# Patient Record
Sex: FEMALE | Race: BLACK OR AFRICAN AMERICAN | NOT HISPANIC OR LATINO | Employment: FULL TIME | URBAN - METROPOLITAN AREA
[De-identification: names, ages, dates, MRNs, and addresses within clinical notes are randomized per-mention and may not be internally consistent; named-entity substitution may affect disease eponyms.]

---

## 2017-03-02 ENCOUNTER — TELEPHONE (OUTPATIENT)
Dept: FAMILY MEDICINE | Facility: CLINIC | Age: 37
End: 2017-03-02

## 2017-03-02 DIAGNOSIS — E78.5 DYSLIPIDEMIA (HIGH LDL; LOW HDL): ICD-10-CM

## 2017-03-02 DIAGNOSIS — E66.01 MORBID OBESITY, UNSPECIFIED OBESITY TYPE: Primary | ICD-10-CM

## 2017-03-02 DIAGNOSIS — E88.810 METABOLIC SYNDROME: ICD-10-CM

## 2017-03-02 NOTE — TELEPHONE ENCOUNTER
----- Message from Nae Magana sent at 3/2/2017 10:07 AM CST -----  Pt called and stated she would like labs before her 03/10/17 appt.

## 2017-03-03 ENCOUNTER — LAB VISIT (OUTPATIENT)
Dept: LAB | Facility: HOSPITAL | Age: 37
End: 2017-03-03
Attending: FAMILY MEDICINE
Payer: COMMERCIAL

## 2017-03-03 DIAGNOSIS — E88.810 METABOLIC SYNDROME: ICD-10-CM

## 2017-03-03 DIAGNOSIS — E78.5 DYSLIPIDEMIA (HIGH LDL; LOW HDL): ICD-10-CM

## 2017-03-03 DIAGNOSIS — E66.01 MORBID OBESITY, UNSPECIFIED OBESITY TYPE: ICD-10-CM

## 2017-03-03 LAB
ALBUMIN SERPL BCP-MCNC: 3.5 G/DL
ALP SERPL-CCNC: 57 U/L
ALT SERPL W/O P-5'-P-CCNC: 12 U/L
ANION GAP SERPL CALC-SCNC: 6 MMOL/L
AST SERPL-CCNC: 17 U/L
BASOPHILS # BLD AUTO: 0.04 K/UL
BASOPHILS NFR BLD: 0.8 %
BILIRUB SERPL-MCNC: 0.5 MG/DL
BUN SERPL-MCNC: 14 MG/DL
CALCIUM SERPL-MCNC: 8.7 MG/DL
CHLORIDE SERPL-SCNC: 105 MMOL/L
CO2 SERPL-SCNC: 27 MMOL/L
CREAT SERPL-MCNC: 0.8 MG/DL
DIFFERENTIAL METHOD: ABNORMAL
EOSINOPHIL # BLD AUTO: 0.2 K/UL
EOSINOPHIL NFR BLD: 3.2 %
ERYTHROCYTE [DISTWIDTH] IN BLOOD BY AUTOMATED COUNT: 13.1 %
EST. GFR  (AFRICAN AMERICAN): >60 ML/MIN/1.73 M^2
EST. GFR  (NON AFRICAN AMERICAN): >60 ML/MIN/1.73 M^2
GLUCOSE SERPL-MCNC: 91 MG/DL
HCT VFR BLD AUTO: 35 %
HGB BLD-MCNC: 11.9 G/DL
LYMPHOCYTES # BLD AUTO: 1.4 K/UL
LYMPHOCYTES NFR BLD: 28.6 %
MCH RBC QN AUTO: 29.5 PG
MCHC RBC AUTO-ENTMCNC: 34 %
MCV RBC AUTO: 87 FL
MONOCYTES # BLD AUTO: 0.5 K/UL
MONOCYTES NFR BLD: 10.2 %
NEUTROPHILS # BLD AUTO: 2.7 K/UL
NEUTROPHILS NFR BLD: 57 %
PLATELET # BLD AUTO: 267 K/UL
PMV BLD AUTO: 10.3 FL
POTASSIUM SERPL-SCNC: 3.9 MMOL/L
PROT SERPL-MCNC: 7.1 G/DL
RBC # BLD AUTO: 4.03 M/UL
SODIUM SERPL-SCNC: 138 MMOL/L
T4 FREE SERPL-MCNC: 1.07 NG/DL
TSH SERPL DL<=0.005 MIU/L-ACNC: 2.13 UIU/ML
WBC # BLD AUTO: 4.72 K/UL

## 2017-03-03 PROCEDURE — 80053 COMPREHEN METABOLIC PANEL: CPT

## 2017-03-03 PROCEDURE — 85025 COMPLETE CBC W/AUTO DIFF WBC: CPT

## 2017-03-03 PROCEDURE — 84443 ASSAY THYROID STIM HORMONE: CPT

## 2017-03-03 PROCEDURE — 84439 ASSAY OF FREE THYROXINE: CPT

## 2017-03-03 PROCEDURE — 80061 LIPID PANEL: CPT

## 2017-03-03 PROCEDURE — 36415 COLL VENOUS BLD VENIPUNCTURE: CPT | Mod: PO

## 2017-03-03 PROCEDURE — 83036 HEMOGLOBIN GLYCOSYLATED A1C: CPT

## 2017-03-03 RX ORDER — FLUTICASONE PROPIONATE 50 UG/1
SPRAY, METERED NASAL
Qty: 16 G | Refills: 5 | Status: SHIPPED | OUTPATIENT
Start: 2017-03-03

## 2017-03-04 LAB
ESTIMATED AVG GLUCOSE: 108 MG/DL
HBA1C MFR BLD HPLC: 5.4 %

## 2017-03-08 LAB
CHOLEST SERPL-MCNC: 189 MG/DL (ref 100–199)
HDL SERPL QN: 9.2 NM
HDL SERPL QN: <25 NM
HDL SERPL-SCNC: 20.9 UMOL/L
HDLC SERPL-MCNC: 39 MG/DL
HLD.LARGE SERPL-SCNC: 4.2 UMOL/L
LDL MED SERPL QN: 21.7 NM
LDL SERPL QN: 21.7 NM
LDL SERPL-SCNC: 1601 NMOL/L
LDL SMALL SERPL-SCNC: 427 NMOL/L
LDL SMALL SERPL-SCNC: 427 NMOL/L
LDLC SERPL CALC-MCNC: 140 MG/DL (ref 0–99)
TRIGL SERPL-MCNC: 49 MG/DL (ref 0–149)
VLDL LARGE SERPL-SCNC: <0.8 NMOL/L
VLDL SERPL QN: ABNORMAL NM

## 2017-03-10 ENCOUNTER — OFFICE VISIT (OUTPATIENT)
Dept: FAMILY MEDICINE | Facility: CLINIC | Age: 37
End: 2017-03-10
Attending: FAMILY MEDICINE
Payer: COMMERCIAL

## 2017-03-10 VITALS
BODY MASS INDEX: 38.84 KG/M2 | DIASTOLIC BLOOD PRESSURE: 74 MMHG | WEIGHT: 233.13 LBS | HEART RATE: 66 BPM | SYSTOLIC BLOOD PRESSURE: 118 MMHG | OXYGEN SATURATION: 99 % | HEIGHT: 65 IN

## 2017-03-10 DIAGNOSIS — Z91.89 FRAMINGHAM CARDIAC RISK <10% IN NEXT 10 YEARS: ICD-10-CM

## 2017-03-10 DIAGNOSIS — E78.5 DYSLIPIDEMIA (HIGH LDL; LOW HDL): ICD-10-CM

## 2017-03-10 DIAGNOSIS — E88.810 METABOLIC SYNDROME: ICD-10-CM

## 2017-03-10 DIAGNOSIS — L21.9 SEBORRHEIC ECZEMA OF SCALP: ICD-10-CM

## 2017-03-10 DIAGNOSIS — E66.9 NON MORBID OBESITY, UNSPECIFIED OBESITY TYPE: ICD-10-CM

## 2017-03-10 DIAGNOSIS — Z00.00 ROUTINE GENERAL MEDICAL EXAMINATION AT A HEALTH CARE FACILITY: Primary | ICD-10-CM

## 2017-03-10 DIAGNOSIS — Z97.5 IUD (INTRAUTERINE DEVICE) IN PLACE: ICD-10-CM

## 2017-03-10 PROCEDURE — 99395 PREV VISIT EST AGE 18-39: CPT | Mod: S$GLB,,, | Performed by: FAMILY MEDICINE

## 2017-03-10 PROCEDURE — 99999 PR PBB SHADOW E&M-EST. PATIENT-LVL III: CPT | Mod: PBBFAC,,, | Performed by: FAMILY MEDICINE

## 2017-03-10 PROCEDURE — 3078F DIAST BP <80 MM HG: CPT | Mod: S$GLB,,, | Performed by: FAMILY MEDICINE

## 2017-03-10 PROCEDURE — 3074F SYST BP LT 130 MM HG: CPT | Mod: S$GLB,,, | Performed by: FAMILY MEDICINE

## 2017-03-10 NOTE — MR AVS SNAPSHOT
[facility]  [address] [place] [address]  [address]  [facility] [address]  Phone: [phone]                  Kristofer Gentile   3/10/2017 8:20 AM   Office Visit    Description:  Female : 1980   Provider:  Albino Billings Jr., MD   Department:  [facility]           Reason for Visit     Annual Exam           Diagnoses this Visit        Comments    Routine general medical examination at a health care facility    -  Primary     Metabolic syndrome         Dyslipidemia (high LDL; low HDL)         IUD (intrauterine device) in place         Non morbid obesity, unspecified obesity type         BMI 38.0-38.9,adult         Kendall cardiac risk <10% in next 10 years         Seborrheic eczema of scalp                To Do List           Goals (5 Years of Data)     None      [facility] On [facility]     [facility] On Call Nurse Care Line -  Assistance  Registered nurses in the [facility] On Call Center provide clinical advisement, health education, appointment booking, and other advisory services.  Call for this free service at [phone].             Medications           Message regarding Medications     Verify the changes and/or additions to your medication regime listed below are the same as discussed with your clinician today.  If any of these changes or additions are incorrect, please notify your healthcare provider.        STOP taking these medications     fexofenadine (ALLEGRA) 180 MG tablet Take 1 tablet (180 mg total) by mouth once daily.    levocetirizine (XYZAL) 5 MG tablet Take 1 tablet (5 mg total) by mouth every evening.           Verify that the below list of medications is an accurate representation of the medications you are currently taking.  If none reported, the list may be blank. If incorrect, please contact your healthcare provider. Carry this list with you in case of emergency.           Current Medications     FLONASE ALLERGY RELIEF 50 mcg/actuation nasal spray  "SPRAY TWICE IN EACH NOSTRIL ONCE DAILY    levonorgestrel (MIRENA) 20 mcg/24 hour (5 years) IUD 1 each by Intrauterine route once.           Clinical Reference Information           Your Vitals Were     BP Pulse Height Weight SpO2 BMI    118/74 (BP Location: Left arm, Patient Position: Sitting, BP Method: Manual) 66 5' 5" (1.651 m) 105.7 kg (233 lb 1.6 oz) 99% 38.79 kg/m2      Blood Pressure          Most Recent Value    BP  118/74      Allergies as of 3/10/2017     Bactrim [Sulfamethoxazole-trimethoprim]    Sulfa (Sulfonamide Antibiotics)      Immunizations Administered on Date of Encounter - 3/10/2017     None      Orders Placed During Today's Visit      Normal Orders This Visit    Ambulatory referral to Dermatology       Language Assistance Services     ATTENTION: Language assistance services are available, free of charge. Please call [phone].      ATENCIÓN: Si habla karen, tiene a guzman disposición servicios gratuitos de asistencia lingüística. Llame al [phone].     SHEA Ý: N?u b?n nói Ti?ng Vi?t, có các d?ch v? h? tr? ngôn ng? mi?n phí dành cho b?n. G?i s? [phone].         [facility] complies with applicable Federal civil rights laws and does not discriminate on the basis of race, color, national origin, age, disability, or sex.        "

## 2017-03-10 NOTE — PROGRESS NOTES
Subjective:       Patient ID: Kristofer Gentile is a 36 y.o. female.    Chief Complaint: Annual Exam    HPI     The patient presents to the office today requesting a routine periodic health examination.    Patient Active Problem List   Diagnosis    Dyslipidemia (high LDL; low HDL)    Family history of stroke    Herpes simplex labialis    Obesity    BMI 38.0-38.9,adult    Metabolic syndrome    IUD (intrauterine device) in place       Past Surgical History:   Procedure Laterality Date    INTRAUTERINE DEVICE INSERTION  2009    again 2014         Current Outpatient Prescriptions:     FLONASE ALLERGY RELIEF 50 mcg/actuation nasal spray, SPRAY TWICE IN EACH NOSTRIL ONCE DAILY, Disp: 16 g, Rfl: 5    levonorgestrel (MIRENA) 20 mcg/24 hour (5 years) IUD, 1 each by Intrauterine route once., Disp: , Rfl:     Review of patient's allergies indicates:   Allergen Reactions    Bactrim [sulfamethoxazole-trimethoprim] Other (See Comments)     Stiffness to joints    Sulfa (sulfonamide antibiotics) Hives       Family History   Problem Relation Age of Onset    Diabetes Mother     Hypertension Mother     Rheum arthritis Mother     Coronary artery disease Mother     Stroke Mother      x2    Liver disease Mother      NAFLD    Sarcoidosis Mother     Hypertension Father        Social History     Social History    Marital status:      Spouse name: N/A    Number of children: 2    Years of education: N/A     Occupational History     [employer]     Social History Main Topics    Smoking status: Never Smoker    Smokeless tobacco: Never Used    Alcohol use 0.5 oz/week     1 drink(s) per week    Drug use: No    Sexual activity: Yes     Partners: Male     Birth control/ protection: IUD     Other Topics Concern    Not on file     Social History Narrative    The patient does exercise regularly (@ gym 2-4 days/week).    Rates diet as fair.    She is not satisfied with weight.    She does not drink at least  "1/2 gallon water daily.    She drinks 1 coffee/tea/caffeine-containing soft drinks daily.    Total sleep time at night is 4-6 hours.    She works 40 hours per week.    She does wear seat belts.    Hobbies include none.                               OB History      Para Term  AB TAB SAB Ectopic Multiple Living    2 2 2               Obstetric Comments    Menarche age 14.  No menses on Mirena.  No history of abnormal PAP smear.          Patient Care Team:  Albino Billings Jr., MD as PCP - General (Family Medicine)  Anastsaiya Ellis MD as Obstetrician (Obstetrics)      Review of Systems   Constitutional: Negative for fatigue and unexpected weight change.   HENT: Negative for ear discharge, ear pain, hearing loss, tinnitus and voice change.    Respiratory: Negative for cough and shortness of breath.    Cardiovascular: Negative for chest pain, palpitations and leg swelling.   Gastrointestinal: Positive for constipation. Negative for abdominal pain, blood in stool, diarrhea, nausea and vomiting.   Genitourinary: Negative for difficulty urinating, dyspareunia, dysuria, frequency and hematuria.   Musculoskeletal: Negative for arthralgias, back pain and myalgias.   Skin: Negative for rash.   Neurological: Positive for headaches (mild). Negative for dizziness, weakness and light-headedness.   Hematological: Does not bruise/bleed easily.   Psychiatric/Behavioral: Positive for sleep disturbance (early awakenings; 2/t "stress"?). Negative for dysphoric mood. The patient is not nervous/anxious.          Lab Visit on 2017   Component Date Value Ref Range Status    LDL Particle Number by NMR 2017 1601* <1000 nmol/L Final    Lipids, LDL Cholesterol 2017 140* 0 - 99 mg/dL Final    Lipids, HDL Cholesterol 2017 39* >39 mg/dL Final    Lipids, Triglycerides 2017 49  0 - 149 mg/dL Final    Lipids, Total Cholesterol 2017 189  100 - 199 mg/dL Final    HDL Particle Number 2017 20.9* " >=30.5 umol/L Final    Small LDL Particle 03/03/2017 427  <=527 nmol/L Final    LDL Particle Size 03/03/2017 21.7  >20.5 nm Final    Large VLDL Particle Number 03/03/2017 <0.8  <=2.7 nmol/L Final    Small LDL Particle Number 03/03/2017 427  <=527 nmol/L Final    Large HDL Particle Number 03/03/2017 4.2* >=4.8 umol/L Final    VLDL Size 03/03/2017 Not Applicable  nm Final    NMR LDL Size 03/03/2017 21.7  >=20.8 nm Final    HDL Size 03/03/2017 9.2  >=9.2 nm Final    LP Insulin Resistance Score 03/03/2017 <25  <=45 Final    WBC 03/03/2017 4.72  3.90 - 12.70 K/uL Final    RBC 03/03/2017 4.03  4.00 - 5.40 M/uL Final    Hemoglobin 03/03/2017 11.9* 12.0 - 16.0 g/dL Final    Hematocrit 03/03/2017 35.0* 37.0 - 48.5 % Final    MCV 03/03/2017 87  82 - 98 fL Final    MCH 03/03/2017 29.5  27.0 - 31.0 pg Final    MCHC 03/03/2017 34.0  32.0 - 36.0 % Final    RDW 03/03/2017 13.1  11.5 - 14.5 % Final    Platelets 03/03/2017 267  150 - 350 K/uL Final    MPV 03/03/2017 10.3  9.2 - 12.9 fL Final    Gran # 03/03/2017 2.7  1.8 - 7.7 K/uL Final    Lymph # 03/03/2017 1.4  1.0 - 4.8 K/uL Final    Mono # 03/03/2017 0.5  0.3 - 1.0 K/uL Final    Eos # 03/03/2017 0.2  0.0 - 0.5 K/uL Final    Baso # 03/03/2017 0.04  0.00 - 0.20 K/uL Final    Gran% 03/03/2017 57.0  38.0 - 73.0 % Final    Lymph% 03/03/2017 28.6  18.0 - 48.0 % Final    Mono% 03/03/2017 10.2  4.0 - 15.0 % Final    Eosinophil% 03/03/2017 3.2  0.0 - 8.0 % Final    Basophil% 03/03/2017 0.8  0.0 - 1.9 % Final    Differential Method 03/03/2017 Automated   Final    Sodium 03/03/2017 138  136 - 145 mmol/L Final    Potassium 03/03/2017 3.9  3.5 - 5.1 mmol/L Final    Chloride 03/03/2017 105  95 - 110 mmol/L Final    CO2 03/03/2017 27  23 - 29 mmol/L Final    Glucose 03/03/2017 91  70 - 110 mg/dL Final    BUN, Bld 03/03/2017 14  6 - 20 mg/dL Final    Creatinine 03/03/2017 0.8  0.5 - 1.4 mg/dL Final    Calcium 03/03/2017 8.7  8.7 - 10.5 mg/dL Final     Total Protein 03/03/2017 7.1  6.0 - 8.4 g/dL Final    Albumin 03/03/2017 3.5  3.5 - 5.2 g/dL Final    Total Bilirubin 03/03/2017 0.5  0.1 - 1.0 mg/dL Final    Alkaline Phosphatase 03/03/2017 57  55 - 135 U/L Final    AST 03/03/2017 17  10 - 40 U/L Final    ALT 03/03/2017 12  10 - 44 U/L Final    Anion Gap 03/03/2017 6* 8 - 16 mmol/L Final    eGFR if African American 03/03/2017 >60.0  >60 mL/min/1.73 m^2 Final    eGFR if non African American 03/03/2017 >60.0  >60 mL/min/1.73 m^2 Final    TSH 03/03/2017 2.135  0.400 - 4.000 uIU/mL Final    Free T4 03/03/2017 1.07  0.71 - 1.51 ng/dL Final    Hemoglobin A1C 03/03/2017 5.4  4.5 - 6.2 % Final    Estimated Avg Glucose 03/03/2017 108  68 - 131 mg/dL Final         Objective:      Physical Exam   Constitutional: She is oriented to person, place, and time. She appears well-developed and well-nourished. She is cooperative.   HENT:   Head: Normocephalic and atraumatic.   Nose: Nose normal.   Mouth/Throat: Oropharynx is clear and moist and mucous membranes are normal.   Eyes: Conjunctivae are normal. No scleral icterus.   Neck: Neck supple. No JVD present. Carotid bruit is not present. No thyromegaly present.   Cardiovascular: Normal rate, regular rhythm, normal heart sounds and normal pulses.  Exam reveals no gallop and no friction rub.    No murmur heard.  Pulmonary/Chest: Effort normal and breath sounds normal. She has no wheezes. She has no rhonchi. She has no rales.   Abdominal: Soft. Bowel sounds are normal. She exhibits no distension and no mass. There is no splenomegaly or hepatomegaly. There is no tenderness.   Musculoskeletal: Normal range of motion. She exhibits no edema or tenderness.   Lymphadenopathy:     She has no cervical adenopathy.     She has no axillary adenopathy.   Neurological: She is alert and oriented to person, place, and time. She has normal strength and normal reflexes. No cranial nerve deficit or sensory deficit.   Skin: Skin is warm and  "dry.   Psychiatric: She has a normal mood and affect. Her speech is normal.   Vitals reviewed.        Assessment:       1. Routine general medical examination at a health care facility    2. Metabolic syndrome    3. Dyslipidemia (high LDL; low HDL)    4. IUD (intrauterine device) in place    5. Non morbid obesity, unspecified obesity type    6. BMI 38.0-38.9,adult        Plan:       Congratulated patient on progress so far: loss of ~ 20-25 lbs.  Discussed with patient the importance of lifestyle modifications, including well-balanced diet and moderate exercise regimen, in reducing risk for cardiovascular/cerebrovascular disease and diabetes.  Discussed routine examinations and screenings.   Reminded patient to get PAP smear in July.  Flonase for allergic rhinitis.  RTC 1 year.         "This note will not be shared with the patient."  "

## 2017-04-27 ENCOUNTER — LAB VISIT (OUTPATIENT)
Dept: LAB | Facility: HOSPITAL | Age: 37
End: 2017-04-27
Payer: COMMERCIAL

## 2017-04-27 DIAGNOSIS — L65.8 OTHER ALOPECIA: Primary | ICD-10-CM

## 2017-04-27 PROCEDURE — 86038 ANTINUCLEAR ANTIBODIES: CPT

## 2017-04-27 PROCEDURE — 36415 COLL VENOUS BLD VENIPUNCTURE: CPT

## 2017-04-28 LAB — ANA SER QL IF: NORMAL

## 2017-07-17 ENCOUNTER — PATIENT MESSAGE (OUTPATIENT)
Dept: OBSTETRICS AND GYNECOLOGY | Facility: CLINIC | Age: 37
End: 2017-07-17

## 2017-07-24 ENCOUNTER — OFFICE VISIT (OUTPATIENT)
Dept: OBSTETRICS AND GYNECOLOGY | Facility: CLINIC | Age: 37
End: 2017-07-24
Attending: OBSTETRICS & GYNECOLOGY
Payer: COMMERCIAL

## 2017-07-24 VITALS
SYSTOLIC BLOOD PRESSURE: 108 MMHG | HEIGHT: 65 IN | BODY MASS INDEX: 39.3 KG/M2 | WEIGHT: 235.88 LBS | DIASTOLIC BLOOD PRESSURE: 80 MMHG

## 2017-07-24 DIAGNOSIS — Z01.419 WELL WOMAN EXAM WITH ROUTINE GYNECOLOGICAL EXAM: Primary | ICD-10-CM

## 2017-07-24 PROCEDURE — 88175 CYTOPATH C/V AUTO FLUID REDO: CPT

## 2017-07-24 PROCEDURE — 99385 PREV VISIT NEW AGE 18-39: CPT | Mod: S$GLB,,, | Performed by: OBSTETRICS & GYNECOLOGY

## 2017-07-24 PROCEDURE — 99999 PR PBB SHADOW E&M-EST. PATIENT-LVL II: CPT | Mod: PBBFAC,,, | Performed by: OBSTETRICS & GYNECOLOGY

## 2017-07-24 PROCEDURE — 87624 HPV HI-RISK TYP POOLED RSLT: CPT

## 2017-07-24 NOTE — PROGRESS NOTES
SUBJECTIVE:   37 y.o. female   for annual routine Pap and checkup. Patient's last menstrual period was 2017 (approximate)..  She has no unusual complaints.        Past Medical History:   Diagnosis Date    Allergy     Eczema 3/2012    Fever blister     IUD (intrauterine device) in place      Past Surgical History:   Procedure Laterality Date    INTRAUTERINE DEVICE INSERTION      again      Social History     Social History    Marital status:      Spouse name: N/A    Number of children: 2    Years of education: N/A     Occupational History     [employer]     Social History Main Topics    Smoking status: Never Smoker    Smokeless tobacco: Never Used    Alcohol use 0.5 oz/week     1 Standard drinks or equivalent per week    Drug use: No    Sexual activity: Yes     Partners: Male     Birth control/ protection: IUD     Other Topics Concern    Not on file     Social History Narrative    The patient does exercise regularly (@ gym 2-4 days/week).    Rates diet as fair.    She is not satisfied with weight.    She does not drink at least 1/2 gallon water daily.    She drinks 1 coffee/tea/caffeine-containing soft drinks daily.    Total sleep time at night is 4-6 hours.    She works 40 hours per week.    She does wear seat belts.    Hobbies include none.                             Family History   Problem Relation Age of Onset    Diabetes Mother     Hypertension Mother     Rheum arthritis Mother     Coronary artery disease Mother     Stroke Mother      x2    Liver disease Mother      NAFLD    Sarcoidosis Mother     Hypertension Father      OB History    Para Term  AB Living   2 2 2         SAB TAB Ectopic Multiple Live Births                  # Outcome Date GA Lbr [place]/2nd Weight Sex Delivery Anes PTL Lv   2 Term      Vag-Spont      1 Term      Vag-Spont         Obstetric Comments   Menarche age 14.   No menses on Mirena.   No history of abnormal PAP  "smear.         Current Outpatient Prescriptions   Medication Sig Dispense Refill    FLONASE ALLERGY RELIEF 50 mcg/actuation nasal spray SPRAY TWICE IN EACH NOSTRIL ONCE DAILY 16 g 5    levonorgestrel (MIRENA) 20 mcg/24 hour (5 years) IUD 1 each by Intrauterine route once.       No current facility-administered medications for this visit.      Allergies: Bactrim [sulfamethoxazole-trimethoprim] and Sulfa (sulfonamide antibiotics)     ROS:  Constitutional: no weight loss, weight gain, fever, fatigue  Eyes:  No vision changes, glasses/contacts  ENT/Mouth: No ulcers, sinus problems, ears ringing, headache  Cardiovascular: No inability to lie flat, chest pain, exercise intolerance, swelling, heart palpitations  Respiratory: No wheezing, coughing blood, shortness of breath, or cough  Gastrointestinal: No diarrhea, bloody stool, nausea/vomiting, constipation, gas, hemorrhoids  Genitourinary: No blood in urine, painful urination, urgency of urination, frequency of urination, incomplete emptying, incontinence, abnormal bleeding, painful periods, heavy periods, vaginal discharge, vaginal odor, painful intercourse, sexual problems, bleeding after intercourse.  Musculoskeletal: No muscle weakness  Skin/Breast: No painful breasts, nipple discharge, masses, rash, ulcers  Neurological: No passing out, seizures, numbness, headache  Endocrine: No diabetes, hypothyroid, hyperthyroid, hot flashes, hair loss, abnormal hair growth, ance  Psychiatric: No depression, crying  Hematologic: No bruises, bleeding, swollen lymph nodes, anemia.      OBJECTIVE:   The patient appears well, alert, oriented x 3, in no distress.  /80   Ht 5' 5" (1.651 m)   Wt 107 kg (235 lb 14.3 oz)   LMP 07/03/2017 (Approximate)   BMI 39.25 kg/m²   NECK: no thyromegaly, trachea midline  SKIN: no acne, striae, hirsutism  BREAST EXAM: breasts appear normal, no suspicious masses, no skin or nipple changes or axillary nodes  ABDOMEN: no hernias, masses, or " hepatosplenomegaly  GENITALIA: normal external genitalia, no erythema, no discharge  URETHRA: normal urethra, normal urethral meatus  VAGINA: Normal  CERVIX: no lesions or cervical motion tenderness and IUD string present  UTERUS: normal size, contour, position, consistency, mobility, non-tender  ADNEXA: no mass, fullness, tenderness    \  ASSESSMENT:   .Kristofer was seen today for well woman and annual exam.    Diagnoses and all orders for this visit:    Well woman exam with routine gynecological exam  -     Liquid-based pap smear, screening  -     HPV High Risk Genotypes, PCR

## 2017-07-28 LAB
HPV HR 12 DNA CVX QL NAA+PROBE: NEGATIVE
HPV16 DNA SPEC QL NAA+PROBE: NEGATIVE
HPV18 DNA SPEC QL NAA+PROBE: NEGATIVE

## 2017-10-23 ENCOUNTER — OFFICE VISIT (OUTPATIENT)
Dept: OBSTETRICS AND GYNECOLOGY | Facility: CLINIC | Age: 37
End: 2017-10-23
Payer: COMMERCIAL

## 2017-10-23 ENCOUNTER — TELEPHONE (OUTPATIENT)
Dept: OBSTETRICS AND GYNECOLOGY | Facility: CLINIC | Age: 37
End: 2017-10-23

## 2017-10-23 VITALS
WEIGHT: 237.44 LBS | DIASTOLIC BLOOD PRESSURE: 72 MMHG | BODY MASS INDEX: 39.56 KG/M2 | SYSTOLIC BLOOD PRESSURE: 120 MMHG | HEIGHT: 65 IN

## 2017-10-23 DIAGNOSIS — N76.0 ACUTE VAGINITIS: Primary | ICD-10-CM

## 2017-10-23 LAB
CANDIDA RRNA VAG QL PROBE: POSITIVE
G VAGINALIS RRNA GENITAL QL PROBE: NEGATIVE
T VAGINALIS RRNA GENITAL QL PROBE: NEGATIVE

## 2017-10-23 PROCEDURE — 87480 CANDIDA DNA DIR PROBE: CPT

## 2017-10-23 PROCEDURE — 99999 PR PBB SHADOW E&M-EST. PATIENT-LVL II: CPT | Mod: PBBFAC,,,

## 2017-10-23 PROCEDURE — 87660 TRICHOMONAS VAGIN DIR PROBE: CPT

## 2017-10-23 PROCEDURE — 99213 OFFICE O/P EST LOW 20 MIN: CPT | Mod: S$GLB,,, | Performed by: NURSE PRACTITIONER

## 2017-10-23 RX ORDER — FLUCONAZOLE 150 MG/1
150 TABLET ORAL ONCE
Qty: 1 TABLET | Refills: 1 | Status: SHIPPED | OUTPATIENT
Start: 2017-10-23 | End: 2017-10-23

## 2017-10-23 NOTE — PROGRESS NOTES
CC: Vaginal Discharge    Kristofer Gentile is a 37 y.o. female  presents with complaint of vaginal discharge for 1 week.  She reports itching.  denies odor.  She states the discharge is white.  Alleviating factors: Monistat 1- with mild relief of symptoms. No new sexual partners.    Reports she took Amoxicillin for 2 days prior to onset of symptoms.     ROS:  GENERAL: No fever, chills, fatigability or weight loss.  VULVAR: No pain, no lesions and no itching.  VAGINAL: No relaxation, no itching, no discharge, no abnormal bleeding and no lesions.  ABDOMEN: No abdominal pain. Denies nausea. Denies vomiting. No diarrhea. No constipation  BREAST: Denies pain. No lumps. No discharge.  URINARY: No incontinence, no nocturia, no frequency and no dysuria.  CARDIOVASCULAR: No chest pain. No shortness of breath. No leg cramps.  NEUROLOGICAL: No headaches. No vision changes.    PHYSICAL EXAM:  VULVA: normal appearing vulva with no masses, tenderness or lesions   VAGINA: normal appearing vagina with normal color and + thick discharge, no lesions   CERVIX: normal appearing cervix without discharge or lesions + IUD strings visualized- 2 cm out  UTERUS: uterus is normal size, shape, consistency and nontender   ADNEXA: normal adnexa in size, nontender and no masses    ASSESSMENT and PLAN:    ICD-10-CM ICD-9-CM    1. Acute vaginitis N76.0 616.10 Vaginosis Screen by DNA Probe      fluconazole (DIFLUCAN) 150 MG Tab     Affirm  Diflucan    Patient was counseled today on vaginitis prevention including :  a. avoiding feminine products such as deoderant soaps, body wash, bubble bath, douches, scented toilet paper, deoderant tampons or pads, feminine wipes, chronic pad use, etc.  b. avoiding other vulvovaginal irritants such as long hot baths, humidity, tight, synthetic clothing, chlorine and sitting around in wet bathing suits  c. wearing cotton underwear, avoiding thong underwear and no underwear to bed  d. taking showers instead of  baths and use a hair dryer on cool setting afterwards to dry  e. wearing cotton to exercise and shower immediately after exercise and change clothes  f. using polyurethane condoms without spermicide if sexually active and symptoms are triggered by intercourse    FOLLOW UP: PRN lack of improvement.    Lulu Sullivan, FNP-C

## 2017-10-23 NOTE — TELEPHONE ENCOUNTER
Called to inform patient of abnormal culture results, no answer, left message for patient to call office at [phone].        Please notify pt her vaginosis culture was positive for yeast- continue Diflucan as we discussed

## 2018-04-03 ENCOUNTER — PATIENT MESSAGE (OUTPATIENT)
Dept: FAMILY MEDICINE | Facility: CLINIC | Age: 38
End: 2018-04-03

## 2018-04-04 ENCOUNTER — OFFICE VISIT (OUTPATIENT)
Dept: INTERNAL MEDICINE | Facility: CLINIC | Age: 38
End: 2018-04-04
Payer: COMMERCIAL

## 2018-04-04 ENCOUNTER — TELEPHONE (OUTPATIENT)
Dept: INTERNAL MEDICINE | Facility: CLINIC | Age: 38
End: 2018-04-04

## 2018-04-04 VITALS
BODY MASS INDEX: 40.84 KG/M2 | SYSTOLIC BLOOD PRESSURE: 126 MMHG | HEIGHT: 65 IN | DIASTOLIC BLOOD PRESSURE: 82 MMHG | HEART RATE: 73 BPM | WEIGHT: 245.13 LBS | OXYGEN SATURATION: 99 %

## 2018-04-04 DIAGNOSIS — G47.00 INSOMNIA, UNSPECIFIED TYPE: Primary | ICD-10-CM

## 2018-04-04 DIAGNOSIS — F43.21 GRIEF: ICD-10-CM

## 2018-04-04 PROCEDURE — 99999 PR PBB SHADOW E&M-EST. PATIENT-LVL III: CPT | Mod: PBBFAC,,, | Performed by: INTERNAL MEDICINE

## 2018-04-04 PROCEDURE — 99212 OFFICE O/P EST SF 10 MIN: CPT | Mod: S$GLB,,, | Performed by: INTERNAL MEDICINE

## 2018-04-04 PROCEDURE — 3079F DIAST BP 80-89 MM HG: CPT | Mod: CPTII,S$GLB,, | Performed by: INTERNAL MEDICINE

## 2018-04-04 PROCEDURE — 3074F SYST BP LT 130 MM HG: CPT | Mod: CPTII,S$GLB,, | Performed by: INTERNAL MEDICINE

## 2018-04-04 RX ORDER — TEMAZEPAM 7.5 MG/1
15 CAPSULE ORAL NIGHTLY PRN
Qty: 15 CAPSULE | Refills: 0 | Status: SHIPPED | OUTPATIENT
Start: 2018-04-04 | End: 2018-05-04

## 2018-04-04 NOTE — TELEPHONE ENCOUNTER
----- Message from Maddy Parmar sent at 4/4/2018 12:34 PM CDT -----  Contact: self/[phone]      Patient is returning a phone call.  Who left a message for the patient: Cecille  Does patient know what this is regarding:  Returning call  Comments: please call and advise.

## 2018-04-05 NOTE — PROGRESS NOTES
Subjective:       Patient ID: Kristofer Gentile is a 37 y.o. female.    Chief Complaint: Stress    Patient is very stressed and distressed over the impending death of her mother.  Wants [place] papers completed.  Looks like from review of form that mom's doc need to complete forms.  Patient not sleeping      Review of Systems   Constitutional: Negative for activity change, chills, fatigue and fever.   HENT: Negative for congestion, ear pain, nosebleeds, postnasal drip, sinus pressure and sore throat.    Eyes: Negative.  Negative for visual disturbance.   Respiratory: Negative for cough, chest tightness, shortness of breath and wheezing.    Cardiovascular: Negative for chest pain.   Gastrointestinal: Negative for abdominal pain, diarrhea, nausea and vomiting.   Genitourinary: Negative for difficulty urinating, dysuria, frequency and urgency.   Musculoskeletal: Negative for arthralgias and neck stiffness.   Skin: Negative for rash.   Neurological: Negative for dizziness, weakness and headaches.   Psychiatric/Behavioral: Negative for sleep disturbance. The patient is not nervous/anxious.        Objective:      Physical Exam   Constitutional: She appears well-developed and well-nourished.       Assessment:       1. Insomnia, unspecified type    2. Grief        Plan:   Kristofer was seen today for stress.    Diagnoses and all orders for this visit:    Insomnia, unspecified type    Grief    Other orders  -     temazepam (RESTORIL) 7.5 MG Cap; Take 2 capsules (15 mg total) by mouth nightly as needed.

## 2018-10-25 ENCOUNTER — TELEPHONE (OUTPATIENT)
Dept: INTERNAL MEDICINE | Facility: CLINIC | Age: 38
End: 2018-10-25

## 2018-10-25 NOTE — TELEPHONE ENCOUNTER
Appointment For: Kristofer Gentile ([id number])   Visit Type: [facility]CIE NEW PATIENT VISIT/PHYS ([id number])      10/31/2018   3:20 PM  40 mins.  Melonie Villanueva MD     [facility]      Patient Comments:   I need a check up and I would also like to talk about some changes in my left breast.  I am looking for a new primary care physician

## 2018-10-31 ENCOUNTER — OFFICE VISIT (OUTPATIENT)
Dept: INTERNAL MEDICINE | Facility: CLINIC | Age: 38
End: 2018-10-31
Payer: COMMERCIAL

## 2018-10-31 VITALS
SYSTOLIC BLOOD PRESSURE: 130 MMHG | HEIGHT: 65 IN | HEART RATE: 80 BPM | TEMPERATURE: 98 F | BODY MASS INDEX: 41.36 KG/M2 | DIASTOLIC BLOOD PRESSURE: 80 MMHG | WEIGHT: 248.25 LBS

## 2018-10-31 DIAGNOSIS — E04.9 PALPABLE THYROID: ICD-10-CM

## 2018-10-31 DIAGNOSIS — N64.52 BREAST DISCHARGE: ICD-10-CM

## 2018-10-31 DIAGNOSIS — Z00.00 ANNUAL PHYSICAL EXAM: Primary | ICD-10-CM

## 2018-10-31 DIAGNOSIS — Z86.2 HISTORY OF ANEMIA: ICD-10-CM

## 2018-10-31 PROCEDURE — 90714 TD VACC NO PRESV 7 YRS+ IM: CPT | Mod: S$GLB,,, | Performed by: INTERNAL MEDICINE

## 2018-10-31 PROCEDURE — 90471 IMMUNIZATION ADMIN: CPT | Mod: S$GLB,,, | Performed by: INTERNAL MEDICINE

## 2018-10-31 PROCEDURE — 90472 IMMUNIZATION ADMIN EACH ADD: CPT | Mod: S$GLB,,, | Performed by: INTERNAL MEDICINE

## 2018-10-31 PROCEDURE — 3079F DIAST BP 80-89 MM HG: CPT | Mod: CPTII,S$GLB,, | Performed by: INTERNAL MEDICINE

## 2018-10-31 PROCEDURE — 99395 PREV VISIT EST AGE 18-39: CPT | Mod: 25,S$GLB,, | Performed by: INTERNAL MEDICINE

## 2018-10-31 PROCEDURE — 99999 PR PBB SHADOW E&M-EST. PATIENT-LVL III: CPT | Mod: PBBFAC,,, | Performed by: INTERNAL MEDICINE

## 2018-10-31 PROCEDURE — 3075F SYST BP GE 130 - 139MM HG: CPT | Mod: CPTII,S$GLB,, | Performed by: INTERNAL MEDICINE

## 2018-10-31 PROCEDURE — 90686 IIV4 VACC NO PRSV 0.5 ML IM: CPT | Mod: S$GLB,,, | Performed by: INTERNAL MEDICINE

## 2018-10-31 NOTE — PROGRESS NOTES
Subjective:      Kristofer Gentile is a 38 y.o. female who presents for annual exam.      Family History:  family history includes Coronary artery disease in her mother; Dementia in her father; Diabetes in her mother; Hyperlipidemia in her father; Hypertension in her father and mother; Leukemia in her maternal uncle; Liver disease in her mother; Rheum arthritis in her maternal grandmother, mother, and paternal grandmother; Sarcoidosis in her maternal uncle and mother; Stroke in her mother; Thyroid disease in her mother.    Health Maintenance:  Health Maintenance       Date Due Completion Date    TETANUS VACCINE 07/01/2018 7/1/2008 (Done)    Override on 7/1/2008: Done    Influenza Vaccine 08/01/2018 3/10/2017 (ClinicallyNA)    Override on 3/10/2017: Not Clinically Appropriate    Pap Smear with HPV Cotest 07/24/2020 7/24/2017    Override on 5/1/2010: Done (date approximate)        Eye exam: not regularly  Dental Exam: not regularly  Last Pap: 7/2017    Influenza: due  Tetanus: due    Body mass index is 41.31 kg/m².    Meds:   Current Outpatient Medications:     FLONASE ALLERGY RELIEF 50 mcg/actuation nasal spray, SPRAY TWICE IN EACH NOSTRIL ONCE DAILY, Disp: 16 g, Rfl: 5    levonorgestrel (MIRENA) 20 mcg/24 hour (5 years) IUD, 1 each by Intrauterine route once., Disp: , Rfl:     PMHx:   Past Medical History:   Diagnosis Date    Allergy     Carpal tunnel syndrome     Fever blister     IUD (intrauterine device) in place 2009    Thyroid disease     treated with synthroid during pregnancy       PSHx:     Past Surgical History:   Procedure Laterality Date    INTRAUTERINE DEVICE INSERTION  2009    again 2014       SocHx:   Social History     Socioeconomic History    Marital status:      Spouse name: None    Number of children: 2    Years of education: None    Highest education level: None   Social Needs    Financial resource strain: None    Food insecurity - worry: None    Food insecurity - inability:  None    Transportation needs - medical: None    Transportation needs - non-medical: None   Occupational History     Employer: [employer]   Tobacco Use    Smoking status: Never Smoker    Smokeless tobacco: Never Used   Substance and Sexual Activity    Alcohol use: Yes     Alcohol/week: 0.5 oz     Types: 1 Standard drinks or equivalent per week     Comment: Rarely    Drug use: No    Sexual activity: Yes     Partners: Male     Birth control/protection: IUD   Other Topics Concern    Are you pregnant or think you may be? Not Asked    Breast-feeding Not Asked   Social History Narrative    None       Review of Systems   Constitutional: Negative for appetite change, chills, fatigue, fever and unexpected weight change.        With stress    HENT: Negative for congestion, dental problem, ear discharge, ear pain, postnasal drip, rhinorrhea and sinus pressure.    Eyes: Negative for redness and visual disturbance.   Respiratory: Negative for cough, chest tightness and shortness of breath.    Cardiovascular: Positive for leg swelling. Negative for chest pain and palpitations.   Gastrointestinal: Negative for abdominal pain, constipation (infrequent BM's, sometimes every 2 weeks), diarrhea, nausea and vomiting.   Endocrine: Positive for cold intolerance. Negative for heat intolerance.   Genitourinary: Negative for dysuria, frequency, hematuria and urgency.        Reports clear left breast discharge x1   Musculoskeletal: Positive for arthralgias (knee pains). Negative for myalgias.        Right shoulder blade pain   Skin: Negative for rash.        Hair loss   Neurological: Negative for dizziness, weakness, numbness and headaches.   Hematological: Negative for adenopathy.   Psychiatric/Behavioral: Positive for sleep disturbance. Negative for dysphoric mood. The patient is nervous/anxious (related to her stressful job ).        Objective:      Physical Exam   Constitutional: She is oriented to person, place, and  time. Vital signs are normal. She appears well-developed and well-nourished. No distress.   HENT:   Head: Normocephalic and atraumatic.   Right Ear: Hearing, tympanic membrane, external ear and ear canal normal. Tympanic membrane is not erythematous and not bulging.   Left Ear: Hearing, tympanic membrane, external ear and ear canal normal. Tympanic membrane is not erythematous and not bulging.   Nose: Nose normal.   Mouth/Throat: Uvula is midline, oropharynx is clear and moist and mucous membranes are normal. No oropharyngeal exudate or posterior oropharyngeal erythema.   Eyes: EOM and lids are normal. Pupils are equal, round, and reactive to light. No scleral icterus.   + conjunctival pallor   Neck: Normal range of motion. Neck supple. No thyroid mass present. Thyromegaly: palpable.   Cardiovascular: Normal rate, regular rhythm, normal heart sounds and intact distal pulses.   No murmur heard.  Pulmonary/Chest: Effort normal and breath sounds normal. She has no wheezes.   Abdominal: Soft. Bowel sounds are normal. She exhibits no distension. There is no hepatosplenomegaly. There is no tenderness. There is no rigidity, no rebound and no guarding.   Musculoskeletal: Normal range of motion. She exhibits no edema.   Lymphadenopathy:     She has no cervical adenopathy.        Right: No supraclavicular adenopathy present.        Left: No supraclavicular adenopathy present.   Neurological: She is alert and oriented to person, place, and time. She has normal reflexes. She displays normal reflexes. Coordination and gait normal.   Skin: Skin is warm, dry and intact. No rash noted. She is not diaphoretic.   Psychiatric: She has a normal mood and affect.   Vitals reviewed.      Assessment:       1. Annual physical exam    2. Breast discharge    3. Palpable thyroid    4. History of anemia        Plan:         1. Annual physical exam  - CBC auto differential; Future  - Comprehensive metabolic panel; Future  - Hemoglobin A1c;  Future  - Lipid panel; Future  - T3, free; Future  - T4, free; Future  - Urinalysis; Future  - Vitamin D; Future  - TAY; Future  - Sedimentation rate; Future  - C-reactive protein; Future  - Td Vaccine (Adult) - Preservative Free  - Influenza - Quadrivalent (3 years & older) (PF)    2. Breast discharge, left  - Prolactin; Future  - will arrange MMG and US    3. Palpable thyroid  - Anti-thyroglobulin antibody; Future  - Thyroid peroxidase antibody; Future  - Thyroid stimulating immunoglobulin; Future  - TSH; Future  - US Soft Tissue Head Neck Thyroid; Future    4. History of anemia  - Ferritin; Future  - Iron and TIBC; Future  - Reticulocytes; Future      RTC in 1 year or sooner if needed      Melonie Villanueva MD

## 2018-11-02 ENCOUNTER — LAB VISIT (OUTPATIENT)
Dept: LAB | Facility: HOSPITAL | Age: 38
End: 2018-11-02
Attending: INTERNAL MEDICINE
Payer: COMMERCIAL

## 2018-11-02 DIAGNOSIS — Z86.2 HISTORY OF ANEMIA: ICD-10-CM

## 2018-11-02 DIAGNOSIS — Z00.00 ANNUAL PHYSICAL EXAM: ICD-10-CM

## 2018-11-02 DIAGNOSIS — E04.9 PALPABLE THYROID: ICD-10-CM

## 2018-11-02 DIAGNOSIS — N64.52 BREAST DISCHARGE: ICD-10-CM

## 2018-11-02 LAB
25(OH)D3+25(OH)D2 SERPL-MCNC: 6 NG/ML
ALBUMIN SERPL BCP-MCNC: 3.5 G/DL
ALP SERPL-CCNC: 67 U/L
ALT SERPL W/O P-5'-P-CCNC: 14 U/L
ANION GAP SERPL CALC-SCNC: 5 MMOL/L
AST SERPL-CCNC: 18 U/L
BASOPHILS # BLD AUTO: 0.04 K/UL
BASOPHILS NFR BLD: 1.2 %
BILIRUB SERPL-MCNC: 0.4 MG/DL
BUN SERPL-MCNC: 10 MG/DL
CALCIUM SERPL-MCNC: 9 MG/DL
CHLORIDE SERPL-SCNC: 106 MMOL/L
CHOLEST SERPL-MCNC: 233 MG/DL
CHOLEST/HDLC SERPL: 5.7 {RATIO}
CO2 SERPL-SCNC: 27 MMOL/L
CREAT SERPL-MCNC: 0.8 MG/DL
CRP SERPL-MCNC: 4.1 MG/L
DIFFERENTIAL METHOD: ABNORMAL
EOSINOPHIL # BLD AUTO: 0.1 K/UL
EOSINOPHIL NFR BLD: 3.7 %
ERYTHROCYTE [DISTWIDTH] IN BLOOD BY AUTOMATED COUNT: 12.8 %
ERYTHROCYTE [SEDIMENTATION RATE] IN BLOOD BY WESTERGREN METHOD: 26 MM/HR
EST. GFR  (AFRICAN AMERICAN): >60 ML/MIN/1.73 M^2
EST. GFR  (NON AFRICAN AMERICAN): >60 ML/MIN/1.73 M^2
ESTIMATED AVG GLUCOSE: 103 MG/DL
FERRITIN SERPL-MCNC: 172 NG/ML
GLUCOSE SERPL-MCNC: 89 MG/DL
HBA1C MFR BLD HPLC: 5.2 %
HCT VFR BLD AUTO: 36.5 %
HDLC SERPL-MCNC: 41 MG/DL
HDLC SERPL: 17.6 %
HGB BLD-MCNC: 11.8 G/DL
IMM GRANULOCYTES # BLD AUTO: 0.01 K/UL
IMM GRANULOCYTES NFR BLD AUTO: 0.3 %
IRON SERPL-MCNC: 47 UG/DL
LDLC SERPL CALC-MCNC: 179.8 MG/DL
LYMPHOCYTES # BLD AUTO: 1.2 K/UL
LYMPHOCYTES NFR BLD: 33.7 %
MCH RBC QN AUTO: 28.8 PG
MCHC RBC AUTO-ENTMCNC: 32.3 G/DL
MCV RBC AUTO: 89 FL
MONOCYTES # BLD AUTO: 0.4 K/UL
MONOCYTES NFR BLD: 11 %
NEUTROPHILS # BLD AUTO: 1.7 K/UL
NEUTROPHILS NFR BLD: 50.1 %
NONHDLC SERPL-MCNC: 192 MG/DL
NRBC BLD-RTO: 0 /100 WBC
PLATELET # BLD AUTO: 304 K/UL
PMV BLD AUTO: 10.7 FL
POTASSIUM SERPL-SCNC: 3.8 MMOL/L
PROLACTIN SERPL IA-MCNC: 7 NG/ML
PROT SERPL-MCNC: 7.5 G/DL
RBC # BLD AUTO: 4.1 M/UL
RETICS/RBC NFR AUTO: 1.2 %
SATURATED IRON: 15 %
SODIUM SERPL-SCNC: 138 MMOL/L
T3FREE SERPL-MCNC: 2.5 PG/ML
T4 FREE SERPL-MCNC: 1.24 NG/DL
THYROGLOB AB SERPL IA-ACNC: 42.1 IU/ML
THYROPEROXIDASE IGG SERPL-ACNC: 197.2 IU/ML
TOTAL IRON BINDING CAPACITY: 312 UG/DL
TRANSFERRIN SERPL-MCNC: 211 MG/DL
TRIGL SERPL-MCNC: 61 MG/DL
TSH SERPL DL<=0.005 MIU/L-ACNC: 1.85 UIU/ML
WBC # BLD AUTO: 3.47 K/UL

## 2018-11-02 PROCEDURE — 84445 ASSAY OF TSI GLOBULIN: CPT

## 2018-11-02 PROCEDURE — 84443 ASSAY THYROID STIM HORMONE: CPT

## 2018-11-02 PROCEDURE — 85045 AUTOMATED RETICULOCYTE COUNT: CPT

## 2018-11-02 PROCEDURE — 84439 ASSAY OF FREE THYROXINE: CPT

## 2018-11-02 PROCEDURE — 85652 RBC SED RATE AUTOMATED: CPT

## 2018-11-02 PROCEDURE — 85025 COMPLETE CBC W/AUTO DIFF WBC: CPT

## 2018-11-02 PROCEDURE — 36415 COLL VENOUS BLD VENIPUNCTURE: CPT | Mod: PO

## 2018-11-02 PROCEDURE — 86376 MICROSOMAL ANTIBODY EACH: CPT

## 2018-11-02 PROCEDURE — 83036 HEMOGLOBIN GLYCOSYLATED A1C: CPT

## 2018-11-02 PROCEDURE — 86038 ANTINUCLEAR ANTIBODIES: CPT

## 2018-11-02 PROCEDURE — 80061 LIPID PANEL: CPT

## 2018-11-02 PROCEDURE — 86140 C-REACTIVE PROTEIN: CPT

## 2018-11-02 PROCEDURE — 83540 ASSAY OF IRON: CPT

## 2018-11-02 PROCEDURE — 80053 COMPREHEN METABOLIC PANEL: CPT

## 2018-11-02 PROCEDURE — 82728 ASSAY OF FERRITIN: CPT

## 2018-11-02 PROCEDURE — 86800 THYROGLOBULIN ANTIBODY: CPT

## 2018-11-02 PROCEDURE — 84481 FREE ASSAY (FT-3): CPT

## 2018-11-02 PROCEDURE — 82306 VITAMIN D 25 HYDROXY: CPT

## 2018-11-02 PROCEDURE — 84146 ASSAY OF PROLACTIN: CPT

## 2018-11-05 ENCOUNTER — TELEPHONE (OUTPATIENT)
Dept: INTERNAL MEDICINE | Facility: CLINIC | Age: 38
End: 2018-11-05

## 2018-11-05 ENCOUNTER — HOSPITAL ENCOUNTER (OUTPATIENT)
Dept: RADIOLOGY | Facility: OTHER | Age: 38
Discharge: HOME | End: 2018-11-05
Attending: INTERNAL MEDICINE
Payer: COMMERCIAL

## 2018-11-05 DIAGNOSIS — E04.9 PALPABLE THYROID: ICD-10-CM

## 2018-11-05 PROBLEM — E55.9 VITAMIN D DEFICIENCY: Status: ACTIVE | Noted: 2018-11-05

## 2018-11-05 LAB
ANA SER QL IF: NORMAL
TSI SER-ACNC: <0.1 IU/L

## 2018-11-05 PROCEDURE — 76536 US EXAM OF HEAD AND NECK: CPT | Mod: 26,,, | Performed by: RADIOLOGY

## 2018-11-05 PROCEDURE — 76536 US EXAM OF HEAD AND NECK: CPT | Mod: TC

## 2018-11-05 RX ORDER — ERGOCALCIFEROL 1.25 MG/1
50000 CAPSULE ORAL
Qty: 4 CAPSULE | Refills: 2 | Status: SHIPPED | OUTPATIENT
Start: 2018-11-05 | End: 2019-01-30 | Stop reason: SDUPTHER

## 2018-11-05 NOTE — TELEPHONE ENCOUNTER
----- Message from Melonie Villanueva MD sent at 11/5/2018 12:19 AM CST -----  Message sent via patient portal.    - new rx, also see if she will make f/u appt in 1-2 weeks to discuss results and plans

## 2018-11-05 NOTE — TELEPHONE ENCOUNTER
Called pt; no answer; left voicemail stating new RX was called in, and appt has been made to follow up for lab result discussion. Pt has been notified via portal and letter sent home.

## 2018-11-08 PROBLEM — R76.8 ANTI-TPO ANTIBODIES PRESENT: Status: ACTIVE | Noted: 2018-11-08

## 2018-11-21 ENCOUNTER — OFFICE VISIT (OUTPATIENT)
Dept: INTERNAL MEDICINE | Facility: CLINIC | Age: 38
End: 2018-11-21
Payer: COMMERCIAL

## 2018-11-21 VITALS
RESPIRATION RATE: 16 BRPM | HEIGHT: 65 IN | BODY MASS INDEX: 41.94 KG/M2 | SYSTOLIC BLOOD PRESSURE: 121 MMHG | DIASTOLIC BLOOD PRESSURE: 82 MMHG | TEMPERATURE: 99 F | HEART RATE: 69 BPM | WEIGHT: 251.75 LBS

## 2018-11-21 DIAGNOSIS — N64.52 BREAST DISCHARGE: Primary | ICD-10-CM

## 2018-11-21 DIAGNOSIS — R76.8 ANTI-TPO ANTIBODIES PRESENT: ICD-10-CM

## 2018-11-21 DIAGNOSIS — E06.5 THYROIDITIS, CHRONIC: ICD-10-CM

## 2018-11-21 DIAGNOSIS — N63.20 LEFT BREAST LUMP: ICD-10-CM

## 2018-11-21 PROCEDURE — 3079F DIAST BP 80-89 MM HG: CPT | Mod: CPTII,S$GLB,, | Performed by: INTERNAL MEDICINE

## 2018-11-21 PROCEDURE — 99999 PR PBB SHADOW E&M-EST. PATIENT-LVL III: CPT | Mod: PBBFAC,,, | Performed by: INTERNAL MEDICINE

## 2018-11-21 PROCEDURE — 3008F BODY MASS INDEX DOCD: CPT | Mod: CPTII,S$GLB,, | Performed by: INTERNAL MEDICINE

## 2018-11-21 PROCEDURE — 99213 OFFICE O/P EST LOW 20 MIN: CPT | Mod: S$GLB,,, | Performed by: INTERNAL MEDICINE

## 2018-11-21 PROCEDURE — 3074F SYST BP LT 130 MM HG: CPT | Mod: CPTII,S$GLB,, | Performed by: INTERNAL MEDICINE

## 2018-11-21 NOTE — PROGRESS NOTES
Subjective:       Patient ID: Kristofer Gentile is a 38 y.o. female who presents for Follow-up; Breast Problem; and Thyroid Problem      Patient described discharge from the left breast which has continued on a daily basis. Labs were unremarkable. She denies skin changes and reports that she checks breasts for lumps intermittently. She does notice some areas of tenderness. Denies family history of breast cancer.    Thyroid Problem   Presents for follow-up visit. Symptoms include constipation, nail problem and weight gain. Patient reports no anxiety, cold intolerance, diaphoresis, diarrhea, fatigue, heat intolerance, leg swelling, palpitations, tremors or weight loss. (Reports hair loss and brittle nails) The symptoms have been stable.        Review of Systems   Constitutional: Positive for weight gain. Negative for chills, diaphoresis, fatigue, fever and weight loss.   HENT: Negative for congestion, rhinorrhea and sinus pressure.    Eyes: Negative for redness and visual disturbance.   Respiratory: Negative for cough and shortness of breath.    Cardiovascular: Negative for chest pain, palpitations and leg swelling.   Gastrointestinal: Positive for constipation. Negative for abdominal pain, diarrhea, nausea and vomiting.   Endocrine: Negative for cold intolerance and heat intolerance.   Genitourinary: Negative for frequency and hematuria.        + breast discharge   Musculoskeletal: Negative for arthralgias and myalgias.   Skin: Negative for rash.   Neurological: Negative for dizziness, tremors, weakness, numbness and headaches.   Psychiatric/Behavioral: The patient is not nervous/anxious.        Objective:      Physical Exam   Constitutional: She is oriented to person, place, and time. Vital signs are normal. She appears well-developed and well-nourished. No distress.   HENT:   Head: Normocephalic and atraumatic.   Right Ear: Hearing and external ear normal.   Left Ear: Hearing and external ear normal.   Nose: Nose  normal.   Mouth/Throat: Uvula is midline.   Eyes: Lids are normal.   Cardiovascular: Normal rate, regular rhythm, normal heart sounds and intact distal pulses.   No murmur heard.  Pulmonary/Chest: Effort normal and breath sounds normal. She has no wheezes. She exhibits no mass, no tenderness and no swelling. Right breast exhibits no inverted nipple, no mass, no nipple discharge and no skin change. Left breast exhibits mass (tender nodular area inferior to nipple) and nipple discharge (serous discharge). Left breast exhibits no inverted nipple and no skin change.   Abdominal: Soft. Bowel sounds are normal. She exhibits no distension. There is no tenderness.   Musculoskeletal: Normal range of motion. She exhibits no edema.   Lymphadenopathy:     She has no axillary adenopathy.        Right: No supraclavicular adenopathy present.        Left: No supraclavicular adenopathy present.   Neurological: She is alert and oriented to person, place, and time.   Skin: Skin is warm, dry and intact. No rash noted. She is not diaphoretic.   Psychiatric: She has a normal mood and affect.   Vitals reviewed.      Assessment/Plan:       1. Breast discharge  - diagnostic mammogram and US    2. Left breast lump  - MMG and US    3. Thyroiditis, chronic  - TSH; Future    4. Anti-TPO antibodies present  - currently asymptomatic, will monitor periodically    Melonie Villanueva MD

## 2018-11-23 ENCOUNTER — HOSPITAL ENCOUNTER (OUTPATIENT)
Dept: RADIOLOGY | Facility: HOSPITAL | Age: 38
Discharge: HOME | End: 2018-11-23
Attending: INTERNAL MEDICINE
Payer: COMMERCIAL

## 2018-11-23 DIAGNOSIS — N64.52 BREAST DISCHARGE: ICD-10-CM

## 2018-11-23 DIAGNOSIS — N63.20 LEFT BREAST LUMP: ICD-10-CM

## 2018-11-23 PROCEDURE — 77066 DX MAMMO INCL CAD BI: CPT | Mod: TC,PO

## 2018-11-23 PROCEDURE — 77062 BREAST TOMOSYNTHESIS BI: CPT | Mod: 26,,, | Performed by: RADIOLOGY

## 2018-11-23 PROCEDURE — 77066 DX MAMMO INCL CAD BI: CPT | Mod: 26,,, | Performed by: RADIOLOGY

## 2018-11-23 PROCEDURE — 76642 ULTRASOUND BREAST LIMITED: CPT | Mod: TC,PO,LT

## 2018-11-23 PROCEDURE — 76642 ULTRASOUND BREAST LIMITED: CPT | Mod: 26,LT,, | Performed by: RADIOLOGY

## 2018-12-05 ENCOUNTER — TELEPHONE (OUTPATIENT)
Dept: RADIOLOGY | Facility: HOSPITAL | Age: 38
End: 2018-12-05

## 2018-12-14 ENCOUNTER — HOSPITAL ENCOUNTER (OUTPATIENT)
Dept: RADIOLOGY | Facility: HOSPITAL | Age: 38
Discharge: HOME | End: 2018-12-14
Attending: INTERNAL MEDICINE
Payer: COMMERCIAL

## 2018-12-14 DIAGNOSIS — R92.8 ABNORMAL MAMMOGRAM: ICD-10-CM

## 2018-12-14 PROCEDURE — 88305 TISSUE SPECIMEN TO PATHOLOGY, RADIOLOGY: ICD-10-PCS | Mod: 26,,, | Performed by: PATHOLOGY

## 2018-12-14 PROCEDURE — 77065 MAMMO DIGITAL DIAGNOSTIC LEFT WITH CAD: ICD-10-PCS | Mod: 26,LT,, | Performed by: RADIOLOGY

## 2018-12-14 PROCEDURE — 19083 BX BREAST 1ST LESION US IMAG: CPT | Mod: LT,,, | Performed by: RADIOLOGY

## 2018-12-14 PROCEDURE — 88305 TISSUE EXAM BY PATHOLOGIST: CPT | Performed by: PATHOLOGY

## 2018-12-14 PROCEDURE — 27201068 US BREAST BIOPSY WITH IMAGING 1ST SITE LEFT: Mod: PO

## 2018-12-14 PROCEDURE — 77065 DX MAMMO INCL CAD UNI: CPT | Mod: TC,PO,LT

## 2018-12-14 PROCEDURE — 77065 DX MAMMO INCL CAD UNI: CPT | Mod: 26,LT,, | Performed by: RADIOLOGY

## 2018-12-14 PROCEDURE — 19083 US BREAST BIOPSY WITH IMAGING 1ST SITE LEFT: ICD-10-PCS | Mod: LT,,, | Performed by: RADIOLOGY

## 2018-12-14 PROCEDURE — 88305 TISSUE EXAM BY PATHOLOGIST: CPT | Mod: 26,,, | Performed by: PATHOLOGY

## 2018-12-18 ENCOUNTER — TELEPHONE (OUTPATIENT)
Dept: SURGERY | Facility: CLINIC | Age: 38
End: 2018-12-18

## 2018-12-18 NOTE — TELEPHONE ENCOUNTER
Spoke with patient regarding results of left breast biopsy showing papilloma, surgical consult recommended, appt scheduled and confirmed, all questions answered at this time

## 2018-12-20 ENCOUNTER — TELEPHONE (OUTPATIENT)
Dept: INTERNAL MEDICINE | Facility: CLINIC | Age: 38
End: 2018-12-20

## 2018-12-20 NOTE — TELEPHONE ENCOUNTER
----- Message from Carol Polo RN sent at 12/19/2018 11:49 AM CST -----  Regarding: breast core needle biopsy results   Good morning,  Spoke with patient regarding results of left breast biopsy showing papilloma, surgical consult recommended, appt scheduled and confirmed, all questions answered at this time.  Consult scheduled for 1/8/19 with Dr Wynn.  Thank you.    Carol Polo RN

## 2019-01-21 ENCOUNTER — OFFICE VISIT (OUTPATIENT)
Dept: INTERNAL MEDICINE | Facility: CLINIC | Age: 39
End: 2019-01-21
Payer: COMMERCIAL

## 2019-01-21 VITALS
TEMPERATURE: 99 F | BODY MASS INDEX: 41.58 KG/M2 | RESPIRATION RATE: 12 BRPM | DIASTOLIC BLOOD PRESSURE: 89 MMHG | HEIGHT: 65 IN | HEART RATE: 76 BPM | SYSTOLIC BLOOD PRESSURE: 127 MMHG | WEIGHT: 249.56 LBS

## 2019-01-21 DIAGNOSIS — B36.9 FUNGAL SKIN INFECTION: Primary | ICD-10-CM

## 2019-01-21 DIAGNOSIS — R21 RASH: ICD-10-CM

## 2019-01-21 PROCEDURE — 3079F DIAST BP 80-89 MM HG: CPT | Mod: CPTII,S$GLB,, | Performed by: INTERNAL MEDICINE

## 2019-01-21 PROCEDURE — 3074F PR MOST RECENT SYSTOLIC BLOOD PRESSURE < 130 MM HG: ICD-10-PCS | Mod: CPTII,S$GLB,, | Performed by: INTERNAL MEDICINE

## 2019-01-21 PROCEDURE — 3079F PR MOST RECENT DIASTOLIC BLOOD PRESSURE 80-89 MM HG: ICD-10-PCS | Mod: CPTII,S$GLB,, | Performed by: INTERNAL MEDICINE

## 2019-01-21 PROCEDURE — 99213 PR OFFICE/OUTPT VISIT, EST, LEVL III, 20-29 MIN: ICD-10-PCS | Mod: S$GLB,,, | Performed by: INTERNAL MEDICINE

## 2019-01-21 PROCEDURE — 3008F BODY MASS INDEX DOCD: CPT | Mod: CPTII,S$GLB,, | Performed by: INTERNAL MEDICINE

## 2019-01-21 PROCEDURE — 99999 PR PBB SHADOW E&M-EST. PATIENT-LVL III: ICD-10-PCS | Mod: PBBFAC,,, | Performed by: INTERNAL MEDICINE

## 2019-01-21 PROCEDURE — 99999 PR PBB SHADOW E&M-EST. PATIENT-LVL III: CPT | Mod: PBBFAC,,, | Performed by: INTERNAL MEDICINE

## 2019-01-21 PROCEDURE — 99213 OFFICE O/P EST LOW 20 MIN: CPT | Mod: S$GLB,,, | Performed by: INTERNAL MEDICINE

## 2019-01-21 PROCEDURE — 3074F SYST BP LT 130 MM HG: CPT | Mod: CPTII,S$GLB,, | Performed by: INTERNAL MEDICINE

## 2019-01-21 PROCEDURE — 3008F PR BODY MASS INDEX (BMI) DOCUMENTED: ICD-10-PCS | Mod: CPTII,S$GLB,, | Performed by: INTERNAL MEDICINE

## 2019-01-21 RX ORDER — CLOTRIMAZOLE AND BETAMETHASONE DIPROPIONATE 10; .5 MG/ML; MG/ML
LOTION TOPICAL 2 TIMES DAILY
Qty: 30 ML | Refills: 0 | Status: SHIPPED | OUTPATIENT
Start: 2019-01-21 | End: 2019-02-06 | Stop reason: SDUPTHER

## 2019-01-21 RX ORDER — METHYLPREDNISOLONE 4 MG/1
TABLET ORAL
Qty: 1 PACKAGE | Refills: 0 | Status: SHIPPED | OUTPATIENT
Start: 2019-01-21 | End: 2019-02-01

## 2019-01-21 NOTE — PROGRESS NOTES
"Subjective:       Patient ID: Kristofer Gentile is a 38 y.o. female.    Chief Complaint: Rash (underarm since Nov. under breast area, neck)    HPI    Rash in axilla started in November. She thought it had been irritated shaving so she applied neosporin, but it only worsened. Last week the rash has spread to under breasts. It is pruritic. She has avoid deoderant for the past few weeks. No drainage from the rash.     Review of Systems   Constitutional: Negative for chills and fever.   Respiratory: Negative for shortness of breath.    Cardiovascular: Negative for chest pain.   Musculoskeletal: Negative for neck pain.   Skin: Positive for rash.       Objective:        Vitals:    01/21/19 1151   BP: 127/89   BP Location: Left arm   Patient Position: Sitting   BP Method: Medium (Automatic)   Pulse: 76   Resp: 12   Temp: 98.6 °F (37 °C)   TempSrc: Oral   Weight: 113.2 kg (249 lb 9 oz)   Height: 5' 5" (1.651 m)       Body mass index is 41.53 kg/m².    Physical Exam   Constitutional: She is oriented to person, place, and time. She appears well-developed and well-nourished. No distress.   HENT:   Head: Normocephalic and atraumatic.   Right Ear: External ear normal.   Left Ear: External ear normal.   Eyes: Conjunctivae and EOM are normal.   Neck: Normal range of motion.   Cardiovascular: Normal rate and intact distal pulses.   Pulmonary/Chest: Effort normal.   Musculoskeletal: Normal range of motion.   Neurological: She is alert and oriented to person, place, and time.   Skin: Skin is warm and dry. Rash noted. Rash is maculopapular (bilateral axilla, under breasts, and along base of neck).   Psychiatric: She has a normal mood and affect. Her behavior is normal.       Assessment:     1. Fungal skin infection    2. Rash           Plan:         1. Fungal skin infection  - clotrimazole-betamethasone (LOTRISONE) lotion; Apply topically 2 (two) times daily. for 10 days  Dispense: 30 mL; Refill: 0    2. Rash  - methylPREDNISolone " (MEDROL DOSEPACK) 4 mg tablet; use as directed  Dispense: 1 Package; Refill: 0           Patient note was created using MModal Dictation.  Any errors in syntax or even information may not have been identified and edited on initial review prior to signing this note.

## 2019-01-28 ENCOUNTER — TELEPHONE (OUTPATIENT)
Dept: SURGERY | Facility: CLINIC | Age: 39
End: 2019-01-28

## 2019-01-28 NOTE — TELEPHONE ENCOUNTER
Returned call to  regarding a sooner apt. New Patient with a Left Intraductal Mass. Booked apt with  2/1/19 @ 11am I informed patient of date & time of apt. Mailed apt slip out.

## 2019-01-30 ENCOUNTER — TELEPHONE (OUTPATIENT)
Dept: INTERNAL MEDICINE | Facility: CLINIC | Age: 39
End: 2019-01-30

## 2019-01-30 DIAGNOSIS — E55.9 VITAMIN D DEFICIENCY: Primary | ICD-10-CM

## 2019-01-30 RX ORDER — ERGOCALCIFEROL 1.25 MG/1
CAPSULE ORAL
Qty: 4 CAPSULE | Refills: 0 | Status: SHIPPED | OUTPATIENT
Start: 2019-01-30 | End: 2019-03-18 | Stop reason: SDUPTHER

## 2019-01-30 NOTE — TELEPHONE ENCOUNTER
----- Message from Melonie Villanueva MD sent at 1/30/2019  3:30 PM CST -----  Please enter 1 month recall

## 2019-02-01 ENCOUNTER — OFFICE VISIT (OUTPATIENT)
Dept: SURGERY | Facility: CLINIC | Age: 39
End: 2019-02-01
Payer: COMMERCIAL

## 2019-02-01 VITALS
HEIGHT: 65 IN | SYSTOLIC BLOOD PRESSURE: 119 MMHG | HEART RATE: 70 BPM | WEIGHT: 251.44 LBS | DIASTOLIC BLOOD PRESSURE: 69 MMHG | TEMPERATURE: 98 F | BODY MASS INDEX: 41.89 KG/M2

## 2019-02-01 DIAGNOSIS — D24.2 INTRADUCTAL PAPILLOMA OF LEFT BREAST: Primary | ICD-10-CM

## 2019-02-01 PROCEDURE — 99999 PR PBB SHADOW E&M-EST. PATIENT-LVL III: ICD-10-PCS | Mod: PBBFAC,,, | Performed by: SURGERY

## 2019-02-01 PROCEDURE — 3078F DIAST BP <80 MM HG: CPT | Mod: CPTII,S$GLB,, | Performed by: SURGERY

## 2019-02-01 PROCEDURE — 3074F SYST BP LT 130 MM HG: CPT | Mod: CPTII,S$GLB,, | Performed by: SURGERY

## 2019-02-01 PROCEDURE — 3078F PR MOST RECENT DIASTOLIC BLOOD PRESSURE < 80 MM HG: ICD-10-PCS | Mod: CPTII,S$GLB,, | Performed by: SURGERY

## 2019-02-01 PROCEDURE — 99204 OFFICE O/P NEW MOD 45 MIN: CPT | Mod: S$GLB,,, | Performed by: SURGERY

## 2019-02-01 PROCEDURE — 99204 PR OFFICE/OUTPT VISIT, NEW, LEVL IV, 45-59 MIN: ICD-10-PCS | Mod: S$GLB,,, | Performed by: SURGERY

## 2019-02-01 PROCEDURE — 3008F BODY MASS INDEX DOCD: CPT | Mod: CPTII,S$GLB,, | Performed by: SURGERY

## 2019-02-01 PROCEDURE — 3074F PR MOST RECENT SYSTOLIC BLOOD PRESSURE < 130 MM HG: ICD-10-PCS | Mod: CPTII,S$GLB,, | Performed by: SURGERY

## 2019-02-01 PROCEDURE — 3008F PR BODY MASS INDEX (BMI) DOCUMENTED: ICD-10-PCS | Mod: CPTII,S$GLB,, | Performed by: SURGERY

## 2019-02-01 PROCEDURE — 99999 PR PBB SHADOW E&M-EST. PATIENT-LVL III: CPT | Mod: PBBFAC,,, | Performed by: SURGERY

## 2019-02-01 NOTE — PROGRESS NOTES
CHIEF COMPLAINT: left-sided  Intraductal papilloma of the breast with associated discharge     Subjective:       Kristofer Gentile is a 38 y.o. pre menopausal female referred for evaluation of results following a core needle biopsy of the left breast which took place on 2018  Pathology showed:               Left breast mass, biopsy:              - Intraductal papilloma.              - Negative for atypia or malignancy.      Patient noted a change on breast exam.  Patient admits to nipple discharge.Patient denies a personal history of breast cancer.        GYN History:  Age of menarche was 12. Patient admits to hormonal therapy. Patient is . Age of first live birth was 22 Patient did breast feed.     FAMILY History:  There is no family history of breast cancer. No family history of ovarian cancers.           Past Medical History:   Diagnosis Date    Allergy      Carpal tunnel syndrome      Fever blister      IUD (intrauterine device) in place     Thyroid disease       treated with synthroid during pregnancy            Past Surgical History:   Procedure Laterality Date    INTRAUTERINE DEVICE INSERTION        again              Current Outpatient Medications on File Prior to Visit   Medication Sig Dispense Refill    ergocalciferol (ERGOCALCIFEROL) 50,000 unit Cap TAKE 1 CAPSULE BY MOUTH EVERY 7 DAYS 4 capsule 0    FLONASE ALLERGY RELIEF 50 mcg/actuation nasal spray SPRAY TWICE IN EACH NOSTRIL ONCE DAILY 16 g 5    levonorgestrel (MIRENA) 20 mcg/24 hour (5 years) IUD 1 each by Intrauterine route once.        [] clotrimazole-betamethasone (LOTRISONE) lotion Apply topically 2 (two) times daily. for 10 days 30 mL 0    [DISCONTINUED] methylPREDNISolone (MEDROL DOSEPACK) 4 mg tablet use as directed 1 Package 0      No current facility-administered medications on file prior to visit.       Social History               Socioeconomic History    Marital status:        Spouse name:  Not on file    Number of children: 2    Years of education: Not on file    Highest education level: Not on file   Social Needs    Financial resource strain: Not on file    Food insecurity - worry: Not on file    Food insecurity - inability: Not on file    Transportation needs - medical: Not on file    Transportation needs - non-medical: Not on file   Occupational History       Employer: [employer]   Tobacco Use    Smoking status: Never Smoker    Smokeless tobacco: Never Used   Substance and Sexual Activity    Alcohol use: Yes       Alcohol/week: 0.5 oz       Types: 1 Standard drinks or equivalent per week       Comment: Rarely    Drug use: No    Sexual activity: Yes       Partners: Male       Birth control/protection: IUD   Other Topics Concern    Are you pregnant or think you may be? Not Asked    Breast-feeding Not Asked   Social History Narrative    Not on file               Family History   Problem Relation Age of Onset    Diabetes Mother      Hypertension Mother      Rheum arthritis Mother      Coronary artery disease Mother      Stroke Mother           x2    Liver disease Mother           NAFLD    Sarcoidosis Mother      Thyroid disease Mother      Hypertension Father      Dementia Father      Hyperlipidemia Father      Rheum arthritis Maternal Grandmother           wheelchair bound    Rheum arthritis Paternal Grandmother      Sarcoidosis Maternal Uncle      Leukemia Maternal Uncle      Breast cancer Neg Hx      Colon cancer Neg Hx      Ovarian cancer Neg Hx           Review of Systems  Review of Systems   Constitutional: Negative for appetite change, fever and unexpected weight change.   Respiratory: Negative for chest tightness and shortness of breath.    Cardiovascular: Negative for chest pain.   Gastrointestinal: Positive for constipation.   Neurological: Negative for dizziness and headaches.            Objective:   PHYSICAL EXAM:  /69 (BP Location: Left arm,  "Patient Position: Sitting, BP Method: Large (Automatic))   Pulse 70   Temp 98.2 °F (36.8 °C) (Oral)   Ht 5' 5" (1.651 m)   Wt 114 kg (251 lb 7 oz)   BMI 41.84 kg/m²      Physical Exam   Pulmonary/Chest: Left breast exhibits nipple discharge.             On examination, this patient has evident keloid scars on the abdomen and on the right anterior chest. There is a small area of density beneath the areola of the left breast, at the 6 o'clock position. When this area is palpated, there is serous discharge from the nipple. There are no palpable masses bilaterally in the breasts.No axillary adenopathy bilaterally. There is no nipple discharge on the right.         Assessment:       Kristofer Gentile is a 38 y.o. pre-menopausal female with a left sided breast papilloma and associated discharge. She is amenable to excisional biopsy and will proceed with surgical planning.   Plan:   The patient's discharge is bothersome. Ms Gentile would like to proceed with removing the papilloma in her left breast. She has been scheduled for an excisional biopsy for March 1st. Because this patient forms keloid scars, it was discussed that kenalog injections will be used during her surgical excision to mitigate possible keloid formation.         "

## 2019-02-01 NOTE — MEDICAL/APP STUDENT
New Breast Cancer  History and Physical  [facility]  Department of Surgery    REFERRING PROVIDER: [place] [address]    CHIEF COMPLAINT: left-sided  Intraductal papilloma of the breast with associated discharge    Subjective:      Kristofer Gentile is a 38 y.o. pre menopausal female referred for evaluation of results following a core needle biopsy of the left breast which took place on 2018  Pathology showed:    Left breast mass, biopsy:   - Intraductal papilloma.   - Negative for atypia or malignancy.     Patient noted a change on breast exam.  Patient admits to nipple discharge.Patient denies a personal history of breast cancer.      GYN History:  Age of menarche was 12. Patient admits to hormonal therapy. Patient is . Age of first live birth was 22 Patient did breast feed.    FAMILY History:  There is no family history of breast cancer. No family history of ovarian cancers.     Past Medical History:   Diagnosis Date    Allergy     Carpal tunnel syndrome     Fever blister     IUD (intrauterine device) in place     Thyroid disease     treated with synthroid during pregnancy     Past Surgical History:   Procedure Laterality Date    INTRAUTERINE DEVICE INSERTION  2009    again 2014     Current Outpatient Medications on File Prior to Visit   Medication Sig Dispense Refill    ergocalciferol (ERGOCALCIFEROL) 50,000 unit Cap TAKE 1 CAPSULE BY MOUTH EVERY 7 DAYS 4 capsule 0    FLONASE ALLERGY RELIEF 50 mcg/actuation nasal spray SPRAY TWICE IN EACH NOSTRIL ONCE DAILY 16 g 5    levonorgestrel (MIRENA) 20 mcg/24 hour (5 years) IUD 1 each by Intrauterine route once.      [] clotrimazole-betamethasone (LOTRISONE) lotion Apply topically 2 (two) times daily. for 10 days 30 mL 0    [DISCONTINUED] methylPREDNISolone (MEDROL DOSEPACK) 4 mg tablet use as directed 1 Package 0     No current facility-administered medications on file prior to visit.       Social History     Socioeconomic History    Marital status:      Spouse name: Not on file    Number of children: 2    Years of education: Not on file    Highest education level: Not on file   Social Needs    Financial resource strain: Not on file    Food insecurity - worry: Not on file    Food insecurity - inability: Not on file    Transportation needs - medical: Not on file    Transportation needs - non-medical: Not on file   Occupational History     Employer: [employer]   Tobacco Use    Smoking status: Never Smoker    Smokeless tobacco: Never Used   Substance and Sexual Activity    Alcohol use: Yes     Alcohol/week: 0.5 oz     Types: 1 Standard drinks or equivalent per week     Comment: Rarely    Drug use: No    Sexual activity: Yes     Partners: Male     Birth control/protection: IUD   Other Topics Concern    Are you pregnant or think you may be? Not Asked    Breast-feeding Not Asked   Social History Narrative    Not on file     Family History   Problem Relation Age of Onset    Diabetes Mother     Hypertension Mother     Rheum arthritis Mother     Coronary artery disease Mother     Stroke Mother         x2    Liver disease Mother         NAFLD    Sarcoidosis Mother     Thyroid disease Mother     Hypertension Father     Dementia Father     Hyperlipidemia Father     Rheum arthritis Maternal Grandmother         wheelchair bound    Rheum arthritis Paternal Grandmother     Sarcoidosis Maternal Uncle     Leukemia Maternal Uncle     Breast cancer Neg Hx     Colon cancer Neg Hx     Ovarian cancer Neg Hx         Review of Systems  Review of Systems   Constitutional: Negative for appetite change, fever and unexpected weight change.   Respiratory: Negative for chest tightness and shortness of breath.    Cardiovascular: Negative for chest pain.   Gastrointestinal: Positive for constipation.   Neurological: Negative for dizziness and headaches.           Objective:  "  PHYSICAL EXAM:  /69 (BP Location: Left arm, Patient Position: Sitting, BP Method: Large (Automatic))   Pulse 70   Temp 98.2 °F (36.8 °C) (Oral)   Ht 5' 5" (1.651 m)   Wt 114 kg (251 lb 7 oz)   BMI 41.84 kg/m²     Physical Exam   Pulmonary/Chest: Left breast exhibits nipple discharge.           On examination, this patient has evident keloid scars on the abdomen and on the right anterior chest. There is a small area of density beneath the areola of the left breast, at the 6 o'clock position. When this area is palpated, there is serous discharge from the nipple. There are no palpable masses bilaterally in the breasts.No axillary adenopathy bilaterally. There is no nipple discharge on the right.       Assessment:      Kristofer Gentile is a 38 y.o. pre-menopausal female with a left sided breast papilloma and associated discharge. She is amenable to excisional biopsy and will proceed with surgical planning.   Plan:    It was discussed that due to her young age, and the associated nipple discharge that is bothering her, Ms Gentile would like to proceed with removing the papilloma in her left breast. She has been scheduled for an excisional biopsy for March 1st. Because this patient forms keloid scars, it was discussed that kenalog injections will be used during her surgical excision to mitigate possible keloid formation.   "

## 2019-02-01 NOTE — H&P (VIEW-ONLY)
CHIEF COMPLAINT: left-sided  Intraductal papilloma of the breast with associated discharge     Subjective:       Kristofer Gentile is a 38 y.o. pre menopausal female referred for evaluation of results following a core needle biopsy of the left breast which took place on 2018  Pathology showed:               Left breast mass, biopsy:              - Intraductal papilloma.              - Negative for atypia or malignancy.      Patient noted a change on breast exam.  Patient admits to nipple discharge.Patient denies a personal history of breast cancer.        GYN History:  Age of menarche was 12. Patient admits to hormonal therapy. Patient is . Age of first live birth was 22 Patient did breast feed.     FAMILY History:  There is no family history of breast cancer. No family history of ovarian cancers.           Past Medical History:   Diagnosis Date    Allergy      Carpal tunnel syndrome      Fever blister      IUD (intrauterine device) in place     Thyroid disease       treated with synthroid during pregnancy            Past Surgical History:   Procedure Laterality Date    INTRAUTERINE DEVICE INSERTION        again              Current Outpatient Medications on File Prior to Visit   Medication Sig Dispense Refill    ergocalciferol (ERGOCALCIFEROL) 50,000 unit Cap TAKE 1 CAPSULE BY MOUTH EVERY 7 DAYS 4 capsule 0    FLONASE ALLERGY RELIEF 50 mcg/actuation nasal spray SPRAY TWICE IN EACH NOSTRIL ONCE DAILY 16 g 5    levonorgestrel (MIRENA) 20 mcg/24 hour (5 years) IUD 1 each by Intrauterine route once.        [] clotrimazole-betamethasone (LOTRISONE) lotion Apply topically 2 (two) times daily. for 10 days 30 mL 0    [DISCONTINUED] methylPREDNISolone (MEDROL DOSEPACK) 4 mg tablet use as directed 1 Package 0      No current facility-administered medications on file prior to visit.       Social History               Socioeconomic History    Marital status:        Spouse name:  Not on file    Number of children: 2    Years of education: Not on file    Highest education level: Not on file   Social Needs    Financial resource strain: Not on file    Food insecurity - worry: Not on file    Food insecurity - inability: Not on file    Transportation needs - medical: Not on file    Transportation needs - non-medical: Not on file   Occupational History       Employer: [employer]   Tobacco Use    Smoking status: Never Smoker    Smokeless tobacco: Never Used   Substance and Sexual Activity    Alcohol use: Yes       Alcohol/week: 0.5 oz       Types: 1 Standard drinks or equivalent per week       Comment: Rarely    Drug use: No    Sexual activity: Yes       Partners: Male       Birth control/protection: IUD   Other Topics Concern    Are you pregnant or think you may be? Not Asked    Breast-feeding Not Asked   Social History Narrative    Not on file               Family History   Problem Relation Age of Onset    Diabetes Mother      Hypertension Mother      Rheum arthritis Mother      Coronary artery disease Mother      Stroke Mother           x2    Liver disease Mother           NAFLD    Sarcoidosis Mother      Thyroid disease Mother      Hypertension Father      Dementia Father      Hyperlipidemia Father      Rheum arthritis Maternal Grandmother           wheelchair bound    Rheum arthritis Paternal Grandmother      Sarcoidosis Maternal Uncle      Leukemia Maternal Uncle      Breast cancer Neg Hx      Colon cancer Neg Hx      Ovarian cancer Neg Hx           Review of Systems  Review of Systems   Constitutional: Negative for appetite change, fever and unexpected weight change.   Respiratory: Negative for chest tightness and shortness of breath.    Cardiovascular: Negative for chest pain.   Gastrointestinal: Positive for constipation.   Neurological: Negative for dizziness and headaches.            Objective:   PHYSICAL EXAM:  /69 (BP Location: Left arm,  "Patient Position: Sitting, BP Method: Large (Automatic))   Pulse 70   Temp 98.2 °F (36.8 °C) (Oral)   Ht 5' 5" (1.651 m)   Wt 114 kg (251 lb 7 oz)   BMI 41.84 kg/m²      Physical Exam   Pulmonary/Chest: Left breast exhibits nipple discharge.             On examination, this patient has evident keloid scars on the abdomen and on the right anterior chest. There is a small area of density beneath the areola of the left breast, at the 6 o'clock position. When this area is palpated, there is serous discharge from the nipple. There are no palpable masses bilaterally in the breasts.No axillary adenopathy bilaterally. There is no nipple discharge on the right.         Assessment:       Kristoefr Gentile is a 38 y.o. pre-menopausal female with a left sided breast papilloma and associated discharge. She is amenable to excisional biopsy and will proceed with surgical planning.   Plan:   The patient's discharge is bothersome. Ms Gentile would like to proceed with removing the papilloma in her left breast. She has been scheduled for an excisional biopsy for March 1st. Because this patient forms keloid scars, it was discussed that kenalog injections will be used during her surgical excision to mitigate possible keloid formation.         "

## 2019-02-01 NOTE — LETTER
February 1, 2019      Melonie Villanueva MD  [address] [place] [address] [place] [address]           [facility]  [address]  [facility] [address]  Phone: [phone]  Fax: [phone]          Patient: Kristofer Gentile   MR Number: [id number]   YOB: 1980   Date of Visit: 2/1/2019       Dear Dr. Melonie Villanueva:    Thank you for referring Kristofer Gentile to me for evaluation. Attached you will find relevant portions of my assessment and plan of care.    If you have questions, please do not hesitate to call me. I look forward to following Kristofer Gentile along with you.    Sincerely,    Kelby Mcmahan MD    Enclosure  CC:  No Recipients    If you would like to receive this communication electronically, please contact [email or web address] or [phone] to request more information on [employer] Link access.    For providers and/or their staff who would like to refer a patient to [facility], please contact us through our one-stop-shop provider referral line, [facility], at [phone].    If you feel you have received this communication in error or would no longer like to receive these types of communications, please e-mail [email or web address]

## 2019-02-05 DIAGNOSIS — D24.2 INTRADUCTAL PAPILLOMA OF BREAST, LEFT: Primary | ICD-10-CM

## 2019-02-06 ENCOUNTER — PATIENT MESSAGE (OUTPATIENT)
Dept: INTERNAL MEDICINE | Facility: CLINIC | Age: 39
End: 2019-02-06

## 2019-02-06 DIAGNOSIS — B36.9 FUNGAL SKIN INFECTION: ICD-10-CM

## 2019-02-06 RX ORDER — CLOTRIMAZOLE AND BETAMETHASONE DIPROPIONATE 10; .5 MG/ML; MG/ML
LOTION TOPICAL 2 TIMES DAILY
Qty: 30 ML | Refills: 0 | Status: SHIPPED | OUTPATIENT
Start: 2019-02-06 | End: 2019-05-05 | Stop reason: SDUPTHER

## 2019-02-11 ENCOUNTER — LAB VISIT (OUTPATIENT)
Dept: LAB | Facility: HOSPITAL | Age: 39
End: 2019-02-11
Attending: INTERNAL MEDICINE
Payer: COMMERCIAL

## 2019-02-11 DIAGNOSIS — E55.9 VITAMIN D DEFICIENCY: ICD-10-CM

## 2019-02-11 DIAGNOSIS — E06.5 THYROIDITIS, CHRONIC: ICD-10-CM

## 2019-02-11 LAB
25(OH)D3+25(OH)D2 SERPL-MCNC: 22 NG/ML
TSH SERPL DL<=0.005 MIU/L-ACNC: 2.1 UIU/ML

## 2019-02-11 PROCEDURE — 82306 VITAMIN D 25 HYDROXY: CPT

## 2019-02-11 PROCEDURE — 84443 ASSAY THYROID STIM HORMONE: CPT

## 2019-02-11 PROCEDURE — 36415 COLL VENOUS BLD VENIPUNCTURE: CPT | Mod: PO

## 2019-02-18 ENCOUNTER — TELEPHONE (OUTPATIENT)
Dept: SURGERY | Facility: CLINIC | Age: 39
End: 2019-02-18

## 2019-02-18 NOTE — TELEPHONE ENCOUNTER
Left message on voice mail informing pt that her post op appt has been changed from 3/15, to 3/18/19. Instructed pt to call if the new date and time does not work for her schedule.

## 2019-02-27 ENCOUNTER — TELEPHONE (OUTPATIENT)
Dept: SURGERY | Facility: CLINIC | Age: 39
End: 2019-02-27

## 2019-02-27 NOTE — TELEPHONE ENCOUNTER
Return call to pt. Informed that per the surgery instruction booklet, polish, tips and wraps are to be removed prior to surgery. Pt states she is not sure if she will be able to get this done prior to surgery 3/1. Again, informed pt that this was the recommendation.

## 2019-02-27 NOTE — TELEPHONE ENCOUNTER
----- Message from Angela Suarez RN sent at 2/27/2019  4:44 PM CST -----  Contact: [phone]  Breast Patient.  ----- Message -----  From: Sharmin Vides  Sent: 2/27/2019   4:22 PM  To: Marj JOHNSON Staff    Needs Advice    Reason for call:Pt states that has dip nails and she wants to know if she needs to remove them for surgery         Communication Preference:call    Additional Information:

## 2019-02-28 ENCOUNTER — ANESTHESIA EVENT (OUTPATIENT)
Dept: SURGERY | Facility: HOSPITAL | Age: 39
End: 2019-02-28
Payer: COMMERCIAL

## 2019-02-28 ENCOUNTER — TELEPHONE (OUTPATIENT)
Dept: SURGERY | Facility: CLINIC | Age: 39
End: 2019-02-28

## 2019-02-28 DIAGNOSIS — D24.2 INTRADUCTAL PAPILLOMA OF LEFT BREAST: ICD-10-CM

## 2019-02-28 RX ORDER — SODIUM CHLORIDE 9 MG/ML
INJECTION, SOLUTION INTRAVENOUS CONTINUOUS
Status: CANCELLED | OUTPATIENT
Start: 2019-02-28

## 2019-02-28 RX ORDER — ACETAMINOPHEN 500 MG
1 TABLET ORAL EVERY MORNING
COMMUNITY
End: 2020-04-17

## 2019-02-28 NOTE — ANESTHESIA PREPROCEDURE EVALUATION
02/28/2019    Pre-operative evaluation for EXCISIONAL BIOPSY- w/major duct excision KENALOG INJECTION AT TIME OF SURGERY (Left)    Kristofer Gentile is a 38 y.o. female with no significant past medical history who was recently evaluated for left bneast papilloma with associated nipple discharge. She is now presenting for procedure noted above.     Past Surgical History:   Procedure Laterality Date    INTRAUTERINE DEVICE INSERTION  2009    again 2014         Vital Signs Range (Last 24H):         CBC:   No results for input(s): WBC, RBC, HGB, HCT, PLT, MCV, MCH, MCHC in the last 720 hours.    CMP: No results for input(s): NA, K, CL, CO2, BUN, CREATININE, GLU, MG, PHOS, CALCIUM, ALBUMIN, PROT, ALKPHOS, ALT, AST, BILITOT in the last 720 hours.    INR:  No results for input(s): PT, INR, PROTIME, APTT in the last 720 hours.      Anesthesia Evaluation    I have reviewed the Patient Summary Reports.     I have reviewed the Medications.     Review of Systems  Anesthesia Hx:  No previous Anesthesia Denies Hx of Anesthetic complications  Neg history of prior surgery. Denies Family Hx of Anesthesia complications.   Denies Personal Hx of Anesthesia complications.   Cardiovascular:   Denies MI.  Denies CAD.     Denies Angina.        Pulmonary:   Denies Pneumonia Denies COPD.  Denies Asthma.  Denies Shortness of breath.    Renal/:   Denies Chronic Renal Disease.     Hepatic/GI:   Denies GERD.    Neurological:   Denies TIA. Denies CVA.    Endocrine:   Denies Diabetes.           Anesthesia Plan  Type of Anesthesia, risks & benefits discussed:  Anesthesia Type:  general  Patient's Preference:   Intra-op Monitoring Plan:   Intra-op Monitoring Plan Comments:   Post Op Pain Control Plan: multimodal analgesia  Post Op Pain Control Plan Comments:   Induction:   IV  Beta Blocker:  Patient is not currently on a Beta-Blocker (No  further documentation required).       Informed Consent: Patient understands risks and agrees with Anesthesia plan.  Questions answered. Anesthesia consent signed with patient.  ASA Score: 2     Day of Surgery Review of History & Physical:            Ready For Surgery From Anesthesia Perspective.

## 2019-02-28 NOTE — TELEPHONE ENCOUNTER
Pre op call attempted and LVM with arrival time of 0515, NPO after [place] and have someone to drive her jennie when she is discharged from the hospital.  Also stated to call back to confirm receipt of this message.

## 2019-03-01 ENCOUNTER — ANESTHESIA (OUTPATIENT)
Dept: SURGERY | Facility: HOSPITAL | Age: 39
End: 2019-03-01
Payer: COMMERCIAL

## 2019-03-01 ENCOUNTER — HOSPITAL ENCOUNTER (OUTPATIENT)
Facility: HOSPITAL | Age: 39
Discharge: HOME | End: 2019-03-01
Attending: SURGERY | Admitting: SURGERY
Payer: COMMERCIAL

## 2019-03-01 VITALS
HEIGHT: 65 IN | TEMPERATURE: 99 F | WEIGHT: 251.31 LBS | BODY MASS INDEX: 41.87 KG/M2 | SYSTOLIC BLOOD PRESSURE: 146 MMHG | RESPIRATION RATE: 18 BRPM | DIASTOLIC BLOOD PRESSURE: 92 MMHG | HEART RATE: 79 BPM | OXYGEN SATURATION: 100 %

## 2019-03-01 DIAGNOSIS — D24.2 INTRADUCTAL PAPILLOMA OF LEFT BREAST: Primary | ICD-10-CM

## 2019-03-01 LAB
B-HCG UR QL: NEGATIVE
CTP QC/QA: YES

## 2019-03-01 PROCEDURE — 63600175 PHARM REV CODE 636 W HCPCS: Performed by: STUDENT IN AN ORGANIZED HEALTH CARE EDUCATION/TRAINING PROGRAM

## 2019-03-01 PROCEDURE — 71000033 HC RECOVERY, INTIAL HOUR: Performed by: SURGERY

## 2019-03-01 PROCEDURE — 88307 TISSUE EXAM BY PATHOLOGIST: CPT | Performed by: PATHOLOGY

## 2019-03-01 PROCEDURE — 88307 TISSUE EXAM BY PATHOLOGIST: CPT | Mod: 26,,, | Performed by: PATHOLOGY

## 2019-03-01 PROCEDURE — 81025 URINE PREGNANCY TEST: CPT | Performed by: SURGERY

## 2019-03-01 PROCEDURE — 63600175 PHARM REV CODE 636 W HCPCS: Performed by: PHYSICIAN ASSISTANT

## 2019-03-01 PROCEDURE — D9220A PRA ANESTHESIA: ICD-10-PCS | Mod: ,,, | Performed by: ANESTHESIOLOGY

## 2019-03-01 PROCEDURE — 88307 TISSUE SPECIMEN TO PATHOLOGY - SURGERY: ICD-10-PCS | Mod: 26,,, | Performed by: PATHOLOGY

## 2019-03-01 PROCEDURE — D9220A PRA ANESTHESIA: Mod: ,,, | Performed by: ANESTHESIOLOGY

## 2019-03-01 PROCEDURE — 37000008 HC ANESTHESIA 1ST 15 MINUTES: Performed by: SURGERY

## 2019-03-01 PROCEDURE — 36000706: Performed by: SURGERY

## 2019-03-01 PROCEDURE — 37000009 HC ANESTHESIA EA ADD 15 MINS: Performed by: SURGERY

## 2019-03-01 PROCEDURE — 19120 PR EXCISE BREAST CYST: ICD-10-PCS | Mod: LT,,, | Performed by: SURGERY

## 2019-03-01 PROCEDURE — 25000003 PHARM REV CODE 250: Performed by: SURGERY

## 2019-03-01 PROCEDURE — 19120 REMOVAL OF BREAST LESION: CPT | Mod: LT,,, | Performed by: SURGERY

## 2019-03-01 PROCEDURE — 25000003 PHARM REV CODE 250: Performed by: STUDENT IN AN ORGANIZED HEALTH CARE EDUCATION/TRAINING PROGRAM

## 2019-03-01 PROCEDURE — 63600175 PHARM REV CODE 636 W HCPCS: Performed by: SURGERY

## 2019-03-01 PROCEDURE — 36000707: Performed by: SURGERY

## 2019-03-01 PROCEDURE — 25000003 PHARM REV CODE 250: Performed by: PHYSICIAN ASSISTANT

## 2019-03-01 PROCEDURE — 71000015 HC POSTOP RECOV 1ST HR: Performed by: SURGERY

## 2019-03-01 PROCEDURE — 25000003 PHARM REV CODE 250

## 2019-03-01 RX ORDER — LIDOCAINE HYDROCHLORIDE 10 MG/ML
1 INJECTION, SOLUTION EPIDURAL; INFILTRATION; INTRACAUDAL; PERINEURAL ONCE
Status: COMPLETED | OUTPATIENT
Start: 2019-03-01 | End: 2019-03-01

## 2019-03-01 RX ORDER — SODIUM CHLORIDE 0.9 % (FLUSH) 0.9 %
3 SYRINGE (ML) INJECTION
Status: DISCONTINUED | OUTPATIENT
Start: 2019-03-01 | End: 2019-03-01 | Stop reason: HOSPADM

## 2019-03-01 RX ORDER — LIDOCAINE HCL/PF 100 MG/5ML
SYRINGE (ML) INTRAVENOUS
Status: DISCONTINUED | OUTPATIENT
Start: 2019-03-01 | End: 2019-03-01

## 2019-03-01 RX ORDER — SUCCINYLCHOLINE CHLORIDE 20 MG/ML
INJECTION INTRAMUSCULAR; INTRAVENOUS
Status: DISCONTINUED | OUTPATIENT
Start: 2019-03-01 | End: 2019-03-01

## 2019-03-01 RX ORDER — ONDANSETRON 2 MG/ML
INJECTION INTRAMUSCULAR; INTRAVENOUS
Status: DISCONTINUED | OUTPATIENT
Start: 2019-03-01 | End: 2019-03-01

## 2019-03-01 RX ORDER — PROPOFOL 10 MG/ML
INJECTION, EMULSION INTRAVENOUS
Status: DISCONTINUED | OUTPATIENT
Start: 2019-03-01 | End: 2019-03-01

## 2019-03-01 RX ORDER — FENTANYL CITRATE 50 UG/ML
INJECTION, SOLUTION INTRAMUSCULAR; INTRAVENOUS
Status: DISCONTINUED | OUTPATIENT
Start: 2019-03-01 | End: 2019-03-01

## 2019-03-01 RX ORDER — SODIUM CHLORIDE 9 MG/ML
INJECTION, SOLUTION INTRAVENOUS CONTINUOUS
Status: DISCONTINUED | OUTPATIENT
Start: 2019-03-01 | End: 2019-03-01 | Stop reason: HOSPADM

## 2019-03-01 RX ORDER — TRIAMCINOLONE ACETONIDE 40 MG/ML
INJECTION, SUSPENSION INTRA-ARTICULAR; INTRAMUSCULAR
Status: DISCONTINUED | OUTPATIENT
Start: 2019-03-01 | End: 2019-03-01 | Stop reason: HOSPADM

## 2019-03-01 RX ORDER — HYDROMORPHONE HYDROCHLORIDE 1 MG/ML
0.2 INJECTION, SOLUTION INTRAMUSCULAR; INTRAVENOUS; SUBCUTANEOUS EVERY 5 MIN PRN
Status: DISCONTINUED | OUTPATIENT
Start: 2019-03-01 | End: 2019-03-01 | Stop reason: HOSPADM

## 2019-03-01 RX ORDER — ONDANSETRON 2 MG/ML
4 INJECTION INTRAMUSCULAR; INTRAVENOUS DAILY PRN
Status: DISCONTINUED | OUTPATIENT
Start: 2019-03-01 | End: 2019-03-01 | Stop reason: HOSPADM

## 2019-03-01 RX ORDER — OXYCODONE HYDROCHLORIDE 5 MG/1
5 TABLET ORAL EVERY 6 HOURS PRN
Qty: 16 TABLET | Refills: 0 | Status: SHIPPED | OUTPATIENT
Start: 2019-03-01 | End: 2019-03-18

## 2019-03-01 RX ORDER — CEFAZOLIN SODIUM 1 G/3ML
2 INJECTION, POWDER, FOR SOLUTION INTRAMUSCULAR; INTRAVENOUS
Status: COMPLETED | OUTPATIENT
Start: 2019-03-01 | End: 2019-03-01

## 2019-03-01 RX ORDER — OXYCODONE HYDROCHLORIDE 5 MG/1
5 TABLET ORAL ONCE
Status: COMPLETED | OUTPATIENT
Start: 2019-03-01 | End: 2019-03-01

## 2019-03-01 RX ORDER — MIDAZOLAM HYDROCHLORIDE 1 MG/ML
INJECTION INTRAMUSCULAR; INTRAVENOUS
Status: DISCONTINUED | OUTPATIENT
Start: 2019-03-01 | End: 2019-03-01

## 2019-03-01 RX ORDER — OXYCODONE HYDROCHLORIDE 5 MG/1
TABLET ORAL
Status: COMPLETED
Start: 2019-03-01 | End: 2019-03-01

## 2019-03-01 RX ADMIN — PROPOFOL 200 MG: 10 INJECTION, EMULSION INTRAVENOUS at 07:03

## 2019-03-01 RX ADMIN — SUCCINYLCHOLINE CHLORIDE 160 MG: 20 INJECTION, SOLUTION INTRAMUSCULAR; INTRAVENOUS at 07:03

## 2019-03-01 RX ADMIN — MIDAZOLAM HYDROCHLORIDE 2 MG: 1 INJECTION, SOLUTION INTRAMUSCULAR; INTRAVENOUS at 06:03

## 2019-03-01 RX ADMIN — LIDOCAINE HYDROCHLORIDE 0.1 MG: 10 INJECTION, SOLUTION EPIDURAL; INFILTRATION; INTRACAUDAL; PERINEURAL at 06:03

## 2019-03-01 RX ADMIN — CEFAZOLIN 3 G: 330 INJECTION, POWDER, FOR SOLUTION INTRAMUSCULAR; INTRAVENOUS at 07:03

## 2019-03-01 RX ADMIN — LIDOCAINE HYDROCHLORIDE 100 MG: 20 INJECTION, SOLUTION INTRAVENOUS at 07:03

## 2019-03-01 RX ADMIN — SODIUM CHLORIDE, SODIUM GLUCONATE, SODIUM ACETATE, POTASSIUM CHLORIDE, MAGNESIUM CHLORIDE, SODIUM PHOSPHATE, DIBASIC, AND POTASSIUM PHOSPHATE: .53; .5; .37; .037; .03; .012; .00082 INJECTION, SOLUTION INTRAVENOUS at 07:03

## 2019-03-01 RX ADMIN — ONDANSETRON 4 MG: 2 INJECTION INTRAMUSCULAR; INTRAVENOUS at 07:03

## 2019-03-01 RX ADMIN — SODIUM CHLORIDE: 0.9 INJECTION, SOLUTION INTRAVENOUS at 06:03

## 2019-03-01 RX ADMIN — FENTANYL CITRATE 50 MCG: 50 INJECTION, SOLUTION INTRAMUSCULAR; INTRAVENOUS at 07:03

## 2019-03-01 RX ADMIN — OXYCODONE HYDROCHLORIDE 5 MG: 5 TABLET ORAL at 08:03

## 2019-03-01 NOTE — OP NOTE
DATE OF PROCEDURE:  03/01/2019.    POSTOPERATIVE DIAGNOSES:  Left nipple discharge and papilloma.    POSTOPERATIVE DIAGNOSES:  Left nipple discharge and papilloma.    PROCEDURES:  Major duct excision and injection of Kenalog.    SURGEON:  Kelby Mcmahan M.D.    ASSISTANT:  Dr. Mendoza.    ANESTHESIA:  General.    BLOOD LOSS:  Minimal.    COMPLICATIONS:  None.    INDICATIONS:  This is a 38-year-old keloid-former, presented with a copious volume of   bloody left nipple discharge.  Core biopsy demonstrated a papilloma.  Major duct   excision is indicated as a therapeutic maneuver to stop the discharge.    OPERATIVE REPORT IN DETAIL:  The patient was brought to the operating room and   placed in the supine position, prepped and draped in sterile fashion.  Once   satisfactory general anesthesia was induced, a Kenalog injection was made along   the lateral edge of the areola and then infiltrating the soft tissues in the   retroareolar space.  Skin and subcutaneous tissues were divided and then flaps   were raised in a plane below the dermis of the areola.  This was raised beyond   the level of the nipple.  Several large blood filled ducts were encountered   during this procedure.  These ducts were excised as a single specimen and   labeled as major ducts.  No orientation was provided.  At the completion of this   excision, there was no further nipple discharge nor was there any drainage from   deep within the breast tissue.  Hemostasis was achieved and considered   excellent, and the skin edges were then reapproximated with a running   subcuticular 4-0 Monocryl stitch.  Again, it should be emphasized that Kenalog   injection was made prior to the incision of the entire operative field.  Needle,   sponge and instrument counts were correct.  The patient remained stable   throughout.      /eddie  [id number]  blank(s)        GF/HN  dd: 03/01/2019 07:33:22 (CST)  td: 03/01/2019 08:16:25 (CST)  Doc ID   #[id number]  Job ID  #[id number]    CC:

## 2019-03-01 NOTE — ANESTHESIA RELEASE NOTE
"Anesthesia Release from PACU Note    Patient: Kristofer Gentile    Procedure(s) Performed: Procedure(s) (LRB):  EXCISIONAL BIOPSY- w/major duct excision KENALOG INJECTION AT TIME OF SURGERY (Left)    Anesthesia type: general    Post pain: Adequate analgesia    Post assessment: no apparent anesthetic complications, tolerated procedure well and no evidence of recall    Last Vitals:   Visit Vitals  BP (!) 146/92   Pulse 79   Temp 37 °C (98.6 °F) (Temporal)   Resp 18   Ht 5' 5" (1.651 m)   Wt 114 kg (251 lb 5.2 oz)   LMP 01/01/2019 (Approximate)   SpO2 100%   Breastfeeding? No   BMI 41.82 kg/m²       Post vital signs: stable    Level of consciousness: awake, alert  and oriented    Nausea/Vomiting: no nausea/no vomiting    Complications: none    Airway Patency: patent    Respiratory: unassisted, spontaneous ventilation, room air    Cardiovascular: stable and blood pressure at baseline    Hydration: euvolemic  "

## 2019-03-01 NOTE — PROGRESS NOTES
On admit, patient unable to void for pg test. Started IV and gave fluids. Will try again. anes aware.

## 2019-03-01 NOTE — ANESTHESIA POSTPROCEDURE EVALUATION
"Anesthesia Post Evaluation    Patient: Kristofer Gentile    Procedure(s) Performed: Procedure(s) (LRB):  EXCISIONAL BIOPSY- w/major duct excision KENALOG INJECTION AT TIME OF SURGERY (Left)    Final Anesthesia Type: general  Patient location during evaluation: PACU  Patient participation: Yes- Able to Participate  Level of consciousness: awake and alert and oriented  Post-procedure vital signs: reviewed and stable  Pain management: adequate  Airway patency: patent  PONV status at discharge: No PONV  Anesthetic complications: no      Cardiovascular status: blood pressure returned to baseline, stable and hemodynamically stable  Respiratory status: unassisted, spontaneous ventilation and room air  Hydration status: euvolemic  Follow-up not needed.        Visit Vitals  BP (!) 146/92   Pulse 79   Temp 37 °C (98.6 °F) (Temporal)   Resp 18   Ht 5' 5" (1.651 m)   Wt 114 kg (251 lb 5.2 oz)   LMP 01/01/2019 (Approximate)   SpO2 100%   Breastfeeding? No   BMI 41.82 kg/m²       Pain/Matthew Score: Pain Rating Prior to Med Admin: 3 (3/1/2019  8:11 AM)  Matthew Score: 9 (3/1/2019  9:00 AM)        "

## 2019-03-01 NOTE — INTERVAL H&P NOTE
The patient has been examined and the H&P has been reviewed:    I concur with the findings and no changes have occurred since H&P was written.    Anesthesia/Surgery risks, benefits and alternative options discussed and understood by patient/family.          Active Hospital Problems    Diagnosis  POA    Intraductal papilloma of left breast [D24.2]  Yes      Resolved Hospital Problems   No resolved problems to display.

## 2019-03-01 NOTE — DISCHARGE INSTRUCTIONS
PATIENT INSTRUCTIONS  POST-ANESTHESIA    IMMEDIATELY FOLLOWING SURGERY:  Do not drive or operate machinery for the first twenty four hours after surgery.  Do not make any important decisions for twenty four hours after surgery or while taking narcotic pain medications or sedatives.  If you develop intractable nausea and vomiting or a severe headache please notify your doctor immediately.    FOLLOW-UP:  Please make an appointment with your surgeon as instructed. You do not need to follow up with anesthesia unless specifically instructed to do so.    WOUND CARE INSTRUCTIONS (if applicable):  Keep a dry clean dressing on the anesthesia/puncture wound site if there is drainage.  Once the wound has quit draining you may leave it open to air.  Generally you should leave the bandage intact for twenty four hours unless there is drainage.  If the epidural site drains for more than 36-48 hours please call the anesthesia department.    QUESTIONS?:  Please feel free to call your physician or the hospital  if you have any questions, and they will be happy to assist you.       [facility] Anesthesia Department  [address] [facility]  [phone]        Incision Care  Remember: Follow-up visits allow your healthcare provider to make sure your incision is healing well. Be sure to keep your appointments.     Stitches (sutures), surgical staples, special strips of surgical tape, or surgical skin glue may be used to close incisions. They also help stop bleeding and speed healing. To help your incision heal, follow the tips on this handout.  Home care  Tips for home care include the following:  · Always wash your hands before touching your incision.  · Keep your incision clean and dry.  · Avoid doing things that could cause dirt or sweat to get on your incision.  · Dont pick at scabs. They help protect the wound.  · Keep your incision out of water.  · Take a sponge bath to avoid getting your incision wet, unless  your healthcare provider tells you otherwise.  · Ask your provider when can you take a shower or bathe.  · Ask your provider about the best way to keep your incision dry when bathing or showering.  · Pat stitches dry if they get wet. Dont rub.  · Leave the bandage (dressing) in place until you are told to remove it or change it. Change it only as directed, using clean hands.  · After the first 12 hours, change your dressing every 24 hours, or as directed by your healthcare provider.  · Change your dressing if it gets wet or soiled.  Care for specific closures  Follow these guidelines unless your healthcare provider tells you otherwise:  · Stitches or staples. Once you no longer need to keep these dry, clean the wound daily. First remove the bandage using clean hands. Then wash the area gently with soap and warm water. Use a wet cotton swab to loosen and remove any blood or crust that forms. After cleaning, put a thin layer of antibiotic ointment on. Then put on a new bandage.  · Skin glue. Dont put liquid, ointment, or cream on your wound while the glue is in place. Avoid activities that cause heavy sweating. Protect the wound from sunlight. Do not scratch, rub, or pick at the glue. Do not put tape directly over the glue. The glue should peel off within 5 to 10 days.  · Surgical tape. Keep the area dry. If it gets wet, blot the area dry with a clean towel. Surgical tape usually falls off within 7 to 10 days. If it has not fallen off after 10 days, contact your healthcare provider before taking it off yourself. If you are told to remove the tape, put mineral oil or petroleum jelly on a cotton ball. Gently rub the tape until it is removed.  Changing your dressing  Leave the dressing (bandage) in place until you are told to remove it or change it. Follow the instructions below unless told otherwise by your healthcare provider:  · Always wash your hands before changing your dressing.  · After the first 48 hours, the  incision wound usually will have closed. At this point, leave the incision uncovered and open to the air. If the incision has not closed keep it covered.  · Cover your incision only if your clothing is rubbing it or causing irritation.  · Change your dressing if it gets wet or soiled.  Follow-up care  Follow up with your healthcare provider to ask how long sutures or staples should be left in place. Be sure to return for stitch or staple removal as directed. If dissolving stitches were used in your mouth, these will not need to be removed. They should fall out or dissolve on their own.  If tape closures were used, remove them yourself when your provider recommends if they have not fallen off on their own. If skin glue was used, the glue will wear off by itself.  When to seek medical care  Call your healthcare provider if you have any of the following:  · More pain, redness, swelling, bleeding, or foul-smelling discharge around the incision area  · Fever of 100.4°F (38ºC) or higher, or as directed by your healthcare provider  · Shaking chills  · Vomiting or nausea that doesn't go away  · Numbness, coldness, or tingling around the incision area, or changes in skin color  · Opening of the sutures or wound  · Stitches or staples come apart or fall out or surgical tape falls off before 7 days or as directed by your healthcare provider   Date Last Reviewed: 12/1/2016  © 3759-5200 [employer]. [address] [place] [address], [place] [address]. All rights reserved. This information is not intended as a substitute for professional medical care. Always follow your healthcare professional's instructions.

## 2019-03-01 NOTE — TRANSFER OF CARE
"Anesthesia Transfer of Care Note    Patient: Kristofer Gentile    Procedure(s) Performed: Procedure(s) (LRB):  EXCISIONAL BIOPSY- w/major duct excision KENALOG INJECTION AT TIME OF SURGERY (Left)    Patient location: PACU    Anesthesia Type: general    Transport from OR: Transported from OR on room air with adequate spontaneous ventilation    Post pain: adequate analgesia    Post assessment: no apparent anesthetic complications    Post vital signs: stable    Level of consciousness: awake, oriented and alert    Nausea/Vomiting: no nausea/vomiting    Complications: none          Last vitals:   Visit Vitals  /63   Pulse 94   Temp 36.2 °C (97.2 °F) (Oral)   Resp 20   Ht 5' 5" (1.651 m)   Wt 114 kg (251 lb 5.2 oz)   LMP 01/01/2019 (Approximate)   SpO2 100%   Breastfeeding? No   BMI 41.82 kg/m²     "

## 2019-03-01 NOTE — BRIEF OP NOTE
[facility]  Brief Operative Note     SUMMARY     Surgery Date: 3/1/2019     Surgeon(s) and Role:     * Kelby Mcmahan MD - Primary     * Marah Mendoza MD - Resident - Assisting    Pre-op Diagnosis:  Intraductal papilloma of breast, left [D24.2]    Post-op Diagnosis:  Post-Op Diagnosis Codes:     * Intraductal papilloma of breast, left [D24.2]    Procedure(s) (LRB):  EXCISIONAL BIOPSY- w/major duct excision KENALOG INJECTION AT TIME OF SURGERY (Left)    Anesthesia: General    Description of the findings of the procedure: Bloody nipple discharge from single duct noted before start of procedure. Proceeded with left sided excisional biopsy-major duct excision. Additionally, injected kenalog along incision line before procedure (patient is prone to keloid formation).    Estimated Blood Loss: minimal         Specimens:   Specimen (12h ago, onward)    Start     Ordered    03/01/19 0734  Specimen to Pathology - Surgery  Once     Comments:  1.MAJOR DUCT EXCISION NO ORIENTATION LEFT BREAST-PERM     Start Status   03/01/19 0734 Collected (03/01/19 0734)       03/01/19 0734          Discharge Note    SUMMARY     Admit Date: 3/1/2019    Discharge Date and Time:  03/01/2019 8:07 AM    Hospital Course: 3/1/2019     Procedure Details:  The patient was seen in the Holding Room. The risks, benefits, complications, treatment options, and expected outcomes were discussed with the patient. The patient concurred with the proposed plan, giving informed consent.  The site of surgery properly noted/marked.   Patient was brought to the operating room and positioned supine on the table. General anesthesia was administered by anesthesia team. Patient's left breast was prepped and draped in usual sterile fashion. A Time Out was held and the above information confirmed. Kenalog was then injected along the proposed incision line along the lateral inferior border of the left nipple. We then proceeded with left sided  excisional biopsy-major duct excision. She tolerated the procedure well without complication. She was then brought to PACU for recovery in stable condition.       Final Diagnosis: Post-Op Diagnosis Codes:     * Intraductal papilloma of breast, left [D24.2]    Disposition: Home or Self Care    Follow Up/Patient Instructions:     POSTOPERATIVE INSTRUCTIONS FOLLOWING BREAST BIOPSY OR LUMPECTOMY    The following are post-operative instructions that will help you to recover from your surgery.  Please read over these instructions carefully and contact us if we can answer any of your questions or concerns.    Surgical Dressing:  After 48 hours it is ok to shower/cleanse the breast with antibacterial soap and warm water. Do not take a tub bath and do not soak the surgical site for at least 2 weeks. Please do not scrub your incision or pick off your surgical glue-this glue will come off on its own over time.    The final pathology report will be available approximately 7-10 days after your surgery.  Our office will call you with your pathology report when it becomes available.      Activity   You should be able to return to your regular activities 2 days after your surgery.  However, do not engage in strenuous activities in which you use your upper body such as:  golf, tennis, aerobics, washing windows, raking the yard, mopping, vacuuming, heavy lifting (e.g children) until you are seen for your follow-up appointment in clinic. Do not lift anything heavier than a gallon of milk.    Medication for pain  You may find that over the counter pain medications may be sufficient for your pain.  You will be given a prescription for pain medication for more severe pain.  You should not drive or operate machinery while taking these.  Please take prescription pain medicine (narcotics) with food.  Narcotics can cause, or worsen, constipation.  You will need to increase your fluid intake, eat high fiber foods (such as fruits and bran) and  make sure that you are up and walking. You may need to take an over the counter stool softener for constipation.    Please report the following:   Temperature greater than 101 degrees   Discharge or bad odor from the wound   Excessive bleeding, such as saturated bloody dressing or extreme bruising   Redness at incision and/or drain sites   Swelling or buildup of fluid around incision   Persistent fevers, chills, nausea, vomiting, or diarrhea    Additional information  Your surgeon will see you approximately 2 weeks following your surgery.  If this follow-up appointment has not been made, please call the office.    If you have any questions or problems, please call my office or my nurse.    After hours and on weekends, you may call the main [facility] line at [phone] and ask to have the general surgery resident paged or have me paged.      Medications:  Reconciled Home Medications:      Medication List      START taking these medications    oxyCODONE 5 MG immediate release tablet  Commonly known as:  ROXICODONE  Take 1 tablet (5 mg total) by mouth every 6 (six) hours as needed for Pain (Can utilize for moderate to severe pain, as needed.).        CHANGE how you take these medications    FLONASE ALLERGY RELIEF 50 mcg/actuation nasal spray  Generic drug:  fluticasone  SPRAY TWICE IN EACH NOSTRIL ONCE DAILY  What changed:  See the new instructions.        CONTINUE taking these medications    levonorgestrel 20 mcg/24 hr (5 years) IUD  Commonly known as:  MIRENA  1 each by Intrauterine route once.     VITAMIN D3 2,000 unit Cap  Generic drug:  cholecalciferol (vitamin D3)  Take 1 capsule by mouth every morning.        ASK your doctor about these medications    ergocalciferol 50,000 unit Cap  Commonly known as:  ERGOCALCIFEROL  TAKE 1 CAPSULE BY MOUTH EVERY 7 DAYS          Discharge Procedure Orders   Diet Adult Regular     No driving until:   Scheduling Instructions: Please do not drive while utilizing narcotic  medications for pain control.     Notify your health care provider if you experience any of the following:  temperature >100.4     Notify your health care provider if you experience any of the following:  persistent nausea and vomiting or diarrhea     Notify your health care provider if you experience any of the following:  severe uncontrolled pain     Notify your health care provider if you experience any of the following:  redness, tenderness, or signs of infection (pain, swelling, redness, odor or green/yellow discharge around incision site)     No dressing needed   Scheduling Instructions: Your incision is covered with dermabond (a surgical glue). This glue will come off on its own over time-please do not pick off. Generally will come off in about 7-10 days. It is okay to get wet in the shower. Just let warm, soapy water to rinse over incision. Then rinse off and pat dry. Do not scrub over incision.     Activity as tolerated     Follow-up Information     Kelby Mcmahan MD In 2 weeks.    Specialties:  General Surgery, Surgery  Why:  For wound re-check and post-operative follow-up (2 week post-op visit-duct excision for intraductal papilloma)  Contact information:  [address]  [facility] [id number] [phone]                   Marah Mendoza MD  General Surgery, PGYI [facility]

## 2019-03-04 RX ORDER — ERGOCALCIFEROL 1.25 MG/1
CAPSULE ORAL
Qty: 4 CAPSULE | Refills: 0 | OUTPATIENT
Start: 2019-03-04

## 2019-03-10 ENCOUNTER — PATIENT MESSAGE (OUTPATIENT)
Dept: SURGERY | Facility: CLINIC | Age: 39
End: 2019-03-10

## 2019-03-18 ENCOUNTER — OFFICE VISIT (OUTPATIENT)
Dept: SURGERY | Facility: CLINIC | Age: 39
End: 2019-03-18
Payer: COMMERCIAL

## 2019-03-18 VITALS — TEMPERATURE: 99 F | HEIGHT: 65 IN | BODY MASS INDEX: 41.91 KG/M2 | WEIGHT: 251.56 LBS

## 2019-03-18 DIAGNOSIS — D24.2 PAPILLOMA OF LEFT BREAST: Primary | ICD-10-CM

## 2019-03-18 PROCEDURE — 99999 PR PBB SHADOW E&M-EST. PATIENT-LVL III: CPT | Mod: PBBFAC,,, | Performed by: SURGERY

## 2019-03-18 PROCEDURE — 99999 PR PBB SHADOW E&M-EST. PATIENT-LVL III: ICD-10-PCS | Mod: PBBFAC,,, | Performed by: SURGERY

## 2019-03-18 PROCEDURE — 99024 POSTOP FOLLOW-UP VISIT: CPT | Mod: S$GLB,,, | Performed by: SURGERY

## 2019-03-18 PROCEDURE — 99024 PR POST-OP FOLLOW-UP VISIT: ICD-10-PCS | Mod: S$GLB,,, | Performed by: SURGERY

## 2019-03-18 NOTE — PROGRESS NOTES
S/p left major duct excision   Path papilloma  Benign  She has had some low volume leakage that is slowing down  Wound is clean  I cannot solicit any drainage today  Advised her to call April 1 if leakage has not stopped

## 2019-03-25 ENCOUNTER — PATIENT MESSAGE (OUTPATIENT)
Dept: SURGERY | Facility: CLINIC | Age: 39
End: 2019-03-25

## 2019-03-27 ENCOUNTER — TELEPHONE (OUTPATIENT)
Dept: SURGERY | Facility: CLINIC | Age: 39
End: 2019-03-27

## 2019-03-27 NOTE — TELEPHONE ENCOUNTER
----- Message from Angela Suarez RN sent at 3/27/2019 12:49 PM CDT -----  Contact: PT  Breast Patient  ----- Message -----  From: Concha Magana  Sent: 3/27/2019  12:39 PM  To: Marj JOHNSON Staff    Needs Advice    Reason for call: Patient is having some concerns regarding your procedure done needs to speak with the doctor        Communication Preference:[phone]    Additional Information:

## 2019-03-27 NOTE — TELEPHONE ENCOUNTER
Return call to pt. Wanted to make sure she could still come in for an appt tomorrow. Pt did not see appt in her [facility] account. Informed pt that appt has been put into the system for 9:30 am tomorrow 3/28 with Dr Mcmahan in the general surgery clinic.

## 2019-03-28 ENCOUNTER — OFFICE VISIT (OUTPATIENT)
Dept: SURGERY | Facility: CLINIC | Age: 39
End: 2019-03-28
Payer: COMMERCIAL

## 2019-03-28 VITALS
DIASTOLIC BLOOD PRESSURE: 73 MMHG | SYSTOLIC BLOOD PRESSURE: 131 MMHG | WEIGHT: 250.56 LBS | BODY MASS INDEX: 41.74 KG/M2 | HEART RATE: 72 BPM | TEMPERATURE: 99 F | HEIGHT: 65 IN

## 2019-03-28 DIAGNOSIS — N60.12 DUCTAL PAPILLOMATOSIS OF BREAST, LEFT: Primary | ICD-10-CM

## 2019-03-28 PROCEDURE — 99999 PR PBB SHADOW E&M-EST. PATIENT-LVL III: ICD-10-PCS | Mod: PBBFAC,,, | Performed by: SURGERY

## 2019-03-28 PROCEDURE — 99024 POSTOP FOLLOW-UP VISIT: CPT | Mod: S$GLB,,, | Performed by: SURGERY

## 2019-03-28 PROCEDURE — 99024 PR POST-OP FOLLOW-UP VISIT: ICD-10-PCS | Mod: S$GLB,,, | Performed by: SURGERY

## 2019-03-28 PROCEDURE — 99999 PR PBB SHADOW E&M-EST. PATIENT-LVL III: CPT | Mod: PBBFAC,,, | Performed by: SURGERY

## 2019-03-28 NOTE — PROGRESS NOTES
Patient concerned about wound healing still having some low volume leakage  Wound is healing great scar is difficult to identify at the areolar edge  Slight skin separation  No fluid in breast on exam  Patient reassured that leakage will stop in the next 2-3 weeks  She will call if it persists  Otherwise doing great path benign  No other follow up regarding breast cancer risk required

## 2019-05-04 ENCOUNTER — PATIENT MESSAGE (OUTPATIENT)
Dept: INTERNAL MEDICINE | Facility: CLINIC | Age: 39
End: 2019-05-04

## 2019-05-04 DIAGNOSIS — B36.9 FUNGAL SKIN INFECTION: ICD-10-CM

## 2019-05-05 RX ORDER — CLOTRIMAZOLE AND BETAMETHASONE DIPROPIONATE 10; .5 MG/ML; MG/ML
LOTION TOPICAL 2 TIMES DAILY
Qty: 30 ML | Refills: 0 | Status: SHIPPED | OUTPATIENT
Start: 2019-05-05 | End: 2019-05-15

## 2019-05-07 ENCOUNTER — PATIENT MESSAGE (OUTPATIENT)
Dept: INTERNAL MEDICINE | Facility: CLINIC | Age: 39
End: 2019-05-07

## 2019-05-09 ENCOUNTER — NURSE TRIAGE (OUTPATIENT)
Dept: ADMINISTRATIVE | Facility: CLINIC | Age: 39
End: 2019-05-09

## 2019-05-17 ENCOUNTER — DOCUMENTATION ONLY (OUTPATIENT)
Dept: FAMILY MEDICINE | Facility: CLINIC | Age: 39
End: 2019-05-17

## 2019-05-17 NOTE — PROGRESS NOTES
Pre-Visit Chart Review  For Appointment Scheduled on 05/20/2019    There are no preventive care reminders to display for this patient.

## 2019-07-01 ENCOUNTER — TELEPHONE (OUTPATIENT)
Dept: INTERNAL MEDICINE | Facility: CLINIC | Age: 39
End: 2019-07-01

## 2019-07-01 DIAGNOSIS — Z00.00 ANNUAL PHYSICAL EXAM: Primary | ICD-10-CM

## 2019-07-01 NOTE — TELEPHONE ENCOUNTER
----- Message from Jeanmarie Reyez sent at 7/1/2019  4:43 PM CDT -----  Doctor appointment and lab have been scheduled.  Please link lab orders to the lab appointment.  Date of doctor appointment:  11/1  Date of lab appointment: 10/25   Physical or EP: Physical  Comments:

## 2019-07-23 ENCOUNTER — PATIENT MESSAGE (OUTPATIENT)
Dept: INTERNAL MEDICINE | Facility: CLINIC | Age: 39
End: 2019-07-23

## 2019-07-29 ENCOUNTER — PATIENT MESSAGE (OUTPATIENT)
Dept: INTERNAL MEDICINE | Facility: CLINIC | Age: 39
End: 2019-07-29

## 2019-07-30 ENCOUNTER — OFFICE VISIT (OUTPATIENT)
Dept: INTERNAL MEDICINE | Facility: CLINIC | Age: 39
End: 2019-07-30
Payer: COMMERCIAL

## 2019-07-30 VITALS
RESPIRATION RATE: 16 BRPM | SYSTOLIC BLOOD PRESSURE: 126 MMHG | HEIGHT: 65 IN | BODY MASS INDEX: 36.26 KG/M2 | DIASTOLIC BLOOD PRESSURE: 88 MMHG | TEMPERATURE: 99 F | WEIGHT: 217.63 LBS | HEART RATE: 77 BPM

## 2019-07-30 DIAGNOSIS — R21 RASH: ICD-10-CM

## 2019-07-30 DIAGNOSIS — L74.510 HYPERHIDROSIS OF AXILLA: Primary | ICD-10-CM

## 2019-07-30 PROCEDURE — 3074F PR MOST RECENT SYSTOLIC BLOOD PRESSURE < 130 MM HG: ICD-10-PCS | Mod: CPTII,S$GLB,, | Performed by: INTERNAL MEDICINE

## 2019-07-30 PROCEDURE — 99212 OFFICE O/P EST SF 10 MIN: CPT | Mod: S$GLB,,, | Performed by: INTERNAL MEDICINE

## 2019-07-30 PROCEDURE — 99999 PR PBB SHADOW E&M-EST. PATIENT-LVL III: ICD-10-PCS | Mod: PBBFAC,,, | Performed by: INTERNAL MEDICINE

## 2019-07-30 PROCEDURE — 3008F BODY MASS INDEX DOCD: CPT | Mod: CPTII,S$GLB,, | Performed by: INTERNAL MEDICINE

## 2019-07-30 PROCEDURE — 3079F PR MOST RECENT DIASTOLIC BLOOD PRESSURE 80-89 MM HG: ICD-10-PCS | Mod: CPTII,S$GLB,, | Performed by: INTERNAL MEDICINE

## 2019-07-30 PROCEDURE — 3079F DIAST BP 80-89 MM HG: CPT | Mod: CPTII,S$GLB,, | Performed by: INTERNAL MEDICINE

## 2019-07-30 PROCEDURE — 99212 PR OFFICE/OUTPT VISIT, EST, LEVL II, 10-19 MIN: ICD-10-PCS | Mod: S$GLB,,, | Performed by: INTERNAL MEDICINE

## 2019-07-30 PROCEDURE — 99999 PR PBB SHADOW E&M-EST. PATIENT-LVL III: CPT | Mod: PBBFAC,,, | Performed by: INTERNAL MEDICINE

## 2019-07-30 PROCEDURE — 3074F SYST BP LT 130 MM HG: CPT | Mod: CPTII,S$GLB,, | Performed by: INTERNAL MEDICINE

## 2019-07-30 PROCEDURE — 3008F PR BODY MASS INDEX (BMI) DOCUMENTED: ICD-10-PCS | Mod: CPTII,S$GLB,, | Performed by: INTERNAL MEDICINE

## 2019-07-30 RX ORDER — ECONAZOLE NITRATE 10 MG/G
CREAM TOPICAL 2 TIMES DAILY
Qty: 85 G | Refills: 1 | Status: SHIPPED | OUTPATIENT
Start: 2019-07-30 | End: 2019-12-04

## 2019-07-30 NOTE — PROGRESS NOTES
Subjective:       Patient ID: Kristofer Gentile is a 39 y.o. female.    Chief Complaint: rash    HPI   The patient presents with complaints of a recurrent axillary rash.  She has been noting skin irritation since January 2019.  She has used different antiperspirant deodorants and she has also use Lotrisone to the affected areas.  She has been diagnosed to have a fungal infection of the skin.    Review of Systems   Constitutional: Negative for fatigue and fever.   HENT: Negative for congestion, rhinorrhea and sore throat.    Eyes: Negative for pain.   Respiratory: Negative for cough and shortness of breath.    Gastrointestinal: Negative for diarrhea and vomiting.   Skin: Positive for rash.       Objective:      Physical Exam   Constitutional: She appears well-developed and well-nourished. No distress.   Skin: Skin is warm.   Weeping erythematous eruption is noted under both axillae.  Excoriated skin changes are also present.  No  nodule or fluctuance is appreciated on palpation.   Nursing note and vitals reviewed.      Assessment:       1. Hyperhidrosis of axilla    2. Rash        Plan:       Kristofer was seen today for rash.  Econazole cream will be ordered for topical use.  Dermatology consultation will be obtained regarding axillary hyperhidrosis and recurrent rash.  The patient is to return to clinic as needed.    Diagnoses and all orders for this visit:    Hyperhidrosis of axilla  -     Ambulatory consult to Dermatology    Rash  -     Ambulatory consult to Dermatology    Other orders  -     econazole nitrate 1 % cream; Apply topically 2 (two) times daily.

## 2019-08-12 ENCOUNTER — OFFICE VISIT (OUTPATIENT)
Dept: OBSTETRICS AND GYNECOLOGY | Facility: CLINIC | Age: 39
End: 2019-08-12
Attending: OBSTETRICS & GYNECOLOGY
Payer: COMMERCIAL

## 2019-08-12 ENCOUNTER — OFFICE VISIT (OUTPATIENT)
Dept: INTERNAL MEDICINE | Facility: CLINIC | Age: 39
End: 2019-08-12
Payer: COMMERCIAL

## 2019-08-12 VITALS
SYSTOLIC BLOOD PRESSURE: 119 MMHG | WEIGHT: 252.19 LBS | DIASTOLIC BLOOD PRESSURE: 70 MMHG | BODY MASS INDEX: 41.97 KG/M2

## 2019-08-12 VITALS
SYSTOLIC BLOOD PRESSURE: 104 MMHG | BODY MASS INDEX: 41.8 KG/M2 | HEART RATE: 68 BPM | TEMPERATURE: 99 F | HEIGHT: 65 IN | DIASTOLIC BLOOD PRESSURE: 70 MMHG | WEIGHT: 250.88 LBS

## 2019-08-12 DIAGNOSIS — Z12.4 ENCOUNTER FOR PAPANICOLAOU SMEAR FOR CERVICAL CANCER SCREENING: ICD-10-CM

## 2019-08-12 DIAGNOSIS — Z01.419 WOMEN'S ANNUAL ROUTINE GYNECOLOGICAL EXAMINATION: Primary | ICD-10-CM

## 2019-08-12 DIAGNOSIS — Z12.31 ENCOUNTER FOR SCREENING MAMMOGRAM FOR BREAST CANCER: ICD-10-CM

## 2019-08-12 DIAGNOSIS — Z11.51 SCREENING FOR HPV (HUMAN PAPILLOMAVIRUS): ICD-10-CM

## 2019-08-12 DIAGNOSIS — R23.4 BREAST SKIN CHANGES: ICD-10-CM

## 2019-08-12 DIAGNOSIS — B36.9 FUNGAL SKIN INFECTION: Primary | ICD-10-CM

## 2019-08-12 DIAGNOSIS — Z30.09 COUNSELING FOR BIRTH CONTROL REGARDING INTRAUTERINE DEVICE (IUD): ICD-10-CM

## 2019-08-12 PROCEDURE — 99999 PR PBB SHADOW E&M-EST. PATIENT-LVL III: CPT | Mod: PBBFAC,,, | Performed by: NURSE PRACTITIONER

## 2019-08-12 PROCEDURE — 99395 PREV VISIT EST AGE 18-39: CPT | Mod: S$GLB,,, | Performed by: NURSE PRACTITIONER

## 2019-08-12 PROCEDURE — 99213 OFFICE O/P EST LOW 20 MIN: CPT | Mod: S$GLB,,, | Performed by: INTERNAL MEDICINE

## 2019-08-12 PROCEDURE — 3078F DIAST BP <80 MM HG: CPT | Mod: CPTII,S$GLB,, | Performed by: INTERNAL MEDICINE

## 2019-08-12 PROCEDURE — 3074F SYST BP LT 130 MM HG: CPT | Mod: CPTII,S$GLB,, | Performed by: NURSE PRACTITIONER

## 2019-08-12 PROCEDURE — 3008F BODY MASS INDEX DOCD: CPT | Mod: CPTII,S$GLB,, | Performed by: INTERNAL MEDICINE

## 2019-08-12 PROCEDURE — 99999 PR PBB SHADOW E&M-EST. PATIENT-LVL III: ICD-10-PCS | Mod: PBBFAC,,, | Performed by: INTERNAL MEDICINE

## 2019-08-12 PROCEDURE — 3078F DIAST BP <80 MM HG: CPT | Mod: CPTII,S$GLB,, | Performed by: NURSE PRACTITIONER

## 2019-08-12 PROCEDURE — 99999 PR PBB SHADOW E&M-EST. PATIENT-LVL III: ICD-10-PCS | Mod: PBBFAC,,, | Performed by: NURSE PRACTITIONER

## 2019-08-12 PROCEDURE — 3078F PR MOST RECENT DIASTOLIC BLOOD PRESSURE < 80 MM HG: ICD-10-PCS | Mod: CPTII,S$GLB,, | Performed by: INTERNAL MEDICINE

## 2019-08-12 PROCEDURE — 3074F PR MOST RECENT SYSTOLIC BLOOD PRESSURE < 130 MM HG: ICD-10-PCS | Mod: CPTII,S$GLB,, | Performed by: NURSE PRACTITIONER

## 2019-08-12 PROCEDURE — 99999 PR PBB SHADOW E&M-EST. PATIENT-LVL III: CPT | Mod: PBBFAC,,, | Performed by: INTERNAL MEDICINE

## 2019-08-12 PROCEDURE — 3078F PR MOST RECENT DIASTOLIC BLOOD PRESSURE < 80 MM HG: ICD-10-PCS | Mod: CPTII,S$GLB,, | Performed by: NURSE PRACTITIONER

## 2019-08-12 PROCEDURE — 99395 PR PREVENTIVE VISIT,EST,18-39: ICD-10-PCS | Mod: S$GLB,,, | Performed by: NURSE PRACTITIONER

## 2019-08-12 PROCEDURE — 3074F SYST BP LT 130 MM HG: CPT | Mod: CPTII,S$GLB,, | Performed by: INTERNAL MEDICINE

## 2019-08-12 PROCEDURE — 87624 HPV HI-RISK TYP POOLED RSLT: CPT

## 2019-08-12 PROCEDURE — 3008F PR BODY MASS INDEX (BMI) DOCUMENTED: ICD-10-PCS | Mod: CPTII,S$GLB,, | Performed by: INTERNAL MEDICINE

## 2019-08-12 PROCEDURE — 88175 CYTOPATH C/V AUTO FLUID REDO: CPT

## 2019-08-12 PROCEDURE — 3074F PR MOST RECENT SYSTOLIC BLOOD PRESSURE < 130 MM HG: ICD-10-PCS | Mod: CPTII,S$GLB,, | Performed by: INTERNAL MEDICINE

## 2019-08-12 PROCEDURE — 99213 PR OFFICE/OUTPT VISIT, EST, LEVL III, 20-29 MIN: ICD-10-PCS | Mod: S$GLB,,, | Performed by: INTERNAL MEDICINE

## 2019-08-12 RX ORDER — CLOTRIMAZOLE AND BETAMETHASONE DIPROPIONATE 10; .5 MG/ML; MG/ML
LOTION TOPICAL 2 TIMES DAILY
Qty: 30 ML | Refills: 2 | Status: SHIPPED | OUTPATIENT
Start: 2019-08-12 | End: 2019-12-04 | Stop reason: SDUPTHER

## 2019-08-12 RX ORDER — MISOPROSTOL 200 UG/1
200 TABLET ORAL ONCE
Qty: 2 TABLET | Refills: 0 | Status: SHIPPED | OUTPATIENT
Start: 2019-08-12 | End: 2019-10-02 | Stop reason: SDUPTHER

## 2019-08-12 NOTE — PROGRESS NOTES
"CC: Annual  HPI: Pt is a 39 y.o.  female who presents for routine annual exam.  She is currently in school getting her Master's in [employer].  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  She uses Mirena IUD for contraception-  2019. She desires a replacement IUD She does not want STD screening.  Pt reports skin changes to her left breast- looks "peeling like." She had breast biopsy in 3/2019- benign intraductal papilloma.  The patient participates in regular exercise: no.  The patient does not smoke.  The patient wears seatbelts.   Pt denies any domestic violence.     FH:  Breast cancer: none  Colon cancer: none  Ovarian cancer: none  Endometrial cancer: none    ROS:  GENERAL: Feeling well overall. Denies fever or chills.   SKIN: Denies rash or lesions.   HEAD: Denies head injury or headache.   NODES: Denies enlarged lymph nodes.   CHEST: Denies chest pain or shortness of breath.   CARDIOVASCULAR: Denies palpitations or left sided chest pain.   ABDOMEN: No abdominal pain, constipation, diarrhea, nausea, vomiting or rectal bleeding.   URINARY: No dysuria, hematuria, or burning on urination.  REPRODUCTIVE: See HPI.   BREASTS: Denies pain, lumps, or nipple discharge.   HEMATOLOGIC: No easy bruisability or excessive bleeding.   MUSCULOSKELETAL: Denies joint pain or swelling.   NEUROLOGIC: Denies syncope or weakness.   PSYCHIATRIC: Denies depression, anxiety or mood swings.    PE:   APPEARANCE: Well nourished, well developed, Black or  female in no acute distress.  NODES: no cervical, supraclavicular, or inguinal lymphadenopathy  BREASTS: Symmetrical, no skin changes or visible lesions. No palpable masses, nipple discharge or adenopathy bilaterally.  ABDOMEN: Soft. No tenderness or masses. No distention. No hernias palpated. No CVA tenderness.  VULVA: No lesions. Normal external female genitalia.  URETHRAL MEATUS: Normal size and location, no lesions, no prolapse.  URETHRA: No masses, tenderness, " or prolapse.  VAGINA: Moist. No lesions or lacerations noted. No abnormal discharge present. No odor present.   CERVIX: No lesions or discharge. No cervical motion tenderness.   IUD strings visualized at os- 3 cm out.  UTERUS: Normal size, regular shape, mobile, non-tender.  ADNEXA: No tenderness. No fullness or masses palpated in the adnexal regions.   ANUS PERINEUM: Normal.      Diagnosis:  1. Women's annual routine gynecological examination    2. Encounter for Papanicolaou smear for cervical cancer screening    3. Screening for HPV (human papillomavirus)    4. Encounter for screening mammogram for breast cancer    5. Counseling for birth control regarding intrauterine device (IUD)    6. Breast skin changes        Plan:   Pap/ HPV  Left breast US and mammo for evaluation of left breast skin changes  Screening mammo in 12/2019  Benefits investigation initiated for Mirena IUD  Cytotec for IUD insertion     Orders Placed This Encounter    HPV High Risk Genotypes, PCR    Mammo Digital Screening Bilat w/ Juan A    US Breast Left Complete    Mammo Digital Diagnostic Left with Juan A    Device Authorization Order    Liquid-based pap smear, screening    miSOPROStol (CYTOTEC) 200 MCG Tab       Patient was counseled today on the new ACS guidelines for cervical cytology screening as well as the current recommendations for breast cancer screening. She was counseled to follow up with her PCP for other routine health maintenance. Counseling session lasted approximately 10 minutes, and all her questions were answered.    Follow-up with me in 1 year for routine exam    SUKI Mar

## 2019-08-12 NOTE — PROGRESS NOTES
"Subjective:       Patient ID: Kristofer Gentile is a 39 y.o. female.    Chief Complaint: Itching (underarms )    HPI    She presents for evaluation of axillary rash. The rash occurs intermittently over months now. She has had relief w/ lotrisone previously. She has seen another provider in clinic who prescribed econazole, which provided no relief. She would like to resume the lotrisone. She had apt with dermatology scheduled and cancelled but plans to reschedule.     Review of Systems   Skin: Positive for rash.   All other systems reviewed and are negative.      Objective:        Vitals:    08/12/19 1453   BP: 104/70   BP Location: Right arm   Patient Position: Sitting   BP Method: Large (Manual)   Pulse: 68   Temp: 98.9 °F (37.2 °C)   TempSrc: Oral   Weight: 113.8 kg (250 lb 14.1 oz)   Height: 5' 5" (1.651 m)       Body mass index is 41.75 kg/m².    Physical Exam   Constitutional: She is oriented to person, place, and time. She appears well-developed and well-nourished. No distress.   HENT:   Head: Normocephalic and atraumatic.   Right Ear: External ear normal.   Left Ear: External ear normal.   Eyes: Conjunctivae and EOM are normal.   Neck: Normal range of motion.   Cardiovascular: Normal rate and intact distal pulses.   Pulmonary/Chest: Effort normal.   Musculoskeletal: Normal range of motion.   Neurological: She is alert and oriented to person, place, and time.   Skin: Skin is warm and dry. Rash (hyperpigmented, bilateral axilla ) noted.   Psychiatric: She has a normal mood and affect. Her behavior is normal.       Assessment:     1. Fungal skin infection           Plan:         1. Fungal skin infection  - assist with dermatology referral today  - clotrimazole-betamethasone (LOTRISONE) lotion; Apply topically 2 (two) times daily.  Dispense: 30 mL; Refill: 2- discussed risk of long term topical steroid use.           Patient note was created using MModal Dictation.  Any errors in syntax or even information may " not have been identified and edited on initial review prior to signing this note.

## 2019-08-16 LAB
HPV HR 12 DNA CVX QL NAA+PROBE: NEGATIVE
HPV16 AG SPEC QL: NEGATIVE
HPV18 DNA SPEC QL NAA+PROBE: NEGATIVE

## 2019-08-19 ENCOUNTER — HOSPITAL ENCOUNTER (OUTPATIENT)
Dept: RADIOLOGY | Facility: HOSPITAL | Age: 39
Discharge: HOME | End: 2019-08-19
Attending: NURSE PRACTITIONER
Payer: COMMERCIAL

## 2019-08-19 VITALS — WEIGHT: 250 LBS | HEIGHT: 65 IN | BODY MASS INDEX: 41.65 KG/M2

## 2019-08-19 DIAGNOSIS — R23.4 BREAST SKIN CHANGES: ICD-10-CM

## 2019-08-19 PROCEDURE — 76642 ULTRASOUND BREAST LIMITED: CPT | Mod: TC,PO,LT

## 2019-08-19 PROCEDURE — 77061 BREAST TOMOSYNTHESIS UNI: CPT | Mod: 26,LT,, | Performed by: RADIOLOGY

## 2019-08-19 PROCEDURE — 76642 ULTRASOUND BREAST LIMITED: CPT | Mod: 26,LT,, | Performed by: RADIOLOGY

## 2019-08-19 PROCEDURE — 76642 US BREAST LEFT LIMITED: ICD-10-PCS | Mod: 26,LT,, | Performed by: RADIOLOGY

## 2019-08-19 PROCEDURE — 77065 MAMMO DIGITAL DIAGNOSTIC LEFT WITH TOMOSYNTHESIS_CAD: ICD-10-PCS | Mod: 26,LT,, | Performed by: RADIOLOGY

## 2019-08-19 PROCEDURE — 77065 DX MAMMO INCL CAD UNI: CPT | Mod: 26,LT,, | Performed by: RADIOLOGY

## 2019-08-19 PROCEDURE — 77061 BREAST TOMOSYNTHESIS UNI: CPT | Mod: TC,PO,LT

## 2019-08-19 PROCEDURE — 77065 DX MAMMO INCL CAD UNI: CPT | Mod: TC,PO,LT

## 2019-08-19 PROCEDURE — 77061 MAMMO DIGITAL DIAGNOSTIC LEFT WITH TOMOSYNTHESIS_CAD: ICD-10-PCS | Mod: 26,LT,, | Performed by: RADIOLOGY

## 2019-10-01 ENCOUNTER — OFFICE VISIT (OUTPATIENT)
Dept: INTERNAL MEDICINE | Facility: CLINIC | Age: 39
End: 2019-10-01
Payer: COMMERCIAL

## 2019-10-01 VITALS
SYSTOLIC BLOOD PRESSURE: 122 MMHG | OXYGEN SATURATION: 98 % | BODY MASS INDEX: 41.64 KG/M2 | HEART RATE: 76 BPM | DIASTOLIC BLOOD PRESSURE: 80 MMHG | WEIGHT: 250.25 LBS

## 2019-10-01 DIAGNOSIS — Z98.890 HISTORY OF BENIGN BREAST BIOPSY: ICD-10-CM

## 2019-10-01 DIAGNOSIS — N64.4 BREAST PAIN, LEFT: Primary | ICD-10-CM

## 2019-10-01 DIAGNOSIS — N61.1 BREAST ABSCESS: ICD-10-CM

## 2019-10-01 DIAGNOSIS — N61.0 MASTITIS: ICD-10-CM

## 2019-10-01 PROCEDURE — 99214 OFFICE O/P EST MOD 30 MIN: CPT | Mod: S$GLB,,, | Performed by: PHYSICIAN ASSISTANT

## 2019-10-01 PROCEDURE — 3079F DIAST BP 80-89 MM HG: CPT | Mod: CPTII,S$GLB,, | Performed by: PHYSICIAN ASSISTANT

## 2019-10-01 PROCEDURE — 3008F BODY MASS INDEX DOCD: CPT | Mod: CPTII,S$GLB,, | Performed by: PHYSICIAN ASSISTANT

## 2019-10-01 PROCEDURE — 3074F SYST BP LT 130 MM HG: CPT | Mod: CPTII,S$GLB,, | Performed by: PHYSICIAN ASSISTANT

## 2019-10-01 PROCEDURE — 3079F PR MOST RECENT DIASTOLIC BLOOD PRESSURE 80-89 MM HG: ICD-10-PCS | Mod: CPTII,S$GLB,, | Performed by: PHYSICIAN ASSISTANT

## 2019-10-01 PROCEDURE — 3008F PR BODY MASS INDEX (BMI) DOCUMENTED: ICD-10-PCS | Mod: CPTII,S$GLB,, | Performed by: PHYSICIAN ASSISTANT

## 2019-10-01 PROCEDURE — 99999 PR PBB SHADOW E&M-EST. PATIENT-LVL IV: ICD-10-PCS | Mod: PBBFAC,,, | Performed by: PHYSICIAN ASSISTANT

## 2019-10-01 PROCEDURE — 99214 PR OFFICE/OUTPT VISIT, EST, LEVL IV, 30-39 MIN: ICD-10-PCS | Mod: S$GLB,,, | Performed by: PHYSICIAN ASSISTANT

## 2019-10-01 PROCEDURE — 3074F PR MOST RECENT SYSTOLIC BLOOD PRESSURE < 130 MM HG: ICD-10-PCS | Mod: CPTII,S$GLB,, | Performed by: PHYSICIAN ASSISTANT

## 2019-10-01 PROCEDURE — 99999 PR PBB SHADOW E&M-EST. PATIENT-LVL IV: CPT | Mod: PBBFAC,,, | Performed by: PHYSICIAN ASSISTANT

## 2019-10-01 RX ORDER — IBUPROFEN 600 MG/1
600 TABLET ORAL EVERY 6 HOURS PRN
Qty: 20 TABLET | Refills: 0 | Status: SHIPPED | OUTPATIENT
Start: 2019-10-01 | End: 2019-10-04 | Stop reason: SDUPTHER

## 2019-10-01 RX ORDER — DOXYCYCLINE 100 MG/1
100 CAPSULE ORAL 2 TIMES DAILY
Qty: 14 CAPSULE | Refills: 0 | Status: SHIPPED | OUTPATIENT
Start: 2019-10-01 | End: 2019-10-08

## 2019-10-01 NOTE — LETTER
October 1, 2019                 [facility] - Internal Medicine  Internal Medicine  [address]  [facility] [address]  Phone: [phone]  Fax: [phone]   October 1, 2019     Patient: Kristofer Gentile   YOB: 1980   Date of Visit: 10/1/2019       To Whom it May Concern:    Kristofer Gentile was seen in my clinic on 10/1/2019. She may return to work on 10/4/19.    Please excuse her from any classes or work missed.    If you have any questions or concerns, please don't hesitate to call.    Sincerely,           Darya Felipe PA-C

## 2019-10-01 NOTE — PROGRESS NOTES
Subjective:       Patient ID: Kristofer Gentile is a 39 y.o. female.    Chief Complaint: Breast Pain (left)    HPI     Established pt of Melonie Villanueva MD (new to me)    C/o left breast pain, onset about one week ago. Left breast with area of swelling, redness and a lump.     She has hx of excision breast biopsy (ductal papilloma) of her left breast in March 2019.     Past Medical History:   Diagnosis Date    Allergy     Carpal tunnel syndrome     Fever blister     IUD (intrauterine device) in place 2009    Thyroid disease     treated with synthroid during pregnancy    Vitamin D deficiency 11/5/2018     Social History     Tobacco Use    Smoking status: Never Smoker    Smokeless tobacco: Never Used   Substance Use Topics    Alcohol use: Yes     Alcohol/week: 0.8 standard drinks     Types: 1 Standard drinks or equivalent per week     Frequency: Monthly or less     Drinks per session: 1 or 2     Binge frequency: Never     Comment: Rarely    Drug use: No     Review of patient's allergies indicates:   Allergen Reactions    Bactrim [sulfamethoxazole-trimethoprim] Hives and Other (See Comments)     Stiffness to joints    Sulfa (sulfonamide antibiotics) Hives         Review of Systems   Constitutional: Negative for chills, fever and unexpected weight change.   Respiratory: Negative for cough and shortness of breath.    Cardiovascular: Negative for chest pain and palpitations.   Gastrointestinal: Negative for blood in stool, diarrhea and vomiting.   Skin: Positive for color change.        As per HPI   Neurological: Negative for weakness and headaches.       Objective: /80   Pulse 76   Wt 113.5 kg (250 lb 3.6 oz)   SpO2 98%   BMI 41.64 kg/m²         Physical Exam   Constitutional: She appears well-developed and well-nourished. No distress.   HENT:   Head: Normocephalic and atraumatic.   Mouth/Throat: Oropharynx is clear and moist.   Cardiovascular: Normal rate and regular rhythm. Exam reveals no  friction rub.   No murmur heard.  Pulmonary/Chest: Effort normal and breath sounds normal. She has no wheezes. She has no rales. Right breast exhibits no inverted nipple, no mass, no nipple discharge, no skin change and no tenderness. Left breast exhibits mass and tenderness. Left breast exhibits no inverted nipple and no nipple discharge.   Left outer quadrant near 5 o'clock. approx 3cm area of redness, swelling with mild induration adjacent to prior surgical scar. Moderate tenderness. No drainage.        Neurological: She is alert.   Skin: Skin is warm and dry. No rash noted.   Vitals reviewed.      Assessment:       1. Breast pain, left    2. Mastitis    3. Breast abscess    4. History of benign breast biopsy        Plan:           Kristofer was seen today for breast pain.    Diagnoses and all orders for this visit:    Breast pain, left  Mastitis  Breast abscess  -     Ambulatory Referral to Breast Surgery  -     US Breast Left Complete; Future  -     doxycycline (VIBRAMYCIN) 100 MG Cap; Take 1 capsule (100 mg total) by mouth 2 (two) times daily. for 7 days  -     ibuprofen (ADVIL,MOTRIN) 600 MG tablet; Take 1 tablet (600 mg total) by mouth every 6 (six) hours as needed for Pain.      History of benign breast biopsy          Start doxy as above  Pt with allergy to Bactrim  Ibuprofen prn  [facility] follow up schedule.           Future Appointments   Date Time Provider Department Center   10/2/2019  8:00 AM [facility] [id number] [facility]   10/3/2019  3:00 PM LEWIS K[facility]ffman [facility] [place]   10/16/2019  8:30 AM Anastasiya Ellis MD [facility] OBGYN [place]   10/25/2019  9:00 AM [facility] [place]   10/25/2019  9:00 AM [facility] [place]   11/1/2019  9:40 AM Melonie Villanueva MD [facility] IM [place]   11/6/2019  2:20 PM Lizzette Patel MD [facility] DERM [place]

## 2019-10-02 ENCOUNTER — OFFICE VISIT (OUTPATIENT)
Dept: SURGERY | Facility: CLINIC | Age: 39
End: 2019-10-02
Payer: COMMERCIAL

## 2019-10-02 ENCOUNTER — HOSPITAL ENCOUNTER (OUTPATIENT)
Dept: RADIOLOGY | Facility: HOSPITAL | Age: 39
Discharge: HOME | End: 2019-10-02
Attending: PHYSICIAN ASSISTANT
Payer: COMMERCIAL

## 2019-10-02 VITALS
SYSTOLIC BLOOD PRESSURE: 121 MMHG | HEIGHT: 65 IN | TEMPERATURE: 98 F | BODY MASS INDEX: 41.65 KG/M2 | DIASTOLIC BLOOD PRESSURE: 79 MMHG | HEART RATE: 73 BPM | WEIGHT: 250 LBS

## 2019-10-02 DIAGNOSIS — N61.1 BREAST ABSCESS: Primary | ICD-10-CM

## 2019-10-02 DIAGNOSIS — N64.4 BREAST PAIN, LEFT: ICD-10-CM

## 2019-10-02 PROCEDURE — 99999 PR PBB SHADOW E&M-EST. PATIENT-LVL III: CPT | Mod: PBBFAC,,, | Performed by: PHYSICIAN ASSISTANT

## 2019-10-02 PROCEDURE — 99214 OFFICE O/P EST MOD 30 MIN: CPT | Mod: 25,S$GLB,, | Performed by: PHYSICIAN ASSISTANT

## 2019-10-02 PROCEDURE — 3078F DIAST BP <80 MM HG: CPT | Mod: CPTII,S$GLB,, | Performed by: PHYSICIAN ASSISTANT

## 2019-10-02 PROCEDURE — 3074F PR MOST RECENT SYSTOLIC BLOOD PRESSURE < 130 MM HG: ICD-10-PCS | Mod: CPTII,S$GLB,, | Performed by: PHYSICIAN ASSISTANT

## 2019-10-02 PROCEDURE — 87077 CULTURE AEROBIC IDENTIFY: CPT

## 2019-10-02 PROCEDURE — 99999 PR PBB SHADOW E&M-EST. PATIENT-LVL III: ICD-10-PCS | Mod: PBBFAC,,, | Performed by: PHYSICIAN ASSISTANT

## 2019-10-02 PROCEDURE — 3008F BODY MASS INDEX DOCD: CPT | Mod: CPTII,S$GLB,, | Performed by: PHYSICIAN ASSISTANT

## 2019-10-02 PROCEDURE — 87075 CULTR BACTERIA EXCEPT BLOOD: CPT

## 2019-10-02 PROCEDURE — 3074F SYST BP LT 130 MM HG: CPT | Mod: CPTII,S$GLB,, | Performed by: PHYSICIAN ASSISTANT

## 2019-10-02 PROCEDURE — 87070 CULTURE OTHR SPECIMN AEROBIC: CPT

## 2019-10-02 PROCEDURE — 76642 US BREAST LEFT LIMITED: ICD-10-PCS | Mod: 26,LT,, | Performed by: RADIOLOGY

## 2019-10-02 PROCEDURE — 87186 SC STD MICRODIL/AGAR DIL: CPT

## 2019-10-02 PROCEDURE — 10060 PR DRAIN SKIN ABSCESS SIMPLE: ICD-10-PCS | Mod: S$GLB,,, | Performed by: PHYSICIAN ASSISTANT

## 2019-10-02 PROCEDURE — 76642 ULTRASOUND BREAST LIMITED: CPT | Mod: 26,LT,, | Performed by: RADIOLOGY

## 2019-10-02 PROCEDURE — 99214 PR OFFICE/OUTPT VISIT, EST, LEVL IV, 30-39 MIN: ICD-10-PCS | Mod: 25,S$GLB,, | Performed by: PHYSICIAN ASSISTANT

## 2019-10-02 PROCEDURE — 10060 I&D ABSCESS SIMPLE/SINGLE: CPT | Mod: S$GLB,,, | Performed by: PHYSICIAN ASSISTANT

## 2019-10-02 PROCEDURE — 76642 ULTRASOUND BREAST LIMITED: CPT | Mod: TC,PO,LT

## 2019-10-02 PROCEDURE — 3008F PR BODY MASS INDEX (BMI) DOCUMENTED: ICD-10-PCS | Mod: CPTII,S$GLB,, | Performed by: PHYSICIAN ASSISTANT

## 2019-10-02 PROCEDURE — 3078F PR MOST RECENT DIASTOLIC BLOOD PRESSURE < 80 MM HG: ICD-10-PCS | Mod: CPTII,S$GLB,, | Performed by: PHYSICIAN ASSISTANT

## 2019-10-02 RX ORDER — CLINDAMYCIN HYDROCHLORIDE 300 MG/1
300 CAPSULE ORAL EVERY 8 HOURS
Qty: 30 CAPSULE | Refills: 0 | Status: SHIPPED | OUTPATIENT
Start: 2019-10-02 | End: 2019-10-12

## 2019-10-02 RX ORDER — MISOPROSTOL 200 UG/1
TABLET ORAL
Refills: 0 | COMMUNITY
Start: 2019-08-12 | End: 2019-12-04

## 2019-10-02 NOTE — PROGRESS NOTES
[facility]  [facility] Breast [place]  10/2/2019      SUBJECTIVE:   Ms. Kristofer Gentile is a 39 y.o. female who presents today complaining of a LEFT breast abscess.      History of Present Illness: Patient was originally seen by Dr. 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  She had an excisional biopsy of this on 3/1/19.  Post-operatively she had some low volume leakage but was otherwise healing well.      Interval history:  Patient states on 9/27/19 she had itching and pain at her lower outer left breast. Two days later she noticed swelling and redness at the site and went to see her primary care provider.  She was given Doxycycline and referred her for a further workup.  Patient states she has not started taking the antibiotic yet.  She denies any trauma to the breast and denies any fever.  She is not diabetic and does not smoke.  She has no history of nipple piercings.      Past Medical History:   Diagnosis Date    Allergy     Carpal tunnel syndrome     Fever blister     IUD (intrauterine device) in place 2009    Thyroid disease     treated with synthroid during pregnancy    Vitamin D deficiency 11/5/2018     Past Surgical History:   Procedure Laterality Date    EXCISIONAL BIOPSY Left 3/1/2019    Procedure: EXCISIONAL BIOPSY- w/major duct excision KENALOG INJECTION AT TIME OF SURGERY;  Surgeon: Kelby Mcmahan MD;  Location: [address];  Service: General;  Laterality: Left;    INTRAUTERINE DEVICE INSERTION  2009    again 2014     Social History     Socioeconomic History    Marital status:      Spouse name: Not on file    Number of children: 2    Years of education: Not on file    Highest education level: Not on file   Occupational History     Employer: [employer]   Social Needs    Financial resource strain: Somewhat hard    Food insecurity:     Worry: Sometimes true     Inability: Sometimes true    Transportation  needs:     Medical: No     Non-medical: No   Tobacco Use    Smoking status: Never Smoker    Smokeless tobacco: Never Used   Substance and Sexual Activity    Alcohol use: Yes     Alcohol/week: 0.8 standard drinks     Types: 1 Standard drinks or equivalent per week     Frequency: Monthly or less     Drinks per session: 1 or 2     Binge frequency: Never     Comment: Rarely    Drug use: No    Sexual activity: Yes     Partners: Male     Birth control/protection: IUD   Lifestyle    Physical activity:     Days per week: 0 days     Minutes per session: 0 min    Stress: To some extent   Relationships    Social connections:     Talks on phone: More than three times a week     Gets together: Once a week     Attends [employer] service: Not on file     Active member of club or organization: Yes     Attends meetings of clubs or organizations: More than 4 times per year     Relationship status:    Other Topics Concern    Are you pregnant or think you may be? Not Asked    Breast-feeding Not Asked   Social History Narrative    Not on file     Review of patient's allergies indicates:   Allergen Reactions    Bactrim [sulfamethoxazole-trimethoprim] Hives and Other (See Comments)     Stiffness to joints    Sulfa (sulfonamide antibiotics) Hives      Family History: No family history of breast or ovarian cancer.  No known family history of other cancers.    GYN History:  Age of menarche was 12. Patient admits to hormonal therapy. Patient is . Age of first live birth was 22 Patient did breast feed.     Review of Systems: Denies any chest pain or shortness of breath.  Denies any fever or chills.  See HPI/ Interval History for other systems reviewed.    OBJECTIVE:   Vitals:    10/02/19 1320   BP: 121/79   Pulse: 73   Temp: 98.4 °F (36.9 °C)      Physical Exam   Constitutional: She is well-developed, well-nourished, and in no distress.   HENT:   Head: Normocephalic and atraumatic.   Pulmonary/Chest: Right breast  exhibits no inverted nipple, no mass, no nipple discharge, no skin change and no tenderness. Left breast exhibits mass, skin change and tenderness. Left breast exhibits no inverted nipple and no nipple discharge.       Lymphadenopathy:     She has no cervical adenopathy.     She has no axillary adenopathy.      Abscess Incision and Drainage:  PREOPERATIVE DIAGNOSIS: Abscess    POSTOPERATIVE DIAGNOSIS: Abscess    OPERATIVE PROCEDURE: Incision and Drainage    ATTENDING PHYSICIAN: Dr. Cedrick Wynn  SURGICAL ASSISTANT: Jordana Serrano PA-C  ANESTHESIA: 1% lidocaine without epinephrine    INDICATION: A 39-year-old woman with an abscess located at her left breast. Details and risks were explained to the patient including infection, bleeding, and possible reaccumulation of fluid.     OPERATIVE PROCEDURE: The patient was placed supine on the exam table. Her left extremity was elevated above her head.  The area was prepped in the usual sterile fashion.  1% lidocaine without epinephrine was infiltrated around the entire area and then, utilizing a #10 blade, a small incision was made through the abscess.  Pressure was applied to drain the abscess manually, and a hemostat was used to break up loculations along the inside border of the abscess.  Cultures were obtained, the area was packed, and a dressing was applied.  The patient tolerated the procedure well.  All needles were disposed of at the end of the case.    COMPLICATIONS: None  ESTIMATED BLOOD LOSS: 5-10 ml  SPECIMEN: None    ASSESSMENT:  Ms. Kristofer Gentile is a 39 y.o. year old female with a LEFT breast abscess.      PLAN:   We discussed that she would benefit from Clindamycin instead of Doxycyline so I called in a prescription for this.  I advised her to remove the packing on Friday morning and keep the area clean and dry.  I will see her back in one week.  She was instructed to call me with any new or worsening redness, drainage, swelling, or fevers.       ~Jordana Serrano PA-C      Surgical Oncology            10/2/2019

## 2019-10-04 ENCOUNTER — OFFICE VISIT (OUTPATIENT)
Dept: SURGERY | Facility: CLINIC | Age: 39
End: 2019-10-04
Payer: COMMERCIAL

## 2019-10-04 VITALS
BODY MASS INDEX: 42.06 KG/M2 | DIASTOLIC BLOOD PRESSURE: 63 MMHG | SYSTOLIC BLOOD PRESSURE: 134 MMHG | HEIGHT: 65 IN | WEIGHT: 252.44 LBS | TEMPERATURE: 99 F | HEART RATE: 78 BPM

## 2019-10-04 DIAGNOSIS — N61.1 BREAST ABSCESS: Primary | ICD-10-CM

## 2019-10-04 DIAGNOSIS — N64.4 BREAST PAIN, LEFT: ICD-10-CM

## 2019-10-04 LAB — BACTERIA SPEC AEROBE CULT: ABNORMAL

## 2019-10-04 PROCEDURE — 99999 PR PBB SHADOW E&M-EST. PATIENT-LVL III: ICD-10-PCS | Mod: PBBFAC,,, | Performed by: PHYSICIAN ASSISTANT

## 2019-10-04 PROCEDURE — 99024 PR POST-OP FOLLOW-UP VISIT: ICD-10-PCS | Mod: S$GLB,,, | Performed by: PHYSICIAN ASSISTANT

## 2019-10-04 PROCEDURE — 99024 POSTOP FOLLOW-UP VISIT: CPT | Mod: S$GLB,,, | Performed by: PHYSICIAN ASSISTANT

## 2019-10-04 PROCEDURE — 3078F PR MOST RECENT DIASTOLIC BLOOD PRESSURE < 80 MM HG: ICD-10-PCS | Mod: CPTII,S$GLB,, | Performed by: PHYSICIAN ASSISTANT

## 2019-10-04 PROCEDURE — 99999 PR PBB SHADOW E&M-EST. PATIENT-LVL III: CPT | Mod: PBBFAC,,, | Performed by: PHYSICIAN ASSISTANT

## 2019-10-04 PROCEDURE — 3008F BODY MASS INDEX DOCD: CPT | Mod: CPTII,S$GLB,, | Performed by: PHYSICIAN ASSISTANT

## 2019-10-04 PROCEDURE — 3008F PR BODY MASS INDEX (BMI) DOCUMENTED: ICD-10-PCS | Mod: CPTII,S$GLB,, | Performed by: PHYSICIAN ASSISTANT

## 2019-10-04 PROCEDURE — 3078F DIAST BP <80 MM HG: CPT | Mod: CPTII,S$GLB,, | Performed by: PHYSICIAN ASSISTANT

## 2019-10-04 PROCEDURE — 3075F SYST BP GE 130 - 139MM HG: CPT | Mod: CPTII,S$GLB,, | Performed by: PHYSICIAN ASSISTANT

## 2019-10-04 PROCEDURE — 3075F PR MOST RECENT SYSTOLIC BLOOD PRESS GE 130-139MM HG: ICD-10-PCS | Mod: CPTII,S$GLB,, | Performed by: PHYSICIAN ASSISTANT

## 2019-10-04 RX ORDER — IBUPROFEN 600 MG/1
TABLET ORAL
Qty: 20 TABLET | Refills: 0 | Status: SHIPPED | OUTPATIENT
Start: 2019-10-04 | End: 2019-12-04

## 2019-10-04 NOTE — PROGRESS NOTES
[facility]  [facility]  10/4/2019      SUBJECTIVE:   Ms. Kristofer Gentile is a 39 y.o. female who presents today complaining of needing the packing removed from her breast.      History of Present Illness: Patient was originally seen by me on 10/2/19 for a LEFT breast abscess.  Incision and drainage was performed and she was given a prescription for Clindamycin.  Culture was positive for proteus mirabilis.    Interval History:  Patient states that since she started taking the antibiotic yesterday she started feeling fatigued, feverish, and nauseated with body aches. She took Ibuprofen and this provided relief.  She did not take the Clindamycin this morning because she was afraid it was causing her to feel bad.  She also did not remove the packing from her breast because she is afraid to do it herself.  She complains of her breast having a very foul smell lately.      Review of Systems: Denies any chest pain or shortness of breath.  Denies any fever or chills.  See HPI/ Interval History for other systems reviewed.    OBJECTIVE:   Vitals:    10/04/19 1353   BP: 134/63   Pulse: 78   Temp: 98.5 °F (36.9 °C)      Physical Exam:  General: alert and oriented; no acute distress.    Breast:  Packing removed from left breast.  Foul smell; pus drained from inside.  Pressure applied to outside to manually drain as much as possible.  Otherwise healing okay with decreased erythema and edema.      ASSESSMENT:  Ms. Kristofer Gentile is a 39 y.o. year old female with a LEFT breast abscess.    PLAN:   We discussed that the antibiotic was not causing her symptoms, but rather the infection itself.  I advised her to continue taking the Clindamycin as soon as possible.  The infection otherwise appears to be healing somewhat from the inside out.  I advised her to call me over the weekend with any new or worsening fever, drainage, redness, or swelling.    She was advised to keep the area clean and dry and follow  up with me Monday.    ~Jordana Serrano PA-C      Surgical Oncology            10/4/2019

## 2019-10-06 DIAGNOSIS — N64.4 BREAST PAIN, LEFT: ICD-10-CM

## 2019-10-07 ENCOUNTER — PATIENT OUTREACH (OUTPATIENT)
Dept: ADMINISTRATIVE | Facility: OTHER | Age: 39
End: 2019-10-07

## 2019-10-07 LAB — BACTERIA SPEC ANAEROBE CULT: NORMAL

## 2019-10-07 RX ORDER — IBUPROFEN 600 MG/1
TABLET ORAL
Qty: 20 TABLET | Refills: 0 | OUTPATIENT
Start: 2019-10-07

## 2019-10-08 ENCOUNTER — OFFICE VISIT (OUTPATIENT)
Dept: SURGERY | Facility: CLINIC | Age: 39
End: 2019-10-08
Payer: COMMERCIAL

## 2019-10-08 VITALS
TEMPERATURE: 97 F | SYSTOLIC BLOOD PRESSURE: 128 MMHG | DIASTOLIC BLOOD PRESSURE: 71 MMHG | HEART RATE: 74 BPM | WEIGHT: 252 LBS | HEIGHT: 65 IN | BODY MASS INDEX: 41.99 KG/M2

## 2019-10-08 DIAGNOSIS — Z87.2 HISTORY OF ABSCESS OF BREAST: Primary | ICD-10-CM

## 2019-10-08 PROCEDURE — 3078F DIAST BP <80 MM HG: CPT | Mod: CPTII,S$GLB,, | Performed by: PHYSICIAN ASSISTANT

## 2019-10-08 PROCEDURE — 99999 PR PBB SHADOW E&M-EST. PATIENT-LVL III: CPT | Mod: PBBFAC,,, | Performed by: PHYSICIAN ASSISTANT

## 2019-10-08 PROCEDURE — 3078F PR MOST RECENT DIASTOLIC BLOOD PRESSURE < 80 MM HG: ICD-10-PCS | Mod: CPTII,S$GLB,, | Performed by: PHYSICIAN ASSISTANT

## 2019-10-08 PROCEDURE — 3008F PR BODY MASS INDEX (BMI) DOCUMENTED: ICD-10-PCS | Mod: CPTII,S$GLB,, | Performed by: PHYSICIAN ASSISTANT

## 2019-10-08 PROCEDURE — 99024 PR POST-OP FOLLOW-UP VISIT: ICD-10-PCS | Mod: S$GLB,,, | Performed by: PHYSICIAN ASSISTANT

## 2019-10-08 PROCEDURE — 99999 PR PBB SHADOW E&M-EST. PATIENT-LVL III: ICD-10-PCS | Mod: PBBFAC,,, | Performed by: PHYSICIAN ASSISTANT

## 2019-10-08 PROCEDURE — 3074F SYST BP LT 130 MM HG: CPT | Mod: CPTII,S$GLB,, | Performed by: PHYSICIAN ASSISTANT

## 2019-10-08 PROCEDURE — 3008F BODY MASS INDEX DOCD: CPT | Mod: CPTII,S$GLB,, | Performed by: PHYSICIAN ASSISTANT

## 2019-10-08 PROCEDURE — 99024 POSTOP FOLLOW-UP VISIT: CPT | Mod: S$GLB,,, | Performed by: PHYSICIAN ASSISTANT

## 2019-10-08 PROCEDURE — 3074F PR MOST RECENT SYSTOLIC BLOOD PRESSURE < 130 MM HG: ICD-10-PCS | Mod: CPTII,S$GLB,, | Performed by: PHYSICIAN ASSISTANT

## 2019-10-08 NOTE — PROGRESS NOTES
[facility]  [facility] Breast [place]  10/8/2019      SUBJECTIVE:   Ms. Kristofer Gentile is a 39 y.o. female who presents today for follow up of a LEFT breast abscess.    History of Present Illness: Patient has a history of an intraductal papilloma of the LEFT breast, excised by Dr. Mcmahan in March of 2019.  Post-operatively she had some low volume leakage but was otherwise healing well.    She originally saw me on 10/2/19 complaining of pain, swelling, and redness at her lower outer left breast.    On exam she had a 5 x 4 cm erythematous, edematous mass with underlying fluctuance and tenderness at the left breast.  Incision and drainage of this abscess was performed and she was given Clindamycin.  She presented for removal of the packing on 10/6/19.  The area had a foul smell and was still draining pus, but she did have decreased erythema and edema.  She was advised to continue taking the antibiotic, keep the area clean and dry, and follow up today.  Cultures were positive for Proteus Mirabilis.    Interval history:  Patient states she is doing well and has no pain or fever.  She is still taking the antibiotic as advised.      Review of Systems: Denies any chest pain or shortness of breath.  Denies any fever or chills.  See HPI/ Interval History for other systems reviewed.    OBJECTIVE:   Vitals:    10/08/19 1342   BP: 128/71   Pulse: 74   Temp: 97 °F (36.1 °C)     Physical Exam:  HEENT: Normocephalic, atraumatic.    General: alert and oriented; no acute distress.  Breast: ~2 cm opening at lower outer left breast filled with healthy pink granulation tissue; healing well.  Surrounding area is soft with no erythema or edema.  No tenderness noted.      ASSESSMENT:  Ms. Kristofer Gentile is a 39 y.o. year old female  who presents today for follow up of a LEFT breast abscess.    PLAN:   We discussed that everything appears to be healing well.  She can follow up as needed.      ~Jordana Serrano PA-C       Surgical Oncology            10/8/2019

## 2019-10-14 ENCOUNTER — TELEPHONE (OUTPATIENT)
Dept: OBSTETRICS AND GYNECOLOGY | Facility: CLINIC | Age: 39
End: 2019-10-14

## 2019-10-14 ENCOUNTER — TELEPHONE (OUTPATIENT)
Dept: PHARMACY | Facility: CLINIC | Age: 39
End: 2019-10-14

## 2019-10-14 DIAGNOSIS — Z30.09 COUNSELING FOR BIRTH CONTROL REGARDING INTRAUTERINE DEVICE (IUD): Primary | ICD-10-CM

## 2019-10-14 NOTE — TELEPHONE ENCOUNTER
[facility] received a prescription for Mirena.   IUD's are excluded under the patient's pharmacy benefits. [facility] is unable to bill medical claims for medications.     The medication itself, and the administration of the medication, will both have to be billed under the medical benefit and may require a prior authorization. Please contact [facility] with any questions at [phone].

## 2019-10-14 NOTE — TELEPHONE ENCOUNTER
----- Message from Rakel Dobbins MA sent at 10/14/2019  3:18 PM CDT -----  Contact: JAILENE GUSMAN [[id number]]    ----- Message -----  From: Cecy Magana  Sent: 10/14/2019  12:01 PM CDT  To: Nate JOHNSON Staff    Type:  Patient Returning Call    Who Called: JAILENE GUSMAN [[id number]]     Who Left Message for Patient: Aida Gonzalez LPN      Does the patient know what this is regarding?:no     Best Call Back Number:[phone]     Additional Information:

## 2019-10-16 ENCOUNTER — OFFICE VISIT (OUTPATIENT)
Dept: SURGERY | Facility: CLINIC | Age: 39
End: 2019-10-16
Payer: COMMERCIAL

## 2019-10-16 ENCOUNTER — PATIENT OUTREACH (OUTPATIENT)
Dept: ADMINISTRATIVE | Facility: OTHER | Age: 39
End: 2019-10-16

## 2019-10-16 ENCOUNTER — PROCEDURE VISIT (OUTPATIENT)
Dept: OBSTETRICS AND GYNECOLOGY | Facility: CLINIC | Age: 39
End: 2019-10-16
Payer: COMMERCIAL

## 2019-10-16 VITALS
HEART RATE: 72 BPM | TEMPERATURE: 98 F | BODY MASS INDEX: 41.99 KG/M2 | DIASTOLIC BLOOD PRESSURE: 84 MMHG | SYSTOLIC BLOOD PRESSURE: 122 MMHG | HEIGHT: 65 IN | WEIGHT: 252 LBS

## 2019-10-16 VITALS
WEIGHT: 253.75 LBS | SYSTOLIC BLOOD PRESSURE: 122 MMHG | DIASTOLIC BLOOD PRESSURE: 74 MMHG | BODY MASS INDEX: 42.28 KG/M2 | HEIGHT: 65 IN

## 2019-10-16 DIAGNOSIS — Z30.430 ENCOUNTER FOR INSERTION OF MIRENA IUD: Primary | ICD-10-CM

## 2019-10-16 DIAGNOSIS — N61.1 BREAST ABSCESS: Primary | ICD-10-CM

## 2019-10-16 PROCEDURE — 99213 OFFICE O/P EST LOW 20 MIN: CPT | Mod: S$GLB,,, | Performed by: PHYSICIAN ASSISTANT

## 2019-10-16 PROCEDURE — 99213 PR OFFICE/OUTPT VISIT, EST, LEVL III, 20-29 MIN: ICD-10-PCS | Mod: S$GLB,,, | Performed by: PHYSICIAN ASSISTANT

## 2019-10-16 PROCEDURE — 58300 INSERT INTRAUTERINE DEVICE: CPT | Mod: S$GLB,,, | Performed by: STUDENT IN AN ORGANIZED HEALTH CARE EDUCATION/TRAINING PROGRAM

## 2019-10-16 PROCEDURE — 3079F DIAST BP 80-89 MM HG: CPT | Mod: CPTII,S$GLB,, | Performed by: PHYSICIAN ASSISTANT

## 2019-10-16 PROCEDURE — 58300 INSERTION OF IUD: ICD-10-PCS | Mod: S$GLB,,, | Performed by: STUDENT IN AN ORGANIZED HEALTH CARE EDUCATION/TRAINING PROGRAM

## 2019-10-16 PROCEDURE — 3008F BODY MASS INDEX DOCD: CPT | Mod: CPTII,S$GLB,, | Performed by: PHYSICIAN ASSISTANT

## 2019-10-16 PROCEDURE — 3074F SYST BP LT 130 MM HG: CPT | Mod: CPTII,S$GLB,, | Performed by: PHYSICIAN ASSISTANT

## 2019-10-16 PROCEDURE — 3008F PR BODY MASS INDEX (BMI) DOCUMENTED: ICD-10-PCS | Mod: CPTII,S$GLB,, | Performed by: PHYSICIAN ASSISTANT

## 2019-10-16 PROCEDURE — 3074F PR MOST RECENT SYSTOLIC BLOOD PRESSURE < 130 MM HG: ICD-10-PCS | Mod: CPTII,S$GLB,, | Performed by: PHYSICIAN ASSISTANT

## 2019-10-16 PROCEDURE — 3079F PR MOST RECENT DIASTOLIC BLOOD PRESSURE 80-89 MM HG: ICD-10-PCS | Mod: CPTII,S$GLB,, | Performed by: PHYSICIAN ASSISTANT

## 2019-10-16 RX ORDER — AMOXICILLIN AND CLAVULANATE POTASSIUM 500; 125 MG/1; MG/1
1 TABLET, FILM COATED ORAL 3 TIMES DAILY
Qty: 21 TABLET | Refills: 0 | Status: SHIPPED | OUTPATIENT
Start: 2019-10-16 | End: 2019-11-25

## 2019-10-16 NOTE — PROCEDURES
Insertion of IUD  Date/Time: 10/16/2019 8:30 AM  Performed by: Edwin Angel MD  Authorized by: Edwin Angel MD     Consent:     Consent obtained:  Written    Consent given by:  Patient    Procedure risks and benefits discussed: yes      Patient questions answered: yes    Procedure:     Pelvic exam performed: yes      Cervix cleaned and prepped: yes      Speculum placed in vagina: yes      Tenaculum applied to cervix: yes      Uterus sounded: yes      Uterus sound depth (cm):  6    IUD inserted with no complications: yes      IUD type:  Mirena    Strings trimmed: yes    Post-procedure:     Patient tolerated procedure well: yes    Comments:      RTC in 3 weeks for string check

## 2019-10-16 NOTE — PROGRESS NOTES
[facility]  [facility] Breast [place]  10/16/2019    SUBJECTIVE:   Ms. Kristofer Gentile is a 39 y.o. female who presents today complaining of a non-healing LEFT breast abscess.      History of Present Illness: Patient has a history of an intraductal papilloma of the LEFT breast, excised by Dr. Mcmahan in March of 2019.  Post-operatively she had some low volume leakage but was otherwise healing well.    She originally saw me on 10/2/19 complaining of pain, swelling, and redness at her lower outer left breast.    On exam she had a 5 x 4 cm erythematous, edematous mass with underlying fluctuance and tenderness at the left breast.  Incision and drainage of this abscess was performed and she was given Clindamycin.  She presented for removal of the packing on 10/6/19.  The area had a foul smell and was still draining pus, but she did have decreased erythema and edema.  She was advised to continue taking the antibiotic, keep the area clean and dry, and follow up today.  Cultures were positive for Proteus Mirabilis. She was subsequently seen on 10/8/19 and appeared to be healing well.  The left breast incision site was filled with healthy pink granulation tissue and the surrounding tissue was soft with no erythema or edema.      Interval History:  Today patient presents complaining of noticing a white spot at the incision site on Sunday, 10/13/19.  She applied a cutip to the area and a small amount of pus seemed to drain from the site.  She also thinks the surrounding area is more pink/red than when she was previously seen in clinic.      Review of Systems: Denies any chest pain or shortness of breath.  Denies any fever or chills.  See HPI/ Interval History for other systems reviewed.    OBJECTIVE:   Vitals:    10/16/19 1057   BP: 122/84   Pulse: 72   Temp: 98.1 °F (36.7 °C)      Physical Exam   Constitutional: She is well-developed, well-nourished, and in no distress.   HENT:   Head: Normocephalic and  atraumatic.   Pulmonary/Chest:         ASSESSMENT:  Ms. Kristofer Gentile is a 39 y.o. year old female with a non-healing LEFT breast abscess.      PLAN:   We discussed that this lesion still appears to be infected.  I changed her antibiotic to Augmentin since this was sensitive on recent culture and she is allergic to Bactrim.  I advised her to call with any new or worsening pain, redness, fevers, swelling, or drainage.  Otherwise I will see her back in clinic on 10/22 for follow up.      ~Jordana Serrano PA-C      Surgical Oncology            10/16/2019

## 2019-10-22 ENCOUNTER — OFFICE VISIT (OUTPATIENT)
Dept: SURGERY | Facility: CLINIC | Age: 39
End: 2019-10-22
Payer: COMMERCIAL

## 2019-10-22 ENCOUNTER — PATIENT OUTREACH (OUTPATIENT)
Dept: ADMINISTRATIVE | Facility: OTHER | Age: 39
End: 2019-10-22

## 2019-10-22 VITALS
HEIGHT: 65 IN | DIASTOLIC BLOOD PRESSURE: 74 MMHG | SYSTOLIC BLOOD PRESSURE: 135 MMHG | WEIGHT: 252 LBS | HEART RATE: 76 BPM | TEMPERATURE: 98 F | BODY MASS INDEX: 41.99 KG/M2

## 2019-10-22 DIAGNOSIS — N61.1 ABSCESS OF FEMALE BREAST: Primary | ICD-10-CM

## 2019-10-22 PROCEDURE — 99212 PR OFFICE/OUTPT VISIT, EST, LEVL II, 10-19 MIN: ICD-10-PCS | Mod: S$GLB,,, | Performed by: PHYSICIAN ASSISTANT

## 2019-10-22 PROCEDURE — 99212 OFFICE O/P EST SF 10 MIN: CPT | Mod: S$GLB,,, | Performed by: PHYSICIAN ASSISTANT

## 2019-10-22 PROCEDURE — 3008F PR BODY MASS INDEX (BMI) DOCUMENTED: ICD-10-PCS | Mod: CPTII,S$GLB,, | Performed by: PHYSICIAN ASSISTANT

## 2019-10-22 PROCEDURE — 3078F DIAST BP <80 MM HG: CPT | Mod: CPTII,S$GLB,, | Performed by: PHYSICIAN ASSISTANT

## 2019-10-22 PROCEDURE — 3075F SYST BP GE 130 - 139MM HG: CPT | Mod: CPTII,S$GLB,, | Performed by: PHYSICIAN ASSISTANT

## 2019-10-22 PROCEDURE — 3078F PR MOST RECENT DIASTOLIC BLOOD PRESSURE < 80 MM HG: ICD-10-PCS | Mod: CPTII,S$GLB,, | Performed by: PHYSICIAN ASSISTANT

## 2019-10-22 PROCEDURE — 99999 PR PBB SHADOW E&M-EST. PATIENT-LVL III: ICD-10-PCS | Mod: PBBFAC,,, | Performed by: PHYSICIAN ASSISTANT

## 2019-10-22 PROCEDURE — 3075F PR MOST RECENT SYSTOLIC BLOOD PRESS GE 130-139MM HG: ICD-10-PCS | Mod: CPTII,S$GLB,, | Performed by: PHYSICIAN ASSISTANT

## 2019-10-22 PROCEDURE — 99999 PR PBB SHADOW E&M-EST. PATIENT-LVL III: CPT | Mod: PBBFAC,,, | Performed by: PHYSICIAN ASSISTANT

## 2019-10-22 PROCEDURE — 3008F BODY MASS INDEX DOCD: CPT | Mod: CPTII,S$GLB,, | Performed by: PHYSICIAN ASSISTANT

## 2019-10-22 NOTE — PROGRESS NOTES
[facility]  [facility] Breast [place]  10/22/2019      SUBJECTIVE:   Ms. Kristofer Gentile is a 39 y.o. female who presents today for follow up of a LEFT breast abscess.      History of Present Illness: Patient has a history of an intraductal papilloma of the LEFT breast, excised by Dr. Mcmahan in March of 2019.  Post-operatively she had some low volume leakage but was otherwise healing well.    She originally saw me on 10/2/19 complaining of pain, swelling, and redness at her lower outer left breast.    On exam she had a 5 x 4 cm erythematous, edematous mass with underlying fluctuance and tenderness at the left breast.  Incision and drainage of this abscess was performed and she was given Clindamycin.  She presented for removal of the packing on 10/6/19.  The area had a foul smell and was still draining pus, but she did have decreased erythema and edema.  She was advised to continue taking the antibiotic, keep the area clean and dry, and follow up today.  Cultures were positive for Proteus Mirabilis. She was subsequently seen on 10/8/19 and appeared to be healing well.  The left breast incision site was filled with healthy pink granulation tissue and the surrounding tissue was soft with no erythema or edema.   On 10/16/19 she presented complaining of a white spot at her incision with a small amount of drainage and redness.  On exam she had a 1 cm opening draining green/yellow pus.  I changed her antibiotic to Augmentin based on culture sensitivity and advised her to follow up in one week.      Interval history: Patient states she is doing well.  She said the site is healed and she is no longer having any pain, redness, or drainage.    Review of Systems: Denies any chest pain or shortness of breath.  Denies any fever or chills.  See HPI/ Interval History for other systems reviewed.    OBJECTIVE:   Vitals:    10/22/19 1012   BP: 135/74   Pulse: 76   Temp: 98.2 °F (36.8 °C)        Physical Exam:  HEENT:  Normocephalic, atraumatic.    Left Breast: No erythema or edema.  Small pinpoint, healing lesion at left lateral breast; closed.  No tenderness.      ASSESSMENT:  Ms. Kristofer Gentile is a 39 y.o. year old female with a resolving LEFT breast abscess.    PLAN:   We discussed that this abscess appears to be well healed and she can follow up as needed.  I advised her to continue taking the full course of antibiotics.      ~Jordana Serrano PA-C      Surgical Oncology            10/22/2019

## 2019-11-01 ENCOUNTER — PATIENT MESSAGE (OUTPATIENT)
Dept: INTERNAL MEDICINE | Facility: CLINIC | Age: 39
End: 2019-11-01

## 2019-11-02 ENCOUNTER — NURSE TRIAGE (OUTPATIENT)
Dept: ADMINISTRATIVE | Facility: CLINIC | Age: 39
End: 2019-11-02

## 2019-11-02 NOTE — TELEPHONE ENCOUNTER
Abscess left breast, open and drain in the beginning of October. Patient was on Clindamycin. October 16, Augmentin prescribed. Patient states that she is having green discharge from the left breast area. Patient is requesting a prescription for augmentin.   Paged surgery intern for the [facility] at 1055. Patient denies any N, V, fever, chills at this time.   Spoke with MD at 1125:  Dr. Benjamin Liu [phone]  Augmentin 500-125mg tablet tid for 7 days. No refills with the understanding that the patient will go to her appointment on Monday. Prescription called into [facility]. Patient notifeid and verbalized understanding.     Reason for Disposition   Caller has URGENT medication question about med that PCP prescribed and triager unable to answer question    Protocols used: ST MEDICATION QUESTION CALL-A-

## 2019-11-04 ENCOUNTER — OFFICE VISIT (OUTPATIENT)
Dept: SURGERY | Facility: CLINIC | Age: 39
End: 2019-11-04
Payer: COMMERCIAL

## 2019-11-04 ENCOUNTER — OFFICE VISIT (OUTPATIENT)
Dept: INTERNAL MEDICINE | Facility: CLINIC | Age: 39
End: 2019-11-04
Payer: COMMERCIAL

## 2019-11-04 ENCOUNTER — PATIENT OUTREACH (OUTPATIENT)
Dept: ADMINISTRATIVE | Facility: OTHER | Age: 39
End: 2019-11-04

## 2019-11-04 VITALS
SYSTOLIC BLOOD PRESSURE: 120 MMHG | DIASTOLIC BLOOD PRESSURE: 86 MMHG | BODY MASS INDEX: 40.59 KG/M2 | WEIGHT: 243.63 LBS | HEART RATE: 76 BPM | HEIGHT: 65 IN | OXYGEN SATURATION: 98 %

## 2019-11-04 VITALS
HEART RATE: 75 BPM | BODY MASS INDEX: 40.62 KG/M2 | SYSTOLIC BLOOD PRESSURE: 119 MMHG | DIASTOLIC BLOOD PRESSURE: 75 MMHG | HEIGHT: 65 IN | WEIGHT: 243.81 LBS

## 2019-11-04 DIAGNOSIS — N61.1 BREAST ABSCESS: Primary | ICD-10-CM

## 2019-11-04 DIAGNOSIS — N61.0 ACUTE MASTITIS OF LEFT BREAST: Primary | ICD-10-CM

## 2019-11-04 PROCEDURE — 3078F PR MOST RECENT DIASTOLIC BLOOD PRESSURE < 80 MM HG: ICD-10-PCS | Mod: CPTII,S$GLB,, | Performed by: PHYSICIAN ASSISTANT

## 2019-11-04 PROCEDURE — 3078F DIAST BP <80 MM HG: CPT | Mod: CPTII,S$GLB,, | Performed by: PHYSICIAN ASSISTANT

## 2019-11-04 PROCEDURE — 3079F PR MOST RECENT DIASTOLIC BLOOD PRESSURE 80-89 MM HG: ICD-10-PCS | Mod: CPTII,S$GLB,, | Performed by: INTERNAL MEDICINE

## 2019-11-04 PROCEDURE — 99999 PR PBB SHADOW E&M-EST. PATIENT-LVL III: ICD-10-PCS | Mod: PBBFAC,,, | Performed by: PHYSICIAN ASSISTANT

## 2019-11-04 PROCEDURE — 99212 PR OFFICE/OUTPT VISIT, EST, LEVL II, 10-19 MIN: ICD-10-PCS | Mod: S$GLB,,, | Performed by: PHYSICIAN ASSISTANT

## 2019-11-04 PROCEDURE — 99999 PR PBB SHADOW E&M-EST. PATIENT-LVL III: CPT | Mod: PBBFAC,,, | Performed by: PHYSICIAN ASSISTANT

## 2019-11-04 PROCEDURE — 3074F PR MOST RECENT SYSTOLIC BLOOD PRESSURE < 130 MM HG: ICD-10-PCS | Mod: CPTII,S$GLB,, | Performed by: PHYSICIAN ASSISTANT

## 2019-11-04 PROCEDURE — 99214 PR OFFICE/OUTPT VISIT, EST, LEVL IV, 30-39 MIN: ICD-10-PCS | Mod: S$GLB,,, | Performed by: INTERNAL MEDICINE

## 2019-11-04 PROCEDURE — 99999 PR PBB SHADOW E&M-EST. PATIENT-LVL IV: CPT | Mod: PBBFAC,,, | Performed by: INTERNAL MEDICINE

## 2019-11-04 PROCEDURE — 99999 PR PBB SHADOW E&M-EST. PATIENT-LVL IV: ICD-10-PCS | Mod: PBBFAC,,, | Performed by: INTERNAL MEDICINE

## 2019-11-04 PROCEDURE — 99214 OFFICE O/P EST MOD 30 MIN: CPT | Mod: S$GLB,,, | Performed by: INTERNAL MEDICINE

## 2019-11-04 PROCEDURE — 3008F BODY MASS INDEX DOCD: CPT | Mod: CPTII,S$GLB,, | Performed by: INTERNAL MEDICINE

## 2019-11-04 PROCEDURE — 3079F DIAST BP 80-89 MM HG: CPT | Mod: CPTII,S$GLB,, | Performed by: INTERNAL MEDICINE

## 2019-11-04 PROCEDURE — 3008F PR BODY MASS INDEX (BMI) DOCUMENTED: ICD-10-PCS | Mod: CPTII,S$GLB,, | Performed by: PHYSICIAN ASSISTANT

## 2019-11-04 PROCEDURE — 3074F PR MOST RECENT SYSTOLIC BLOOD PRESSURE < 130 MM HG: ICD-10-PCS | Mod: CPTII,S$GLB,, | Performed by: INTERNAL MEDICINE

## 2019-11-04 PROCEDURE — 99212 OFFICE O/P EST SF 10 MIN: CPT | Mod: S$GLB,,, | Performed by: PHYSICIAN ASSISTANT

## 2019-11-04 PROCEDURE — 3008F PR BODY MASS INDEX (BMI) DOCUMENTED: ICD-10-PCS | Mod: CPTII,S$GLB,, | Performed by: INTERNAL MEDICINE

## 2019-11-04 PROCEDURE — 3074F SYST BP LT 130 MM HG: CPT | Mod: CPTII,S$GLB,, | Performed by: PHYSICIAN ASSISTANT

## 2019-11-04 PROCEDURE — 3008F BODY MASS INDEX DOCD: CPT | Mod: CPTII,S$GLB,, | Performed by: PHYSICIAN ASSISTANT

## 2019-11-04 PROCEDURE — 3074F SYST BP LT 130 MM HG: CPT | Mod: CPTII,S$GLB,, | Performed by: INTERNAL MEDICINE

## 2019-11-04 RX ORDER — AMOXICILLIN AND CLAVULANATE POTASSIUM 500; 125 MG/1; MG/1
TABLET, FILM COATED ORAL
Qty: 21 TABLET | Refills: 0 | OUTPATIENT
Start: 2019-11-04

## 2019-11-04 RX ORDER — DOXYCYCLINE 100 MG/1
100 CAPSULE ORAL DAILY
Qty: 30 CAPSULE | Refills: 0 | Status: SHIPPED | OUTPATIENT
Start: 2019-11-04 | End: 2019-11-25

## 2019-11-04 NOTE — PROGRESS NOTES
INTERNAL MEDICINE CLINIC - SAME DAY APPOINTMENT  Progress Note    PRESENTING HISTORY     PCP: Melonie Villanueva MD  Chief Complaint/Reason for Visit:     Chief Complaint   Patient presents with    Mastitis     History of Present Illness & ROS : Ms. Kristofer Gentile is a 39 y.o. female.      Has mastitis of left breast last month.  Was seen in Breast Surgery Clinic 10-16 and 10-22.    History of biopsy in 3-2019.    She started taking Augmentin again Saturday.  No fever.    PAST HISTORY:     Past Medical History:   Diagnosis Date    Allergy     Anti-TPO antibodies present 11/8/2018    Carpal tunnel syndrome     Dyslipidemia (high LDL; low HDL) 6/25/2013    Fever blister     Herpes simplex labialis 6/25/2013    Intraductal papilloma of left breast 3/1/2019    IUD (intrauterine device) in place 2009    Metabolic syndrome 7/22/2013    Thyroid disease     treated with synthroid during pregnancy    Vitamin D deficiency 11/5/2018    Vitamin D insufficiency 11/5/2018       Past Surgical History:   Procedure Laterality Date    EXCISIONAL BIOPSY Left 3/1/2019    Procedure: EXCISIONAL BIOPSY- w/major duct excision KENALOG INJECTION AT TIME OF SURGERY;  Surgeon: Kelby Mcmahan MD;  Location: [facility] OR [address] [place] [address];  Service: General;  Laterality: Left;    INTRAUTERINE DEVICE INSERTION  2009    again 2014       Family History   Problem Relation Age of Onset    Diabetes Mother     Hypertension Mother     Rheum arthritis Mother     Coronary artery disease Mother     Stroke Mother         x2    Liver disease Mother         NAFLD    Sarcoidosis Mother     Thyroid disease Mother     Hypertension Father     Dementia Father     Hyperlipidemia Father     Rheum arthritis Maternal Grandmother         wheelchair bound    Rheum arthritis Paternal Grandmother     Sarcoidosis Maternal Uncle     Leukemia Maternal Uncle     Breast cancer Neg Hx     Colon cancer Neg Hx     Ovarian cancer Neg Hx        Social  History     Socioeconomic History    Marital status:      Spouse name: Not on file    Number of children: 2    Years of education: Not on file    Highest education level: Not on file   Occupational History     Employer: [employer]   Social Needs    Financial resource strain: Somewhat hard    Food insecurity:     Worry: Sometimes true     Inability: Sometimes true    Transportation needs:     Medical: No     Non-medical: No   Tobacco Use    Smoking status: Never Smoker    Smokeless tobacco: Never Used   Substance and Sexual Activity    Alcohol use: Yes     Alcohol/week: 0.8 standard drinks     Types: 1 Standard drinks or equivalent per week     Frequency: Monthly or less     Drinks per session: 1 or 2     Binge frequency: Never     Comment: Rarely    Drug use: No    Sexual activity: Yes     Partners: Male     Birth control/protection: IUD   Lifestyle    Physical activity:     Days per week: 0 days     Minutes per session: 0 min    Stress: To some extent   Relationships    Social connections:     Talks on phone: More than three times a week     Gets together: Once a week     Attends Congregational service: Not on file     Active member of club or organization: Yes     Attends meetings of clubs or organizations: More than 4 times per year     Relationship status:    Other Topics Concern    Are you pregnant or think you may be? Not Asked    Breast-feeding Not Asked   Social History Narrative    Not on file       MEDICATIONS & ALLERGIES:     Current Outpatient Medications on File Prior to Visit   Medication Sig Dispense Refill    cholecalciferol, vitamin D3, (VITAMIN D3) 2,000 unit Cap Take 1 capsule by mouth every morning.      clotrimazole-betamethasone (LOTRISONE) lotion Apply topically 2 (two) times daily. 30 mL 2    econazole nitrate 1 % cream Apply topically 2 (two) times daily. 85 g 1    FLONASE ALLERGY RELIEF 50 mcg/actuation nasal spray SPRAY TWICE IN EACH NOSTRIL ONCE  DAILY (Patient taking differently: SPRAY TWICE IN EACH NOSTRIL ONCE DAILY  prn) 16 g 5    levonorgestrel (MIRENA) 20 mcg/24 hour (5 years) IUD 1 each by Intrauterine route once.      amoxicillin-clavulanate 500-125mg (AUGMENTIN) 500-125 mg Tab Take 1 tablet (500 mg total) by mouth 3 (three) times daily. Take 1 tablet with food 3 times daily for 7 days. (Patient not taking: Reported on 11/4/2019) 21 tablet 0    ibuprofen (ADVIL,MOTRIN) 600 MG tablet TAKE 1 TABLET(600 MG) BY MOUTH EVERY 6 HOURS AS NEEDED FOR PAIN 20 tablet 0    miSOPROStol (CYTOTEC) 200 MCG Tab   0     Current Facility-Administered Medications on File Prior to Visit   Medication Dose Route Frequency Provider Last Rate Last Dose    levonorgestrel 20 mcg/24 hours (5 yrs) 52 mg IUD 1 Intra Uterine Device  1 Intra Uterine Device Intrauterine  Edwin Angel MD   1 Intra Uterine Device at 10/16/19 0830        Review of patient's allergies indicates:   Allergen Reactions    Bactrim [sulfamethoxazole-trimethoprim] Hives and Other (See Comments)     Stiffness to joints    Sulfa (sulfonamide antibiotics) Hives       Medications Reconciliation:   I have reconciled the patient's home medications with the patient/family. I have updated all changes.  Refer to After-Visit Medication List.    OBJECTIVE:     Vital Signs:  Vitals:    11/04/19 0913   BP: 120/86   Pulse: 76     Wt Readings from Last 1 Encounters:   11/04/19 0913 110.5 kg (243 lb 9.7 oz)     Body mass index is 40.54 kg/m².     Chaperon in room:  A woman medical attendant was present to assist during the physical examination.    Physical Exam:  General: Well developed, well nourished. No distress.  HEENT: Head is normocephalic, atraumatic  Eyes: Clear conjunctiva.  Neck: Supple, symmetrical neck; trachea midline.  Lungs:  normal respiratory effort.  Extremities: No LE edema.   Skin: Skin color, texture, turgor normal.   Musculoskeletal: Normal gait.   Lymph Nodes: small node to left axilla X  1 (0.3 cm)  Psychiatric: Normal affect. Alert.      Left breast  No abscess pocket palpated.  No active drainage.    Laboratory  Lab Results   Component Value Date    WBC 3.47 (L) 11/02/2018    HGB 11.8 (L) 11/02/2018    HCT 36.5 (L) 11/02/2018     11/02/2018    CHOL 233 (H) 11/02/2018    TRIG 61 11/02/2018    HDL 41 11/02/2018    ALT 14 11/02/2018    AST 18 11/02/2018     11/02/2018    K 3.8 11/02/2018     11/02/2018    CREATININE 0.8 11/02/2018    BUN 10 11/02/2018    CO2 27 11/02/2018    TSH 2.104 02/11/2019    HGBA1C 5.2 11/02/2018       ASSESSMENT & PLAN:     Acute mastitis of left breast  - Plan: Continue her supply of Augmentin (about 5 days) until finished.              Add doxycycline 100 mg daily for 30 days.    -     doxycycline (VIBRAMYCIN) 100 MG Cap; Take 1 capsule (100 mg total) by mouth once daily. Take with food.  Dispense: 30 capsule; Refill: 0    Scheduled Follow-up :  Future Appointments   Date Time Provider Department Center   11/6/2019  2:20 PM Lizzette Patel MD [facility] DERM [place]   11/23/2019  8:15 AM [facility] [place]   11/23/2019  9:00 AM LAB[facility] [place]   11/25/2019  9:40 AM Melonie Villanueva MD [facility] IM [place]       After Visit Medication List :     Medication List           Accurate as of November 4, 2019  9:28 AM. If you have any questions, ask your nurse or doctor.               START taking these medications    doxycycline 100 MG Cap  Commonly known as:  VIBRAMYCIN  Take 1 capsule (100 mg total) by mouth once daily. Take with food.  Started by:  Jairo Pate MD        CHANGE how you take these medications    FLONASE ALLERGY RELIEF 50 mcg/actuation nasal spray  Generic drug:  fluticasone propionate  SPRAY TWICE IN EACH NOSTRIL ONCE DAILY  What changed:  See the new instructions.        CONTINUE taking these medications    amoxicillin-clavulanate 500-125mg 500-125 mg Tab  Commonly known as:  AUGMENTIN  Take 1 tablet (500 mg  total) by mouth 3 (three) times daily. Take 1 tablet with food 3 times daily for 7 days.     clotrimazole-betamethasone lotion  Commonly known as:  LOTRISONE  Apply topically 2 (two) times daily.     econazole nitrate 1 % cream  Apply topically 2 (two) times daily.     ibuprofen 600 MG tablet  Commonly known as:  ADVIL,MOTRIN  TAKE 1 TABLET(600 MG) BY MOUTH EVERY 6 HOURS AS NEEDED FOR PAIN     levonorgestrel 20 mcg/24 hours (5 yrs) 52 mg IUD  Commonly known as:  MIRENA     miSOPROStol 200 MCG Tab  Commonly known as:  CYTOTEC     VITAMIN D3 2,000 unit Cap  Generic drug:  cholecalciferol (vitamin D3)           Where to Get Your Medications      These medications were sent to [id number] - [address] AT [address] [address]    Phone:  [phone]   · doxycycline 100 MG Cap         Signing Physician:  Jairo Pate MD

## 2019-11-04 NOTE — PROGRESS NOTES
[facility]  [facility] [place]  11/4/2019      SUBJECTIVE:   Ms. Kristofer Gentile is a 39 y.o. female who presents today complaining of a recurrent LEFT breast abscess.    History of Present Illness:  Patient has a history of an intraductal papilloma of the LEFT breast, excised by Dr. Mcmahan in March of 2019.  Post-operatively she had some low volume leakage but was otherwise healing well.    She originally saw me on 10/2/19 complaining of pain, swelling, and redness at her lower outer left breast.    On exam she had a 5 x 4 cm erythematous, edematous mass with underlying fluctuance and tenderness at the left breast.  Incision and drainage of this abscess was performed and she was given Clindamycin.  She presented for removal of the packing on 10/6/19.  The area had a foul smell and was still draining pus, but she did have decreased erythema and edema.  She was advised to continue taking the antibiotic, keep the area clean and dry, and follow up today.  Cultures were positive for Proteus Mirabilis. She was subsequently seen on 10/8/19 and appeared to be healing well.  The left breast incision site was filled with healthy pink granulation tissue and the surrounding tissue was soft with no erythema or edema.   On 10/16/19 she presented complaining of a white spot at her incision with a small amount of drainage and redness.  On exam she had a 1 cm opening draining green/yellow pus.  I changed her antibiotic to Augmentin based on culture sensitivity and advised her to follow up in one week.    She was last seen on 10/22/19 and the site appeared to be healing well with a small, pinpoint closed lesion at the lateral left breast; no skin changes or tenderness.      Interval History:  Patient states on 11/2 she noticed a green discharge and slight redness from the previous opening of her left breast abscess.  She spoke to the resident on call about her problem, and he called her in Augmentin.  Patient  states she is feeling slightly better since she started taking this.      Review of Systems: Denies any chest pain or shortness of breath.  Denies any fever or chills.  See HPI/ Interval History for other systems reviewed.    OBJECTIVE:   Vitals:    11/04/19 1313   BP: 119/75   Pulse: 75      Physical Exam:  HEENT: Normocephalic, atraumatic.    General: alert and oriented; no acute distress.  LEFT breast: ~1 cm pinpoint opening at lower outer left breast. Yellow and clear fluid on bandage but no drainage was expressed with pressure applied.  No underlying fluctuance. Minimal erythema.  Tenderness.    ASSESSMENT:  Ms. Kristofer Gentile is a 39 y.o. year old female with a recurrent LEFT breast abscess.    PLAN:   We discussed that there was nothing to be drained at this time, but she does appear to have a resolving infection.  I advised her to continue taking the Augmentin, and she can follow up with me in 2 weeks.      ~Jordana Serrano PA-C      Surgical Oncology            11/4/2019

## 2019-11-05 ENCOUNTER — TELEPHONE (OUTPATIENT)
Dept: SURGERY | Facility: CLINIC | Age: 39
End: 2019-11-05

## 2019-11-05 NOTE — TELEPHONE ENCOUNTER
Contacted the patient regarding the message below. Patient request to come in on the 11/25/19. I scheduled the patient 2 wk f/u with LEWIS Mattson on 11/25/19 @ 1:30pm. Patient voiced understanding of date, time and location. A reminder letter will be mailed out.              ----- Message from LEWIS Kauffman sent at 11/4/2019  9:44 PM CST -----  Hi,  Please have this patient follow up with me in 2 weeks.    Thanks,  BERYL KauffmanC

## 2019-11-05 NOTE — TELEPHONE ENCOUNTER
Spoke to patient. Evaluated in medicine clinic and breast surgery clinic. Feels ok, no f/c/s, on 2 antibiotics.     Will see her in clinic on 11/25/19.

## 2019-11-06 ENCOUNTER — INITIAL CONSULT (OUTPATIENT)
Dept: DERMATOLOGY | Facility: CLINIC | Age: 39
End: 2019-11-06
Payer: COMMERCIAL

## 2019-11-06 DIAGNOSIS — L30.4 INTERTRIGO: Primary | ICD-10-CM

## 2019-11-06 DIAGNOSIS — L91.0 KELOID: ICD-10-CM

## 2019-11-06 DIAGNOSIS — D48.5 NEOPLASM OF UNCERTAIN BEHAVIOR OF SKIN: ICD-10-CM

## 2019-11-06 PROCEDURE — 99999 PR PBB SHADOW E&M-EST. PATIENT-LVL III: CPT | Mod: PBBFAC,,, | Performed by: DERMATOLOGY

## 2019-11-06 PROCEDURE — 99202 OFFICE O/P NEW SF 15 MIN: CPT | Mod: S$GLB,,, | Performed by: DERMATOLOGY

## 2019-11-06 PROCEDURE — 99999 PR PBB SHADOW E&M-EST. PATIENT-LVL III: ICD-10-PCS | Mod: PBBFAC,,, | Performed by: DERMATOLOGY

## 2019-11-06 PROCEDURE — 99202 PR OFFICE/OUTPT VISIT, NEW, LEVL II, 15-29 MIN: ICD-10-PCS | Mod: S$GLB,,, | Performed by: DERMATOLOGY

## 2019-11-06 RX ORDER — NYSTATIN 100000 [USP'U]/G
POWDER TOPICAL
Qty: 120 G | Refills: 6 | Status: SHIPPED | OUTPATIENT
Start: 2019-11-06 | End: 2020-04-17

## 2019-11-06 NOTE — PATIENT INSTRUCTIONS
Recommend white vinegar: water 1:1 compresses  nightly after bath/shower followed by cool blow dry and then application of prescription medication.     Use Nystatin powder for maintenance in the morning.     Wash affected areas 2x per week with Hibiclens wash which can be purchased over the counter

## 2019-11-06 NOTE — LETTER
November 6, 2019      Doug Michael MD  [address] [facility] [address]  [place] [address]           [place] - Dermatology  [address]  [place] [address]  Phone: [phone]  Fax: [phone]          Patient: Kristofer Gentile   MR Number: [id number]   YOB: 1980   Date of Visit: 11/6/2019       Dear Dr. Doug Michael:    Thank you for referring Kristofer Gentile to me for evaluation. Attached you will find relevant portions of my assessment and plan of care.    If you have questions, please do not hesitate to call me. I look forward to following Kristofer Gentile along with you.    Sincerely,    Lizzette Patel MD    Enclosure  CC:  No Recipients    If you would like to receive this communication electronically, please contact [email or web address] or [phone] to request more information on [email or web address] Link access.    For providers and/or their staff who would like to refer a patient to [facility], please contact us through our one-stop-shop provider referral line, [facility], at [phone].    If you feel you have received this communication in error or would no longer like to receive these types of communications, please e-mail [email or web address]

## 2019-11-06 NOTE — PROGRESS NOTES
Subjective:       Patient ID:  Kristofer Gentile is a 39 y.o. female who presents for   Chief Complaint   Patient presents with    Rash    Excessive Sweating    Cyst     Pt here today for rash right and left axilla since last nov.  , comes and goes, itches, red, tx econazole cream resolved and then recurred. and lotrisone cream.    Pt also c/o cyst left breast, months, no symptoms, tx Augmentin and doxcycyline.  - had a papilloma of left breast removed march 2019. Had redness and swelling in left breast 1mo ago and was told it was an abscess - drained and ultrasounded.  At [facility] and was tx with clindamycin but recurred so took above abx .     Rash     Excessive Sweating         Review of Systems   Constitutional: Negative for fever, chills, fatigue and malaise.   Skin: Positive for itching and rash.   Hematologic/Lymphatic: Bruises/bleeds easily.        Objective:    Physical Exam   Constitutional: She appears well-developed and well-nourished. No distress.   Neurological: She is alert and oriented to person, place, and time. She is not disoriented.   Psychiatric: She has a normal mood and affect.   Skin:   Areas Examined (abnormalities noted in diagram):   Chest / Axilla Inspection Performed  Abdomen Inspection Performed  RUE Inspected  LUE Inspection Performed              Diagram Legend     Erythematous scaling macule/papule c/w actinic keratosis       Vascular papule c/w angioma      Pigmented verrucoid papule/plaque c/w seborrheic keratosis      Yellow umbilicated papule c/w sebaceous hyperplasia      Irregularly shaped tan macule c/w lentigo     1-2 mm smooth white papules consistent with Milia      Movable subcutaneous cyst with punctum c/w epidermal inclusion cyst      Subcutaneous movable cyst c/w pilar cyst      Firm pink to brown papule c/w dermatofibroma      Pedunculated fleshy papule(s) c/w skin tag(s)      Evenly pigmented macule c/w junctional nevus     Mildly variegated  pigmented, slightly irregular-bordered macule c/w mildly atypical nevus      Flesh colored to evenly pigmented papule c/w intradermal nevus       Pink pearly papule/plaque c/w basal cell carcinoma      Erythematous hyperkeratotic cursted plaque c/w SCC      Surgical scar with no sign of skin cancer recurrence      Open and closed comedones      Inflammatory papules and pustules      Verrucoid papule consistent consistent with wart     Erythematous eczematous patches and plaques     Dystrophic onycholytic nail with subungual debris c/w onychomycosis     Umbilicated papule    Erythematous-base heme-crusted tan verrucoid plaque consistent with inflamed seborrheic keratosis     Erythematous Silvery Scaling Plaque c/w Psoriasis     See annotation      Assessment / Plan:        Intertrigo  Recommend white vinegar: water 1:1 compresses  nightly after bath/shower followed by cool blow dry and then application of prescription medication.     Use Nystatin powder for maintenance in the morning.     Wash affected areas 2x per week with Hibiclens wash which can be purchased over the counter  Can use econazole as needed    Will culture if recurs after she stops all the abx    -     nystatin (MYCOSTATIN) powder; aaa qhs prn flare  Dispense: 120 g; Refill: 6    Neoplasm of uncertain behavior of skin- left breast  I am suspicious that pt has a subq keloid as the area of concern is in an area where she states she has had a papilloma removed from her breast. She has several keloids on her abdomen and one on her righrt breast that was triggered by a very superficial scratch per patient.  She likely has a cyst overlying this area.  She has had an u/s in breast center as well so no concern for malignancy. She can continue follow up as no further tx recommendations at this time.    HC cream for area of inflammation from bandaid     Keloid- abdominal  Reassuance           Follow up in about 2 months (around 1/6/2020).

## 2019-11-11 ENCOUNTER — TELEPHONE (OUTPATIENT)
Dept: SURGERY | Facility: CLINIC | Age: 39
End: 2019-11-11

## 2019-11-11 ENCOUNTER — PATIENT OUTREACH (OUTPATIENT)
Dept: ADMINISTRATIVE | Facility: OTHER | Age: 39
End: 2019-11-11

## 2019-11-11 ENCOUNTER — OFFICE VISIT (OUTPATIENT)
Dept: OBSTETRICS AND GYNECOLOGY | Facility: CLINIC | Age: 39
End: 2019-11-11
Payer: COMMERCIAL

## 2019-11-11 ENCOUNTER — OFFICE VISIT (OUTPATIENT)
Dept: SURGERY | Facility: CLINIC | Age: 39
End: 2019-11-11
Payer: COMMERCIAL

## 2019-11-11 VITALS
DIASTOLIC BLOOD PRESSURE: 66 MMHG | HEART RATE: 75 BPM | BODY MASS INDEX: 40.32 KG/M2 | WEIGHT: 242 LBS | HEIGHT: 65 IN | SYSTOLIC BLOOD PRESSURE: 132 MMHG

## 2019-11-11 VITALS
WEIGHT: 110 LBS | DIASTOLIC BLOOD PRESSURE: 70 MMHG | HEIGHT: 65 IN | BODY MASS INDEX: 18.33 KG/M2 | SYSTOLIC BLOOD PRESSURE: 110 MMHG

## 2019-11-11 DIAGNOSIS — B37.9 YEAST INFECTION: Primary | ICD-10-CM

## 2019-11-11 DIAGNOSIS — N61.1 LEFT BREAST ABSCESS: Primary | ICD-10-CM

## 2019-11-11 PROCEDURE — 99999 PR PBB SHADOW E&M-EST. PATIENT-LVL III: CPT | Mod: PBBFAC,,, | Performed by: OBSTETRICS & GYNECOLOGY

## 2019-11-11 PROCEDURE — 87481 CANDIDA DNA AMP PROBE: CPT | Mod: 59

## 2019-11-11 PROCEDURE — 3078F PR MOST RECENT DIASTOLIC BLOOD PRESSURE < 80 MM HG: ICD-10-PCS | Mod: CPTII,S$GLB,, | Performed by: OBSTETRICS & GYNECOLOGY

## 2019-11-11 PROCEDURE — 87801 DETECT AGNT MULT DNA AMPLI: CPT

## 2019-11-11 PROCEDURE — 99212 OFFICE O/P EST SF 10 MIN: CPT | Mod: S$GLB,,, | Performed by: PHYSICIAN ASSISTANT

## 2019-11-11 PROCEDURE — 3008F PR BODY MASS INDEX (BMI) DOCUMENTED: ICD-10-PCS | Mod: CPTII,S$GLB,, | Performed by: OBSTETRICS & GYNECOLOGY

## 2019-11-11 PROCEDURE — 99213 OFFICE O/P EST LOW 20 MIN: CPT | Mod: S$GLB,,, | Performed by: OBSTETRICS & GYNECOLOGY

## 2019-11-11 PROCEDURE — 3074F PR MOST RECENT SYSTOLIC BLOOD PRESSURE < 130 MM HG: ICD-10-PCS | Mod: CPTII,S$GLB,, | Performed by: OBSTETRICS & GYNECOLOGY

## 2019-11-11 PROCEDURE — 99999 PR PBB SHADOW E&M-EST. PATIENT-LVL III: ICD-10-PCS | Mod: PBBFAC,,, | Performed by: PHYSICIAN ASSISTANT

## 2019-11-11 PROCEDURE — 3075F SYST BP GE 130 - 139MM HG: CPT | Mod: CPTII,S$GLB,, | Performed by: PHYSICIAN ASSISTANT

## 2019-11-11 PROCEDURE — 3078F PR MOST RECENT DIASTOLIC BLOOD PRESSURE < 80 MM HG: ICD-10-PCS | Mod: CPTII,S$GLB,, | Performed by: PHYSICIAN ASSISTANT

## 2019-11-11 PROCEDURE — 3078F DIAST BP <80 MM HG: CPT | Mod: CPTII,S$GLB,, | Performed by: OBSTETRICS & GYNECOLOGY

## 2019-11-11 PROCEDURE — 99999 PR PBB SHADOW E&M-EST. PATIENT-LVL III: CPT | Mod: PBBFAC,,, | Performed by: PHYSICIAN ASSISTANT

## 2019-11-11 PROCEDURE — 99999 PR PBB SHADOW E&M-EST. PATIENT-LVL III: ICD-10-PCS | Mod: PBBFAC,,, | Performed by: OBSTETRICS & GYNECOLOGY

## 2019-11-11 PROCEDURE — 3008F BODY MASS INDEX DOCD: CPT | Mod: CPTII,S$GLB,, | Performed by: OBSTETRICS & GYNECOLOGY

## 2019-11-11 PROCEDURE — 3074F SYST BP LT 130 MM HG: CPT | Mod: CPTII,S$GLB,, | Performed by: OBSTETRICS & GYNECOLOGY

## 2019-11-11 PROCEDURE — 99212 PR OFFICE/OUTPT VISIT, EST, LEVL II, 10-19 MIN: ICD-10-PCS | Mod: S$GLB,,, | Performed by: PHYSICIAN ASSISTANT

## 2019-11-11 PROCEDURE — 3075F PR MOST RECENT SYSTOLIC BLOOD PRESS GE 130-139MM HG: ICD-10-PCS | Mod: CPTII,S$GLB,, | Performed by: PHYSICIAN ASSISTANT

## 2019-11-11 PROCEDURE — 3008F BODY MASS INDEX DOCD: CPT | Mod: CPTII,S$GLB,, | Performed by: PHYSICIAN ASSISTANT

## 2019-11-11 PROCEDURE — 99213 PR OFFICE/OUTPT VISIT, EST, LEVL III, 20-29 MIN: ICD-10-PCS | Mod: S$GLB,,, | Performed by: OBSTETRICS & GYNECOLOGY

## 2019-11-11 PROCEDURE — 3078F DIAST BP <80 MM HG: CPT | Mod: CPTII,S$GLB,, | Performed by: PHYSICIAN ASSISTANT

## 2019-11-11 PROCEDURE — 3008F PR BODY MASS INDEX (BMI) DOCUMENTED: ICD-10-PCS | Mod: CPTII,S$GLB,, | Performed by: PHYSICIAN ASSISTANT

## 2019-11-11 RX ORDER — FLUCONAZOLE 150 MG/1
150 TABLET ORAL ONCE
Qty: 1 TABLET | Refills: 0 | Status: SHIPPED | OUTPATIENT
Start: 2019-11-11 | End: 2019-11-11

## 2019-11-11 NOTE — PROGRESS NOTES
Gynecology    SUBJECTIVE:     Chief Complaint: Gynecologic Exam (yeast infection)       History of Present Illness:  39 year old who presents with a yeast infection.  She has been treating a breast abscess for the past month.  For the past week, she has had thick white discharge.  She has itching as well in the vagina.  Some slight burning as well.  No odor.  Did not try monistat because she tried it once before and didn't have improvement in the past.      Review of Systems:  Review of Systems   Constitutional: Negative for chills and fever.   Genitourinary: Positive for vaginal discharge. Negative for menorrhagia, menstrual problem, pelvic pain, vaginal bleeding, vaginal pain and vaginal odor.        OBJECTIVE:     Physical Exam:  Physical Exam   Constitutional: She is oriented to person, place, and time. She appears well-developed and well-nourished.   Pulmonary/Chest: Effort normal.   Abdominal: Soft.   Genitourinary: Vagina normal. No labial fusion. There is no rash, tenderness, lesion or injury on the right labia. There is no rash, tenderness, lesion or injury on the left labia. Cervix exhibits no motion tenderness, no discharge and no friability. No erythema, tenderness or bleeding in the vagina. No foreign body in the vagina. No signs of injury around the vagina. No vaginal discharge found.   Genitourinary Comments: Urethra: normal appearing urethra with no masses, tenderness or lesions  Urethral meatus: normal size, anterior vaginal wall with no prolapse, no lesions   Neurological: She is alert and oriented to person, place, and time.   Psychiatric: She has a normal mood and affect. Her behavior is normal. Judgment and thought content normal.   Nursing note and vitals reviewed.      ASSESSMENT:       ICD-10-CM ICD-9-CM    1. Yeast infection B37.9 112.9 Vaginosis Screen by DNA Probe      fluconazole (DIFLUCAN) 150 MG Tab          Plan:      Kristofer was seen today for gynecologic exam.    Diagnoses and all  orders for this visit:    Yeast infection  -     Vaginosis Screen by DNA Probe  -     fluconazole (DIFLUCAN) 150 MG Tab; Take 1 tablet (150 mg total) by mouth once. for 1 dose    - vaginosis screen today, diflucan to pharamcy    Orders Placed This Encounter   Procedures    Vaginosis Screen by DNA Probe       Follow up for annual or prn.    Ursula Ortega

## 2019-11-12 LAB
BACTERIAL VAGINOSIS DNA: NEGATIVE
CANDIDA GLABRATA DNA: NEGATIVE
CANDIDA KRUSEI DNA: NEGATIVE
CANDIDA RRNA VAG QL PROBE: POSITIVE
T VAGINALIS RRNA GENITAL QL PROBE: NEGATIVE

## 2019-11-12 NOTE — PROGRESS NOTES
[facility]  [facility] Breast [place]  11/11/19    SUBJECTIVE:   Ms. Kristofer Gentile is a 39 y.o. female who presents today complaining of skin changes and drainage at the site of the healing left breast abscess.     History of Present Illness:  Patient has a history of an intraductal papilloma of the LEFT breast, excised by Dr. Mcmahan in March of 2019.  Post-operatively she had some low volume leakage but was otherwise healing well.    She originally saw me on 10/2/19 complaining of pain, swelling, and redness at her lower outer left breast.    On exam she had a 5 x 4 cm erythematous, edematous mass with underlying fluctuance and tenderness at the left breast.  Incision and drainage of this abscess was performed and she was given Clindamycin.  She presented for removal of the packing on 10/6/19.  The area had a foul smell and was still draining pus, but she did have decreased erythema and edema.  She was advised to continue taking the antibiotic, keep the area clean and dry, and follow up today.  Cultures were positive for Proteus Mirabilis. She was subsequently seen on 10/8/19 and appeared to be healing well.   The left breast incision site was filled with healthy pink granulation tissue and the surrounding tissue was soft with no erythema or edema.   On 10/16/19 she presented complaining of a white spot at her incision with a small amount of drainage and redness.  On exam she had a 1 cm opening draining green/yellow pus.  I changed her antibiotic to Augmentin based on culture sensitivity and advised her to follow up in one week.    On 10/22/19 the site appeared to be healing well with a small, pinpoint closed lesion at the lateral left breast; no skin changes or tenderness.    On 11/4/19 she presented complaining of a green discharge and slight redness from the previous opening of her left breast abscess. She spoke to the resident on call about her problem, and he called her in Augmentin.   On exam  she had a 1 cm pinpoint opening at the lower outer left breast with minimal erythema and no underling masses or fluctuance.  We discussed that she had a resolving infection and I advised her to continue taking the Augmentin.      Of note, she does not smoke and she is not diabetic.    Review of Systems: Denies any chest pain or shortness of breath.  Denies any fever or chills.  See HPI/ Interval History for other systems reviewed.     OBJECTIVE:       Vitals:     11/04/19 1313   BP: 119/75   Pulse: 75      Physical Exam:  HEENT: Normocephalic, atraumatic.    General: alert and oriented; no acute distress.  LEFT breast: ~.5 cm pinpoint opening at lower outer left breast with surrounding increased pigmentation and palpules/skin changes. Very minimal clear/yellow fluid expressed with pressure applied.  No underlying fluctuance or masses. No erythema or tenderness     ASSESSMENT:  Ms. Kristofer Gentile is a 39 y.o. year old female with a slow healing LEFT breast abscess.     PLAN:   We discussed that the lesion appears to be healing slowly, but it is not infected at this time.  I recommended that she get a follow up mammogram next week, and this was ordered as diagnostic and bilateral since she is almost due for her annual screening.  She can follow up with me the next week or sooner if she has any fever, redness, swelling, or masses.    I also advised her to stop using the neosporin and a bandage since either one is likely contributing to the new skin changes at her breast.  I recommended that she either leave it uncovered or simply keep a patch of gauze in her bra against the lesion until it heals.    ~Jordana Serrano PA-C

## 2019-11-22 ENCOUNTER — PATIENT OUTREACH (OUTPATIENT)
Dept: ADMINISTRATIVE | Facility: OTHER | Age: 39
End: 2019-11-22

## 2019-11-23 ENCOUNTER — LAB VISIT (OUTPATIENT)
Dept: LAB | Facility: HOSPITAL | Age: 39
End: 2019-11-23
Attending: INTERNAL MEDICINE
Payer: COMMERCIAL

## 2019-11-23 DIAGNOSIS — Z00.00 ANNUAL PHYSICAL EXAM: ICD-10-CM

## 2019-11-23 LAB
25(OH)D3+25(OH)D2 SERPL-MCNC: 34 NG/ML (ref 30–96)
ALBUMIN SERPL BCP-MCNC: 3.5 G/DL (ref 3.5–5.2)
ALP SERPL-CCNC: 53 U/L (ref 55–135)
ALT SERPL W/O P-5'-P-CCNC: 18 U/L (ref 10–44)
ANION GAP SERPL CALC-SCNC: 9 MMOL/L (ref 8–16)
AST SERPL-CCNC: 21 U/L (ref 10–40)
BASOPHILS # BLD AUTO: 0.04 K/UL (ref 0–0.2)
BASOPHILS NFR BLD: 0.9 % (ref 0–1.9)
BILIRUB SERPL-MCNC: 0.5 MG/DL (ref 0.1–1)
BUN SERPL-MCNC: 6 MG/DL (ref 6–20)
CALCIUM SERPL-MCNC: 9.2 MG/DL (ref 8.7–10.5)
CHLORIDE SERPL-SCNC: 104 MMOL/L (ref 95–110)
CHOLEST SERPL-MCNC: 245 MG/DL (ref 120–199)
CHOLEST/HDLC SERPL: 5.6 {RATIO} (ref 2–5)
CO2 SERPL-SCNC: 25 MMOL/L (ref 23–29)
CREAT SERPL-MCNC: 0.8 MG/DL (ref 0.5–1.4)
DIFFERENTIAL METHOD: NORMAL
EOSINOPHIL # BLD AUTO: 0.1 K/UL (ref 0–0.5)
EOSINOPHIL NFR BLD: 2.3 % (ref 0–8)
ERYTHROCYTE [DISTWIDTH] IN BLOOD BY AUTOMATED COUNT: 13.1 % (ref 11.5–14.5)
EST. GFR  (AFRICAN AMERICAN): >60 ML/MIN/1.73 M^2
EST. GFR  (NON AFRICAN AMERICAN): >60 ML/MIN/1.73 M^2
ESTIMATED AVG GLUCOSE: 97 MG/DL (ref 68–131)
GLUCOSE SERPL-MCNC: 85 MG/DL (ref 70–110)
HBA1C MFR BLD HPLC: 5 % (ref 4–5.6)
HCT VFR BLD AUTO: 38.6 % (ref 37–48.5)
HDLC SERPL-MCNC: 44 MG/DL (ref 40–75)
HDLC SERPL: 18 % (ref 20–50)
HGB BLD-MCNC: 12.6 G/DL (ref 12–16)
IMM GRANULOCYTES # BLD AUTO: 0.02 K/UL (ref 0–0.04)
IMM GRANULOCYTES NFR BLD AUTO: 0.5 % (ref 0–0.5)
LDLC SERPL CALC-MCNC: 188.2 MG/DL (ref 63–159)
LYMPHOCYTES # BLD AUTO: 1.5 K/UL (ref 1–4.8)
LYMPHOCYTES NFR BLD: 34.5 % (ref 18–48)
MCH RBC QN AUTO: 30.1 PG (ref 27–31)
MCHC RBC AUTO-ENTMCNC: 32.6 G/DL (ref 32–36)
MCV RBC AUTO: 92 FL (ref 82–98)
MONOCYTES # BLD AUTO: 0.4 K/UL (ref 0.3–1)
MONOCYTES NFR BLD: 9.7 % (ref 4–15)
NEUTROPHILS # BLD AUTO: 2.3 K/UL (ref 1.8–7.7)
NEUTROPHILS NFR BLD: 52.1 % (ref 38–73)
NONHDLC SERPL-MCNC: 201 MG/DL
NRBC BLD-RTO: 0 /100 WBC
PLATELET # BLD AUTO: 268 K/UL (ref 150–350)
PMV BLD AUTO: 10.7 FL (ref 9.2–12.9)
POTASSIUM SERPL-SCNC: 3.5 MMOL/L (ref 3.5–5.1)
PROT SERPL-MCNC: 7.4 G/DL (ref 6–8.4)
RBC # BLD AUTO: 4.19 M/UL (ref 4–5.4)
SODIUM SERPL-SCNC: 138 MMOL/L (ref 136–145)
TRIGL SERPL-MCNC: 64 MG/DL (ref 30–150)
TSH SERPL DL<=0.005 MIU/L-ACNC: 1.5 UIU/ML (ref 0.4–4)
WBC # BLD AUTO: 4.32 K/UL (ref 3.9–12.7)

## 2019-11-23 PROCEDURE — 82306 VITAMIN D 25 HYDROXY: CPT

## 2019-11-23 PROCEDURE — 83036 HEMOGLOBIN GLYCOSYLATED A1C: CPT

## 2019-11-23 PROCEDURE — 80061 LIPID PANEL: CPT

## 2019-11-23 PROCEDURE — 84443 ASSAY THYROID STIM HORMONE: CPT

## 2019-11-23 PROCEDURE — 36415 COLL VENOUS BLD VENIPUNCTURE: CPT | Mod: PO

## 2019-11-23 PROCEDURE — 85025 COMPLETE CBC W/AUTO DIFF WBC: CPT

## 2019-11-23 PROCEDURE — 80053 COMPREHEN METABOLIC PANEL: CPT

## 2019-11-25 ENCOUNTER — OFFICE VISIT (OUTPATIENT)
Dept: INTERNAL MEDICINE | Facility: CLINIC | Age: 39
End: 2019-11-25
Payer: COMMERCIAL

## 2019-11-25 ENCOUNTER — OFFICE VISIT (OUTPATIENT)
Dept: SURGERY | Facility: CLINIC | Age: 39
End: 2019-11-25
Payer: COMMERCIAL

## 2019-11-25 VITALS
DIASTOLIC BLOOD PRESSURE: 80 MMHG | SYSTOLIC BLOOD PRESSURE: 118 MMHG | WEIGHT: 243.81 LBS | RESPIRATION RATE: 18 BRPM | TEMPERATURE: 99 F | HEIGHT: 65 IN | HEART RATE: 80 BPM | BODY MASS INDEX: 40.62 KG/M2

## 2019-11-25 VITALS
BODY MASS INDEX: 40.48 KG/M2 | DIASTOLIC BLOOD PRESSURE: 68 MMHG | WEIGHT: 243 LBS | TEMPERATURE: 98 F | SYSTOLIC BLOOD PRESSURE: 122 MMHG | HEART RATE: 69 BPM | HEIGHT: 65 IN

## 2019-11-25 DIAGNOSIS — N61.1 BREAST ABSCESS OF FEMALE: Primary | ICD-10-CM

## 2019-11-25 DIAGNOSIS — R76.8 ANTI-TPO ANTIBODIES PRESENT: ICD-10-CM

## 2019-11-25 DIAGNOSIS — E78.2 MIXED HYPERLIPIDEMIA: ICD-10-CM

## 2019-11-25 DIAGNOSIS — Z00.00 ANNUAL PHYSICAL EXAM: Primary | ICD-10-CM

## 2019-11-25 DIAGNOSIS — B36.9 FUNGAL SKIN INFECTION: ICD-10-CM

## 2019-11-25 DIAGNOSIS — E66.01 MORBID OBESITY WITH BMI OF 40.0-44.9, ADULT: ICD-10-CM

## 2019-11-25 DIAGNOSIS — N61.1 BREAST ABSCESS: ICD-10-CM

## 2019-11-25 PROBLEM — E55.9 VITAMIN D INSUFFICIENCY: Status: RESOLVED | Noted: 2018-11-05 | Resolved: 2019-11-25

## 2019-11-25 PROCEDURE — 99999 PR PBB SHADOW E&M-EST. PATIENT-LVL III: ICD-10-PCS | Mod: PBBFAC,,, | Performed by: PHYSICIAN ASSISTANT

## 2019-11-25 PROCEDURE — 99999 PR PBB SHADOW E&M-EST. PATIENT-LVL III: ICD-10-PCS | Mod: PBBFAC,,, | Performed by: INTERNAL MEDICINE

## 2019-11-25 PROCEDURE — 3074F SYST BP LT 130 MM HG: CPT | Mod: CPTII,S$GLB,, | Performed by: PHYSICIAN ASSISTANT

## 2019-11-25 PROCEDURE — 3078F PR MOST RECENT DIASTOLIC BLOOD PRESSURE < 80 MM HG: ICD-10-PCS | Mod: CPTII,S$GLB,, | Performed by: PHYSICIAN ASSISTANT

## 2019-11-25 PROCEDURE — 90471 IMMUNIZATION ADMIN: CPT | Mod: S$GLB,,, | Performed by: INTERNAL MEDICINE

## 2019-11-25 PROCEDURE — 99212 PR OFFICE/OUTPT VISIT, EST, LEVL II, 10-19 MIN: ICD-10-PCS | Mod: S$GLB,,, | Performed by: PHYSICIAN ASSISTANT

## 2019-11-25 PROCEDURE — 3008F BODY MASS INDEX DOCD: CPT | Mod: CPTII,S$GLB,, | Performed by: PHYSICIAN ASSISTANT

## 2019-11-25 PROCEDURE — 99395 PR PREVENTIVE VISIT,EST,18-39: ICD-10-PCS | Mod: 25,S$GLB,, | Performed by: INTERNAL MEDICINE

## 2019-11-25 PROCEDURE — 90471 FLU VACCINE (QUAD) GREATER THAN OR EQUAL TO 3YO PRESERVATIVE FREE IM: ICD-10-PCS | Mod: S$GLB,,, | Performed by: INTERNAL MEDICINE

## 2019-11-25 PROCEDURE — 3074F PR MOST RECENT SYSTOLIC BLOOD PRESSURE < 130 MM HG: ICD-10-PCS | Mod: CPTII,S$GLB,, | Performed by: INTERNAL MEDICINE

## 2019-11-25 PROCEDURE — 99999 PR PBB SHADOW E&M-EST. PATIENT-LVL III: CPT | Mod: PBBFAC,,, | Performed by: PHYSICIAN ASSISTANT

## 2019-11-25 PROCEDURE — 3079F PR MOST RECENT DIASTOLIC BLOOD PRESSURE 80-89 MM HG: ICD-10-PCS | Mod: CPTII,S$GLB,, | Performed by: INTERNAL MEDICINE

## 2019-11-25 PROCEDURE — 3074F SYST BP LT 130 MM HG: CPT | Mod: CPTII,S$GLB,, | Performed by: INTERNAL MEDICINE

## 2019-11-25 PROCEDURE — 99212 OFFICE O/P EST SF 10 MIN: CPT | Mod: S$GLB,,, | Performed by: PHYSICIAN ASSISTANT

## 2019-11-25 PROCEDURE — 99395 PREV VISIT EST AGE 18-39: CPT | Mod: 25,S$GLB,, | Performed by: INTERNAL MEDICINE

## 2019-11-25 PROCEDURE — 3008F PR BODY MASS INDEX (BMI) DOCUMENTED: ICD-10-PCS | Mod: CPTII,S$GLB,, | Performed by: PHYSICIAN ASSISTANT

## 2019-11-25 PROCEDURE — 90686 FLU VACCINE (QUAD) GREATER THAN OR EQUAL TO 3YO PRESERVATIVE FREE IM: ICD-10-PCS | Mod: S$GLB,,, | Performed by: INTERNAL MEDICINE

## 2019-11-25 PROCEDURE — 3078F DIAST BP <80 MM HG: CPT | Mod: CPTII,S$GLB,, | Performed by: PHYSICIAN ASSISTANT

## 2019-11-25 PROCEDURE — 90686 IIV4 VACC NO PRSV 0.5 ML IM: CPT | Mod: S$GLB,,, | Performed by: INTERNAL MEDICINE

## 2019-11-25 PROCEDURE — 3074F PR MOST RECENT SYSTOLIC BLOOD PRESSURE < 130 MM HG: ICD-10-PCS | Mod: CPTII,S$GLB,, | Performed by: PHYSICIAN ASSISTANT

## 2019-11-25 PROCEDURE — 3079F DIAST BP 80-89 MM HG: CPT | Mod: CPTII,S$GLB,, | Performed by: INTERNAL MEDICINE

## 2019-11-25 PROCEDURE — 99999 PR PBB SHADOW E&M-EST. PATIENT-LVL III: CPT | Mod: PBBFAC,,, | Performed by: INTERNAL MEDICINE

## 2019-11-25 NOTE — PROGRESS NOTES
[facility]  [facility] Breast [place]  11/11/19     SUBJECTIVE:   Ms. Kristofer Gentile is a 39 y.o. female who presents today for follow up of a LEFT breast abscess.     History of Present Illness:  Patient has a history of an intraductal papilloma of the LEFT breast, excised by Dr. Mcmahan in March of 2019.  Post-operatively she had some low volume leakage but was otherwise healing well.    She originally saw me on 10/2/19 complaining of pain, swelling, and redness at her lower outer left breast.    On exam she had a 5 x 4 cm erythematous, edematous mass with underlying fluctuance and tenderness at the left breast.  Incision and drainage of this abscess was performed and she was given Clindamycin.  She presented for removal of the packing on 10/6/19.  The area had a foul smell and was still draining pus, but she did have decreased erythema and edema.  She was advised to continue taking the antibiotic, keep the area clean and dry, and follow up today.  Cultures were positive for Proteus Mirabilis. She was subsequently seen on 10/8/19 and appeared to be healing well.   The left breast incision site was filled with healthy pink granulation tissue and the surrounding tissue was soft with no erythema or edema.   On 10/16/19 she presented complaining of a white spot at her incision with a small amount of drainage and redness.  On exam she had a 1 cm opening draining green/yellow pus.  I changed her antibiotic to Augmentin based on culture sensitivity and advised her to follow up in one week.    On 10/22/19 the site appeared to be healing well with a small, pinpoint closed lesion at the lateral left breast; no skin changes or tenderness.    On 11/4/19 she presented complaining of a green discharge and slight redness from the previous opening of her left breast abscess. She spoke to the resident on call about her problem, and he called her in Augmentin.   On exam she had a 1 cm pinpoint opening at the lower  outer left breast with minimal erythema and no underling masses or fluctuance.  We discussed that she had a resolving infection and I advised her to continue taking the Augmentin.    She was seen on 11/11/19 with a .5 cm pinpoint opening and skin changes from the use of neosporin.  The lesion appeared to be healing slowly, but I advised her to stop using the neosporin and keep the area clean and dry.     Of note, she does not smoke and she is not diabetic.     Review of Systems: Denies any chest pain or shortness of breath.  Denies any fever or chills.  See HPI/ Interval History for other systems reviewed.     OBJECTIVE:   Vitals:    11/25/19 1331   BP: 122/68   Pulse: 69   Temp: 98.3 °F (36.8 °C)     Physical Exam:  HEENT: Normocephalic, atraumatic.    General: alert and oriented; no acute distress.  LEFT breast: Well healed, closed site of previous infection at lower outer left breast.  Mild desquamation but no erythema, edema, or tenderness noted.      ASSESSMENT:  Ms. Kristofer Gentile is a 39 y.o. year old female with a healing LEFT breast abscess.     PLAN:   We discussed that the lesion appears to be well healed.  She needs a follow up mammogram and ultrasound on the left, and since she is almost due for her annual, I ordered a bilateral diagnostic.    She can follow up with me as needed.       ~Jordana Serrano PA-C

## 2019-11-25 NOTE — PROGRESS NOTES
Subjective:      Kristofer Gentile is a 39 y.o. female who presents for annual exam.    Family History:  family history includes Atrial fibrillation in her mother; Coronary artery disease in her mother; Dementia in her father; Diabetes in her mother; Hyperlipidemia in her father and mother; Hypertension in her father, maternal aunt, and mother; Leukemia in her maternal uncle; Liver cancer in her father; Liver disease in her mother; Rheum arthritis in her maternal grandmother, mother, and paternal grandmother; Sarcoidosis in her maternal uncle and mother; Stroke in her mother; Thyroid disease in her mother.    Health Maintenance:  Health Maintenance       Date Due Completion Date    Influenza Vaccine (1) 09/01/2019 10/31/2018    Pap Smear with HPV Cotest 08/12/2022 8/12/2019    Override on 5/1/2010: Done (date approximate)    TETANUS VACCINE 10/31/2028 10/31/2018    Override on 7/1/2008: Done        Eye exam: not recently  Dental Exam: due  OB/GYN: Dr. Ortega  Last Pap: 8/2019    Influenza: due  Tetanus: 2018     Exercise: no time for exercise, works in [employer], goes to classes regularly, internship program  Diet: fairly healthy when she prepares her own meals, admits to skipping breakfast, eats snacks from vending machine during the day, salad for lunch, fast food for dinner- [employer]    Body mass index is 40.58 kg/m².  Weight increased from 243 lbs to 253 lbs in the last year    Meds:   Current Outpatient Medications:     clotrimazole-betamethasone (LOTRISONE) lotion, Apply topically 2 (two) times daily., Disp: 30 mL, Rfl: 2    FLONASE ALLERGY RELIEF 50 mcg/actuation nasal spray, SPRAY TWICE IN EACH NOSTRIL ONCE DAILY (Patient taking differently: SPRAY TWICE IN EACH NOSTRIL ONCE DAILY  prn), Disp: 16 g, Rfl: 5    ibuprofen (ADVIL,MOTRIN) 600 MG tablet, TAKE 1 TABLET(600 MG) BY MOUTH EVERY 6 HOURS AS NEEDED FOR PAIN, Disp: 20 tablet, Rfl: 0    levonorgestrel (MIRENA) 20 mcg/24 hour (5 years) IUD,  1 each by Intrauterine route once., Disp: , Rfl:     multivit,calc,mins/iron/folic (ONE-A-DAY WOMENS FORMULA ORAL), Take by mouth., Disp: , Rfl:     nystatin (MYCOSTATIN) powder, aaa qhs prn flare, Disp: 120 g, Rfl: 6    cholecalciferol, vitamin D3, (VITAMIN D3) 2,000 unit Cap, Take 1 capsule by mouth every morning., Disp: , Rfl:     econazole nitrate 1 % cream, Apply topically 2 (two) times daily. (Patient not taking: Reported on 11/25/2019), Disp: 85 g, Rfl: 1    miSOPROStol (CYTOTEC) 200 MCG Tab, , Disp: , Rfl: 0    Current Facility-Administered Medications:     levonorgestrel 20 mcg/24 hours (5 yrs) 52 mg IUD 1 Intra Uterine Device, 1 Intra Uterine Device, Intrauterine, , Edwin Angel MD, 1 Intra Uterine Device at 10/16/19 0830    PMHx:   Past Medical History:   Diagnosis Date    Allergy     Anti-TPO antibodies present 11/8/2018    Carpal tunnel syndrome     Dyslipidemia (high LDL; low HDL) 6/25/2013    Fever blister     Herpes simplex labialis 6/25/2013    Intraductal papilloma of left breast 3/1/2019    IUD (intrauterine device) in place 2009    Metabolic syndrome 7/22/2013    Thyroid disease     treated with synthroid during pregnancy    Vitamin D deficiency 11/5/2018    Vitamin D insufficiency 11/5/2018       PSHx:  Past Surgical History:   Procedure Laterality Date    BREAST BIOPSY Left 12/14/2018    Core bx, benign    EXCISIONAL BIOPSY Left 3/1/2019    Procedure: EXCISIONAL BIOPSY- w/major duct excision KENALOG INJECTION AT TIME OF SURGERY;  Surgeon: Kelby Mcmahan MD;  Location: [address];  Service: General;  Laterality: Left;    INTRAUTERINE DEVICE INSERTION  2009    again 2014       SocHx:   Social History     Socioeconomic History    Marital status:      Spouse name: Not on file    Number of children: 2    Years of education: Not on file    Highest education level: Not on file   Occupational History     Employer: [employer]   Social Needs     Financial resource strain: Somewhat hard    Food insecurity:     Worry: Sometimes true     Inability: Sometimes true    Transportation needs:     Medical: No     Non-medical: No   Tobacco Use    Smoking status: Never Smoker    Smokeless tobacco: Never Used   Substance and Sexual Activity    Alcohol use: Yes     Alcohol/week: 0.8 standard drinks     Types: 1 Standard drinks or equivalent per week     Frequency: Monthly or less     Drinks per session: 1 or 2     Binge frequency: Never     Comment: Rarely    Drug use: No    Sexual activity: Yes     Partners: Male     Birth control/protection: IUD   Lifestyle    Physical activity:     Days per week: 0 days     Minutes per session: 0 min    Stress: To some extent   Relationships    Social connections:     Talks on phone: More than three times a week     Gets together: Once a week     Attends Oriental orthodox service: Not on file     Active member of club or organization: Yes     Attends meetings of clubs or organizations: More than 4 times per year     Relationship status:    Other Topics Concern    Are you pregnant or think you may be? Not Asked    Breast-feeding Not Asked   Social History Narrative    Not on file       Review of Systems   Constitutional: Negative for chills, diaphoresis, fatigue and fever.   HENT: Negative for congestion, dental problem, ear pain, postnasal drip, rhinorrhea, sinus pressure, sore throat and trouble swallowing.    Eyes: Negative for discharge, redness and visual disturbance.   Respiratory: Negative for cough, chest tightness and shortness of breath.    Cardiovascular: Positive for leg swelling (on and off). Negative for chest pain and palpitations.   Gastrointestinal: Positive for constipation. Negative for abdominal pain, diarrhea, nausea and vomiting.        Uses ACV for heartburn as needed   Endocrine: Positive for cold intolerance. Negative for heat intolerance.   Genitourinary: Negative for dysuria, frequency,  hematuria and urgency.        S/p breast I&D x2, now healing   Musculoskeletal: Negative for arthralgias and myalgias.   Skin: Positive for rash (uses nystatin and econazole as needed for rash in axillae). Negative for wound.   Neurological: Negative for dizziness, weakness, numbness and headaches.   Hematological: Negative for adenopathy.   Psychiatric/Behavioral: Negative for dysphoric mood and sleep disturbance. The patient is not nervous/anxious.        Objective:      Physical Exam   Constitutional: She is oriented to person, place, and time. Vital signs are normal. She appears well-developed and well-nourished. No distress.   HENT:   Head: Normocephalic and atraumatic.   Right Ear: Hearing, tympanic membrane, external ear and ear canal normal. Tympanic membrane is not erythematous and not bulging.   Left Ear: Hearing, tympanic membrane, external ear and ear canal normal. Tympanic membrane is not erythematous and not bulging.   Nose: Nose normal.   Mouth/Throat: Uvula is midline, oropharynx is clear and moist and mucous membranes are normal. No oropharyngeal exudate or posterior oropharyngeal erythema.   Eyes: Pupils are equal, round, and reactive to light. Conjunctivae, EOM and lids are normal. No scleral icterus.   Neck: Normal range of motion. Neck supple. No thyroid mass present.   Cardiovascular: Normal rate, regular rhythm, normal heart sounds and intact distal pulses.   No murmur heard.  Pulmonary/Chest: Effort normal and breath sounds normal. She has no wheezes.   Abdominal: Soft. Bowel sounds are normal. She exhibits no distension. There is no hepatosplenomegaly. There is no tenderness. There is no rigidity, no rebound and no guarding.   Musculoskeletal: Normal range of motion. She exhibits no edema.   Lymphadenopathy:     She has no cervical adenopathy.        Right: No supraclavicular adenopathy present.        Left: No supraclavicular adenopathy present.   Neurological: She is alert and oriented to  person, place, and time. She has normal reflexes. Coordination and gait normal.   Skin: Skin is warm and dry. No rash noted. She is not diaphoretic.   Psychiatric: She has a normal mood and affect.   Vitals reviewed.      Assessment:       1. Annual physical exam    2. Mixed hyperlipidemia    3. Breast abscess    4. Anti-TPO antibodies present    5. Morbid obesity with BMI of 40.0-44.9, adult    6. Fungal skin infection        Plan:       1. Annual physical exam  - discussed recent labs with patient.  - Influenza - Quadrivalent (PF)    2. Mixed hyperlipidemia  - reduce fat intake, start an exercise program  - Lipid panel; Future    3. Breast abscess  - s/p I&D, will f/u with breast surgery    4. Anti-TPO antibodies present  - TSH; Future  - monitor for unintentional weight changes or changes in bowel habits    5. Morbid obesity with BMI of 40.0-44.9, adult  - discussed impediments to diet, will reduce fast food intake    6. Fungal skin infection  - uses lotrisone as needed    RTC in 6 months or sooner of needed    Melonie Villanueva MD

## 2019-11-27 ENCOUNTER — HOSPITAL ENCOUNTER (OUTPATIENT)
Dept: RADIOLOGY | Facility: HOSPITAL | Age: 39
Discharge: HOME | End: 2019-11-27
Attending: PHYSICIAN ASSISTANT
Payer: COMMERCIAL

## 2019-11-27 DIAGNOSIS — N61.1 LEFT BREAST ABSCESS: ICD-10-CM

## 2019-11-27 PROCEDURE — 77062 BREAST TOMOSYNTHESIS BI: CPT | Mod: 26,,, | Performed by: RADIOLOGY

## 2019-11-27 PROCEDURE — 77066 DX MAMMO INCL CAD BI: CPT | Mod: TC,PO

## 2019-11-27 PROCEDURE — 76642 US BREAST LEFT LIMITED: ICD-10-PCS | Mod: 26,LT,, | Performed by: RADIOLOGY

## 2019-11-27 PROCEDURE — 77066 DX MAMMO INCL CAD BI: CPT | Mod: 26,,, | Performed by: RADIOLOGY

## 2019-11-27 PROCEDURE — 77066 MAMMO DIGITAL DIAGNOSTIC BILAT WITH TOMOSYNTHESIS_CAD: ICD-10-PCS | Mod: 26,,, | Performed by: RADIOLOGY

## 2019-11-27 PROCEDURE — 76642 ULTRASOUND BREAST LIMITED: CPT | Mod: 26,LT,, | Performed by: RADIOLOGY

## 2019-11-27 PROCEDURE — 77062 MAMMO DIGITAL DIAGNOSTIC BILAT WITH TOMOSYNTHESIS_CAD: ICD-10-PCS | Mod: 26,,, | Performed by: RADIOLOGY

## 2019-11-27 PROCEDURE — 76642 ULTRASOUND BREAST LIMITED: CPT | Mod: TC,PO,LT

## 2019-12-04 ENCOUNTER — OFFICE VISIT (OUTPATIENT)
Dept: INTERNAL MEDICINE | Facility: CLINIC | Age: 39
End: 2019-12-04
Payer: COMMERCIAL

## 2019-12-04 VITALS
HEIGHT: 65 IN | TEMPERATURE: 99 F | RESPIRATION RATE: 16 BRPM | DIASTOLIC BLOOD PRESSURE: 77 MMHG | BODY MASS INDEX: 39.78 KG/M2 | SYSTOLIC BLOOD PRESSURE: 118 MMHG | HEART RATE: 87 BPM | WEIGHT: 238.75 LBS

## 2019-12-04 DIAGNOSIS — J06.9 UPPER RESPIRATORY TRACT INFECTION, UNSPECIFIED TYPE: ICD-10-CM

## 2019-12-04 DIAGNOSIS — J01.90 ACUTE SINUSITIS, RECURRENCE NOT SPECIFIED, UNSPECIFIED LOCATION: Primary | ICD-10-CM

## 2019-12-04 LAB
INFLUENZA A, MOLECULAR: NEGATIVE
INFLUENZA B, MOLECULAR: NEGATIVE
SPECIMEN SOURCE: NORMAL

## 2019-12-04 PROCEDURE — 3008F PR BODY MASS INDEX (BMI) DOCUMENTED: ICD-10-PCS | Mod: CPTII,S$GLB,, | Performed by: INTERNAL MEDICINE

## 2019-12-04 PROCEDURE — 87502 INFLUENZA DNA AMP PROBE: CPT | Mod: PO

## 2019-12-04 PROCEDURE — 3074F PR MOST RECENT SYSTOLIC BLOOD PRESSURE < 130 MM HG: ICD-10-PCS | Mod: CPTII,S$GLB,, | Performed by: INTERNAL MEDICINE

## 2019-12-04 PROCEDURE — 3078F DIAST BP <80 MM HG: CPT | Mod: CPTII,S$GLB,, | Performed by: INTERNAL MEDICINE

## 2019-12-04 PROCEDURE — 99213 PR OFFICE/OUTPT VISIT, EST, LEVL III, 20-29 MIN: ICD-10-PCS | Mod: S$GLB,,, | Performed by: INTERNAL MEDICINE

## 2019-12-04 PROCEDURE — 99999 PR PBB SHADOW E&M-EST. PATIENT-LVL III: ICD-10-PCS | Mod: PBBFAC,,, | Performed by: INTERNAL MEDICINE

## 2019-12-04 PROCEDURE — 99213 OFFICE O/P EST LOW 20 MIN: CPT | Mod: S$GLB,,, | Performed by: INTERNAL MEDICINE

## 2019-12-04 PROCEDURE — 3074F SYST BP LT 130 MM HG: CPT | Mod: CPTII,S$GLB,, | Performed by: INTERNAL MEDICINE

## 2019-12-04 PROCEDURE — 3008F BODY MASS INDEX DOCD: CPT | Mod: CPTII,S$GLB,, | Performed by: INTERNAL MEDICINE

## 2019-12-04 PROCEDURE — 3078F PR MOST RECENT DIASTOLIC BLOOD PRESSURE < 80 MM HG: ICD-10-PCS | Mod: CPTII,S$GLB,, | Performed by: INTERNAL MEDICINE

## 2019-12-04 PROCEDURE — 99999 PR PBB SHADOW E&M-EST. PATIENT-LVL III: CPT | Mod: PBBFAC,,, | Performed by: INTERNAL MEDICINE

## 2019-12-04 RX ORDER — AMOXICILLIN AND CLAVULANATE POTASSIUM 875; 125 MG/1; MG/1
1 TABLET, FILM COATED ORAL EVERY 12 HOURS
Qty: 20 TABLET | Refills: 0 | Status: SHIPPED | OUTPATIENT
Start: 2019-12-04 | End: 2020-04-17 | Stop reason: ALTCHOICE

## 2019-12-04 RX ORDER — CLOTRIMAZOLE AND BETAMETHASONE DIPROPIONATE 10; .5 MG/ML; MG/ML
LOTION TOPICAL 2 TIMES DAILY
Qty: 30 ML | Refills: 2 | Status: SHIPPED | OUTPATIENT
Start: 2019-12-04 | End: 2020-04-16

## 2019-12-04 NOTE — PROGRESS NOTES
CC:  Congestion and right ear pain  HPI:  The patient is a 39 y.o. year old female who presents to the office for congestion and right ear pain. Her symptoms started Friday.  She also reports postnasal drip, rhinorrhea, headache and shortness of breath.  The patient has taken flonase and zyrtec.  She denies any fever.    PAST MEDICAL HISTORY:  Past Medical History:   Diagnosis Date    Allergy     Anti-TPO antibodies present 11/8/2018    Carpal tunnel syndrome     Dyslipidemia (high LDL; low HDL) 6/25/2013    Fever blister     Herpes simplex labialis 6/25/2013    Intraductal papilloma of left breast 3/1/2019    IUD (intrauterine device) in place 2009    Metabolic syndrome 7/22/2013    Thyroid disease     treated with synthroid during pregnancy    Vitamin D deficiency 11/5/2018    Vitamin D insufficiency 11/5/2018       SURGICAL HISTORY:  Past Surgical History:   Procedure Laterality Date    BREAST BIOPSY Left 12/14/2018    Core bx, benign    EXCISIONAL BIOPSY Left 3/1/2019    Procedure: EXCISIONAL BIOPSY- w/major duct excision KENALOG INJECTION AT TIME OF SURGERY;  Surgeon: Kelby Mcmahan MD;  Location: [facility] OR [address] [place] [address];  Service: General;  Laterality: Left;    INTRAUTERINE DEVICE INSERTION  2009    again 2014       MEDS:  Medcard reviewed and updated    ALLERGIES: Allergy Card reviewed and updated    SOCIAL HISTORY:   The patient is a nonsmoker.    PE:   APPEARANCE: Well nourished, well developed, in no acute distress.    EARS: TM's with fluid bilaterally.  NOSE: Mucosa pink. Airway clear.  MOUTH & THROAT: No tonsillar enlargement. No pharyngeal erythema or exudate. No stridor.  CHEST: Lungs clear to auscultation with unlabored respirations.  CARDIOVASCULAR: Normal S1, S2. No murmurs. No carotid bruits. No pedal edema.  ABDOMEN: Bowel sounds normal. Not distended. Soft. No tenderness or masses.   PSYCHIATRIC: The patient is oriented to person, place, and time and has a pleasant affect.         ASSESSMENT/PLAN:  Kristofer was seen today for uri.    Diagnoses and all orders for this visit:    Acute sinusitis, recurrence not specified, unspecified location  -     prescribed Augmentin    Upper respiratory tract infection, unspecified type  -     Influenza A & B by Molecular    Other orders  -     clotrimazole-betamethasone (LOTRISONE) lotion; Apply topically 2 (two) times daily.  -     amoxicillin-clavulanate 875-125mg (AUGMENTIN) 875-125 mg per tablet; Take 1 tablet by mouth every 12 (twelve) hours.

## 2020-01-27 ENCOUNTER — PATIENT MESSAGE (OUTPATIENT)
Dept: INTERNAL MEDICINE | Facility: CLINIC | Age: 40
End: 2020-01-27

## 2020-01-27 DIAGNOSIS — H53.9 CHANGE IN VISION: Primary | ICD-10-CM

## 2020-01-28 ENCOUNTER — OFFICE VISIT (OUTPATIENT)
Dept: OPTOMETRY | Facility: CLINIC | Age: 40
End: 2020-01-28
Payer: COMMERCIAL

## 2020-01-28 DIAGNOSIS — H04.123 DRY EYES, BILATERAL: ICD-10-CM

## 2020-01-28 DIAGNOSIS — H52.4 PRESBYOPIA: Primary | ICD-10-CM

## 2020-01-28 PROCEDURE — 99999 PR PBB SHADOW E&M-EST. PATIENT-LVL II: CPT | Mod: PBBFAC,,, | Performed by: OPTOMETRIST

## 2020-01-28 PROCEDURE — 99999 PR PBB SHADOW E&M-EST. PATIENT-LVL II: ICD-10-PCS | Mod: PBBFAC,,, | Performed by: OPTOMETRIST

## 2020-01-28 PROCEDURE — 92004 COMPRE OPH EXAM NEW PT 1/>: CPT | Mod: S$GLB,,, | Performed by: OPTOMETRIST

## 2020-01-28 PROCEDURE — 92015 PR REFRACTION: ICD-10-PCS | Mod: S$GLB,,, | Performed by: OPTOMETRIST

## 2020-01-28 PROCEDURE — 92004 PR EYE EXAM, NEW PATIENT,COMPREHESV: ICD-10-PCS | Mod: S$GLB,,, | Performed by: OPTOMETRIST

## 2020-01-28 PROCEDURE — 92015 DETERMINE REFRACTIVE STATE: CPT | Mod: S$GLB,,, | Performed by: OPTOMETRIST

## 2020-01-28 NOTE — PROGRESS NOTES
HPI     DELFINA: 2005  Pt states her eye will burn while she is on the computer her eyes are   burning  and she is finding that she is squinting while on the computer.   States with her eye burning she feels like she is having blurry VA with   it. Pt is on the computer none stop all day.   No f/f  No gtts  No sx   Family HX glau- mother     Last edited by Flor Maddox MA on 1/28/2020 10:35 AM. (History)        ROS     Negative for: Constitutional, Gastrointestinal, Neurological, Skin,   Genitourinary, Musculoskeletal, HENT, Endocrine, Cardiovascular, Eyes,   Respiratory, Psychiatric, Allergic/Imm, Heme/Lymph    Last edited by Cullen Sharpe, CHAS on 1/28/2020 10:45 AM. (History)        Assessment /Plan     For exam results, see Encounter Report.    Presbyopia    Dry eyes, bilateral      1. Presby sx--wrote spex Rx, or otc readers OK  2. Dry eye sx--advised SYSTANE ATs prn    PLAN:    rtc 1 yr

## 2020-01-28 NOTE — LETTER
January 28, 2020      Melonie Villanueva MD  [address] [facility]  [place] [address]           [place] [facility]  [address] [facility].  [place] [address]  Phone: [phone]  Fax: [phone]          Patient: Kristofer Gentile   MR Number: [id number]   YOB: 1980   Date of Visit: 1/28/2020       Dear Dr. Melonie Villanueva:    Thank you for referring Kristofer Gentile to me for evaluation. Attached you will find relevant portions of my assessment and plan of care.    If you have questions, please do not hesitate to call me. I look forward to following Kristofer Gentile along with you.    Sincerely,    Cullen Sharpe, OD    Enclosure  CC:  No Recipients    If you would like to receive this communication electronically, please contact [email or web address] or [phone] to request more information on [facility] access.    For providers and/or their staff who would like to refer a patient to [facility], please contact us through our one-stop-shop provider referral line, [facility], at [phone].    If you feel you have received this communication in error or would no longer like to receive these types of communications, please e-mail [email or web address]

## 2020-02-17 ENCOUNTER — PATIENT MESSAGE (OUTPATIENT)
Dept: OBSTETRICS AND GYNECOLOGY | Facility: CLINIC | Age: 40
End: 2020-02-17

## 2020-02-19 ENCOUNTER — OFFICE VISIT (OUTPATIENT)
Dept: OBSTETRICS AND GYNECOLOGY | Facility: CLINIC | Age: 40
End: 2020-02-19
Payer: COMMERCIAL

## 2020-02-19 VITALS — HEIGHT: 65 IN | BODY MASS INDEX: 38.2 KG/M2 | WEIGHT: 229.25 LBS

## 2020-02-19 DIAGNOSIS — N76.0 ACUTE VAGINITIS: Primary | ICD-10-CM

## 2020-02-19 LAB
CANDIDA RRNA VAG QL PROBE: NEGATIVE
G VAGINALIS RRNA GENITAL QL PROBE: NEGATIVE
T VAGINALIS RRNA GENITAL QL PROBE: NEGATIVE

## 2020-02-19 PROCEDURE — 87510 GARDNER VAG DNA DIR PROBE: CPT

## 2020-02-19 PROCEDURE — 3008F BODY MASS INDEX DOCD: CPT | Mod: CPTII,S$GLB,, | Performed by: NURSE PRACTITIONER

## 2020-02-19 PROCEDURE — 99214 OFFICE O/P EST MOD 30 MIN: CPT | Mod: S$GLB,,, | Performed by: NURSE PRACTITIONER

## 2020-02-19 PROCEDURE — 3008F PR BODY MASS INDEX (BMI) DOCUMENTED: ICD-10-PCS | Mod: CPTII,S$GLB,, | Performed by: NURSE PRACTITIONER

## 2020-02-19 PROCEDURE — 99999 PR PBB SHADOW E&M-EST. PATIENT-LVL III: ICD-10-PCS | Mod: PBBFAC,,, | Performed by: NURSE PRACTITIONER

## 2020-02-19 PROCEDURE — 99214 PR OFFICE/OUTPT VISIT, EST, LEVL IV, 30-39 MIN: ICD-10-PCS | Mod: S$GLB,,, | Performed by: NURSE PRACTITIONER

## 2020-02-19 PROCEDURE — 87480 CANDIDA DNA DIR PROBE: CPT

## 2020-02-19 PROCEDURE — 99999 PR PBB SHADOW E&M-EST. PATIENT-LVL III: CPT | Mod: PBBFAC,,, | Performed by: NURSE PRACTITIONER

## 2020-02-19 RX ORDER — TINIDAZOLE 500 MG/1
2 TABLET ORAL
Qty: 8 TABLET | Refills: 0 | Status: SHIPPED | OUTPATIENT
Start: 2020-02-19 | End: 2020-02-21

## 2020-02-20 NOTE — PROGRESS NOTES
CC: Vaginal Discharge    Kristofer Gentile is a 39 y.o. female  presents with complaint of vaginal discharge for 4 months intermittently.  She denies itching. She denies odor.  She states the discharge is white and thin.  Alleviating factors: None. No new sexual partners.  She does not desire STD testing.      ROS:  GENERAL: No fever, chills, fatigability or weight loss.  VULVAR: No pain, no lesions and no itching.  VAGINAL: No relaxation, no itching, discharge, no abnormal bleeding and no lesions.  ABDOMEN: No abdominal pain. Denies nausea. Denies vomiting. No diarrhea. No constipation  BREAST: Denies pain. No lumps. No discharge.  URINARY: No incontinence, no nocturia, no frequency and no dysuria.  CARDIOVASCULAR: No chest pain. No shortness of breath. No leg cramps.  NEUROLOGICAL: No headaches. No vision changes.    PHYSICAL EXAM:  VULVA: normal appearing vulva with no masses, tenderness or lesions   VAGINA: normal appearing vagina with normal color and moderate amount of grey/yellow, thin discharge, no lesions   CERVIX: normal appearing cervix without discharge or lesions. IUD strings present, extending 2 cm from cervical os.  UTERUS: uterus is normal size, shape, consistency and nontender   ADNEXA: normal adnexa in size, nontender and no masses    ASSESSMENT and PLAN:    ICD-10-CM ICD-9-CM    1. Acute vaginitis N76.0 616.10 Vaginosis Screen by DNA Probe      tinidazole (TINDAMAX) 500 MG tablet     Affirm  Tindamax    Patient was counseled today on vaginitis prevention including :  a. avoiding feminine products such as deoderant soaps, body wash, bubble bath, douches, scented toilet paper, deoderant tampons or pads, feminine wipes, chronic pad use, etc.  b. avoiding other vulvovaginal irritants such as long hot baths, humidity, tight, synthetic clothing, chlorine and sitting around in wet bathing suits  c. wearing cotton underwear, avoiding thong underwear and no underwear to bed  d. taking showers  instead of baths and use a hair dryer on cool setting afterwards to dry  e. wearing cotton to exercise and shower immediately after exercise and change clothes  f. using polyurethane condoms without spermicide if sexually active and symptoms are triggered by intercourse    FOLLOW UP: PRN lack of improvement.      Katie Krueger, FNP-C

## 2020-02-24 ENCOUNTER — OFFICE VISIT (OUTPATIENT)
Dept: OBSTETRICS AND GYNECOLOGY | Facility: CLINIC | Age: 40
End: 2020-02-24
Attending: OBSTETRICS & GYNECOLOGY
Payer: COMMERCIAL

## 2020-02-24 VITALS
WEIGHT: 227.06 LBS | SYSTOLIC BLOOD PRESSURE: 128 MMHG | DIASTOLIC BLOOD PRESSURE: 70 MMHG | BODY MASS INDEX: 37.83 KG/M2 | HEIGHT: 65 IN

## 2020-02-24 DIAGNOSIS — N89.8 VAGINAL DISCHARGE: Primary | ICD-10-CM

## 2020-02-24 LAB
CANDIDA RRNA VAG QL PROBE: NEGATIVE
G VAGINALIS RRNA GENITAL QL PROBE: POSITIVE
T VAGINALIS RRNA GENITAL QL PROBE: NEGATIVE

## 2020-02-24 PROCEDURE — 99999 PR PBB SHADOW E&M-EST. PATIENT-LVL III: CPT | Mod: PBBFAC,,, | Performed by: NURSE PRACTITIONER

## 2020-02-24 PROCEDURE — 99999 PR PBB SHADOW E&M-EST. PATIENT-LVL III: ICD-10-PCS | Mod: PBBFAC,,, | Performed by: NURSE PRACTITIONER

## 2020-02-24 PROCEDURE — 99213 OFFICE O/P EST LOW 20 MIN: CPT | Mod: S$GLB,,, | Performed by: NURSE PRACTITIONER

## 2020-02-24 PROCEDURE — 3008F BODY MASS INDEX DOCD: CPT | Mod: CPTII,S$GLB,, | Performed by: NURSE PRACTITIONER

## 2020-02-24 PROCEDURE — 87510 GARDNER VAG DNA DIR PROBE: CPT

## 2020-02-24 PROCEDURE — 87480 CANDIDA DNA DIR PROBE: CPT

## 2020-02-24 PROCEDURE — 3074F SYST BP LT 130 MM HG: CPT | Mod: CPTII,S$GLB,, | Performed by: NURSE PRACTITIONER

## 2020-02-24 PROCEDURE — 3074F PR MOST RECENT SYSTOLIC BLOOD PRESSURE < 130 MM HG: ICD-10-PCS | Mod: CPTII,S$GLB,, | Performed by: NURSE PRACTITIONER

## 2020-02-24 PROCEDURE — 3078F PR MOST RECENT DIASTOLIC BLOOD PRESSURE < 80 MM HG: ICD-10-PCS | Mod: CPTII,S$GLB,, | Performed by: NURSE PRACTITIONER

## 2020-02-24 PROCEDURE — 3078F DIAST BP <80 MM HG: CPT | Mod: CPTII,S$GLB,, | Performed by: NURSE PRACTITIONER

## 2020-02-24 PROCEDURE — 3008F PR BODY MASS INDEX (BMI) DOCUMENTED: ICD-10-PCS | Mod: CPTII,S$GLB,, | Performed by: NURSE PRACTITIONER

## 2020-02-24 PROCEDURE — 99213 PR OFFICE/OUTPT VISIT, EST, LEVL III, 20-29 MIN: ICD-10-PCS | Mod: S$GLB,,, | Performed by: NURSE PRACTITIONER

## 2020-02-24 NOTE — PROGRESS NOTES
CC: vaginal discharge    History of Present Illness: Kristofer Gentile is a 39 y.o. female that presents today 2/24/2020   Pt [facility] c/o excessive vaginal discharge. She reports that she had her mirena IUD replaced in October. She also had an abscess in her breast around the same time and was antibiotics for a few weeks. She did get a yeast infection from the antibiotics and feels as if since being on all the medication, her pH is off. She reports that the discharge has a yellow tint to it. She denies any vaginal odor, burning or itching. She denies using scented products in the vaginal area or changing any detergents or hygiene products. She was seen last week and her vaginosis screening was negative, however she completed a tindamax treatment. No other complaints or concerns noted.       Past Medical History:   Diagnosis Date    Allergy     Anti-TPO antibodies present 11/8/2018    Carpal tunnel syndrome     Dyslipidemia (high LDL; low HDL) 6/25/2013    Fever blister     Herpes simplex labialis 6/25/2013    Intraductal papilloma of left breast 3/1/2019    IUD (intrauterine device) in place 2009    Metabolic syndrome 7/22/2013    Thyroid disease     treated with synthroid during pregnancy    Vitamin D deficiency 11/5/2018    Vitamin D insufficiency 11/5/2018       Past Surgical History:   Procedure Laterality Date    BREAST BIOPSY Left 12/14/2018    Core bx, benign    EXCISIONAL BIOPSY Left 3/1/2019    Procedure: EXCISIONAL BIOPSY- w/major duct excision KENALOG INJECTION AT TIME OF SURGERY;  Surgeon: Kelby Mcmahan MD;  Location: [facility] OR [address] [place] [address];  Service: General;  Laterality: Left;    INTRAUTERINE DEVICE INSERTION  2009    again 2014       Current Outpatient Medications   Medication Sig Dispense Refill    amoxicillin-clavulanate 875-125mg (AUGMENTIN) 875-125 mg per tablet Take 1 tablet by mouth every 12 (twelve) hours. 20 tablet 0    cholecalciferol, vitamin  D3, (VITAMIN D3) 2,000 unit Cap Take 1 capsule by mouth every morning.      clotrimazole-betamethasone (LOTRISONE) lotion Apply topically 2 (two) times daily. 30 mL 2    FLONASE ALLERGY RELIEF 50 mcg/actuation nasal spray SPRAY TWICE IN EACH NOSTRIL ONCE DAILY (Patient taking differently: SPRAY TWICE IN EACH NOSTRIL ONCE DAILY  prn) 16 g 5    levonorgestrel (MIRENA) 20 mcg/24 hour (5 years) IUD 1 each by Intrauterine route once.      multivit,calc,mins/iron/folic (ONE-A-DAY WOMENS FORMULA ORAL) Take by mouth.      nystatin (MYCOSTATIN) powder aaa qhs prn flare 120 g 6     Current Facility-Administered Medications   Medication Dose Route Frequency Provider Last Rate Last Dose    levonorgestrel 20 mcg/24 hours (5 yrs) 52 mg IUD 1 Intra Uterine Device  1 Intra Uterine Device Intrauterine  Edwin Angel MD   1 Intra Uterine Device at 10/16/19 0830       Review of patient's allergies indicates:   Allergen Reactions    Bactrim [sulfamethoxazole-trimethoprim] Hives and Other (See Comments)     Stiffness to joints    Sulfa (sulfonamide antibiotics) Hives       Family History   Problem Relation Age of Onset    Diabetes Mother     Hypertension Mother     Rheum arthritis Mother     Coronary artery disease Mother     Stroke Mother         x2    Liver disease Mother         NAFLD    Sarcoidosis Mother     Thyroid disease Mother     Hyperlipidemia Mother     Atrial fibrillation Mother     Cataracts Mother     Glaucoma Mother     Hypertension Father     Dementia Father     Hyperlipidemia Father     Liver cancer Father     Rheum arthritis Maternal Grandmother         wheelchair bound    Rheum arthritis Paternal Grandmother     Sarcoidosis Maternal Uncle     Leukemia Maternal Uncle     Hypertension Maternal Aunt     Cataracts Maternal Grandfather     Breast cancer Neg Hx     Colon cancer Neg Hx     Ovarian cancer Neg Hx        Social History     Tobacco Use    Smoking status: Never Smoker  "   Smokeless tobacco: Never Used   Substance Use Topics    Alcohol use: Yes     Alcohol/week: 0.8 standard drinks     Types: 1 Standard drinks or equivalent per week     Frequency: Monthly or less     Drinks per session: 1 or 2     Binge frequency: Never     Comment: Rarely    Drug use: No       OB History    Para Term  AB Living   2 2 2         SAB TAB Ectopic Multiple Live Births                  # Outcome Date GA Lbr [place]/2nd Weight Sex Delivery Anes PTL Lv   2 Term      Vag-Spont      1 Term      Vag-Spont         Obstetric Comments   Menarche age 14.   No menses on Mirena.   No history of abnormal PAP smear.       Review of Symptoms:  GENERAL: Denies weight gain or weight loss. Feeling well overall.   SKIN: Denies rash or lesions.   HEAD: Denies head injury or headache.   NODES: Denies enlarged lymph nodes.   CHEST: Denies chest pain or shortness of breath.   CARDIOVASCULAR: Denies palpitations or left sided chest pain.   ABDOMEN: No abdominal pain, constipation, diarrhea, nausea, vomiting or rectal bleeding.   URINARY: No frequency, dysuria, hematuria, or burning on urination.      /70   Ht 5' 5" (1.651 m)   Wt 103 kg (227 lb 1.2 oz)   Physical Exam:  APPEARANCE: Well nourished, well developed, in no acute distress.  SKIN: Normal skin turgor, no lesions.  NECK: Neck symmetric without masses   RESPIRATORY: Normal respiratory effort with no retractions or use of accessory muscles  ABDOMEN: Soft. No tenderness or masses. No hepatosplenomegaly. No hernias.  PELVIC: Normal external female genitalia without lesions. Normal hair distribution. Adequate perineal body, normal urethral meatus. Urethra with no masses.  Bladder nontender. Vagina moist and well rugated without lesions, + thin yellow discharge. Cervix pink and without lesions; IUD strings visible. No significant cystocele or rectocele.     ASSESSMENT/PLAN:  Vaginal discharge  -     Vaginosis Screen by DNA Probe      -Reinforced to " avoid any scented products in the vaginal area such as bubble baths, bath bombs, scented soaps/body washes, douches, feminine washes, etc... Also wear cotton underwear and change out of wet/sweaty clothing as soon as possible. Pt verbalized understanding.    -Also recommending consult with Dr. Kevin COFFMAN for further evaluation. Will send referral after results have been reviewed.    Follow-up:  Will f/u with results  RTC if symptoms worsen or do not improve  RTC as needed

## 2020-02-26 ENCOUNTER — PATIENT MESSAGE (OUTPATIENT)
Dept: OBSTETRICS AND GYNECOLOGY | Facility: CLINIC | Age: 40
End: 2020-02-26

## 2020-02-26 RX ORDER — TINIDAZOLE 500 MG/1
2 TABLET ORAL
Qty: 8 TABLET | Refills: 0 | Status: SHIPPED | OUTPATIENT
Start: 2020-02-26 | End: 2020-02-28

## 2020-02-27 ENCOUNTER — PATIENT MESSAGE (OUTPATIENT)
Dept: OBSTETRICS AND GYNECOLOGY | Facility: CLINIC | Age: 40
End: 2020-02-27

## 2020-03-02 ENCOUNTER — PATIENT MESSAGE (OUTPATIENT)
Dept: OBSTETRICS AND GYNECOLOGY | Facility: CLINIC | Age: 40
End: 2020-03-02

## 2020-03-02 DIAGNOSIS — N76.0 ACUTE VAGINITIS: Primary | ICD-10-CM

## 2020-03-02 RX ORDER — METRONIDAZOLE 7.5 MG/G
1 GEL VAGINAL NIGHTLY
Qty: 70 G | Refills: 0 | Status: SHIPPED | OUTPATIENT
Start: 2020-03-02 | End: 2020-03-07

## 2020-03-19 ENCOUNTER — PATIENT MESSAGE (OUTPATIENT)
Dept: OBSTETRICS AND GYNECOLOGY | Facility: CLINIC | Age: 40
End: 2020-03-19

## 2020-03-19 RX ORDER — METRONIDAZOLE 7.5 MG/G
1 GEL VAGINAL DAILY
Qty: 5 APPLICATOR | Refills: 1 | Status: SHIPPED | OUTPATIENT
Start: 2020-03-19 | End: 2020-03-24

## 2020-03-27 RX ORDER — NITROFURANTOIN 25; 75 MG/1; MG/1
100 CAPSULE ORAL 2 TIMES DAILY
Qty: 14 CAPSULE | Refills: 0 | Status: SHIPPED | OUTPATIENT
Start: 2020-03-27 | End: 2020-04-03

## 2020-04-15 ENCOUNTER — PATIENT MESSAGE (OUTPATIENT)
Dept: INTERNAL MEDICINE | Facility: CLINIC | Age: 40
End: 2020-04-15

## 2020-04-15 DIAGNOSIS — E78.2 MIXED HYPERLIPIDEMIA: Primary | ICD-10-CM

## 2020-04-15 NOTE — TELEPHONE ENCOUNTER
See pt portal message.    I see TSH and lipid from November annual.  Do you want to add anything else?    Recall set for 5/30, 6mo f/u

## 2020-04-16 RX ORDER — CLOTRIMAZOLE AND BETAMETHASONE DIPROPIONATE 10; .5 MG/ML; MG/ML
LOTION TOPICAL 2 TIMES DAILY
Qty: 30 ML | Refills: 2 | Status: SHIPPED | OUTPATIENT
Start: 2020-04-16 | End: 2020-04-17

## 2020-04-17 ENCOUNTER — LAB VISIT (OUTPATIENT)
Dept: LAB | Facility: HOSPITAL | Age: 40
End: 2020-04-17
Attending: INTERNAL MEDICINE
Payer: COMMERCIAL

## 2020-04-17 ENCOUNTER — OFFICE VISIT (OUTPATIENT)
Dept: INTERNAL MEDICINE | Facility: CLINIC | Age: 40
End: 2020-04-17
Payer: COMMERCIAL

## 2020-04-17 DIAGNOSIS — Z83.2 FAMILY HISTORY OF AUTOIMMUNE DISORDER: ICD-10-CM

## 2020-04-17 DIAGNOSIS — B36.9 FUNGAL SKIN INFECTION: ICD-10-CM

## 2020-04-17 DIAGNOSIS — L65.9 PATCHY LOSS OF HAIR: Primary | ICD-10-CM

## 2020-04-17 DIAGNOSIS — E78.2 MIXED HYPERLIPIDEMIA: ICD-10-CM

## 2020-04-17 DIAGNOSIS — R76.8 ANTI-TPO ANTIBODIES PRESENT: ICD-10-CM

## 2020-04-17 DIAGNOSIS — L20.82 FLEXURAL ECZEMA: ICD-10-CM

## 2020-04-17 DIAGNOSIS — L65.9 PATCHY LOSS OF HAIR: ICD-10-CM

## 2020-04-17 LAB
ALBUMIN SERPL BCP-MCNC: 3.3 G/DL (ref 3.5–5.2)
ALP SERPL-CCNC: 72 U/L (ref 55–135)
ALT SERPL W/O P-5'-P-CCNC: 13 U/L (ref 10–44)
ANION GAP SERPL CALC-SCNC: 8 MMOL/L (ref 8–16)
AST SERPL-CCNC: 16 U/L (ref 10–40)
BASOPHILS # BLD AUTO: 0.02 K/UL (ref 0–0.2)
BASOPHILS NFR BLD: 0.4 % (ref 0–1.9)
BILIRUB SERPL-MCNC: 0.3 MG/DL (ref 0.1–1)
BUN SERPL-MCNC: 12 MG/DL (ref 6–20)
CALCIUM SERPL-MCNC: 8.6 MG/DL (ref 8.7–10.5)
CHLORIDE SERPL-SCNC: 105 MMOL/L (ref 95–110)
CO2 SERPL-SCNC: 24 MMOL/L (ref 23–29)
CREAT SERPL-MCNC: 0.8 MG/DL (ref 0.5–1.4)
CRP SERPL-MCNC: 7.3 MG/L (ref 0–8.2)
DIFFERENTIAL METHOD: ABNORMAL
EOSINOPHIL # BLD AUTO: 0.5 K/UL (ref 0–0.5)
EOSINOPHIL NFR BLD: 9.6 % (ref 0–8)
ERYTHROCYTE [DISTWIDTH] IN BLOOD BY AUTOMATED COUNT: 12.7 % (ref 11.5–14.5)
ERYTHROCYTE [SEDIMENTATION RATE] IN BLOOD BY WESTERGREN METHOD: 17 MM/HR (ref 0–36)
EST. GFR  (AFRICAN AMERICAN): >60 ML/MIN/1.73 M^2
EST. GFR  (NON AFRICAN AMERICAN): >60 ML/MIN/1.73 M^2
GLUCOSE SERPL-MCNC: 92 MG/DL (ref 70–110)
HCT VFR BLD AUTO: 38.7 % (ref 37–48.5)
HGB BLD-MCNC: 12.5 G/DL (ref 12–16)
IMM GRANULOCYTES # BLD AUTO: 0.01 K/UL (ref 0–0.04)
IMM GRANULOCYTES NFR BLD AUTO: 0.2 % (ref 0–0.5)
LYMPHOCYTES # BLD AUTO: 1.3 K/UL (ref 1–4.8)
LYMPHOCYTES NFR BLD: 27.1 % (ref 18–48)
MCH RBC QN AUTO: 30 PG (ref 27–31)
MCHC RBC AUTO-ENTMCNC: 32.3 G/DL (ref 32–36)
MCV RBC AUTO: 93 FL (ref 82–98)
MONOCYTES # BLD AUTO: 0.5 K/UL (ref 0.3–1)
MONOCYTES NFR BLD: 11 % (ref 4–15)
NEUTROPHILS # BLD AUTO: 2.5 K/UL (ref 1.8–7.7)
NEUTROPHILS NFR BLD: 51.7 % (ref 38–73)
NRBC BLD-RTO: 0 /100 WBC
PLATELET # BLD AUTO: 281 K/UL (ref 150–350)
PMV BLD AUTO: 10.4 FL (ref 9.2–12.9)
POTASSIUM SERPL-SCNC: 4 MMOL/L (ref 3.5–5.1)
PROT SERPL-MCNC: 7.3 G/DL (ref 6–8.4)
RBC # BLD AUTO: 4.17 M/UL (ref 4–5.4)
SODIUM SERPL-SCNC: 137 MMOL/L (ref 136–145)
TSH SERPL DL<=0.005 MIU/L-ACNC: 1.69 UIU/ML (ref 0.4–4)
WBC # BLD AUTO: 4.91 K/UL (ref 3.9–12.7)

## 2020-04-17 PROCEDURE — 80053 COMPREHEN METABOLIC PANEL: CPT

## 2020-04-17 PROCEDURE — 85652 RBC SED RATE AUTOMATED: CPT

## 2020-04-17 PROCEDURE — 86162 COMPLEMENT TOTAL (CH50): CPT

## 2020-04-17 PROCEDURE — 85025 COMPLETE CBC W/AUTO DIFF WBC: CPT

## 2020-04-17 PROCEDURE — 86140 C-REACTIVE PROTEIN: CPT

## 2020-04-17 PROCEDURE — 99213 PR OFFICE/OUTPT VISIT, EST, LEVL III, 20-29 MIN: ICD-10-PCS | Mod: 95,,, | Performed by: INTERNAL MEDICINE

## 2020-04-17 PROCEDURE — 84443 ASSAY THYROID STIM HORMONE: CPT

## 2020-04-17 PROCEDURE — 99213 OFFICE O/P EST LOW 20 MIN: CPT | Mod: 95,,, | Performed by: INTERNAL MEDICINE

## 2020-04-17 PROCEDURE — 86038 ANTINUCLEAR ANTIBODIES: CPT

## 2020-04-17 RX ORDER — KETOCONAZOLE 20 MG/G
CREAM TOPICAL DAILY
Qty: 30 G | Refills: 0 | Status: SHIPPED | OUTPATIENT
Start: 2020-04-17 | End: 2021-01-04

## 2020-04-17 RX ORDER — FLUOCINONIDE 0.5 MG/G
CREAM TOPICAL 2 TIMES DAILY
Qty: 30 G | Refills: 0 | Status: SHIPPED | OUTPATIENT
Start: 2020-04-17 | End: 2020-08-03

## 2020-04-17 NOTE — PROGRESS NOTES
Primary Care Telemedicine Note    The patient location is:  Patient Home   The chief complaint leading to consultation is: hair loss  Total time spent with patient: 23 minutes    Visit type: Virtual visit with synchronous audio only and video  Each patient to whom he or she provides medical services by telemedicine is:  (1) informed of the relationship between the physician and patient and the respective role of any other health care provider with respect to management of the patient; and (2) notified that he or she may decline to receive medical services by telemedicine and may withdraw from such care at any time.    Subjective:      Patient ID: Kristofer Gentile is a 39 y.o. female.    Chief Complaint: Hair Loss and Thyroid Problem    Hair Loss   This is a recurrent problem. The current episode started more than 1 month ago. The problem occurs constantly. The problem has been gradually worsening. Associated symptoms include a rash (under arms and breasts, Lotrisone helped with area under breasts but not under arms). Pertinent negatives include no abdominal pain, arthralgias, chest pain, congestion, coughing, diaphoresis, fatigue, fever, myalgias, nausea, numbness, vomiting or weakness. Nothing aggravates the symptoms. She has tried nothing for the symptoms.   Thyroid Problem   Presents for follow-up visit. Symptoms include constipation (chronic, no BM since Monday), dry skin, hair loss, weight gain (weight fluctuates) and weight loss. Patient reports no anxiety, diaphoresis, fatigue, leg swelling or palpitations.      She was evaluated by Dermatology in the past. She now has patches of hair loss along the right side and 2 round patches of hair loss along left hairline. She stopped using chemicals on her hair      Review of Systems   Constitutional: Positive for weight gain (weight fluctuates) and weight loss. Negative for diaphoresis, fatigue and fever.   HENT: Negative for congestion and sinus pain.     Respiratory: Negative for cough and shortness of breath.    Cardiovascular: Negative for chest pain and palpitations.   Gastrointestinal: Positive for constipation (chronic, no BM since Monday). Negative for abdominal pain, nausea and vomiting.   Musculoskeletal: Negative for arthralgias and myalgias.   Skin: Positive for color change and rash (under arms and breasts, Lotrisone helped with area under breasts but not under arms).        Slow healing of skin lesions, dry skin   Neurological: Negative for dizziness, weakness and numbness.   Psychiatric/Behavioral: The patient is not nervous/anxious.        Objective:      Physical Exam   Constitutional: She is oriented to person, place, and time. She appears well-developed and well-nourished. No distress.   HENT:   Head: Normocephalic and atraumatic.   Pulmonary/Chest: Effort normal. No respiratory distress.   Neurological: She is alert and oriented to person, place, and time.   Skin: Skin is dry and intact. She is not diaphoretic.   2 small rounded patches of hair loss along front hairline. Larger patch of hair loss posterior to hairline above the right ear.   Psychiatric: She has a normal mood and affect. Her behavior is normal. Thought content normal. She is attentive.       Assessment:       1. Patchy loss of hair    2. Anti-TPO antibodies present    3. Fungal skin infection    4. Mixed hyperlipidemia    5. Flexural eczema    6. Family history of autoimmune disorder        Plan:         1. Patchy loss of hair  - TAY Screen w/Reflex; Future  - Sedimentation rate; Future  - C-reactive protein; Future  - CBC auto differential; Future  - Dermatology referral    2. Anti-TPO antibodies present  - repeat TSH    3. Fungal skin infection  - stop nystatin and lotrisone, try ketoconazole  - ketoconazole (NIZORAL) 2 % cream; Apply topically once daily.  Dispense: 30 g; Refill: 0    4. Mixed hyperlipidemia  - repeat lipid panel    5. Flexural eczema  - fluocinonide 0.05%  (LIDEX) 0.05 % cream; Apply topically 2 (two) times daily.  Dispense: 30 g; Refill: 0    6. Family history of autoimmune disorder  - TAY Screen w/Reflex; Future  - Sedimentation rate; Future  - C-reactive protein; Future  - CH50 complement    Melonie Villanueva MD

## 2020-04-21 PROBLEM — D72.10 EOSINOPHILIA: Status: ACTIVE | Noted: 2020-04-21

## 2020-04-21 LAB — ANA SER QL IF: NORMAL

## 2020-04-24 LAB — CH50 SERPL-ACNC: 74 U/ML (ref 42–95)

## 2020-05-20 ENCOUNTER — PATIENT MESSAGE (OUTPATIENT)
Dept: INTERNAL MEDICINE | Facility: CLINIC | Age: 40
End: 2020-05-20

## 2020-05-20 DIAGNOSIS — R30.0 DYSURIA: Primary | ICD-10-CM

## 2020-05-20 NOTE — TELEPHONE ENCOUNTER
Ok for UA and culture if she suspects UTI. Will give antibiotic if preliminary results are abnormal.

## 2020-05-22 ENCOUNTER — LAB VISIT (OUTPATIENT)
Dept: LAB | Facility: HOSPITAL | Age: 40
End: 2020-05-22
Attending: INTERNAL MEDICINE
Payer: COMMERCIAL

## 2020-05-22 DIAGNOSIS — R30.0 DYSURIA: ICD-10-CM

## 2020-05-22 LAB
BACTERIA #/AREA URNS AUTO: ABNORMAL /HPF
BILIRUB UR QL STRIP: NEGATIVE
CLARITY UR REFRACT.AUTO: ABNORMAL
COLOR UR AUTO: YELLOW
GLUCOSE UR QL STRIP: NEGATIVE
HGB UR QL STRIP: ABNORMAL
KETONES UR QL STRIP: NEGATIVE
LEUKOCYTE ESTERASE UR QL STRIP: ABNORMAL
MICROSCOPIC COMMENT: ABNORMAL
NITRITE UR QL STRIP: NEGATIVE
NON-SQ EPI CELLS #/AREA URNS AUTO: <1 /HPF
PH UR STRIP: 5 [PH] (ref 5–8)
PROT UR QL STRIP: NEGATIVE
RBC #/AREA URNS AUTO: 2 /HPF (ref 0–4)
SP GR UR STRIP: 1.02 (ref 1–1.03)
SQUAMOUS #/AREA URNS AUTO: 10 /HPF
URN SPEC COLLECT METH UR: ABNORMAL
WBC #/AREA URNS AUTO: >100 /HPF (ref 0–5)
WBC CLUMPS UR QL AUTO: ABNORMAL

## 2020-05-22 PROCEDURE — 87186 SC STD MICRODIL/AGAR DIL: CPT

## 2020-05-22 PROCEDURE — 87088 URINE BACTERIA CULTURE: CPT

## 2020-05-22 PROCEDURE — 87077 CULTURE AEROBIC IDENTIFY: CPT

## 2020-05-22 PROCEDURE — 87086 URINE CULTURE/COLONY COUNT: CPT

## 2020-05-22 PROCEDURE — 81001 URINALYSIS AUTO W/SCOPE: CPT

## 2020-05-25 LAB — BACTERIA UR CULT: ABNORMAL

## 2020-05-25 RX ORDER — NITROFURANTOIN 25; 75 MG/1; MG/1
100 CAPSULE ORAL 2 TIMES DAILY
Qty: 10 CAPSULE | Refills: 0 | Status: SHIPPED | OUTPATIENT
Start: 2020-05-25 | End: 2020-05-30

## 2020-05-27 ENCOUNTER — TELEPHONE (OUTPATIENT)
Dept: DERMATOLOGY | Facility: CLINIC | Age: 40
End: 2020-05-27

## 2020-05-27 NOTE — TELEPHONE ENCOUNTER
Scheduled for Monday at 1:30p for boils.  ----- Message from Hood Lau MA sent at 5/27/2020 11:47 AM CDT -----  Please get her in with any provider. Does not feel comfortable coming to [facility] location due to COVID testing and does not want VV     Thanks!

## 2020-06-01 ENCOUNTER — OFFICE VISIT (OUTPATIENT)
Dept: DERMATOLOGY | Facility: CLINIC | Age: 40
End: 2020-06-01
Payer: COMMERCIAL

## 2020-06-01 ENCOUNTER — PATIENT MESSAGE (OUTPATIENT)
Dept: INTERNAL MEDICINE | Facility: CLINIC | Age: 40
End: 2020-06-01

## 2020-06-01 DIAGNOSIS — L30.4 INTERTRIGO: ICD-10-CM

## 2020-06-01 DIAGNOSIS — L65.9 ALOPECIA: ICD-10-CM

## 2020-06-01 DIAGNOSIS — L74.510 PRIMARY FOCAL HYPERHIDROSIS OF AXILLA: ICD-10-CM

## 2020-06-01 PROCEDURE — 99999 PR PBB SHADOW E&M-EST. PATIENT-LVL III: CPT | Mod: PBBFAC,,, | Performed by: DERMATOLOGY

## 2020-06-01 PROCEDURE — 99214 OFFICE O/P EST MOD 30 MIN: CPT | Mod: S$GLB,,, | Performed by: DERMATOLOGY

## 2020-06-01 PROCEDURE — 99214 PR OFFICE/OUTPT VISIT, EST, LEVL IV, 30-39 MIN: ICD-10-PCS | Mod: S$GLB,,, | Performed by: DERMATOLOGY

## 2020-06-01 PROCEDURE — 99999 PR PBB SHADOW E&M-EST. PATIENT-LVL III: ICD-10-PCS | Mod: PBBFAC,,, | Performed by: DERMATOLOGY

## 2020-06-01 RX ORDER — KETOCONAZOLE 20 MG/ML
SHAMPOO, SUSPENSION TOPICAL
Qty: 240 ML | Refills: 11 | Status: SHIPPED | OUTPATIENT
Start: 2020-06-01 | End: 2020-06-01 | Stop reason: SDUPTHER

## 2020-06-01 RX ORDER — MICONAZOLE
POWDER (GRAM) MISCELLANEOUS
Qty: 500 G | Refills: 6 | Status: SHIPPED | OUTPATIENT
Start: 2020-06-01 | End: 2020-06-01 | Stop reason: SDUPTHER

## 2020-06-01 RX ORDER — KETOCONAZOLE 20 MG/G
CREAM TOPICAL 2 TIMES DAILY
Qty: 60 G | Refills: 11 | Status: SHIPPED | OUTPATIENT
Start: 2020-06-01 | End: 2020-06-15

## 2020-06-01 RX ORDER — FLUOCINONIDE TOPICAL SOLUTION USP, 0.05% 0.5 MG/ML
SOLUTION TOPICAL
Qty: 60 ML | Refills: 4 | Status: SHIPPED | OUTPATIENT
Start: 2020-06-01 | End: 2021-01-04 | Stop reason: ALTCHOICE

## 2020-06-01 RX ORDER — KETOCONAZOLE 20 MG/ML
SHAMPOO, SUSPENSION TOPICAL
Qty: 240 ML | Refills: 11 | Status: SHIPPED | OUTPATIENT
Start: 2020-06-01 | End: 2021-01-04 | Stop reason: ALTCHOICE

## 2020-06-01 RX ORDER — FLUCONAZOLE 150 MG/1
150 TABLET ORAL ONCE
Qty: 1 TABLET | Refills: 0 | Status: SHIPPED | OUTPATIENT
Start: 2020-06-01 | End: 2020-06-01

## 2020-06-01 RX ORDER — FLUOCINONIDE TOPICAL SOLUTION USP, 0.05% 0.5 MG/ML
SOLUTION TOPICAL
Qty: 60 ML | Refills: 4 | Status: SHIPPED | OUTPATIENT
Start: 2020-06-01 | End: 2020-06-01 | Stop reason: SDUPTHER

## 2020-06-01 RX ORDER — MICONAZOLE
POWDER (GRAM) MISCELLANEOUS
Qty: 500 G | Refills: 6 | Status: SHIPPED | OUTPATIENT
Start: 2020-06-01 | End: 2021-01-04

## 2020-06-01 RX ORDER — KETOCONAZOLE 20 MG/G
CREAM TOPICAL 2 TIMES DAILY
Qty: 60 G | Refills: 11 | Status: SHIPPED | OUTPATIENT
Start: 2020-06-01 | End: 2020-06-01 | Stop reason: SDUPTHER

## 2020-06-01 NOTE — PROGRESS NOTES
CC: hair thinning;  Excessive sweating in armpits    Subjective:       Patient ID:  Kristofer Gentile is a 39 y.o. female who presents for   Chief Complaint   Patient presents with    Alopecia    Excessive Sweating     b/l axillae     HPI  40yo who presents for above complaints.     Hair thinning to front and sides of scalp x 3 years, worsened since March. Notices that if she pulls her hair she gets many hairs in her fingers, +increased shedding. Denies symptoms, burning, tingling, pain, pruritus.  Last wore hair in ginger 1 year prior, previously had permanents, now wearing hair natural. Endorses stress x past 6 months including divorce, sister without a job, 2 teenage boys, father with cancer. Notices a correlation between stress and hairloss. Has a family history of female balding: mother, maternal aunt, and patient's sister. No personal history of uterine fibroids.     Underarms with increased sweating to b/l axillae x 4-5 years. Says her axillae are always damp and has previously had a fungal infection successfully treated with topical ketoconazole cream and resolved. Endorses pruritus today to right axilla. Denies palmar hyperhidrosis. Denies generalized hyperhidrosis and unintentional weight loss.     Review of Systems   Constitutional: Negative for fever, chills, weight loss, fatigue and night sweats.   Skin: Positive for itching and rash.           Objective:    Physical Exam   Constitutional: She appears well-developed and well-nourished.   Neurological: She is alert and oriented to person, place, and time.   Skin:   Areas Examined (abnormalities noted in diagram):   Head / Face Inspection Performed  Neck Inspection Performed  Chest / Axilla Inspection Performed              Diagram Legend     Erythematous scaling macule/papule c/w actinic keratosis       Vascular papule c/w angioma      Pigmented verrucoid papule/plaque c/w seborrheic keratosis      Yellow umbilicated papule c/w sebaceous hyperplasia       Irregularly shaped tan macule c/w lentigo     1-2 mm smooth white papules consistent with Milia      Movable subcutaneous cyst with punctum c/w epidermal inclusion cyst      Subcutaneous movable cyst c/w pilar cyst      Firm pink to brown papule c/w dermatofibroma      Pedunculated fleshy papule(s) c/w skin tag(s)      Evenly pigmented macule c/w junctional nevus     Mildly variegated pigmented, slightly irregular-bordered macule c/w mildly atypical nevus      Flesh colored to evenly pigmented papule c/w intradermal nevus       Pink pearly papule/plaque c/w basal cell carcinoma      Erythematous hyperkeratotic cursted plaque c/w SCC      Surgical scar with no sign of skin cancer recurrence      Open and closed comedones      Inflammatory papules and pustules      Verrucoid papule consistent consistent with wart     Erythematous eczematous patches and plaques     Dystrophic onycholytic nail with subungual debris c/w onychomycosis     Umbilicated papule    Erythematous-base heme-crusted tan verrucoid plaque consistent with inflamed seborrheic keratosis     Erythematous Silvery Scaling Plaque c/w Psoriasis     See annotation      Assessment / Plan:        Alopecia  Ddx: traction with elements of seb derm >> less likely scarring (FFA vs other)  -     ketoconazole (NIZORAL) 2 % shampoo; Lather to scalp, armpits 10 min before shower, let sit, rinse off in shower. Repeat 3x/week.  Dispense: 240 mL; Refill: 11  -     fluocinonide (LIDEX) 0.05 % external solution; Apply to scalp 2x/day for 2 weeks. Then take 2 weeks off. Repeat cycle. Do not apply to face, neck, armpits, groin.  Dispense: 60 mL; Refill: 4  F/u with derm PA 3 mo for consideration of ILK vs minoxidil therapy prn  Keep hair natural    Intertrigo  -     ketoconazole (NIZORAL) 2 % cream; Apply topically 2 (two) times daily. for 14 days  Dispense: 60 g; Refill: 11  -     miconazole, bulk, Powd; Apply to axillae as needed.  Dispense: 500 g; Refill:  6    Primary focal hyperhidrosis of axilla  -     aluminum chloride (DRYSOL) 20 % external solution; Apply to DRY skin at night before bed.  Dispense: 60 mL; Refill: 6      Patient to follow up with PA 3 mo

## 2020-06-15 ENCOUNTER — PATIENT MESSAGE (OUTPATIENT)
Dept: INTERNAL MEDICINE | Facility: CLINIC | Age: 40
End: 2020-06-15

## 2020-06-15 DIAGNOSIS — R30.0 DYSURIA: Primary | ICD-10-CM

## 2020-06-16 ENCOUNTER — LAB VISIT (OUTPATIENT)
Dept: LAB | Facility: HOSPITAL | Age: 40
End: 2020-06-16
Attending: INTERNAL MEDICINE
Payer: COMMERCIAL

## 2020-06-16 DIAGNOSIS — R30.0 DYSURIA: ICD-10-CM

## 2020-06-16 LAB
BACTERIA #/AREA URNS AUTO: ABNORMAL /HPF
BILIRUB UR QL STRIP: NEGATIVE
CLARITY UR REFRACT.AUTO: ABNORMAL
COLOR UR AUTO: YELLOW
GLUCOSE UR QL STRIP: NEGATIVE
HGB UR QL STRIP: ABNORMAL
KETONES UR QL STRIP: NEGATIVE
LEUKOCYTE ESTERASE UR QL STRIP: ABNORMAL
MICROSCOPIC COMMENT: ABNORMAL
NITRITE UR QL STRIP: NEGATIVE
NON-SQ EPI CELLS #/AREA URNS AUTO: 2 /HPF
PH UR STRIP: 5 [PH] (ref 5–8)
PROT UR QL STRIP: NEGATIVE
RBC #/AREA URNS AUTO: 9 /HPF (ref 0–4)
SP GR UR STRIP: 1.02 (ref 1–1.03)
SQUAMOUS #/AREA URNS AUTO: 2 /HPF
URN SPEC COLLECT METH UR: ABNORMAL
WBC #/AREA URNS AUTO: 58 /HPF (ref 0–5)

## 2020-06-16 PROCEDURE — 87186 SC STD MICRODIL/AGAR DIL: CPT

## 2020-06-16 PROCEDURE — 87086 URINE CULTURE/COLONY COUNT: CPT

## 2020-06-16 PROCEDURE — 87088 URINE BACTERIA CULTURE: CPT

## 2020-06-16 PROCEDURE — 81001 URINALYSIS AUTO W/SCOPE: CPT

## 2020-06-16 PROCEDURE — 87077 CULTURE AEROBIC IDENTIFY: CPT

## 2020-06-17 ENCOUNTER — TELEPHONE (OUTPATIENT)
Dept: INTERNAL MEDICINE | Facility: CLINIC | Age: 40
End: 2020-06-17

## 2020-06-17 RX ORDER — NITROFURANTOIN 25; 75 MG/1; MG/1
100 CAPSULE ORAL 2 TIMES DAILY
Qty: 10 CAPSULE | Refills: 0 | Status: SHIPPED | OUTPATIENT
Start: 2020-06-17 | End: 2020-06-19 | Stop reason: ALTCHOICE

## 2020-06-17 NOTE — TELEPHONE ENCOUNTER
----- Message from Melonie Villanueva MD sent at 6/17/2020  8:41 AM CDT -----  Message sent via patient portal.    - new Rx

## 2020-06-18 LAB — BACTERIA UR CULT: ABNORMAL

## 2020-06-19 RX ORDER — CEFPODOXIME PROXETIL 100 MG/1
100 TABLET, FILM COATED ORAL EVERY 12 HOURS
Qty: 10 TABLET | Refills: 0 | Status: SHIPPED | OUTPATIENT
Start: 2020-06-19 | End: 2020-06-24

## 2020-06-26 ENCOUNTER — OFFICE VISIT (OUTPATIENT)
Dept: DERMATOLOGY | Facility: CLINIC | Age: 40
End: 2020-06-26
Payer: COMMERCIAL

## 2020-06-26 DIAGNOSIS — L65.9 ALOPECIA: Primary | ICD-10-CM

## 2020-06-26 DIAGNOSIS — L74.510 PRIMARY FOCAL HYPERHIDROSIS OF AXILLA: ICD-10-CM

## 2020-06-26 DIAGNOSIS — L30.4 INTERTRIGO: ICD-10-CM

## 2020-06-26 PROCEDURE — 99214 OFFICE O/P EST MOD 30 MIN: CPT | Mod: 25,S$GLB,, | Performed by: PHYSICIAN ASSISTANT

## 2020-06-26 PROCEDURE — 99999 PR PBB SHADOW E&M-EST. PATIENT-LVL III: CPT | Mod: PBBFAC,,, | Performed by: PHYSICIAN ASSISTANT

## 2020-06-26 PROCEDURE — 11900 INJECT SKIN LESIONS </W 7: CPT | Mod: S$GLB,,, | Performed by: PHYSICIAN ASSISTANT

## 2020-06-26 PROCEDURE — 99214 PR OFFICE/OUTPT VISIT, EST, LEVL IV, 30-39 MIN: ICD-10-PCS | Mod: 25,S$GLB,, | Performed by: PHYSICIAN ASSISTANT

## 2020-06-26 PROCEDURE — 99999 PR PBB SHADOW E&M-EST. PATIENT-LVL III: ICD-10-PCS | Mod: PBBFAC,,, | Performed by: PHYSICIAN ASSISTANT

## 2020-06-26 PROCEDURE — 11900 PR INJECTION INTO SKIN LESIONS, UP TO 7: ICD-10-PCS | Mod: S$GLB,,, | Performed by: PHYSICIAN ASSISTANT

## 2020-06-26 RX ORDER — GLYCOPYRROLATE 2 MG/1
TABLET ORAL
Qty: 60 TABLET | Refills: 2 | Status: SHIPPED | OUTPATIENT
Start: 2020-06-26 | End: 2021-01-04

## 2020-06-26 RX ORDER — TRIAMCINOLONE ACETONIDE 1 MG/G
CREAM TOPICAL
Qty: 1 BOTTLE | Refills: 3 | Status: SHIPPED | OUTPATIENT
Start: 2020-06-26 | End: 2020-08-14 | Stop reason: SDUPTHER

## 2020-06-26 NOTE — PATIENT INSTRUCTIONS
Recommend white vinegar: water 1:1 compresses 2x/day followed by cool blow dry and then application of prescription medication.

## 2020-06-26 NOTE — PROGRESS NOTES
Subjective:       Patient ID:  Kristofer Gentile is a 39 y.o. female who presents for   Chief Complaint   Patient presents with    Hair Loss     follow-up ; unchanged     Excessive Sweating     follow-up     Pt f/u for hair loss. Last seen 6/1/20 (Dr. Poon). Per her note:   Hair thinning to front and sides of scalp x 3 years, worsened since March. Notices that if she pulls her hair she gets many hairs in her fingers, +increased shedding. Denies symptoms, burning, tingling, pain, pruritus.  Last wore hair in ginger 1 year prior, previously had permanents, now wearing hair natural. Endorses stress x past 6 months including divorce, sister without a job, 2 teenage boys, father with cancer. Notices a correlation between stress and hairloss. Has a family history of female balding: mother, maternal aunt, and patient's sister. No personal history of uterine fibroids.     She was rx'd keto shampoo (difficult to use biw since she doesn't wash her hair that often) and lidex soln (hasn't been using).     Excessive Sweating - Follow-up  Symptom course: unchanged (last seen 6/1/20 - Dr. Poon)  Currently using: drysol few times weekly - making extremely irritated.  Affected locations: left axilla and right axilla  Signs / symptoms: asymptomatic        Review of Systems   Constitutional: Negative for fever, chills, weight loss, fatigue and night sweats.   Skin: Positive for itching and rash.        Objective:    Physical Exam   Constitutional: She appears well-developed and well-nourished. No distress.   Neurological: She is alert and oriented to person, place, and time. She is not disoriented.   Psychiatric: She has a normal mood and affect.   Skin:   Areas Examined (abnormalities noted in diagram):   Scalp / Hair Palpated and Inspected  Head / Face Inspection Performed  Neck Inspection Performed  Chest / Axilla Inspection Performed                  Diagram Legend     Erythematous scaling macule/papule c/w actinic keratosis        Vascular papule c/w angioma      Pigmented verrucoid papule/plaque c/w seborrheic keratosis      Yellow umbilicated papule c/w sebaceous hyperplasia      Irregularly shaped tan macule c/w lentigo     1-2 mm smooth white papules consistent with Milia      Movable subcutaneous cyst with punctum c/w epidermal inclusion cyst      Subcutaneous movable cyst c/w pilar cyst      Firm pink to brown papule c/w dermatofibroma      Pedunculated fleshy papule(s) c/w skin tag(s)      Evenly pigmented macule c/w junctional nevus     Mildly variegated pigmented, slightly irregular-bordered macule c/w mildly atypical nevus      Flesh colored to evenly pigmented papule c/w intradermal nevus       Pink pearly papule/plaque c/w basal cell carcinoma      Erythematous hyperkeratotic cursted plaque c/w SCC      Surgical scar with no sign of skin cancer recurrence      Open and closed comedones      Inflammatory papules and pustules      Verrucoid papule consistent consistent with wart     Erythematous eczematous patches and plaques     Dystrophic onycholytic nail with subungual debris c/w onychomycosis     Umbilicated papule    Erythematous-base heme-crusted tan verrucoid plaque consistent with inflamed seborrheic keratosis     Erythematous Silvery Scaling Plaque c/w Psoriasis     See annotation    Assessment / Plan:      Alopecia (most c/w traction)  -     triamcinolone acetonide injection 10 mg    Intralesional Kenalog 5mg/cc (1 cc total) injected into 3 lesions on the bitemporal and frontal scalp today after obtaining verbal consent including risk of surrounding hypopigmentation. Patient tolerated procedure well.    Units: 1  NDC for Kenalog 10mg/cc:  0838-3289-92    Continue keto shampoo qwk.  Continue lidex soln qhs.  Continue minoxidil.    Intertrigo (BL axillae)  -     Shake ltn: AAA bid after cool blow dry  Dispense: 1 Bottle; Refill: 3    Recommend white vinegar: water 1:1 compresses 2x/day followed by cool blow dry and  then application of prescription medication.     Primary focal hyperhidrosis of axilla (unable to tolerate Drysol)  -     glycopyrrolate (ROBINUL) 2 MG Tab; Take 1 tab po qd x 2 wks then increase to bid as needed.  Dispense: 60 tablet; Refill: 2           Follow up in about 6 weeks (around 8/7/2020).

## 2020-06-26 NOTE — LETTER
June 26, 2020      Melonie Villanueva MD  [address]  [place] [address]           [facility]  [address]  [place] [address]  Phone: [phone]  Fax: [phone]          Patient: Kristofer Gentile   MR Number: [id number]   YOB: 1980   Date of Visit: 6/26/2020       Dear Dr. Melonie Villanueva:    Thank you for referring Kristofer Gentile to me for evaluation. Attached you will find relevant portions of my assessment and plan of care.    If you have questions, please do not hesitate to call me. I look forward to following Kristofer Gentile along with you.    Sincerely,    KARMEN Allenosure  CC:  No Recipients    If you would like to receive this communication electronically, please contact [email or web address] or [phone] to request more information on [employer] Link access.    For providers and/or their staff who would like to refer a patient to [facility], please contact us through our one-stop-shop provider referral line, [facility], at [phone].    If you feel you have received this communication in error or would no longer like to receive these types of communications, please e-mail [email or web address]

## 2020-08-14 ENCOUNTER — OFFICE VISIT (OUTPATIENT)
Dept: DERMATOLOGY | Facility: CLINIC | Age: 40
End: 2020-08-14
Payer: COMMERCIAL

## 2020-08-14 DIAGNOSIS — B07.9 VERRUCA VULGARIS: Primary | ICD-10-CM

## 2020-08-14 DIAGNOSIS — L30.4 INTERTRIGO: ICD-10-CM

## 2020-08-14 PROCEDURE — 99213 OFFICE O/P EST LOW 20 MIN: CPT | Mod: 25,S$GLB,, | Performed by: PHYSICIAN ASSISTANT

## 2020-08-14 PROCEDURE — 99999 PR PBB SHADOW E&M-EST. PATIENT-LVL III: ICD-10-PCS | Mod: PBBFAC,,, | Performed by: PHYSICIAN ASSISTANT

## 2020-08-14 PROCEDURE — 99213 PR OFFICE/OUTPT VISIT, EST, LEVL III, 20-29 MIN: ICD-10-PCS | Mod: 25,S$GLB,, | Performed by: PHYSICIAN ASSISTANT

## 2020-08-14 PROCEDURE — 17110 PR DESTRUCTION BENIGN LESIONS UP TO 14: ICD-10-PCS | Mod: S$GLB,,, | Performed by: PHYSICIAN ASSISTANT

## 2020-08-14 PROCEDURE — 99999 PR PBB SHADOW E&M-EST. PATIENT-LVL III: CPT | Mod: PBBFAC,,, | Performed by: PHYSICIAN ASSISTANT

## 2020-08-14 PROCEDURE — 17110 DESTRUCTION B9 LES UP TO 14: CPT | Mod: S$GLB,,, | Performed by: PHYSICIAN ASSISTANT

## 2020-08-14 RX ORDER — TRIAMCINOLONE ACETONIDE 1 MG/G
CREAM TOPICAL
Qty: 1 BOTTLE | Refills: 3 | Status: SHIPPED | OUTPATIENT
Start: 2020-08-14 | End: 2021-01-04 | Stop reason: ALTCHOICE

## 2020-08-14 NOTE — LETTER
August 14, 2020      Melonie Villanueva MD  [address]  [place] [address]           [facility] [address]  [address]  [facility] [address]  Phone: [phone]  Fax: [phone]          Patient: Kristofer Gentile   MR Number: [id number]   YOB: 1980   Date of Visit: 8/14/2020       Dear Dr. Melonie Villanueva:    Thank you for referring Kristofer Gentile to me for evaluation. Attached you will find relevant portions of my assessment and plan of care.    If you have questions, please do not hesitate to call me. I look forward to following Kristofer Gentile along with you.    Sincerely,    KARMEN Allen  CC:  No Recipients    If you would like to receive this communication electronically, please contact [email or web address] or [phone] to request more information on [employer] Link access.    For providers and/or their staff who would like to refer a patient to [facility], please contact us through our one-stop-shop provider referral line, [facility], at [phone].    If you feel you have received this communication in error or would no longer like to receive these types of communications, please e-mail [email or web address]

## 2020-08-14 NOTE — PATIENT INSTRUCTIONS

## 2020-08-14 NOTE — PROGRESS NOTES
Subjective:       Patient ID:  Kristofer Gentile is a 40 y.o. female who presents for   Chief Complaint   Patient presents with    Hair Loss     follow-up; improving     Warts     right index finger, X1mo, no tx      Pt was diagnosed with intertrigo at her last visit on 6/26/20. States she was unable to receive the rx of shake ltn from the pharmacy.    Warts - Initial  Affected locations: right index finger.  Duration: 1 month  Signs / symptoms: irritated  Aggravated by: nothing  Relieving factors/Treatments tried: OTC wart treatment  Improvement on treatment: no relief        Review of Systems     Objective:    Physical Exam   Constitutional: She appears well-developed and well-nourished. No distress.   Neurological: She is alert and oriented to person, place, and time. She is not disoriented.   Psychiatric: She has a normal mood and affect.   Skin:   Areas Examined (abnormalities noted in diagram):   Nails and Digits Inspection Performed             Diagram Legend     Erythematous scaling macule/papule c/w actinic keratosis       Vascular papule c/w angioma      Pigmented verrucoid papule/plaque c/w seborrheic keratosis      Yellow umbilicated papule c/w sebaceous hyperplasia      Irregularly shaped tan macule c/w lentigo     1-2 mm smooth white papules consistent with Milia      Movable subcutaneous cyst with punctum c/w epidermal inclusion cyst      Subcutaneous movable cyst c/w pilar cyst      Firm pink to brown papule c/w dermatofibroma      Pedunculated fleshy papule(s) c/w skin tag(s)      Evenly pigmented macule c/w junctional nevus     Mildly variegated pigmented, slightly irregular-bordered macule c/w mildly atypical nevus      Flesh colored to evenly pigmented papule c/w intradermal nevus       Pink pearly papule/plaque c/w basal cell carcinoma      Erythematous hyperkeratotic cursted plaque c/w SCC      Surgical scar with no sign of skin cancer recurrence      Open and closed comedones       Inflammatory papules and pustules      Verrucoid papule consistent consistent with wart     Erythematous eczematous patches and plaques     Dystrophic onycholytic nail with subungual debris c/w onychomycosis     Umbilicated papule    Erythematous-base heme-crusted tan verrucoid plaque consistent with inflamed seborrheic keratosis     Erythematous Silvery Scaling Plaque c/w Psoriasis     See annotation    Assessment / Plan:      Verruca vulgaris  Cryosurgery procedure note:    Verbal consent from the patient is obtained including, but not limited to, risk of hypopigmentation/hyperpigmentation, scar, recurrence of lesion. Liquid nitrogen cryosurgery is applied to 1 verruca with prior paring, as detailed in the physical exam, to produce a freeze injury. 3 consecutive freeze thaw cycles are applied to each verruca. The patient is aware that blisters (possibly blood blisters) may form.    Intertrigo  -     triamcinolone acetonide 0.1% (KENALOG) 0.1 % cream; AAA bid after cool blow dry  Dispense: 1 Bottle; Refill: 3           Follow up if symptoms worsen or fail to improve.

## 2020-08-19 ENCOUNTER — PATIENT MESSAGE (OUTPATIENT)
Dept: INTERNAL MEDICINE | Facility: CLINIC | Age: 40
End: 2020-08-19

## 2020-08-19 DIAGNOSIS — R35.0 FREQUENT URINATION: Primary | ICD-10-CM

## 2020-08-19 DIAGNOSIS — Z11.59 NEED FOR HEPATITIS C SCREENING TEST: ICD-10-CM

## 2020-08-19 NOTE — Clinical Note
Entered UA and culture  --- please also let her know to schedule the screening hep C that is recommended for all patients >17yo

## 2020-08-20 ENCOUNTER — LAB VISIT (OUTPATIENT)
Dept: LAB | Facility: HOSPITAL | Age: 40
End: 2020-08-20
Attending: INTERNAL MEDICINE
Payer: COMMERCIAL

## 2020-08-20 DIAGNOSIS — R35.0 FREQUENT URINATION: ICD-10-CM

## 2020-08-20 LAB
BACTERIA #/AREA URNS AUTO: ABNORMAL /HPF
BILIRUB UR QL STRIP: NEGATIVE
CLARITY UR REFRACT.AUTO: ABNORMAL
COLOR UR AUTO: YELLOW
GLUCOSE UR QL STRIP: NEGATIVE
HGB UR QL STRIP: ABNORMAL
KETONES UR QL STRIP: ABNORMAL
LEUKOCYTE ESTERASE UR QL STRIP: ABNORMAL
MICROSCOPIC COMMENT: ABNORMAL
NITRITE UR QL STRIP: POSITIVE
PH UR STRIP: 5 [PH] (ref 5–8)
PROT UR QL STRIP: NEGATIVE
RBC #/AREA URNS AUTO: 0 /HPF (ref 0–4)
SP GR UR STRIP: 1.02 (ref 1–1.03)
URN SPEC COLLECT METH UR: ABNORMAL
WBC #/AREA URNS AUTO: 56 /HPF (ref 0–5)
WBC CLUMPS UR QL AUTO: ABNORMAL

## 2020-08-20 PROCEDURE — 87077 CULTURE AEROBIC IDENTIFY: CPT

## 2020-08-20 PROCEDURE — 87088 URINE BACTERIA CULTURE: CPT

## 2020-08-20 PROCEDURE — 87086 URINE CULTURE/COLONY COUNT: CPT

## 2020-08-20 PROCEDURE — 87186 SC STD MICRODIL/AGAR DIL: CPT

## 2020-08-20 PROCEDURE — 81001 URINALYSIS AUTO W/SCOPE: CPT

## 2020-08-20 RX ORDER — CEFPODOXIME PROXETIL 100 MG/1
100 TABLET, FILM COATED ORAL EVERY 12 HOURS
Qty: 10 TABLET | Refills: 0 | Status: SHIPPED | OUTPATIENT
Start: 2020-08-20 | End: 2020-08-25

## 2020-08-24 LAB — BACTERIA UR CULT: ABNORMAL

## 2020-08-25 ENCOUNTER — TELEPHONE (OUTPATIENT)
Dept: INTERNAL MEDICINE | Facility: CLINIC | Age: 40
End: 2020-08-25

## 2020-08-25 NOTE — TELEPHONE ENCOUNTER
----- Message from Melonie Villanueva MD sent at 8/24/2020  9:53 PM CDT -----  Message sent via patient portal.

## 2020-08-27 ENCOUNTER — PATIENT MESSAGE (OUTPATIENT)
Dept: INTERNAL MEDICINE | Facility: CLINIC | Age: 40
End: 2020-08-27

## 2020-08-28 RX ORDER — FLUCONAZOLE 150 MG/1
150 TABLET ORAL DAILY
Qty: 1 TABLET | Refills: 0 | Status: SHIPPED | OUTPATIENT
Start: 2020-08-28 | End: 2020-08-29

## 2020-10-02 ENCOUNTER — OFFICE VISIT (OUTPATIENT)
Dept: OBSTETRICS AND GYNECOLOGY | Facility: CLINIC | Age: 40
End: 2020-10-02
Payer: COMMERCIAL

## 2020-10-02 ENCOUNTER — PATIENT OUTREACH (OUTPATIENT)
Dept: ADMINISTRATIVE | Facility: OTHER | Age: 40
End: 2020-10-02

## 2020-10-02 VITALS
DIASTOLIC BLOOD PRESSURE: 86 MMHG | SYSTOLIC BLOOD PRESSURE: 130 MMHG | BODY MASS INDEX: 39.37 KG/M2 | HEIGHT: 65 IN | WEIGHT: 236.31 LBS

## 2020-10-02 DIAGNOSIS — N39.0 RECURRENT UTI (URINARY TRACT INFECTION): ICD-10-CM

## 2020-10-02 DIAGNOSIS — Z11.3 ROUTINE SCREENING FOR STI (SEXUALLY TRANSMITTED INFECTION): ICD-10-CM

## 2020-10-02 DIAGNOSIS — N89.8 VAGINAL DISCHARGE: Primary | ICD-10-CM

## 2020-10-02 PROCEDURE — 3075F SYST BP GE 130 - 139MM HG: CPT | Mod: CPTII,S$GLB,, | Performed by: STUDENT IN AN ORGANIZED HEALTH CARE EDUCATION/TRAINING PROGRAM

## 2020-10-02 PROCEDURE — 3008F BODY MASS INDEX DOCD: CPT | Mod: CPTII,S$GLB,, | Performed by: STUDENT IN AN ORGANIZED HEALTH CARE EDUCATION/TRAINING PROGRAM

## 2020-10-02 PROCEDURE — 3008F PR BODY MASS INDEX (BMI) DOCUMENTED: ICD-10-PCS | Mod: CPTII,S$GLB,, | Performed by: STUDENT IN AN ORGANIZED HEALTH CARE EDUCATION/TRAINING PROGRAM

## 2020-10-02 PROCEDURE — 99999 PR PBB SHADOW E&M-EST. PATIENT-LVL III: ICD-10-PCS | Mod: PBBFAC,,, | Performed by: STUDENT IN AN ORGANIZED HEALTH CARE EDUCATION/TRAINING PROGRAM

## 2020-10-02 PROCEDURE — 3075F PR MOST RECENT SYSTOLIC BLOOD PRESS GE 130-139MM HG: ICD-10-PCS | Mod: CPTII,S$GLB,, | Performed by: STUDENT IN AN ORGANIZED HEALTH CARE EDUCATION/TRAINING PROGRAM

## 2020-10-02 PROCEDURE — 99214 PR OFFICE/OUTPT VISIT, EST, LEVL IV, 30-39 MIN: ICD-10-PCS | Mod: S$GLB,,, | Performed by: STUDENT IN AN ORGANIZED HEALTH CARE EDUCATION/TRAINING PROGRAM

## 2020-10-02 PROCEDURE — 87077 CULTURE AEROBIC IDENTIFY: CPT

## 2020-10-02 PROCEDURE — 3079F DIAST BP 80-89 MM HG: CPT | Mod: CPTII,S$GLB,, | Performed by: STUDENT IN AN ORGANIZED HEALTH CARE EDUCATION/TRAINING PROGRAM

## 2020-10-02 PROCEDURE — 99999 PR PBB SHADOW E&M-EST. PATIENT-LVL III: CPT | Mod: PBBFAC,,, | Performed by: STUDENT IN AN ORGANIZED HEALTH CARE EDUCATION/TRAINING PROGRAM

## 2020-10-02 PROCEDURE — 87086 URINE CULTURE/COLONY COUNT: CPT

## 2020-10-02 PROCEDURE — 99214 OFFICE O/P EST MOD 30 MIN: CPT | Mod: S$GLB,,, | Performed by: STUDENT IN AN ORGANIZED HEALTH CARE EDUCATION/TRAINING PROGRAM

## 2020-10-02 PROCEDURE — 3079F PR MOST RECENT DIASTOLIC BLOOD PRESSURE 80-89 MM HG: ICD-10-PCS | Mod: CPTII,S$GLB,, | Performed by: STUDENT IN AN ORGANIZED HEALTH CARE EDUCATION/TRAINING PROGRAM

## 2020-10-02 PROCEDURE — 87491 CHLMYD TRACH DNA AMP PROBE: CPT

## 2020-10-02 PROCEDURE — 87088 URINE BACTERIA CULTURE: CPT

## 2020-10-02 PROCEDURE — 87186 SC STD MICRODIL/AGAR DIL: CPT

## 2020-10-02 RX ORDER — FLUCONAZOLE 150 MG/1
150 TABLET ORAL ONCE
Qty: 1 TABLET | Refills: 1 | Status: SHIPPED | OUTPATIENT
Start: 2020-10-02 | End: 2020-10-02 | Stop reason: CLARIF

## 2020-10-02 RX ORDER — CIPROFLOXACIN 250 MG/1
250 TABLET, FILM COATED ORAL 2 TIMES DAILY
Qty: 6 TABLET | Refills: 0 | Status: SHIPPED | OUTPATIENT
Start: 2020-10-02 | End: 2020-10-05

## 2020-10-02 RX ORDER — NITROFURANTOIN 25; 75 MG/1; MG/1
100 CAPSULE ORAL ONCE AS NEEDED
Qty: 30 CAPSULE | Refills: 1 | Status: SHIPPED | OUTPATIENT
Start: 2020-10-02 | End: 2020-10-02

## 2020-10-02 NOTE — PROGRESS NOTES
"    Reason for visit: Vaginitis (burning and itching, discharge white) and Urinary Frequency    HPI:    40 y.o. female  who presents with complaints of vaginal discharge and urinary frequency.   Patient reports a thick, white vaginal discharge with irritation. She started monistat yesterday.   Patient is also experiencing urinary frequency and urgency.  She relates this to more frequent intercourse with a new partner.  She has had 3 previous UTIs this year.  Currently she denies N/V/F/C.  She uses IUD for contraception and is pleased with this contraception.  She desires GC/CT testing.        Patient's last menstrual period was 2020 (exact date).      Contraception: IUD  Pap: 2019, NILM  Mammogram: 2019 Birads 1      Past medical, surgical, social, family, and obstetric history: Reviewed and updated in EMR.  Medications: Reviewed and updated in EMR.  Allergies: Bactrim [sulfamethoxazole-trimethoprim] and Sulfa (sulfonamide antibiotics)       Review of Systems   Constitutional: Negative for chills and fever.   Respiratory: Negative for shortness of breath and wheezing.    Cardiovascular: Negative for chest pain.   Gastrointestinal: Negative for abdominal pain, diarrhea, nausea and vomiting.   Genitourinary: Positive for frequency and vaginal discharge. Negative for dysuria, hematuria, pelvic pain, vaginal bleeding and vaginal pain.   Neurological: Negative for headaches.         Vitals: /86   Ht 5' 5" (1.651 m)   Wt 107.2 kg (236 lb 5.3 oz)   LMP 2020 (Exact Date)   BMI 39.33 kg/m²     Physical Exam  Constitutional:       General: She is not in acute distress.     Appearance: Normal appearance. She is well-developed.   Genitourinary:      Pelvic exam was performed with patient in the lithotomy position.      Vulva, urethra, bladder, cervix and uterus normal.      No vulval lesion noted.      Bladder is not tender.      Vaginal discharge present.      Uterus is not enlarged or " tender.      Right adnexa not tender or full.      Left adnexa not tender or full.      Genitourinary Comments:   Normal female external genitalia, no erythema or lesions.   Normal appearing urethra with no masses, tenderness or lesions. No diverticula appreciated.    Vagina normal, rugated, thick white vaginal discharge present    Uterus normal size, mobile, non-tender.   No adnexal mass palpated, non-tender.      Neck:      Musculoskeletal: Normal range of motion and neck supple.   Pulmonary:      Effort: Pulmonary effort is normal. No respiratory distress.   Abdominal:      General: There is no distension.      Palpations: Abdomen is soft. There is no hepatomegaly, splenomegaly or mass.      Tenderness: There is no abdominal tenderness.      Hernia: No hernia is present.   Musculoskeletal:      Right lower leg: No edema.      Left lower leg: No edema.   Neurological:      Mental Status: She is alert and oriented to person, place, and time.   Skin:     Findings: No rash.   Psychiatric:         Mood and Affect: Mood and affect normal.           Assessment & Plan:    Vaginal discharge  - Affirm sent  - Exam consistent with yeast infection  - Continue monistat therapy. Unable to prescribe diflucan as interacts with UTI antibiotics (prolonged QT with cipro)      Routine screening for STI (sexually transmitted infection)  - Affirm, GC/CT. Declined labs  -     C. trachomatis/N. gonorrhoeae by AMP DNA    Recurrent UTI (urinary tract infection)  - Leukocytes on Udip today  - UCx sent  - Previous UTIs sensitive to cipro and bactrim. Intermediate to macrobid. Patient allergic to bactrim. Will trial treatment with cipro. Discussed suppression with macrobid PRN due to association with intercourse    -     ciprofloxacin HCl (CIPRO) 250 MG tablet; Take 1 tablet (250 mg total) by mouth 2 (two) times daily. for 3 days  Dispense: 6 tablet; Refill: 0  -     nitrofurantoin, macrocrystal-monohydrate, (MACROBID) 100 MG capsule; Take 1  capsule (100 mg total) by mouth once as needed (Following intercourse).  Dispense: 30 capsule; Refill: 1  -     Urine culture        Lulu Estrada MD  Obstetrics and Gynecology

## 2020-10-02 NOTE — PROGRESS NOTES
Health Maintenance Due   Topic Date Due    Influenza Vaccine (1) 08/01/2020     Updates were requested from care everywhere.  Chart was reviewed for overdue Proactive [facility] Encounters (ERIK) topics (CRS, Breast Cancer Screening, Eye exam)  Health Maintenance has been updated.  LINKS immunization registry triggered.  Immunizations were reconciled.

## 2020-10-05 LAB — BACTERIA UR CULT: ABNORMAL

## 2020-10-07 LAB
CANDIDA RRNA VAG QL PROBE: POSITIVE
G VAGINALIS RRNA GENITAL QL PROBE: POSITIVE
T VAGINALIS RRNA GENITAL QL PROBE: NEGATIVE

## 2020-10-08 ENCOUNTER — TELEPHONE (OUTPATIENT)
Dept: OBSTETRICS AND GYNECOLOGY | Facility: CLINIC | Age: 40
End: 2020-10-08

## 2020-10-08 ENCOUNTER — PATIENT MESSAGE (OUTPATIENT)
Dept: OBSTETRICS AND GYNECOLOGY | Facility: CLINIC | Age: 40
End: 2020-10-08

## 2020-10-08 DIAGNOSIS — B96.89 BV (BACTERIAL VAGINOSIS): Primary | ICD-10-CM

## 2020-10-08 DIAGNOSIS — N76.0 BV (BACTERIAL VAGINOSIS): Primary | ICD-10-CM

## 2020-10-08 RX ORDER — METRONIDAZOLE 7.5 MG/G
1 GEL VAGINAL
Qty: 70 G | Refills: 1 | Status: SHIPPED | OUTPATIENT
Start: 2020-10-08 | End: 2021-01-04

## 2020-10-08 NOTE — TELEPHONE ENCOUNTER
Called patient to discuss results. Affirm + for BV and yeast. Yeast infection resolved with OTC monistat. Patient reports recurrent BV over the last year. Has used treated herself with refills of flagyl >3x since December. She continues to have bothersome discharge.   Will trial suppressive therapy, metrogel twice weekly for 16 weeks. Also recommend OTC rephresh. Discussed avoiding douching, heavy soaps, using cotton underwear, etc.     Rx sent.    Lulu Estrada MD  Obstetrics and Gynecology

## 2020-10-08 NOTE — TELEPHONE ENCOUNTER
Attempted to reach patient to discuss affirm results. No answer. Will attempt again at a later time.

## 2020-12-04 ENCOUNTER — LAB VISIT (OUTPATIENT)
Dept: LAB | Facility: HOSPITAL | Age: 40
End: 2020-12-04
Attending: INTERNAL MEDICINE
Payer: COMMERCIAL

## 2020-12-04 ENCOUNTER — OFFICE VISIT (OUTPATIENT)
Dept: OBSTETRICS AND GYNECOLOGY | Facility: CLINIC | Age: 40
End: 2020-12-04
Payer: COMMERCIAL

## 2020-12-04 VITALS
DIASTOLIC BLOOD PRESSURE: 62 MMHG | HEIGHT: 65 IN | SYSTOLIC BLOOD PRESSURE: 116 MMHG | WEIGHT: 237.19 LBS | BODY MASS INDEX: 39.52 KG/M2

## 2020-12-04 DIAGNOSIS — N89.8 VAGINAL DISCHARGE: Primary | ICD-10-CM

## 2020-12-04 DIAGNOSIS — E78.2 MIXED HYPERLIPIDEMIA: ICD-10-CM

## 2020-12-04 LAB
CHOLEST SERPL-MCNC: 206 MG/DL (ref 120–199)
CHOLEST/HDLC SERPL: 5.4 {RATIO} (ref 2–5)
HDLC SERPL-MCNC: 38 MG/DL (ref 40–75)
HDLC SERPL: 18.4 % (ref 20–50)
LDLC SERPL CALC-MCNC: 159.2 MG/DL (ref 63–159)
NONHDLC SERPL-MCNC: 168 MG/DL
TRIGL SERPL-MCNC: 44 MG/DL (ref 30–150)

## 2020-12-04 PROCEDURE — 36415 COLL VENOUS BLD VENIPUNCTURE: CPT

## 2020-12-04 PROCEDURE — 1126F AMNT PAIN NOTED NONE PRSNT: CPT | Mod: S$GLB,,, | Performed by: OBSTETRICS & GYNECOLOGY

## 2020-12-04 PROCEDURE — 1126F PR PAIN SEVERITY QUANTIFIED, NO PAIN PRESENT: ICD-10-PCS | Mod: S$GLB,,, | Performed by: OBSTETRICS & GYNECOLOGY

## 2020-12-04 PROCEDURE — 3008F PR BODY MASS INDEX (BMI) DOCUMENTED: ICD-10-PCS | Mod: CPTII,S$GLB,, | Performed by: OBSTETRICS & GYNECOLOGY

## 2020-12-04 PROCEDURE — 3074F PR MOST RECENT SYSTOLIC BLOOD PRESSURE < 130 MM HG: ICD-10-PCS | Mod: CPTII,S$GLB,, | Performed by: OBSTETRICS & GYNECOLOGY

## 2020-12-04 PROCEDURE — 80061 LIPID PANEL: CPT

## 2020-12-04 PROCEDURE — 99213 PR OFFICE/OUTPT VISIT, EST, LEVL III, 20-29 MIN: ICD-10-PCS | Mod: S$GLB,,, | Performed by: OBSTETRICS & GYNECOLOGY

## 2020-12-04 PROCEDURE — 99999 PR PBB SHADOW E&M-EST. PATIENT-LVL III: CPT | Mod: PBBFAC,,, | Performed by: OBSTETRICS & GYNECOLOGY

## 2020-12-04 PROCEDURE — 3074F SYST BP LT 130 MM HG: CPT | Mod: CPTII,S$GLB,, | Performed by: OBSTETRICS & GYNECOLOGY

## 2020-12-04 PROCEDURE — 99999 PR PBB SHADOW E&M-EST. PATIENT-LVL III: ICD-10-PCS | Mod: PBBFAC,,, | Performed by: OBSTETRICS & GYNECOLOGY

## 2020-12-04 PROCEDURE — 3078F PR MOST RECENT DIASTOLIC BLOOD PRESSURE < 80 MM HG: ICD-10-PCS | Mod: CPTII,S$GLB,, | Performed by: OBSTETRICS & GYNECOLOGY

## 2020-12-04 PROCEDURE — 99213 OFFICE O/P EST LOW 20 MIN: CPT | Mod: S$GLB,,, | Performed by: OBSTETRICS & GYNECOLOGY

## 2020-12-04 PROCEDURE — 3078F DIAST BP <80 MM HG: CPT | Mod: CPTII,S$GLB,, | Performed by: OBSTETRICS & GYNECOLOGY

## 2020-12-04 PROCEDURE — 3008F BODY MASS INDEX DOCD: CPT | Mod: CPTII,S$GLB,, | Performed by: OBSTETRICS & GYNECOLOGY

## 2020-12-04 RX ORDER — FLUCONAZOLE 150 MG/1
TABLET ORAL
COMMUNITY
Start: 2020-10-02 | End: 2021-01-04 | Stop reason: ALTCHOICE

## 2020-12-04 RX ORDER — NITROFURANTOIN 25; 75 MG/1; MG/1
CAPSULE ORAL
COMMUNITY
Start: 2020-10-02 | End: 2021-12-10

## 2020-12-10 ENCOUNTER — PATIENT MESSAGE (OUTPATIENT)
Dept: OBSTETRICS AND GYNECOLOGY | Facility: CLINIC | Age: 40
End: 2020-12-10

## 2020-12-10 DIAGNOSIS — B96.89 BV (BACTERIAL VAGINOSIS): Primary | ICD-10-CM

## 2020-12-10 DIAGNOSIS — N76.0 BV (BACTERIAL VAGINOSIS): Primary | ICD-10-CM

## 2020-12-10 RX ORDER — METRONIDAZOLE 500 MG/1
500 TABLET ORAL EVERY 12 HOURS
Qty: 14 TABLET | Refills: 0 | Status: SHIPPED | OUTPATIENT
Start: 2020-12-10 | End: 2020-12-17

## 2020-12-17 ENCOUNTER — TELEPHONE (OUTPATIENT)
Dept: INTERNAL MEDICINE | Facility: CLINIC | Age: 40
End: 2020-12-17

## 2020-12-17 DIAGNOSIS — Z12.31 ENCOUNTER FOR SCREENING MAMMOGRAM FOR HIGH-RISK PATIENT: Primary | ICD-10-CM

## 2020-12-18 ENCOUNTER — TELEPHONE (OUTPATIENT)
Dept: INTERNAL MEDICINE | Facility: CLINIC | Age: 40
End: 2020-12-18

## 2020-12-18 DIAGNOSIS — Z00.00 ANNUAL PHYSICAL EXAM: Primary | ICD-10-CM

## 2020-12-21 ENCOUNTER — HOSPITAL ENCOUNTER (OUTPATIENT)
Dept: RADIOLOGY | Facility: HOSPITAL | Age: 40
Discharge: HOME | End: 2020-12-21
Attending: INTERNAL MEDICINE
Payer: COMMERCIAL

## 2020-12-21 DIAGNOSIS — Z12.31 ENCOUNTER FOR SCREENING MAMMOGRAM FOR HIGH-RISK PATIENT: ICD-10-CM

## 2020-12-21 PROCEDURE — 77067 SCR MAMMO BI INCL CAD: CPT | Mod: 26,,, | Performed by: RADIOLOGY

## 2020-12-21 PROCEDURE — 77063 MAMMO DIGITAL SCREENING BILAT WITH TOMO: ICD-10-PCS | Mod: 26,,, | Performed by: RADIOLOGY

## 2020-12-21 PROCEDURE — 77067 SCR MAMMO BI INCL CAD: CPT | Mod: TC,PO

## 2020-12-21 PROCEDURE — 77063 BREAST TOMOSYNTHESIS BI: CPT | Mod: 26,,, | Performed by: RADIOLOGY

## 2020-12-21 PROCEDURE — 77067 MAMMO DIGITAL SCREENING BILAT WITH TOMO: ICD-10-PCS | Mod: 26,,, | Performed by: RADIOLOGY

## 2020-12-22 ENCOUNTER — TELEPHONE (OUTPATIENT)
Dept: INTERNAL MEDICINE | Facility: CLINIC | Age: 40
End: 2020-12-22

## 2020-12-22 NOTE — TELEPHONE ENCOUNTER
----- Message from Melonie Villanueva MD sent at 12/21/2020 11:05 PM CST -----  Message sent via patient portal.

## 2020-12-31 ENCOUNTER — LAB VISIT (OUTPATIENT)
Dept: LAB | Facility: HOSPITAL | Age: 40
End: 2020-12-31
Attending: INTERNAL MEDICINE
Payer: COMMERCIAL

## 2020-12-31 DIAGNOSIS — Z00.00 ANNUAL PHYSICAL EXAM: ICD-10-CM

## 2020-12-31 LAB
25(OH)D3+25(OH)D2 SERPL-MCNC: 23 NG/ML (ref 30–96)
ALBUMIN SERPL BCP-MCNC: 3.3 G/DL (ref 3.5–5.2)
ALP SERPL-CCNC: 57 U/L (ref 55–135)
ALT SERPL W/O P-5'-P-CCNC: 11 U/L (ref 10–44)
ANION GAP SERPL CALC-SCNC: 10 MMOL/L (ref 8–16)
AST SERPL-CCNC: 15 U/L (ref 10–40)
BASOPHILS # BLD AUTO: 0.04 K/UL (ref 0–0.2)
BASOPHILS NFR BLD: 1 % (ref 0–1.9)
BILIRUB SERPL-MCNC: 0.4 MG/DL (ref 0.1–1)
BUN SERPL-MCNC: 9 MG/DL (ref 6–20)
CALCIUM SERPL-MCNC: 8.4 MG/DL (ref 8.7–10.5)
CHLORIDE SERPL-SCNC: 107 MMOL/L (ref 95–110)
CHOLEST SERPL-MCNC: 197 MG/DL (ref 120–199)
CHOLEST/HDLC SERPL: 5.5 {RATIO} (ref 2–5)
CO2 SERPL-SCNC: 24 MMOL/L (ref 23–29)
CREAT SERPL-MCNC: 0.8 MG/DL (ref 0.5–1.4)
DIFFERENTIAL METHOD: ABNORMAL
EOSINOPHIL # BLD AUTO: 0.1 K/UL (ref 0–0.5)
EOSINOPHIL NFR BLD: 3.5 % (ref 0–8)
ERYTHROCYTE [DISTWIDTH] IN BLOOD BY AUTOMATED COUNT: 12.8 % (ref 11.5–14.5)
EST. GFR  (AFRICAN AMERICAN): >60 ML/MIN/1.73 M^2
EST. GFR  (NON AFRICAN AMERICAN): >60 ML/MIN/1.73 M^2
ESTIMATED AVG GLUCOSE: 103 MG/DL (ref 68–131)
GLUCOSE SERPL-MCNC: 92 MG/DL (ref 70–110)
HBA1C MFR BLD HPLC: 5.2 % (ref 4–5.6)
HCT VFR BLD AUTO: 34.3 % (ref 37–48.5)
HDLC SERPL-MCNC: 36 MG/DL (ref 40–75)
HDLC SERPL: 18.3 % (ref 20–50)
HGB BLD-MCNC: 11 G/DL (ref 12–16)
IMM GRANULOCYTES # BLD AUTO: 0.01 K/UL (ref 0–0.04)
IMM GRANULOCYTES NFR BLD AUTO: 0.2 % (ref 0–0.5)
LDLC SERPL CALC-MCNC: 150 MG/DL (ref 63–159)
LYMPHOCYTES # BLD AUTO: 1.3 K/UL (ref 1–4.8)
LYMPHOCYTES NFR BLD: 31.1 % (ref 18–48)
MCH RBC QN AUTO: 29.3 PG (ref 27–31)
MCHC RBC AUTO-ENTMCNC: 32.1 G/DL (ref 32–36)
MCV RBC AUTO: 91 FL (ref 82–98)
MONOCYTES # BLD AUTO: 0.6 K/UL (ref 0.3–1)
MONOCYTES NFR BLD: 13.9 % (ref 4–15)
NEUTROPHILS # BLD AUTO: 2 K/UL (ref 1.8–7.7)
NEUTROPHILS NFR BLD: 50.3 % (ref 38–73)
NONHDLC SERPL-MCNC: 161 MG/DL
NRBC BLD-RTO: 0 /100 WBC
PLATELET # BLD AUTO: 246 K/UL (ref 150–350)
PMV BLD AUTO: 10.8 FL (ref 9.2–12.9)
POTASSIUM SERPL-SCNC: 3.8 MMOL/L (ref 3.5–5.1)
PROT SERPL-MCNC: 7.1 G/DL (ref 6–8.4)
RBC # BLD AUTO: 3.76 M/UL (ref 4–5.4)
SODIUM SERPL-SCNC: 141 MMOL/L (ref 136–145)
TRIGL SERPL-MCNC: 55 MG/DL (ref 30–150)
TSH SERPL DL<=0.005 MIU/L-ACNC: 1.59 UIU/ML (ref 0.4–4)
WBC # BLD AUTO: 4.02 K/UL (ref 3.9–12.7)

## 2020-12-31 PROCEDURE — 84443 ASSAY THYROID STIM HORMONE: CPT

## 2020-12-31 PROCEDURE — 85025 COMPLETE CBC W/AUTO DIFF WBC: CPT

## 2020-12-31 PROCEDURE — 80053 COMPREHEN METABOLIC PANEL: CPT

## 2020-12-31 PROCEDURE — 36415 COLL VENOUS BLD VENIPUNCTURE: CPT | Mod: PO

## 2020-12-31 PROCEDURE — 82306 VITAMIN D 25 HYDROXY: CPT

## 2020-12-31 PROCEDURE — 80061 LIPID PANEL: CPT

## 2020-12-31 PROCEDURE — 83036 HEMOGLOBIN GLYCOSYLATED A1C: CPT

## 2021-01-04 ENCOUNTER — OFFICE VISIT (OUTPATIENT)
Dept: INTERNAL MEDICINE | Facility: CLINIC | Age: 41
End: 2021-01-04
Payer: COMMERCIAL

## 2021-01-04 ENCOUNTER — OFFICE VISIT (OUTPATIENT)
Dept: DERMATOLOGY | Facility: CLINIC | Age: 41
End: 2021-01-04
Payer: COMMERCIAL

## 2021-01-04 VITALS
WEIGHT: 237.44 LBS | TEMPERATURE: 97 F | BODY MASS INDEX: 39.56 KG/M2 | OXYGEN SATURATION: 98 % | HEIGHT: 65 IN | SYSTOLIC BLOOD PRESSURE: 120 MMHG | DIASTOLIC BLOOD PRESSURE: 70 MMHG | RESPIRATION RATE: 18 BRPM | HEART RATE: 68 BPM

## 2021-01-04 DIAGNOSIS — B07.9 VERRUCA VULGARIS: ICD-10-CM

## 2021-01-04 DIAGNOSIS — M79.672 FOOT PAIN, BILATERAL: ICD-10-CM

## 2021-01-04 DIAGNOSIS — M25.571 CHRONIC PAIN OF BOTH ANKLES: ICD-10-CM

## 2021-01-04 DIAGNOSIS — L30.4 INTERTRIGO: Primary | ICD-10-CM

## 2021-01-04 DIAGNOSIS — Z00.00 ANNUAL PHYSICAL EXAM: Primary | ICD-10-CM

## 2021-01-04 DIAGNOSIS — R76.8 ANTI-TPO ANTIBODIES PRESENT: ICD-10-CM

## 2021-01-04 DIAGNOSIS — E66.01 SEVERE OBESITY: ICD-10-CM

## 2021-01-04 DIAGNOSIS — G89.29 CHRONIC PAIN OF BOTH ANKLES: ICD-10-CM

## 2021-01-04 DIAGNOSIS — E55.9 VITAMIN D INSUFFICIENCY: ICD-10-CM

## 2021-01-04 DIAGNOSIS — M25.572 CHRONIC PAIN OF BOTH ANKLES: ICD-10-CM

## 2021-01-04 DIAGNOSIS — M79.671 FOOT PAIN, BILATERAL: ICD-10-CM

## 2021-01-04 PROBLEM — D64.9 NORMOCYTIC ANEMIA: Status: ACTIVE | Noted: 2021-01-04

## 2021-01-04 PROCEDURE — 99999 PR PBB SHADOW E&M-EST. PATIENT-LVL III: ICD-10-PCS | Mod: PBBFAC,,, | Performed by: PHYSICIAN ASSISTANT

## 2021-01-04 PROCEDURE — 90686 IIV4 VACC NO PRSV 0.5 ML IM: CPT | Mod: S$GLB,,, | Performed by: INTERNAL MEDICINE

## 2021-01-04 PROCEDURE — 99213 PR OFFICE/OUTPT VISIT, EST, LEVL III, 20-29 MIN: ICD-10-PCS | Mod: 25,S$GLB,, | Performed by: PHYSICIAN ASSISTANT

## 2021-01-04 PROCEDURE — 3078F DIAST BP <80 MM HG: CPT | Mod: CPTII,S$GLB,, | Performed by: PHYSICIAN ASSISTANT

## 2021-01-04 PROCEDURE — 99999 PR PBB SHADOW E&M-EST. PATIENT-LVL III: CPT | Mod: PBBFAC,,, | Performed by: PHYSICIAN ASSISTANT

## 2021-01-04 PROCEDURE — 1126F PR PAIN SEVERITY QUANTIFIED, NO PAIN PRESENT: ICD-10-PCS | Mod: S$GLB,,, | Performed by: PHYSICIAN ASSISTANT

## 2021-01-04 PROCEDURE — 1126F AMNT PAIN NOTED NONE PRSNT: CPT | Mod: S$GLB,,, | Performed by: PHYSICIAN ASSISTANT

## 2021-01-04 PROCEDURE — 99213 OFFICE O/P EST LOW 20 MIN: CPT | Mod: 25,S$GLB,, | Performed by: PHYSICIAN ASSISTANT

## 2021-01-04 PROCEDURE — 3074F PR MOST RECENT SYSTOLIC BLOOD PRESSURE < 130 MM HG: ICD-10-PCS | Mod: CPTII,S$GLB,, | Performed by: PHYSICIAN ASSISTANT

## 2021-01-04 PROCEDURE — 1126F AMNT PAIN NOTED NONE PRSNT: CPT | Mod: S$GLB,,, | Performed by: INTERNAL MEDICINE

## 2021-01-04 PROCEDURE — 3074F SYST BP LT 130 MM HG: CPT | Mod: CPTII,S$GLB,, | Performed by: PHYSICIAN ASSISTANT

## 2021-01-04 PROCEDURE — 3008F BODY MASS INDEX DOCD: CPT | Mod: CPTII,S$GLB,, | Performed by: INTERNAL MEDICINE

## 2021-01-04 PROCEDURE — 90471 FLU VACCINE (QUAD) GREATER THAN OR EQUAL TO 3YO PRESERVATIVE FREE IM: ICD-10-PCS | Mod: S$GLB,,, | Performed by: INTERNAL MEDICINE

## 2021-01-04 PROCEDURE — 3074F PR MOST RECENT SYSTOLIC BLOOD PRESSURE < 130 MM HG: ICD-10-PCS | Mod: CPTII,S$GLB,, | Performed by: INTERNAL MEDICINE

## 2021-01-04 PROCEDURE — 3078F DIAST BP <80 MM HG: CPT | Mod: CPTII,S$GLB,, | Performed by: INTERNAL MEDICINE

## 2021-01-04 PROCEDURE — 3074F SYST BP LT 130 MM HG: CPT | Mod: CPTII,S$GLB,, | Performed by: INTERNAL MEDICINE

## 2021-01-04 PROCEDURE — 17110 DESTRUCTION B9 LES UP TO 14: CPT | Mod: S$GLB,,, | Performed by: PHYSICIAN ASSISTANT

## 2021-01-04 PROCEDURE — 3078F PR MOST RECENT DIASTOLIC BLOOD PRESSURE < 80 MM HG: ICD-10-PCS | Mod: CPTII,S$GLB,, | Performed by: INTERNAL MEDICINE

## 2021-01-04 PROCEDURE — 99999 PR PBB SHADOW E&M-EST. PATIENT-LVL IV: ICD-10-PCS | Mod: PBBFAC,,, | Performed by: INTERNAL MEDICINE

## 2021-01-04 PROCEDURE — 3008F PR BODY MASS INDEX (BMI) DOCUMENTED: ICD-10-PCS | Mod: CPTII,S$GLB,, | Performed by: INTERNAL MEDICINE

## 2021-01-04 PROCEDURE — 99999 PR PBB SHADOW E&M-EST. PATIENT-LVL IV: CPT | Mod: PBBFAC,,, | Performed by: INTERNAL MEDICINE

## 2021-01-04 PROCEDURE — 1126F PR PAIN SEVERITY QUANTIFIED, NO PAIN PRESENT: ICD-10-PCS | Mod: S$GLB,,, | Performed by: INTERNAL MEDICINE

## 2021-01-04 PROCEDURE — 99396 PREV VISIT EST AGE 40-64: CPT | Mod: 25,S$GLB,, | Performed by: INTERNAL MEDICINE

## 2021-01-04 PROCEDURE — 90471 IMMUNIZATION ADMIN: CPT | Mod: S$GLB,,, | Performed by: INTERNAL MEDICINE

## 2021-01-04 PROCEDURE — 3078F PR MOST RECENT DIASTOLIC BLOOD PRESSURE < 80 MM HG: ICD-10-PCS | Mod: CPTII,S$GLB,, | Performed by: PHYSICIAN ASSISTANT

## 2021-01-04 PROCEDURE — 90686 FLU VACCINE (QUAD) GREATER THAN OR EQUAL TO 3YO PRESERVATIVE FREE IM: ICD-10-PCS | Mod: S$GLB,,, | Performed by: INTERNAL MEDICINE

## 2021-01-04 PROCEDURE — 99396 PR PREVENTIVE VISIT,EST,40-64: ICD-10-PCS | Mod: 25,S$GLB,, | Performed by: INTERNAL MEDICINE

## 2021-01-04 PROCEDURE — 17110 PR DESTRUCTION BENIGN LESIONS UP TO 14: ICD-10-PCS | Mod: S$GLB,,, | Performed by: PHYSICIAN ASSISTANT

## 2021-02-01 ENCOUNTER — PATIENT MESSAGE (OUTPATIENT)
Dept: INTERNAL MEDICINE | Facility: CLINIC | Age: 41
End: 2021-02-01

## 2021-02-01 RX ORDER — VALACYCLOVIR HYDROCHLORIDE 1 G/1
2000 TABLET, FILM COATED ORAL EVERY 12 HOURS
Qty: 4 TABLET | Refills: 0 | Status: SHIPPED | OUTPATIENT
Start: 2021-02-01 | End: 2021-02-02

## 2021-02-02 ENCOUNTER — OFFICE VISIT (OUTPATIENT)
Dept: ORTHOPEDICS | Facility: CLINIC | Age: 41
End: 2021-02-02
Payer: COMMERCIAL

## 2021-02-02 ENCOUNTER — HOSPITAL ENCOUNTER (OUTPATIENT)
Dept: RADIOLOGY | Facility: HOSPITAL | Age: 41
Discharge: HOME | End: 2021-02-02
Attending: ORTHOPAEDIC SURGERY
Payer: COMMERCIAL

## 2021-02-02 DIAGNOSIS — R52 PAIN: ICD-10-CM

## 2021-02-02 DIAGNOSIS — M62.469 GASTROCNEMIUS EQUINUS, UNSPECIFIED LATERALITY: ICD-10-CM

## 2021-02-02 DIAGNOSIS — M19.079 OSTEOARTHRITIS OF MIDFOOT, UNSPECIFIED LATERALITY: ICD-10-CM

## 2021-02-02 DIAGNOSIS — R52 PAIN: Primary | ICD-10-CM

## 2021-02-02 PROCEDURE — 73610 XR ANKLE COMPLETE 3 VIEW BILATERAL: ICD-10-PCS | Mod: 26,,, | Performed by: RADIOLOGY

## 2021-02-02 PROCEDURE — 99244 PR OFFICE CONSULTATION,LEVEL IV: ICD-10-PCS | Mod: S$GLB,,, | Performed by: ORTHOPAEDIC SURGERY

## 2021-02-02 PROCEDURE — 73610 X-RAY EXAM OF ANKLE: CPT | Mod: 26,,, | Performed by: RADIOLOGY

## 2021-02-02 PROCEDURE — 1126F AMNT PAIN NOTED NONE PRSNT: CPT | Mod: S$GLB,,, | Performed by: ORTHOPAEDIC SURGERY

## 2021-02-02 PROCEDURE — 73630 X-RAY EXAM OF FOOT: CPT | Mod: TC,50,PO

## 2021-02-02 PROCEDURE — 99999 PR PBB SHADOW E&M-EST. PATIENT-LVL III: ICD-10-PCS | Mod: PBBFAC,,, | Performed by: ORTHOPAEDIC SURGERY

## 2021-02-02 PROCEDURE — 73610 X-RAY EXAM OF ANKLE: CPT | Mod: TC,50,PO

## 2021-02-02 PROCEDURE — 99999 PR PBB SHADOW E&M-EST. PATIENT-LVL III: CPT | Mod: PBBFAC,,, | Performed by: ORTHOPAEDIC SURGERY

## 2021-02-02 PROCEDURE — 1126F PR PAIN SEVERITY QUANTIFIED, NO PAIN PRESENT: ICD-10-PCS | Mod: S$GLB,,, | Performed by: ORTHOPAEDIC SURGERY

## 2021-02-02 PROCEDURE — 99244 OFF/OP CNSLTJ NEW/EST MOD 40: CPT | Mod: S$GLB,,, | Performed by: ORTHOPAEDIC SURGERY

## 2021-02-02 PROCEDURE — 73630 XR FOOT COMPLETE 3 VIEW BILATERAL: ICD-10-PCS | Mod: 26,,, | Performed by: RADIOLOGY

## 2021-02-02 PROCEDURE — 73630 X-RAY EXAM OF FOOT: CPT | Mod: 26,,, | Performed by: RADIOLOGY

## 2021-02-04 ENCOUNTER — OFFICE VISIT (OUTPATIENT)
Dept: OBSTETRICS AND GYNECOLOGY | Facility: CLINIC | Age: 41
End: 2021-02-04
Payer: COMMERCIAL

## 2021-02-04 VITALS
DIASTOLIC BLOOD PRESSURE: 70 MMHG | SYSTOLIC BLOOD PRESSURE: 120 MMHG | HEIGHT: 65 IN | BODY MASS INDEX: 39.3 KG/M2 | WEIGHT: 235.88 LBS

## 2021-02-04 DIAGNOSIS — N94.9 GENITAL LESION, FEMALE: Primary | ICD-10-CM

## 2021-02-04 PROCEDURE — 3008F BODY MASS INDEX DOCD: CPT | Mod: CPTII,S$GLB,, | Performed by: NURSE PRACTITIONER

## 2021-02-04 PROCEDURE — 3078F PR MOST RECENT DIASTOLIC BLOOD PRESSURE < 80 MM HG: ICD-10-PCS | Mod: CPTII,S$GLB,, | Performed by: NURSE PRACTITIONER

## 2021-02-04 PROCEDURE — 87529 HSV DNA AMP PROBE: CPT

## 2021-02-04 PROCEDURE — 99999 PR PBB SHADOW E&M-EST. PATIENT-LVL III: ICD-10-PCS | Mod: PBBFAC,,, | Performed by: NURSE PRACTITIONER

## 2021-02-04 PROCEDURE — 3078F DIAST BP <80 MM HG: CPT | Mod: CPTII,S$GLB,, | Performed by: NURSE PRACTITIONER

## 2021-02-04 PROCEDURE — 1126F AMNT PAIN NOTED NONE PRSNT: CPT | Mod: S$GLB,,, | Performed by: NURSE PRACTITIONER

## 2021-02-04 PROCEDURE — 99213 OFFICE O/P EST LOW 20 MIN: CPT | Mod: S$GLB,,, | Performed by: NURSE PRACTITIONER

## 2021-02-04 PROCEDURE — 99213 PR OFFICE/OUTPT VISIT, EST, LEVL III, 20-29 MIN: ICD-10-PCS | Mod: S$GLB,,, | Performed by: NURSE PRACTITIONER

## 2021-02-04 PROCEDURE — 3074F SYST BP LT 130 MM HG: CPT | Mod: CPTII,S$GLB,, | Performed by: NURSE PRACTITIONER

## 2021-02-04 PROCEDURE — 3008F PR BODY MASS INDEX (BMI) DOCUMENTED: ICD-10-PCS | Mod: CPTII,S$GLB,, | Performed by: NURSE PRACTITIONER

## 2021-02-04 PROCEDURE — 1126F PR PAIN SEVERITY QUANTIFIED, NO PAIN PRESENT: ICD-10-PCS | Mod: S$GLB,,, | Performed by: NURSE PRACTITIONER

## 2021-02-04 PROCEDURE — 99999 PR PBB SHADOW E&M-EST. PATIENT-LVL III: CPT | Mod: PBBFAC,,, | Performed by: NURSE PRACTITIONER

## 2021-02-04 PROCEDURE — 3074F PR MOST RECENT SYSTOLIC BLOOD PRESSURE < 130 MM HG: ICD-10-PCS | Mod: CPTII,S$GLB,, | Performed by: NURSE PRACTITIONER

## 2021-02-04 RX ORDER — VALACYCLOVIR HYDROCHLORIDE 1 G/1
1000 TABLET, FILM COATED ORAL 2 TIMES DAILY
Qty: 20 TABLET | Refills: 0 | Status: SHIPPED | OUTPATIENT
Start: 2021-02-04 | End: 2021-02-14

## 2021-02-04 RX ORDER — VALACYCLOVIR HYDROCHLORIDE 500 MG/1
500 TABLET, FILM COATED ORAL DAILY
Qty: 30 TABLET | Refills: 11 | Status: SHIPPED | OUTPATIENT
Start: 2021-02-04 | End: 2022-02-11

## 2021-02-05 ENCOUNTER — TELEPHONE (OUTPATIENT)
Dept: ORTHOPEDICS | Facility: CLINIC | Age: 41
End: 2021-02-05

## 2021-02-06 LAB
HSV1 DNA SPEC QL NAA+PROBE: NEGATIVE
HSV2 DNA SPEC QL NAA+PROBE: NEGATIVE
SPECIMEN SOURCE: NORMAL

## 2021-02-10 ENCOUNTER — OFFICE VISIT (OUTPATIENT)
Dept: OBSTETRICS AND GYNECOLOGY | Facility: CLINIC | Age: 41
End: 2021-02-10
Attending: OBSTETRICS & GYNECOLOGY
Payer: COMMERCIAL

## 2021-02-10 VITALS
DIASTOLIC BLOOD PRESSURE: 78 MMHG | SYSTOLIC BLOOD PRESSURE: 118 MMHG | WEIGHT: 237.19 LBS | HEIGHT: 65 IN | BODY MASS INDEX: 39.52 KG/M2

## 2021-02-10 DIAGNOSIS — Z30.017 NEXPLANON INSERTION: ICD-10-CM

## 2021-02-10 DIAGNOSIS — Z12.31 VISIT FOR SCREENING MAMMOGRAM: Primary | ICD-10-CM

## 2021-02-10 PROBLEM — D72.10 EOSINOPHILIA: Status: RESOLVED | Noted: 2020-04-21 | Resolved: 2021-02-10

## 2021-02-10 PROBLEM — E78.2 MIXED HYPERLIPIDEMIA: Status: RESOLVED | Noted: 2020-04-17 | Resolved: 2021-02-10

## 2021-02-10 PROBLEM — Z01.419 WELL WOMAN EXAM WITH ROUTINE GYNECOLOGICAL EXAM: Status: ACTIVE | Noted: 2021-02-10

## 2021-02-10 PROBLEM — D64.9 NORMOCYTIC ANEMIA: Status: RESOLVED | Noted: 2021-01-04 | Resolved: 2021-02-10

## 2021-02-10 PROBLEM — Z01.419 WELL WOMAN EXAM WITH ROUTINE GYNECOLOGICAL EXAM: Status: RESOLVED | Noted: 2021-02-10 | Resolved: 2021-02-10

## 2021-02-10 PROCEDURE — 99999 PR PBB SHADOW E&M-EST. PATIENT-LVL III: ICD-10-PCS | Mod: PBBFAC,,, | Performed by: OBSTETRICS & GYNECOLOGY

## 2021-02-10 PROCEDURE — 3008F BODY MASS INDEX DOCD: CPT | Mod: CPTII,S$GLB,, | Performed by: OBSTETRICS & GYNECOLOGY

## 2021-02-10 PROCEDURE — 3074F PR MOST RECENT SYSTOLIC BLOOD PRESSURE < 130 MM HG: ICD-10-PCS | Mod: CPTII,S$GLB,, | Performed by: OBSTETRICS & GYNECOLOGY

## 2021-02-10 PROCEDURE — 1126F AMNT PAIN NOTED NONE PRSNT: CPT | Mod: S$GLB,,, | Performed by: OBSTETRICS & GYNECOLOGY

## 2021-02-10 PROCEDURE — 3008F PR BODY MASS INDEX (BMI) DOCUMENTED: ICD-10-PCS | Mod: CPTII,S$GLB,, | Performed by: OBSTETRICS & GYNECOLOGY

## 2021-02-10 PROCEDURE — 3078F DIAST BP <80 MM HG: CPT | Mod: CPTII,S$GLB,, | Performed by: OBSTETRICS & GYNECOLOGY

## 2021-02-10 PROCEDURE — 99999 PR PBB SHADOW E&M-EST. PATIENT-LVL III: CPT | Mod: PBBFAC,,, | Performed by: OBSTETRICS & GYNECOLOGY

## 2021-02-10 PROCEDURE — 3078F PR MOST RECENT DIASTOLIC BLOOD PRESSURE < 80 MM HG: ICD-10-PCS | Mod: CPTII,S$GLB,, | Performed by: OBSTETRICS & GYNECOLOGY

## 2021-02-10 PROCEDURE — 99396 PREV VISIT EST AGE 40-64: CPT | Mod: S$GLB,,, | Performed by: OBSTETRICS & GYNECOLOGY

## 2021-02-10 PROCEDURE — 99396 PR PREVENTIVE VISIT,EST,40-64: ICD-10-PCS | Mod: S$GLB,,, | Performed by: OBSTETRICS & GYNECOLOGY

## 2021-02-10 PROCEDURE — 3074F SYST BP LT 130 MM HG: CPT | Mod: CPTII,S$GLB,, | Performed by: OBSTETRICS & GYNECOLOGY

## 2021-02-10 PROCEDURE — 1126F PR PAIN SEVERITY QUANTIFIED, NO PAIN PRESENT: ICD-10-PCS | Mod: S$GLB,,, | Performed by: OBSTETRICS & GYNECOLOGY

## 2021-03-11 ENCOUNTER — PATIENT MESSAGE (OUTPATIENT)
Dept: OBSTETRICS AND GYNECOLOGY | Facility: CLINIC | Age: 41
End: 2021-03-11

## 2021-04-06 ENCOUNTER — OFFICE VISIT (OUTPATIENT)
Dept: INTERNAL MEDICINE | Facility: CLINIC | Age: 41
End: 2021-04-06
Payer: COMMERCIAL

## 2021-04-06 ENCOUNTER — IMMUNIZATION (OUTPATIENT)
Dept: PRIMARY CARE CLINIC | Facility: CLINIC | Age: 41
End: 2021-04-06
Payer: COMMERCIAL

## 2021-04-06 DIAGNOSIS — Z23 NEED FOR VACCINATION: Primary | ICD-10-CM

## 2021-04-06 DIAGNOSIS — R30.0 DYSURIA: ICD-10-CM

## 2021-04-06 DIAGNOSIS — Z56.6 STRESS AT WORK: Primary | ICD-10-CM

## 2021-04-06 PROCEDURE — 99213 OFFICE O/P EST LOW 20 MIN: CPT | Mod: 95,,, | Performed by: INTERNAL MEDICINE

## 2021-04-06 PROCEDURE — 99213 PR OFFICE/OUTPT VISIT, EST, LEVL III, 20-29 MIN: ICD-10-PCS | Mod: 95,,, | Performed by: INTERNAL MEDICINE

## 2021-04-06 PROCEDURE — 91300 COVID-19, MRNA, LNP-S, PF, 30 MCG/0.3 ML DOSE VACCINE: CPT | Mod: PBBFAC | Performed by: FAMILY MEDICINE

## 2021-04-06 SDOH — SOCIAL DETERMINANTS OF HEALTH (SDOH): OTHER PHYSICAL AND MENTAL STRAIN RELATED TO WORK: Z56.6

## 2021-04-07 ENCOUNTER — LAB VISIT (OUTPATIENT)
Dept: LAB | Facility: HOSPITAL | Age: 41
End: 2021-04-07
Attending: INTERNAL MEDICINE
Payer: COMMERCIAL

## 2021-04-07 DIAGNOSIS — R30.0 DYSURIA: ICD-10-CM

## 2021-04-07 PROCEDURE — 87086 URINE CULTURE/COLONY COUNT: CPT | Performed by: INTERNAL MEDICINE

## 2021-04-08 ENCOUNTER — TELEPHONE (OUTPATIENT)
Dept: INTERNAL MEDICINE | Facility: CLINIC | Age: 41
End: 2021-04-08

## 2021-04-08 LAB
BACTERIA UR CULT: NORMAL
BACTERIA UR CULT: NORMAL

## 2021-04-09 ENCOUNTER — LAB VISIT (OUTPATIENT)
Dept: LAB | Facility: HOSPITAL | Age: 41
End: 2021-04-09
Attending: INTERNAL MEDICINE
Payer: COMMERCIAL

## 2021-04-09 ENCOUNTER — TELEPHONE (OUTPATIENT)
Dept: INTERNAL MEDICINE | Facility: CLINIC | Age: 41
End: 2021-04-09

## 2021-04-09 DIAGNOSIS — R30.0 DYSURIA: Primary | ICD-10-CM

## 2021-04-09 DIAGNOSIS — Z00.00 ANNUAL PHYSICAL EXAM: ICD-10-CM

## 2021-04-09 LAB
BILIRUB UR QL STRIP: NEGATIVE
CLARITY UR REFRACT.AUTO: ABNORMAL
COLOR UR AUTO: YELLOW
GLUCOSE UR QL STRIP: NEGATIVE
HGB UR QL STRIP: NEGATIVE
KETONES UR QL STRIP: NEGATIVE
LEUKOCYTE ESTERASE UR QL STRIP: ABNORMAL
MICROSCOPIC COMMENT: NORMAL
NITRITE UR QL STRIP: NEGATIVE
PH UR STRIP: 5 [PH] (ref 5–8)
PROT UR QL STRIP: NEGATIVE
RBC #/AREA URNS AUTO: 1 /HPF (ref 0–4)
SP GR UR STRIP: 1.02 (ref 1–1.03)
SQUAMOUS #/AREA URNS AUTO: 5 /HPF
URN SPEC COLLECT METH UR: ABNORMAL
WBC #/AREA URNS AUTO: 3 /HPF (ref 0–5)

## 2021-04-09 PROCEDURE — 81001 URINALYSIS AUTO W/SCOPE: CPT | Performed by: INTERNAL MEDICINE

## 2021-04-13 ENCOUNTER — PATIENT MESSAGE (OUTPATIENT)
Dept: INTERNAL MEDICINE | Facility: CLINIC | Age: 41
End: 2021-04-13

## 2021-04-27 ENCOUNTER — IMMUNIZATION (OUTPATIENT)
Dept: PRIMARY CARE CLINIC | Facility: CLINIC | Age: 41
End: 2021-04-27
Payer: COMMERCIAL

## 2021-04-27 DIAGNOSIS — Z23 NEED FOR VACCINATION: Primary | ICD-10-CM

## 2021-04-27 PROCEDURE — 91300 COVID-19, MRNA, LNP-S, PF, 30 MCG/0.3 ML DOSE VACCINE: ICD-10-PCS | Mod: ,,, | Performed by: FAMILY MEDICINE

## 2021-04-27 PROCEDURE — 91300 COVID-19, MRNA, LNP-S, PF, 30 MCG/0.3 ML DOSE VACCINE: CPT | Mod: ,,, | Performed by: FAMILY MEDICINE

## 2021-04-27 PROCEDURE — 0002A COVID-19, MRNA, LNP-S, PF, 30 MCG/0.3 ML DOSE VACCINE: ICD-10-PCS | Mod: CV19,,, | Performed by: FAMILY MEDICINE

## 2021-04-27 PROCEDURE — 0002A COVID-19, MRNA, LNP-S, PF, 30 MCG/0.3 ML DOSE VACCINE: CPT | Mod: CV19,,, | Performed by: FAMILY MEDICINE

## 2021-08-02 ENCOUNTER — OFFICE VISIT (OUTPATIENT)
Dept: FAMILY MEDICINE | Facility: CLINIC | Age: 41
End: 2021-08-02
Payer: COMMERCIAL

## 2021-08-02 DIAGNOSIS — J30.9 CHRONIC ALLERGIC RHINITIS: Primary | ICD-10-CM

## 2021-08-02 PROCEDURE — 99213 PR OFFICE/OUTPT VISIT, EST, LEVL III, 20-29 MIN: ICD-10-PCS | Mod: 95,,, | Performed by: NURSE PRACTITIONER

## 2021-08-02 PROCEDURE — 1159F MED LIST DOCD IN RCRD: CPT | Mod: CPTII,,, | Performed by: NURSE PRACTITIONER

## 2021-08-02 PROCEDURE — 99213 OFFICE O/P EST LOW 20 MIN: CPT | Mod: 95,,, | Performed by: NURSE PRACTITIONER

## 2021-08-02 PROCEDURE — 1160F RVW MEDS BY RX/DR IN RCRD: CPT | Mod: CPTII,,, | Performed by: NURSE PRACTITIONER

## 2021-08-02 PROCEDURE — 1160F PR REVIEW ALL MEDS BY PRESCRIBER/CLIN PHARMACIST DOCUMENTED: ICD-10-PCS | Mod: CPTII,,, | Performed by: NURSE PRACTITIONER

## 2021-08-02 PROCEDURE — 1159F PR MEDICATION LIST DOCUMENTED IN MEDICAL RECORD: ICD-10-PCS | Mod: CPTII,,, | Performed by: NURSE PRACTITIONER

## 2021-08-03 ENCOUNTER — PATIENT MESSAGE (OUTPATIENT)
Dept: DERMATOLOGY | Facility: CLINIC | Age: 41
End: 2021-08-03

## 2021-08-12 ENCOUNTER — OFFICE VISIT (OUTPATIENT)
Dept: PRIMARY CARE CLINIC | Facility: CLINIC | Age: 41
End: 2021-08-12
Payer: COMMERCIAL

## 2021-08-12 VITALS
BODY MASS INDEX: 40.88 KG/M2 | WEIGHT: 245.38 LBS | DIASTOLIC BLOOD PRESSURE: 82 MMHG | HEIGHT: 65 IN | OXYGEN SATURATION: 98 % | HEART RATE: 65 BPM | SYSTOLIC BLOOD PRESSURE: 120 MMHG | RESPIRATION RATE: 16 BRPM

## 2021-08-12 DIAGNOSIS — G44.52 NEW DAILY PERSISTENT HEADACHE: ICD-10-CM

## 2021-08-12 DIAGNOSIS — R11.0 NAUSEA: ICD-10-CM

## 2021-08-12 DIAGNOSIS — R05.9 COUGH: ICD-10-CM

## 2021-08-12 PROCEDURE — 99999 PR PBB SHADOW E&M-EST. PATIENT-LVL III: CPT | Mod: PBBFAC,,, | Performed by: NURSE PRACTITIONER

## 2021-08-12 PROCEDURE — 99999 PR PBB SHADOW E&M-EST. PATIENT-LVL III: ICD-10-PCS | Mod: PBBFAC,,, | Performed by: NURSE PRACTITIONER

## 2021-08-12 PROCEDURE — 3074F SYST BP LT 130 MM HG: CPT | Mod: CPTII,S$GLB,, | Performed by: NURSE PRACTITIONER

## 2021-08-12 PROCEDURE — 3074F PR MOST RECENT SYSTOLIC BLOOD PRESSURE < 130 MM HG: ICD-10-PCS | Mod: CPTII,S$GLB,, | Performed by: NURSE PRACTITIONER

## 2021-08-12 PROCEDURE — 3008F PR BODY MASS INDEX (BMI) DOCUMENTED: ICD-10-PCS | Mod: CPTII,S$GLB,, | Performed by: NURSE PRACTITIONER

## 2021-08-12 PROCEDURE — 3079F DIAST BP 80-89 MM HG: CPT | Mod: CPTII,S$GLB,, | Performed by: NURSE PRACTITIONER

## 2021-08-12 PROCEDURE — 1159F PR MEDICATION LIST DOCUMENTED IN MEDICAL RECORD: ICD-10-PCS | Mod: CPTII,S$GLB,, | Performed by: NURSE PRACTITIONER

## 2021-08-12 PROCEDURE — 1160F RVW MEDS BY RX/DR IN RCRD: CPT | Mod: CPTII,S$GLB,, | Performed by: NURSE PRACTITIONER

## 2021-08-12 PROCEDURE — 3079F PR MOST RECENT DIASTOLIC BLOOD PRESSURE 80-89 MM HG: ICD-10-PCS | Mod: CPTII,S$GLB,, | Performed by: NURSE PRACTITIONER

## 2021-08-12 PROCEDURE — 1159F MED LIST DOCD IN RCRD: CPT | Mod: CPTII,S$GLB,, | Performed by: NURSE PRACTITIONER

## 2021-08-12 PROCEDURE — 1160F PR REVIEW ALL MEDS BY PRESCRIBER/CLIN PHARMACIST DOCUMENTED: ICD-10-PCS | Mod: CPTII,S$GLB,, | Performed by: NURSE PRACTITIONER

## 2021-08-12 PROCEDURE — 99213 OFFICE O/P EST LOW 20 MIN: CPT | Mod: S$GLB,,, | Performed by: NURSE PRACTITIONER

## 2021-08-12 PROCEDURE — 99213 PR OFFICE/OUTPT VISIT, EST, LEVL III, 20-29 MIN: ICD-10-PCS | Mod: S$GLB,,, | Performed by: NURSE PRACTITIONER

## 2021-08-12 PROCEDURE — 3008F BODY MASS INDEX DOCD: CPT | Mod: CPTII,S$GLB,, | Performed by: NURSE PRACTITIONER

## 2021-08-19 ENCOUNTER — PATIENT MESSAGE (OUTPATIENT)
Dept: INTERNAL MEDICINE | Facility: CLINIC | Age: 41
End: 2021-08-19

## 2021-08-20 ENCOUNTER — PATIENT MESSAGE (OUTPATIENT)
Dept: INTERNAL MEDICINE | Facility: CLINIC | Age: 41
End: 2021-08-20

## 2021-08-25 ENCOUNTER — OFFICE VISIT (OUTPATIENT)
Dept: INTERNAL MEDICINE | Facility: CLINIC | Age: 41
End: 2021-08-25
Payer: COMMERCIAL

## 2021-08-25 ENCOUNTER — LAB VISIT (OUTPATIENT)
Dept: LAB | Facility: HOSPITAL | Age: 41
End: 2021-08-25
Attending: INTERNAL MEDICINE
Payer: COMMERCIAL

## 2021-08-25 VITALS
BODY MASS INDEX: 41 KG/M2 | TEMPERATURE: 98 F | RESPIRATION RATE: 16 BRPM | DIASTOLIC BLOOD PRESSURE: 80 MMHG | HEART RATE: 64 BPM | OXYGEN SATURATION: 100 % | SYSTOLIC BLOOD PRESSURE: 118 MMHG | WEIGHT: 246.06 LBS | HEIGHT: 65 IN

## 2021-08-25 DIAGNOSIS — R51.9 CHRONIC NONINTRACTABLE HEADACHE, UNSPECIFIED HEADACHE TYPE: ICD-10-CM

## 2021-08-25 DIAGNOSIS — M25.469 JOINT SWELLING OF LOWER LEG: ICD-10-CM

## 2021-08-25 DIAGNOSIS — G89.29 CHRONIC NONINTRACTABLE HEADACHE, UNSPECIFIED HEADACHE TYPE: ICD-10-CM

## 2021-08-25 DIAGNOSIS — D64.9 ANEMIA, UNSPECIFIED TYPE: ICD-10-CM

## 2021-08-25 DIAGNOSIS — R25.0 ABNORMAL HEAD MOVEMENTS: Primary | ICD-10-CM

## 2021-08-25 DIAGNOSIS — R53.83 FATIGUE, UNSPECIFIED TYPE: ICD-10-CM

## 2021-08-25 DIAGNOSIS — E55.9 VITAMIN D INSUFFICIENCY: ICD-10-CM

## 2021-08-25 DIAGNOSIS — R76.8 ANTI-TPO ANTIBODIES PRESENT: ICD-10-CM

## 2021-08-25 DIAGNOSIS — L65.9 PATCHY LOSS OF HAIR: ICD-10-CM

## 2021-08-25 LAB
25(OH)D3+25(OH)D2 SERPL-MCNC: 33 NG/ML (ref 30–96)
BASOPHILS # BLD AUTO: 0.04 K/UL (ref 0–0.2)
BASOPHILS NFR BLD: 0.8 % (ref 0–1.9)
CRP SERPL-MCNC: 1.4 MG/L (ref 0–8.2)
DIFFERENTIAL METHOD: NORMAL
EOSINOPHIL # BLD AUTO: 0.2 K/UL (ref 0–0.5)
EOSINOPHIL NFR BLD: 3.8 % (ref 0–8)
ERYTHROCYTE [DISTWIDTH] IN BLOOD BY AUTOMATED COUNT: 12.9 % (ref 11.5–14.5)
ERYTHROCYTE [SEDIMENTATION RATE] IN BLOOD BY WESTERGREN METHOD: 14 MM/HR (ref 0–36)
HCT VFR BLD AUTO: 37.6 % (ref 37–48.5)
HGB BLD-MCNC: 12.4 G/DL (ref 12–16)
IMM GRANULOCYTES # BLD AUTO: 0.01 K/UL (ref 0–0.04)
IMM GRANULOCYTES NFR BLD AUTO: 0.2 % (ref 0–0.5)
LYMPHOCYTES # BLD AUTO: 1.9 K/UL (ref 1–4.8)
LYMPHOCYTES NFR BLD: 40.6 % (ref 18–48)
MCH RBC QN AUTO: 30.1 PG (ref 27–31)
MCHC RBC AUTO-ENTMCNC: 33 G/DL (ref 32–36)
MCV RBC AUTO: 91 FL (ref 82–98)
MONOCYTES # BLD AUTO: 0.4 K/UL (ref 0.3–1)
MONOCYTES NFR BLD: 8.8 % (ref 4–15)
NEUTROPHILS # BLD AUTO: 2.2 K/UL (ref 1.8–7.7)
NEUTROPHILS NFR BLD: 45.8 % (ref 38–73)
NRBC BLD-RTO: 0 /100 WBC
PLATELET # BLD AUTO: 277 K/UL (ref 150–450)
PMV BLD AUTO: 10.7 FL (ref 9.2–12.9)
RBC # BLD AUTO: 4.12 M/UL (ref 4–5.4)
TSH SERPL DL<=0.005 MIU/L-ACNC: 2.01 UIU/ML (ref 0.4–4)
WBC # BLD AUTO: 4.78 K/UL (ref 3.9–12.7)

## 2021-08-25 PROCEDURE — 99214 PR OFFICE/OUTPT VISIT, EST, LEVL IV, 30-39 MIN: ICD-10-PCS | Mod: S$GLB,,, | Performed by: INTERNAL MEDICINE

## 2021-08-25 PROCEDURE — 3008F PR BODY MASS INDEX (BMI) DOCUMENTED: ICD-10-PCS | Mod: CPTII,S$GLB,, | Performed by: INTERNAL MEDICINE

## 2021-08-25 PROCEDURE — 86147 CARDIOLIPIN ANTIBODY EA IG: CPT | Mod: 59 | Performed by: INTERNAL MEDICINE

## 2021-08-25 PROCEDURE — 86140 C-REACTIVE PROTEIN: CPT | Performed by: INTERNAL MEDICINE

## 2021-08-25 PROCEDURE — 3008F BODY MASS INDEX DOCD: CPT | Mod: CPTII,S$GLB,, | Performed by: INTERNAL MEDICINE

## 2021-08-25 PROCEDURE — 82306 VITAMIN D 25 HYDROXY: CPT | Performed by: INTERNAL MEDICINE

## 2021-08-25 PROCEDURE — 85652 RBC SED RATE AUTOMATED: CPT | Performed by: INTERNAL MEDICINE

## 2021-08-25 PROCEDURE — 99999 PR PBB SHADOW E&M-EST. PATIENT-LVL IV: ICD-10-PCS | Mod: PBBFAC,,, | Performed by: INTERNAL MEDICINE

## 2021-08-25 PROCEDURE — 1159F MED LIST DOCD IN RCRD: CPT | Mod: CPTII,S$GLB,, | Performed by: INTERNAL MEDICINE

## 2021-08-25 PROCEDURE — 3074F SYST BP LT 130 MM HG: CPT | Mod: CPTII,S$GLB,, | Performed by: INTERNAL MEDICINE

## 2021-08-25 PROCEDURE — 3079F PR MOST RECENT DIASTOLIC BLOOD PRESSURE 80-89 MM HG: ICD-10-PCS | Mod: CPTII,S$GLB,, | Performed by: INTERNAL MEDICINE

## 2021-08-25 PROCEDURE — 3079F DIAST BP 80-89 MM HG: CPT | Mod: CPTII,S$GLB,, | Performed by: INTERNAL MEDICINE

## 2021-08-25 PROCEDURE — 3074F PR MOST RECENT SYSTOLIC BLOOD PRESSURE < 130 MM HG: ICD-10-PCS | Mod: CPTII,S$GLB,, | Performed by: INTERNAL MEDICINE

## 2021-08-25 PROCEDURE — 99214 OFFICE O/P EST MOD 30 MIN: CPT | Mod: S$GLB,,, | Performed by: INTERNAL MEDICINE

## 2021-08-25 PROCEDURE — 86225 DNA ANTIBODY NATIVE: CPT | Performed by: INTERNAL MEDICINE

## 2021-08-25 PROCEDURE — 99999 PR PBB SHADOW E&M-EST. PATIENT-LVL IV: CPT | Mod: PBBFAC,,, | Performed by: INTERNAL MEDICINE

## 2021-08-25 PROCEDURE — 84443 ASSAY THYROID STIM HORMONE: CPT | Performed by: INTERNAL MEDICINE

## 2021-08-25 PROCEDURE — 1160F PR REVIEW ALL MEDS BY PRESCRIBER/CLIN PHARMACIST DOCUMENTED: ICD-10-PCS | Mod: CPTII,S$GLB,, | Performed by: INTERNAL MEDICINE

## 2021-08-25 PROCEDURE — 86038 ANTINUCLEAR ANTIBODIES: CPT | Performed by: INTERNAL MEDICINE

## 2021-08-25 PROCEDURE — 1159F PR MEDICATION LIST DOCUMENTED IN MEDICAL RECORD: ICD-10-PCS | Mod: CPTII,S$GLB,, | Performed by: INTERNAL MEDICINE

## 2021-08-25 PROCEDURE — 85025 COMPLETE CBC W/AUTO DIFF WBC: CPT | Performed by: INTERNAL MEDICINE

## 2021-08-25 PROCEDURE — 36415 COLL VENOUS BLD VENIPUNCTURE: CPT | Mod: PO | Performed by: INTERNAL MEDICINE

## 2021-08-25 PROCEDURE — 1160F RVW MEDS BY RX/DR IN RCRD: CPT | Mod: CPTII,S$GLB,, | Performed by: INTERNAL MEDICINE

## 2021-08-26 LAB
ANA SER QL IF: NORMAL
DSDNA AB SER-ACNC: NORMAL [IU]/ML

## 2021-08-30 LAB
CARDIOLIPIN IGG SER IA-ACNC: <9.4 GPL (ref 0–14.99)
CARDIOLIPIN IGM SER IA-ACNC: <9.4 MPL (ref 0–12.49)

## 2021-09-14 ENCOUNTER — PATIENT MESSAGE (OUTPATIENT)
Dept: INTERNAL MEDICINE | Facility: CLINIC | Age: 41
End: 2021-09-14

## 2021-09-14 DIAGNOSIS — R82.90 ABNORMAL URINALYSIS: Primary | ICD-10-CM

## 2021-11-11 ENCOUNTER — OFFICE VISIT (OUTPATIENT)
Dept: INTERNAL MEDICINE | Facility: CLINIC | Age: 41
End: 2021-11-11
Attending: INTERNAL MEDICINE
Payer: COMMERCIAL

## 2021-11-11 VITALS
DIASTOLIC BLOOD PRESSURE: 89 MMHG | SYSTOLIC BLOOD PRESSURE: 132 MMHG | OXYGEN SATURATION: 100 % | BODY MASS INDEX: 41.39 KG/M2 | HEART RATE: 68 BPM | WEIGHT: 248.44 LBS | HEIGHT: 65 IN

## 2021-11-11 DIAGNOSIS — M25.561 ACUTE PAIN OF RIGHT KNEE: ICD-10-CM

## 2021-11-11 DIAGNOSIS — M25.469 KNEE SWELLING: Primary | ICD-10-CM

## 2021-11-11 PROCEDURE — 3008F PR BODY MASS INDEX (BMI) DOCUMENTED: ICD-10-PCS | Mod: CPTII,S$GLB,, | Performed by: INTERNAL MEDICINE

## 2021-11-11 PROCEDURE — 99999 PR PBB SHADOW E&M-EST. PATIENT-LVL IV: CPT | Mod: PBBFAC,,, | Performed by: INTERNAL MEDICINE

## 2021-11-11 PROCEDURE — 3079F PR MOST RECENT DIASTOLIC BLOOD PRESSURE 80-89 MM HG: ICD-10-PCS | Mod: CPTII,S$GLB,, | Performed by: INTERNAL MEDICINE

## 2021-11-11 PROCEDURE — 1159F PR MEDICATION LIST DOCUMENTED IN MEDICAL RECORD: ICD-10-PCS | Mod: CPTII,S$GLB,, | Performed by: INTERNAL MEDICINE

## 2021-11-11 PROCEDURE — 99999 PR PBB SHADOW E&M-EST. PATIENT-LVL IV: ICD-10-PCS | Mod: PBBFAC,,, | Performed by: INTERNAL MEDICINE

## 2021-11-11 PROCEDURE — 99214 PR OFFICE/OUTPT VISIT, EST, LEVL IV, 30-39 MIN: ICD-10-PCS | Mod: S$GLB,,, | Performed by: INTERNAL MEDICINE

## 2021-11-11 PROCEDURE — 99214 OFFICE O/P EST MOD 30 MIN: CPT | Mod: S$GLB,,, | Performed by: INTERNAL MEDICINE

## 2021-11-11 PROCEDURE — 1160F PR REVIEW ALL MEDS BY PRESCRIBER/CLIN PHARMACIST DOCUMENTED: ICD-10-PCS | Mod: CPTII,S$GLB,, | Performed by: INTERNAL MEDICINE

## 2021-11-11 PROCEDURE — 3075F PR MOST RECENT SYSTOLIC BLOOD PRESS GE 130-139MM HG: ICD-10-PCS | Mod: CPTII,S$GLB,, | Performed by: INTERNAL MEDICINE

## 2021-11-11 PROCEDURE — 3079F DIAST BP 80-89 MM HG: CPT | Mod: CPTII,S$GLB,, | Performed by: INTERNAL MEDICINE

## 2021-11-11 PROCEDURE — 3075F SYST BP GE 130 - 139MM HG: CPT | Mod: CPTII,S$GLB,, | Performed by: INTERNAL MEDICINE

## 2021-11-11 PROCEDURE — 1160F RVW MEDS BY RX/DR IN RCRD: CPT | Mod: CPTII,S$GLB,, | Performed by: INTERNAL MEDICINE

## 2021-11-11 PROCEDURE — 3008F BODY MASS INDEX DOCD: CPT | Mod: CPTII,S$GLB,, | Performed by: INTERNAL MEDICINE

## 2021-11-11 PROCEDURE — 1159F MED LIST DOCD IN RCRD: CPT | Mod: CPTII,S$GLB,, | Performed by: INTERNAL MEDICINE

## 2021-11-15 ENCOUNTER — TELEPHONE (OUTPATIENT)
Dept: SPORTS MEDICINE | Facility: CLINIC | Age: 41
End: 2021-11-15
Payer: COMMERCIAL

## 2021-11-16 ENCOUNTER — TELEPHONE (OUTPATIENT)
Dept: INTERNAL MEDICINE | Facility: CLINIC | Age: 41
End: 2021-11-16
Payer: COMMERCIAL

## 2021-11-16 ENCOUNTER — PATIENT OUTREACH (OUTPATIENT)
Dept: ADMINISTRATIVE | Facility: OTHER | Age: 41
End: 2021-11-16
Payer: COMMERCIAL

## 2021-11-16 RX ORDER — METHYLPREDNISOLONE 4 MG/1
TABLET ORAL
Qty: 1 EACH | Refills: 0 | Status: SHIPPED | OUTPATIENT
Start: 2021-11-16 | End: 2021-12-30 | Stop reason: ALTCHOICE

## 2021-11-17 ENCOUNTER — HOSPITAL ENCOUNTER (OUTPATIENT)
Dept: RADIOLOGY | Facility: HOSPITAL | Age: 41
Discharge: HOME | End: 2021-11-17
Attending: ORTHOPAEDIC SURGERY
Payer: COMMERCIAL

## 2021-11-17 ENCOUNTER — OFFICE VISIT (OUTPATIENT)
Dept: SPORTS MEDICINE | Facility: CLINIC | Age: 41
End: 2021-11-17
Payer: COMMERCIAL

## 2021-11-17 VITALS
BODY MASS INDEX: 41.32 KG/M2 | DIASTOLIC BLOOD PRESSURE: 72 MMHG | SYSTOLIC BLOOD PRESSURE: 112 MMHG | WEIGHT: 248 LBS | HEIGHT: 65 IN | HEART RATE: 68 BPM

## 2021-11-17 DIAGNOSIS — M25.469 KNEE SWELLING: ICD-10-CM

## 2021-11-17 DIAGNOSIS — M22.41 CHONDROMALACIA OF RIGHT PATELLOFEMORAL JOINT: Primary | ICD-10-CM

## 2021-11-17 DIAGNOSIS — M25.561 RIGHT KNEE PAIN, UNSPECIFIED CHRONICITY: ICD-10-CM

## 2021-11-17 PROCEDURE — 1159F PR MEDICATION LIST DOCUMENTED IN MEDICAL RECORD: ICD-10-PCS | Mod: CPTII,S$GLB,, | Performed by: ORTHOPAEDIC SURGERY

## 2021-11-17 PROCEDURE — 99214 PR OFFICE/OUTPT VISIT, EST, LEVL IV, 30-39 MIN: ICD-10-PCS | Mod: S$GLB,,, | Performed by: ORTHOPAEDIC SURGERY

## 2021-11-17 PROCEDURE — 3008F PR BODY MASS INDEX (BMI) DOCUMENTED: ICD-10-PCS | Mod: CPTII,S$GLB,, | Performed by: ORTHOPAEDIC SURGERY

## 2021-11-17 PROCEDURE — 73562 X-RAY EXAM OF KNEE 3: CPT | Mod: 26,LT,, | Performed by: RADIOLOGY

## 2021-11-17 PROCEDURE — 1160F PR REVIEW ALL MEDS BY PRESCRIBER/CLIN PHARMACIST DOCUMENTED: ICD-10-PCS | Mod: CPTII,S$GLB,, | Performed by: ORTHOPAEDIC SURGERY

## 2021-11-17 PROCEDURE — 3074F SYST BP LT 130 MM HG: CPT | Mod: CPTII,S$GLB,, | Performed by: ORTHOPAEDIC SURGERY

## 2021-11-17 PROCEDURE — 99214 OFFICE O/P EST MOD 30 MIN: CPT | Mod: S$GLB,,, | Performed by: ORTHOPAEDIC SURGERY

## 2021-11-17 PROCEDURE — 73562 XR KNEE ORTHO RIGHT WITH FLEXION: ICD-10-PCS | Mod: 26,LT,, | Performed by: RADIOLOGY

## 2021-11-17 PROCEDURE — 99999 PR PBB SHADOW E&M-EST. PATIENT-LVL IV: CPT | Mod: PBBFAC,,, | Performed by: ORTHOPAEDIC SURGERY

## 2021-11-17 PROCEDURE — 3078F DIAST BP <80 MM HG: CPT | Mod: CPTII,S$GLB,, | Performed by: ORTHOPAEDIC SURGERY

## 2021-11-17 PROCEDURE — 3078F PR MOST RECENT DIASTOLIC BLOOD PRESSURE < 80 MM HG: ICD-10-PCS | Mod: CPTII,S$GLB,, | Performed by: ORTHOPAEDIC SURGERY

## 2021-11-17 PROCEDURE — 1160F RVW MEDS BY RX/DR IN RCRD: CPT | Mod: CPTII,S$GLB,, | Performed by: ORTHOPAEDIC SURGERY

## 2021-11-17 PROCEDURE — 1159F MED LIST DOCD IN RCRD: CPT | Mod: CPTII,S$GLB,, | Performed by: ORTHOPAEDIC SURGERY

## 2021-11-17 PROCEDURE — 3074F PR MOST RECENT SYSTOLIC BLOOD PRESSURE < 130 MM HG: ICD-10-PCS | Mod: CPTII,S$GLB,, | Performed by: ORTHOPAEDIC SURGERY

## 2021-11-17 PROCEDURE — 3008F BODY MASS INDEX DOCD: CPT | Mod: CPTII,S$GLB,, | Performed by: ORTHOPAEDIC SURGERY

## 2021-11-17 PROCEDURE — 99999 PR PBB SHADOW E&M-EST. PATIENT-LVL IV: ICD-10-PCS | Mod: PBBFAC,,, | Performed by: ORTHOPAEDIC SURGERY

## 2021-11-17 PROCEDURE — 73564 XR KNEE ORTHO RIGHT WITH FLEXION: ICD-10-PCS | Mod: 26,RT,, | Performed by: RADIOLOGY

## 2021-11-17 PROCEDURE — 73564 X-RAY EXAM KNEE 4 OR MORE: CPT | Mod: 26,RT,, | Performed by: RADIOLOGY

## 2021-11-17 PROCEDURE — 73564 X-RAY EXAM KNEE 4 OR MORE: CPT | Mod: TC,RT

## 2021-11-17 RX ORDER — MELOXICAM 15 MG/1
15 TABLET ORAL DAILY
Qty: 30 TABLET | Refills: 0 | Status: SHIPPED | OUTPATIENT
Start: 2021-11-17 | End: 2021-11-19

## 2021-12-29 ENCOUNTER — PATIENT MESSAGE (OUTPATIENT)
Dept: INTERNAL MEDICINE | Facility: CLINIC | Age: 41
End: 2021-12-29
Payer: COMMERCIAL

## 2021-12-30 ENCOUNTER — OFFICE VISIT (OUTPATIENT)
Dept: INTERNAL MEDICINE | Facility: CLINIC | Age: 41
End: 2021-12-30
Payer: COMMERCIAL

## 2021-12-30 ENCOUNTER — PATIENT MESSAGE (OUTPATIENT)
Dept: INTERNAL MEDICINE | Facility: CLINIC | Age: 41
End: 2021-12-30

## 2021-12-30 ENCOUNTER — TELEPHONE (OUTPATIENT)
Dept: INTERNAL MEDICINE | Facility: CLINIC | Age: 41
End: 2021-12-30

## 2021-12-30 ENCOUNTER — LAB VISIT (OUTPATIENT)
Dept: PRIMARY CARE CLINIC | Facility: OTHER | Age: 41
End: 2021-12-30
Attending: INTERNAL MEDICINE
Payer: COMMERCIAL

## 2021-12-30 DIAGNOSIS — B97.89 ACUTE VIRAL SINUSITIS: Primary | ICD-10-CM

## 2021-12-30 DIAGNOSIS — Z20.822 ENCOUNTER FOR LABORATORY TESTING FOR COVID-19 VIRUS: ICD-10-CM

## 2021-12-30 DIAGNOSIS — Z20.822 EXPOSURE TO CONFIRMED CASE OF COVID-19: ICD-10-CM

## 2021-12-30 DIAGNOSIS — R05.9 COUGH: ICD-10-CM

## 2021-12-30 DIAGNOSIS — J02.9 SORE THROAT: ICD-10-CM

## 2021-12-30 DIAGNOSIS — J01.90 ACUTE VIRAL SINUSITIS: Primary | ICD-10-CM

## 2021-12-30 PROCEDURE — U0003 INFECTIOUS AGENT DETECTION BY NUCLEIC ACID (DNA OR RNA); SEVERE ACUTE RESPIRATORY SYNDROME CORONAVIRUS 2 (SARS-COV-2) (CORONAVIRUS DISEASE [COVID-19]), AMPLIFIED PROBE TECHNIQUE, MAKING USE OF HIGH THROUGHPUT TECHNOLOGIES AS DESCRIBED BY CMS-2020-01-R: HCPCS | Performed by: INTERNAL MEDICINE

## 2021-12-30 PROCEDURE — 1159F PR MEDICATION LIST DOCUMENTED IN MEDICAL RECORD: ICD-10-PCS | Mod: CPTII,95,, | Performed by: INTERNAL MEDICINE

## 2021-12-30 PROCEDURE — 99213 PR OFFICE/OUTPT VISIT, EST, LEVL III, 20-29 MIN: ICD-10-PCS | Mod: 95,,, | Performed by: INTERNAL MEDICINE

## 2021-12-30 PROCEDURE — 1159F MED LIST DOCD IN RCRD: CPT | Mod: CPTII,95,, | Performed by: INTERNAL MEDICINE

## 2021-12-30 PROCEDURE — 99213 OFFICE O/P EST LOW 20 MIN: CPT | Mod: 95,,, | Performed by: INTERNAL MEDICINE

## 2021-12-30 PROCEDURE — 1160F PR REVIEW ALL MEDS BY PRESCRIBER/CLIN PHARMACIST DOCUMENTED: ICD-10-PCS | Mod: CPTII,95,, | Performed by: INTERNAL MEDICINE

## 2021-12-30 PROCEDURE — 1160F RVW MEDS BY RX/DR IN RCRD: CPT | Mod: CPTII,95,, | Performed by: INTERNAL MEDICINE

## 2022-01-01 LAB
SARS-COV-2 RNA RESP QL NAA+PROBE: DETECTED
SARS-COV-2- CYCLE NUMBER: 19

## 2022-01-03 ENCOUNTER — TELEPHONE (OUTPATIENT)
Dept: INTERNAL MEDICINE | Facility: CLINIC | Age: 42
End: 2022-01-03
Payer: COMMERCIAL

## 2022-01-03 NOTE — TELEPHONE ENCOUNTER
----- Message from Melonie Villanueva MD sent at 1/2/2022 11:01 PM CST -----  The Covid-19 test is positive. Have any of your symptoms improved? Let me know if you need any additional medications.

## 2022-01-06 ENCOUNTER — LAB VISIT (OUTPATIENT)
Dept: PRIMARY CARE CLINIC | Facility: CLINIC | Age: 42
End: 2022-01-06
Payer: COMMERCIAL

## 2022-01-06 DIAGNOSIS — Z20.822 CONTACT WITH AND (SUSPECTED) EXPOSURE TO COVID-19: ICD-10-CM

## 2022-01-06 LAB
CTP QC/QA: YES
SARS-COV-2 AG RESP QL IA.RAPID: NEGATIVE

## 2022-01-06 PROCEDURE — 87811 SARS-COV-2 COVID19 W/OPTIC: CPT

## 2022-01-11 ENCOUNTER — CLINICAL SUPPORT (OUTPATIENT)
Dept: REHABILITATION | Facility: HOSPITAL | Age: 42
End: 2022-01-11
Attending: ORTHOPAEDIC SURGERY
Payer: COMMERCIAL

## 2022-01-11 DIAGNOSIS — M25.469 KNEE SWELLING: ICD-10-CM

## 2022-01-11 DIAGNOSIS — M25.562 LEFT LATERAL KNEE PAIN: ICD-10-CM

## 2022-01-11 DIAGNOSIS — M22.41 CHONDROMALACIA OF RIGHT PATELLOFEMORAL JOINT: ICD-10-CM

## 2022-01-11 PROBLEM — M25.561 LATERAL KNEE PAIN, RIGHT: Status: ACTIVE | Noted: 2022-01-11

## 2022-01-11 PROCEDURE — 97161 PT EVAL LOW COMPLEX 20 MIN: CPT | Mod: PO

## 2022-01-11 PROCEDURE — 97110 THERAPEUTIC EXERCISES: CPT | Mod: PO

## 2022-01-11 NOTE — PLAN OF CARE
[facility]   Physical Therapy Initial Evaluation     Date: 1/11/2022   Name: Kristofer Gentile  [facility] Number: [id number]    Therapy Diagnosis:   Encounter Diagnoses   Name Primary?    Knee swelling     Chondromalacia of right patellofemoral joint     Left lateral knee pain      Physician: Lilly Horan MD    Physician Orders: PT Eval and Treat   Medical Diagnosis from Referral: Knee swelling [M25.469], Chondromalacia of right patellofemoral joint [M22.41]    Evaluation Date: 1/11/2022  Authorization Period Expiration: 1/1/2022 - 12/31/2022  Plan of Care Expiration: 4/11/2022  Progress Note Due: 2/11/2022  Visit # / Visits authorized: 1/    WOMAC: 27.08%    Precautions: Standard     Time In: 2:15 PM   Time Out: 3:00 PM   Total Appointment Time (timed & untimed codes): 45 minutes      SUBJECTIVE   Date of onset: 10/1/2022    History of current condition - Kristofer reports: that the right lateral side of the knee is hurting 5/10 NPRS. Three (3) months prior to consultation patient was working out and performing hopping when suddenly felt a popping sound but did not stop after the practice. The pain in the knee was aggravated by walking and sitting the next day. The patient had an imaging test and referred to PT due toe exacerbation of partial mobility and increased right knee pain.    Falls: None     Imaging, X-ray: EXAMINATION:  XR KNEE ORTHO RIGHT WITH FLEXION     CLINICAL HISTORY:  Effusion, unspecified knee     TECHNIQUE:  AP standing as well as PA flexion standing and Merchant views of both knees were performed.  A lateral view of the right knee is also performed.     COMPARISON:  05/06/2008     FINDINGS:  No acute fracture or dislocation seen.  Stable well corticated ossific density at the tibial tubercle which can be seen with chronic Osgood-Schlatter's.  Spurring along the anterior margin of the patella.     Soft tissues are unremarkable.  Small suprapatellar joint  effusion.     Joint spaces are preserved with no significant osteophyte formation.       Prior Therapy: Yes  Social History: The patient lives with their family  Occupation:   Prior Level of Function: The patient was able to work out - hopping and running  Current Level of Function: The patient has difficulty of climbing up and down the stairs.     Pain:  Current 5/10, worst 6/10, best 0/10   Location: right knee  right knee(s)   Description: Aching, Dull and Burning  Aggravating Factors: Standing, Walking, Flexing, Lifting and Getting out of bed/chair  Easing Factors: meditation, massage and relaxation    Patients goals: To be able to work out without right knee pain.      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    Medications:   Kristofer has a current medication list which includes the following prescription(s): flonase allergy relief, levonorgestrel, meloxicam, multivit,calc,mins/iron/folic, nitrofurantoin (macrocrystal-monohydrate), TAC 0.1% Loprox 0.77%  mg/5 ml, TAC0.1%/Ciclopirox 0.77%/MOM cream, valacyclovir, and [DISCONTINUED] triamcinolone acetonide 0.1%, and the following Facility-Administered Medications: levonorgestrel.    Allergies:   Review of patient's allergies indicates:   Allergen Reactions    Bactrim  [sulfamethoxazole-trimethoprim] Hives and Other (See Comments)     Stiffness to joints    Sulfa (sulfonamide antibiotics) Hives          OBJECTIVE     Vital Signs:   /70 mmHg   HR: 66 bpm   RR: 17 cpm   Temperature: 97.3 F     Leg Length Discrepancy:   R: 88 cm  L: 87 cm     Observation: The patient ambulates without any form of assistive device.       Functional tests:   SL squat: Unable to perform   SLS EO: R: 3 seconds and L: 5 seconds   SLS EC: R: 1 second and L 3 seconds     Range of Motion (Passive):   Knee Right  Left    Flexion 0-130 0-130   Extension 130-0 130-0     Range of Motion (Active):   Knee Right  Left    Flexion 0-120 0-130   Extension 120-0 130-0       Lower Extremity Strength  Right LE  Left LE    Quadriceps: 3+/5 Quadriceps: 5/5   Hamstrings: 3+/5 Hamstrings: 5/5   Hip flexion (seated): 4/5 Hip flexion (seated): 5/5   Hip extension:  4/5 Hip extension: 5/5   PGM: 4/5 PGM:  5/5   Hip ER:  5/5 Hip ER:  4/5   Hip IR: 5/5 Hip IR: 5/5     Special Tests:   Right Left   Valgus Stress Test Negative Negative   Varus Stress test Negative  Negative    Lachman's test Negative  Negative   Posterior Lachman Negative  Negative    Edward's Test Negative  Negative    Thessaly's Test Negative  Negative   Patellar Grind Test Negative  Negative      Joint Mobility: Decreased tibiofemoral mobility     Palpation: No tenderness in the right knee.     Sensation: Normal sensation in both lower extremities.     Flexibility:    Hamstrings: R = Positive  ; L = Negative     Margarito's test: R = Negative  ; L = Negative    Ron test: R = Negative ; L = Negative     Edema: no edema noted      Limitation/Restriction for WOMAC Survey    Therapist reviewed FOTO scores for Kristofer Gentile on 1/11/2022.   FOTO documents entered into EPIC - see Media section.    Limitation Score: 27.08%         TREATMENT     Total Treatment time (time-based codes) separate from Evaluation: 15 minutes      Kristofer received the treatments  listed below:      therapeutic exercises to develop strength and endurance for 15 minutes including:  Quad setting 10 seconds hold x 10   Quadriceps and Hamstring stretch 30 seconds hold x 5      PATIENT EDUCATION AND HOME EXERCISES     Education provided:   - the patient was instructed to avoid hopping in the meantime.     Written Home Exercises Provided: yes. Exercises were reviewed and Kristofer was able to demonstrate them prior to the end of the session.  Kristofer demonstrated good  understanding of the education provided. See EMR under Patient Instructions for exercises provided during therapy sessions.    ASSESSMENT     Kristofer is a 41 y.o. female referred to outpatient Physical Therapy with a medical diagnosis of Knee swelling [M25.469], Chondromalacia of right patellofemoral joint [M22.41]. Patient presents with weakness of the right knee muscles, LOM of the right knee, difficulty of climbing up and down the stairs. The patient has difficulty of walking, standing, running and jogging    Patient prognosis is Excellent.   Patientt will benefit from skilled outpatient Physical Therapy to address the deficits stated above and in the chart below, provide patient /family education, and to maximize patientt's level of independence.     Plan of care discussed with patient: Yes  Patient's spiritual, cultural and educational needs considered and patient is agreeable to the plan of care and goals as stated below:     Anticipated Barriers for therapy: None     Medical Necessity is demonstrated by the following  History  Co-morbidities and personal factors that may impact the plan of care Co-morbidities:   high BMI    Personal Factors:   no deficits     low   Examination  Body Structures and Functions, activity limitations and participation restrictions that may impact the plan of care Body Regions:   back  lower extremities    Body Systems:    gross symmetry  ROM  strength  gross coordinated  movement  balance    Participation Restrictions:   The patient has difficulty of running, standing, walking and climbing up and down the stairs.    Activity limitations:   Learning and applying knowledge  no deficits    General Tasks and Commands  no deficits    Communication  no deficits    Mobility  walking  moving around using equipment (WC)  using transportation (bus, train, plane, car)  driving (bike, car, motorcycle)    Self care  washing oneself (bathing, drying, washing hands)  caring for body parts (brushing teeth, shaving, grooming)  toileting  looking after one's health    Domestic Life  shopping  cooking  doing house work (cleaning house, washing dishes, laundry)    Interactions/Relationships  no deficits    Life Areas  no deficits    Community and Social Life  no deficits         moderate   Clinical Presentation stable and uncomplicated low   Decision Making/ Complexity Score: low     GOALS: Short Term Goals:  3 weeks  1.Report decreased right lateral knee pain  < / =  3/10  to increase tolerance for ambulation on even surface.  2. Increase knee ROM to 130 degrees in order to be able to perform ADLs without difficulty.  3. Increase strength by 1/3 MMT grade in the right lower extremity muscles  to increase tolerance for ADL and work activities.  4. Pt to tolerate HEP to improve ROM and independence with ADL's    Long Term Goals: 6 weeks  1.Report decreased right lateral knee pain < / = 0/10  to increase tolerance for climbing up and down the stairs.  2.Patient goal: To be able to work out without any pain.  3.Increase strength to >/= 4+/5 in the right lower extremity  to increase tolerance for ADL and work activities.  4. Pt will report at CI level (1-20% impaired) on FOTO knee to demonstrate increase in LE function with every day tasks.     PLAN   Plan of care Certification: 1/11/2022 to 4/22/2022.    Outpatient Physical Therapy 2 times weekly for 6 weeks to include the following interventions: Manual  Therapy, Neuromuscular Re-ed and Patient Education.     Lord Martin Quispe, PT, DPT, MTC      I CERTIFY THE NEED FOR THESE SERVICES FURNISHED UNDER THIS PLAN OF TREATMENT AND WHILE UNDER MY CARE   Physician's comments:     Physician's Signature: ___________________________________________________

## 2022-01-25 ENCOUNTER — PATIENT MESSAGE (OUTPATIENT)
Dept: ADMINISTRATIVE | Facility: HOSPITAL | Age: 42
End: 2022-01-25
Payer: COMMERCIAL

## 2022-01-27 ENCOUNTER — CLINICAL SUPPORT (OUTPATIENT)
Dept: REHABILITATION | Facility: HOSPITAL | Age: 42
End: 2022-01-27
Attending: ORTHOPAEDIC SURGERY
Payer: COMMERCIAL

## 2022-01-27 DIAGNOSIS — M25.661 LIMITATION OF JOINT MOTION OF KNEE, RIGHT: ICD-10-CM

## 2022-01-27 PROCEDURE — 97110 THERAPEUTIC EXERCISES: CPT | Mod: PO

## 2022-01-27 PROCEDURE — 97140 MANUAL THERAPY 1/> REGIONS: CPT | Mod: PO

## 2022-01-27 NOTE — PROGRESS NOTES
Physical Therapy Daily Treatment Note     Name: Kristofer Gentile  [facility] Number: [id number]    Therapy Diagnosis:   Encounter Diagnosis   Name Primary?    Limitation of joint motion of knee, right      Physician: Lilly Horan MD    Visit Date: 1/27/2022  Physician Orders: Evaluate and treat  Medical Diagnosis: Chondromalacia of right patellofemoral joint [M22.41], Knee swelling [M25.469]    Evaluation Date: 1/11/2022  Authorization Period Expiration: 1/25/2022 - 12/31/2022  Plan of Care Certification Period: 4/11/2022  Visit #/Visits authorized: 2/ 20     Time In: 1:30 PM   Time Out: 2:15 PM   Total Billable Time: 45 minutes    Precautions: Standard    Subjective     Pt reports: that the right lateral knee and right ankle are hurting 3/10 NPRS when getting in and out of the car.    She was compliant with home exercise program.    Response to previous treatment: The patient felt that the right lower extremity was getting tighter/firmer or stronger.    Functional change: None to note    Pain: 3/10  Location: right ankles and knee      Objective     Kristofer received therapeutic exercises to develop strength, endurance and ROM for 30 minutes including:  Quadriceps and Iliopsoas stretching 30 seconds hold x 5  SLR, DOUBLE KNEE TO CHEST, bridging and LTR 10 x 3  POSTERIOR PELVIC TILT 10 seconds hold x 10   Prone hip extension with knee straight 10 x 3     Kristofer received the following manual therapy techniques: Joint mobilizations were applied to the: right and left knee for 15 minutes, including:  Patellar Inferior mobilization garde 1 and 2      Home Exercises Provided and Patient Education Provided     Education provided:   - to perform bridging thereby reducing the risk of severe genu recurvatum.     Written Home Exercises Provided: yes.  Exercises were reviewed and Kristofer was able to demonstrate them prior to the end of the session.  Kristofer demonstrated good  understanding of the education  provided.     See EMR under Patient Instructions for exercises provided prior visit.    Assessment     The patient had difficulty performing bridging and prone hip extension due to weakness of the Hamstring muscles. The patient felt that the pain has reduced to 0/10 NPRS after the manual therapy but needs more PT sessions in order to reduce the risk of patellar subluxation.     Kristfoer Is progressing well towards her goals.   Pt prognosis is Good.     Pt will continue to benefit from skilled outpatient physical therapy to address the deficits listed in the problem list box on initial evaluation, provide pt/family education and to maximize pt's level of independence in the home and community environment.     Pt's spiritual, cultural and educational needs considered and pt agreeable to plan of care and goals.    Anticipated barriers to physical therapy: None     GOALS: Short Term Goals:  3 weeks  1.Report decreased right lateral knee pain  < / =  3/10  to increase tolerance for ambulation on even surface.  2. Increase knee ROM to 130 degrees in order to be able to perform ADLs without difficulty.  3. Increase strength by 1/3 MMT grade in the right lower extremity muscles  to increase tolerance for ADL and work activities.  4. Pt to tolerate HEP to improve ROM and independence with ADL's     Long Term Goals: 6 weeks  1.Report decreased right lateral knee pain < / = 0/10  to increase tolerance for climbing up and down the stairs.  2.Patient goal: To be able to work out without any pain.  3.Increase strength to >/= 4+/5 in the right lower extremity  to increase tolerance for ADL and work activities.  4. Pt will report at CI level (1-20% impaired) on FOTO knee to demonstrate increase in LE function with every day tasks.      PLAN   Plan of care Certification: 1/11/2022 to 4/22/2022.     Outpatient Physical Therapy to include the following interventions: Manual Therapy, Neuromuscular Re-ed and Patient Education.         Lord Martin Quispe, PT ,DPT, MTC

## 2022-01-31 ENCOUNTER — CLINICAL SUPPORT (OUTPATIENT)
Dept: REHABILITATION | Facility: HOSPITAL | Age: 42
End: 2022-01-31
Attending: ORTHOPAEDIC SURGERY
Payer: COMMERCIAL

## 2022-01-31 DIAGNOSIS — M25.661 LIMITATION OF JOINT MOTION OF KNEE, RIGHT: ICD-10-CM

## 2022-01-31 PROCEDURE — 97110 THERAPEUTIC EXERCISES: CPT | Mod: PO,CQ

## 2022-01-31 NOTE — PROGRESS NOTES
"    Physical Therapy Daily Treatment Note     Name: Kristofer Gentile  Clinic Number: [id number]    Therapy Diagnosis:   Encounter Diagnosis   Name Primary?    Limitation of joint motion of knee, right      Physician: Lilly Horan MD    Visit Date: 1/31/2022  Physician Orders: Evaluate and treat  Medical Diagnosis: Chondromalacia of right patellofemoral joint [M22.41], Knee swelling [M25.469]    Evaluation Date: 1/11/2022  Authorization Period Expiration: 1/25/2022 - 12/31/2022  Plan of Care Certification Period: 4/11/2022  Visit #/Visits authorized: 2/ 20     Time In: 3:10 PM   Time Out: 3:56 PM   Total Billable Time: 46 minutes    Precautions: Standard    Subjective     Pt reports: increased tightness of the left quad, pain in the usual spot on the knee (lateral aspect)    She was not compliant with home exercise program.    Response to previous treatment: "great, hips were a little sore but that was because of the stretching"    Functional change: None to note    Pain: 3/10  Location: right ankles and knee      Objective     Kristofer received therapeutic exercises to develop strength, endurance and ROM for 30 minutes including:  Quadriceps and Iliopsoas stretching 30 seconds hold x 5  B Straight Leg Raise 10 x 3  DOUBLE KNEE TO CHEST 10 x 3  Single leg bridges 10 x 2 (bridges on ball too challenging on core)  LTR 10 x 3  POSTERIOR PELVIC TILT 10 seconds hold x 10   Prone hip extension with knee straight 10 x 3  (performed with knees bent, pt could not keep legs straight)  B Hamstring curl #3 10 x 3   Prone hamstring curl #2 10 x 3    Kristofer received the following manual therapy techniques: Joint mobilizations were applied to the: right and left knee for 00 minutes, including:  Patellar Inferior mobilization garde 1 and 2      Home Exercises Provided and Patient Education Provided     Education provided:   - to perform bridging thereby reducing the risk of severe genu recurvatum.     Written Home " Exercises Provided: yes.  Exercises were reviewed and Kristofer was able to demonstrate them prior to the end of the session.  Kristofer demonstrated good  understanding of the education provided.     See EMR under Patient Instructions for exercises provided prior visit.    Assessment     Pt tolerated therapy well. Single leg bridges performed to further target hamstrings. Pt was challenged with these due to hamstring weakness and required rest breaks but was able to complete all reps. Pt is very challenged with hip extension exercise which was completed with the knee at 90 degrees since pt was having trouble keeping the leg straight. Weighted hamstring curl added to strengthen hamstring, pt had no adverse effects. Kristofer will benefit from skilled therapy.     Kristofer Is progressing well towards her goals.   Pt prognosis is Good.     Pt will continue to benefit from skilled outpatient physical therapy to address the deficits listed in the problem list box on initial evaluation, provide pt/family education and to maximize pt's level of independence in the home and community environment.     Pt's spiritual, cultural and educational needs considered and pt agreeable to plan of care and goals.    Anticipated barriers to physical therapy: None     GOALS: Short Term Goals:  3 weeks  1.Report decreased right lateral knee pain  < / =  3/10  to increase tolerance for ambulation on even surface. Progressing 1/31/2022    2. Increase knee ROM to 130 degrees in order to be able to perform ADLs without difficulty. Progressing 1/31/2022    3. Increase strength by 1/3 MMT grade in the right lower extremity muscles  to increase tolerance for ADL and work activities. Progressing 1/31/2022    4. Pt to tolerate HEP to improve ROM and independence with ADL's Progressing 1/31/2022        Long Term Goals: 6 weeks  1.Report decreased right lateral knee pain < / = 0/10  to increase tolerance for climbing up and down the  stairs.  2.Patient goal: To be able to work out without any pain.  3.Increase strength to >/= 4+/5 in the right lower extremity  to increase tolerance for ADL and work activities.  4. Pt will report at CI level (1-20% impaired) on FOTO knee to demonstrate increase in LE function with every day tasks.      PLAN   Plan of care Certification: 1/11/2022 to 4/22/2022.     Continue POC as outlined in eval: Outpatient Physical Therapy to include the following interventions: Manual Therapy, Neuromuscular Re-ed and Patient Education.        Juliana Siegel, PTA

## 2022-02-02 ENCOUNTER — TELEPHONE (OUTPATIENT)
Dept: REHABILITATION | Facility: HOSPITAL | Age: 42
End: 2022-02-02
Payer: COMMERCIAL

## 2022-02-02 NOTE — TELEPHONE ENCOUNTER
Called to let pt know that my last 2 pts are coming in and there is not an open spot to reschedule her missed appt this week.

## 2022-02-07 ENCOUNTER — CLINICAL SUPPORT (OUTPATIENT)
Dept: REHABILITATION | Facility: HOSPITAL | Age: 42
End: 2022-02-07
Attending: ORTHOPAEDIC SURGERY
Payer: COMMERCIAL

## 2022-02-07 DIAGNOSIS — M25.661 LIMITATION OF JOINT MOTION OF KNEE, RIGHT: ICD-10-CM

## 2022-02-07 PROCEDURE — 97110 THERAPEUTIC EXERCISES: CPT | Mod: PO,CQ

## 2022-02-07 PROCEDURE — 97112 NEUROMUSCULAR REEDUCATION: CPT | Mod: PO,CQ

## 2022-02-07 PROCEDURE — 97140 MANUAL THERAPY 1/> REGIONS: CPT | Mod: PO,CQ

## 2022-02-07 NOTE — PROGRESS NOTES
"    Physical Therapy Daily Treatment Note     Name: Kristofer Gentile  Clinic Number: [id number]    Therapy Diagnosis:   Encounter Diagnosis   Name Primary?    Limitation of joint motion of knee, right      Physician: Lilly Horan MD    Visit Date: 2/7/2022  Physician Orders: Evaluate and treat  Medical Diagnosis: Chondromalacia of right patellofemoral joint [M22.41], Knee swelling [M25.469]    Evaluation Date: 1/11/2022  Authorization Period Expiration: 1/25/2022 - 12/31/2022  Plan of Care Certification Period: 4/11/2022  Visit #/Visits authorized: 3/ 20   PTA Visit # 2/6    Time In: 3:00 PM   Time Out: 3:55 PM   Total Billable Time: 55 minutes    Precautions: Standard    Subjective     Pt reports: Pain at the lateral aspect of the knee but only if you touch     She was compliant with home exercise program.    Response to previous treatment: "I felt good, pain was the same after"    Functional change: None stated    Pain: 3/10  Post: 1/10  Location: right ankles and knee      Objective     Kristofer received therapeutic exercises to develop strength, endurance and ROM for 30 minutes including:  Quadriceps and Iliopsoas stretching 30 seconds hold x 5  B Straight Leg Raise 10 x 3  SIdelying hip Abduction 10 x 3   DOUBLE KNEE TO CHEST 10 x 3  Single leg bridges 10 x 2 (bridges on ball too challenging on core)  LTR 10 x 3  POSTERIOR PELVIC TILT 10 seconds hold x 10   Prone hip extension with knee straight 10 x 3  (performed with knees bent, pt could not keep legs straight)  B Hamstring curl #3 10 x 3   Prone hamstring curl #2 10 x 3    Kristofer participated in neuromuscular re-education activities to improve:glure med control for 10 minutes. The following activities were included:  Sidelying hip abduction 10 x 3  Standing hip abduction 10 x 3  Standing hip extension 10 x 3      Kristofer received the following manual therapy techniques: Joint mobilizations were applied to the: right and left knee for 15 minutes, " including:  Patellar Inferior mobilization garde 1 and 2    Cross friction massage to lateral aspect of knee    Home Exercises Provided and Patient Education Provided     Education provided:   - to perform bridging thereby reducing the risk of severe genu recurvatum. (continue)    Written Home Exercises Provided: yes.  Exercises were reviewed and Kristofer was able to demonstrate them prior to the end of the session.  Kristofer demonstrated good  understanding of the education provided.     See EMR under Patient Instructions for exercises provided prior visit.    Assessment     Pt tolerated therapy well. Sidelying hip abduction added to strengthen glute med. Verbal and tactile cues given to improve technique but pt was very challenged with positioning. Standing hip abduction and hip extension performed to complete exercise with better positioning. With verbal cues pt was able to correctly target muscle and perform exercises. Cross friction massage applied to lateral aspect of the knee to reduce pain. After CRFM and exercises pt stated she pain in R knee was greatly decreased. Kristofer will continue to benefit from skilled therapy.     Kristofer Is progressing well towards her goals.   Pt prognosis is Good.     Pt will continue to benefit from skilled outpatient physical therapy to address the deficits listed in the problem list box on initial evaluation, provide pt/family education and to maximize pt's level of independence in the home and community environment.     Pt's spiritual, cultural and educational needs considered and pt agreeable to plan of care and goals.    Anticipated barriers to physical therapy: None     GOALS: Short Term Goals:  3 weeks  1.Report decreased right lateral knee pain  < / =  3/10  to increase tolerance for ambulation on even surface. Progressing 2/7/2022    2. Increase knee ROM to 130 degrees in order to be able to perform ADLs without difficulty. Progressing 2/7/2022    3. Increase  strength by 1/3 MMT grade in the right lower extremity muscles  to increase tolerance for ADL and work activities. Progressing 2/7/2022    4. Pt to tolerate HEP to improve ROM and independence with ADL's Progressing 2/7/2022        Long Term Goals: 6 weeks  1.Report decreased right lateral knee pain < / = 0/10  to increase tolerance for climbing up and down the stairs.  2.Patient goal: To be able to work out without any pain.  3.Increase strength to >/= 4+/5 in the right lower extremity  to increase tolerance for ADL and work activities.  4. Pt will report at CI level (1-20% impaired) on FOTO knee to demonstrate increase in LE function with every day tasks.      PLAN   Plan of care Certification: 1/11/2022 to 4/22/2022.     Continue POC as outlined in eval: Outpatient Physical Therapy to include the following interventions: Manual Therapy, Neuromuscular Re-ed and Patient Education.        Juliana Siegel, PTA

## 2022-02-09 ENCOUNTER — CLINICAL SUPPORT (OUTPATIENT)
Dept: REHABILITATION | Facility: HOSPITAL | Age: 42
End: 2022-02-09
Attending: ORTHOPAEDIC SURGERY
Payer: COMMERCIAL

## 2022-02-09 DIAGNOSIS — M25.661 LIMITATION OF JOINT MOTION OF KNEE, RIGHT: ICD-10-CM

## 2022-02-09 PROCEDURE — 97110 THERAPEUTIC EXERCISES: CPT | Mod: PO

## 2022-02-09 PROCEDURE — 97140 MANUAL THERAPY 1/> REGIONS: CPT | Mod: PO

## 2022-02-09 NOTE — PROGRESS NOTES
"  Physical Therapy Daily Treatment Note     Name: Kristofer Gentile  [facility] Number: [id number]    Therapy Diagnosis:   Encounter Diagnosis   Name Primary?    Limitation of joint motion of knee, right      Physician: Lilly Horan MD    Visit Date: 2/9/2022  Physician Orders: Evaluate and treat  Medical Diagnosis: Chondromalacia of right patellofemoral joint [M22.41], Knee swelling [M25.469]    Evaluation Date: 1/11/2022  Authorization Period Expiration: 1/25/2022 - 12/31/2022  Plan of Care Certification Period: 4/11/2022  Visit #/Visits authorized: 5/ 20   PTA Visit # 2/6    Time In: 2:15 PM   Time Out: 3:00 PM   Total Billable Time: 45 minutes    Precautions: Standard    Subjective     Pt reports: Pain at the lateral aspect of the knee and very sensitive to touch.     She was compliant with home exercise program.    Response to previous treatment: "I felt good, pain was the same after"    Functional change: The patient can walk longer distance with lesser discomfort.     Pain: 3/10  Post: 1/10  Location: right ankles and knee      Objective     Kristofer received therapeutic exercises to develop strength, endurance and ROM for 30 minutes including:  Quadriceps and Iliopsoas stretching 30 seconds hold x 5  Bilateral Straight Leg Raise 10 x 3  SIdelying hip Abduction 10 x 3   DOUBLE KNEE TO CHEST 10 x 3  Single leg bridges 10 x 2 (bridges on ball too challenging on core)  LTR 10 x 3  POSTERIOR PELVIC TILT 10 seconds hold x 10   Prone hip extension with knee straight 10 x 3    B Hamstring curl #3 10 x 3   Prone hamstring curl #2 10 x 3    Kristofer participated in neuromuscular re-education activities to improve:glure med control for 00 minutes. The following activities were included:  Sidelying hip abduction 10 x 3  Standing hip abduction 10 x 3  Standing hip extension 10 x 3      Kristofer received the following manual therapy techniques: Joint mobilizations were applied to the: right and left knee for 15 " minutes, including:  Patellar Inferior mobilization garde 1 and 2    Cross friction massage to lateral aspect of knee    Home Exercises Provided and Patient Education Provided     Education provided:   - to perform bridging thereby reducing the risk of severe genu recurvatum. (continue)    Written Home Exercises Provided: yes.  Exercises were reviewed and Kristofer was able to demonstrate them prior to the end of the session.  Kristofer demonstrated good  understanding of the education provided.     See EMR under Patient Instructions for exercises provided prior visit.    Assessment     Pt tolerated therapy well but still exhibiting difficulty lifting the lower extremity due to weak core muscles. The patient has still low endurance but the pain subsided to 1/10 NPRS after the session. The patient will continue to benefit from skilled therapy.     Kristofer Is progressing well towards her goals.   Pt prognosis is Good.     Pt will continue to benefit from skilled outpatient physical therapy to address the deficits listed in the problem list box on initial evaluation, provide pt/family education and to maximize pt's level of independence in the home and community environment.     Pt's spiritual, cultural and educational needs considered and pt agreeable to plan of care and goals.    Anticipated barriers to physical therapy: None     GOALS: Short Term Goals:  3 weeks  1.Report decreased right lateral knee pain  < / =  3/10  to increase tolerance for ambulation on even surface. Progressing 2/9/2022    2. Increase knee ROM to 130 degrees in order to be able to perform ADLs without difficulty. Progressing 2/9/2022    3. Increase strength by 1/3 MMT grade in the right lower extremity muscles  to increase tolerance for ADL and work activities. Progressing 2/9/2022    4. Pt to tolerate HEP to improve ROM and independence with ADL's Progressing 2/9/2022        Long Term Goals: 6 weeks  1.Report decreased right lateral knee  pain < / = 0/10  to increase tolerance for climbing up and down the stairs.  2.Patient goal: To be able to work out without any pain.  3.Increase strength to >/= 4+/5 in the right lower extremity  to increase tolerance for ADL and work activities.  4. Pt will report at CI level (1-20% impaired) on FOTO knee to demonstrate increase in LE function with every day tasks.      PLAN   Plan of care Certification: 1/11/2022 to 4/22/2022.     Continue POC as outlined in eval: Outpatient Physical Therapy to include the following interventions: Manual Therapy, Neuromuscular Re-ed and Patient Education.        Lord Martin Quispe, PT, DPT, MTC

## 2022-02-11 DIAGNOSIS — N94.9 GENITAL LESION, FEMALE: ICD-10-CM

## 2022-02-11 RX ORDER — VALACYCLOVIR HYDROCHLORIDE 500 MG/1
500 TABLET, FILM COATED ORAL DAILY
Qty: 30 TABLET | Refills: 11 | Status: SHIPPED | OUTPATIENT
Start: 2022-02-11 | End: 2022-03-13

## 2022-02-14 ENCOUNTER — CLINICAL SUPPORT (OUTPATIENT)
Dept: REHABILITATION | Facility: HOSPITAL | Age: 42
End: 2022-02-14
Attending: ORTHOPAEDIC SURGERY
Payer: COMMERCIAL

## 2022-02-14 DIAGNOSIS — M25.661 LIMITATION OF JOINT MOTION OF KNEE, RIGHT: ICD-10-CM

## 2022-02-14 NOTE — PROGRESS NOTES
"  Physical Therapy Progress Note     Name: Kristofer Gentile  Clinic Number: [id number]    Therapy Diagnosis:   Encounter Diagnosis   Name Primary?    Limitation of joint motion of knee, right      Physician: Lilly Horan MD    Visit Date: 2/14/2022  Physician Orders: Evaluate and treat  Medical Diagnosis: Chondromalacia of right patellofemoral joint [M22.41], Knee swelling [M25.469]    Evaluation Date: 1/11/2022  Authorization Period Expiration: 1/25/2022 - 12/31/2022  Plan of Care Certification Period: 4/11/2022  Visit #/Visits authorized: 5/ 20   PTA Visit # 2/6    Time In: 2:15 PM   Time Out: 3:00 PM   Total Billable Time: 45 minutes    Precautions: Standard    Subjective     Pt reports: Pain at the lateral aspect of the knee and very sensitive to touch.     She was compliant with home exercise program.    Response to previous treatment: "I felt good, pain was the same after"    Functional change: The patient can walk longer distance with lesser discomfort.     Pain: 3/10  Post: 1/10  Location: right ankles and knee      Objective     Range of Motion (Passive):   Knee Right  Left    Flexion 10-0-130 5-0-130   Extension 130-0-10 130-0-5     Range of Motion (Active):   Knee Right  Left    Flexion 10-0-130 5-0-130   Extension 130-10 130-0-5       Lower Extremity Strength  Right LE  Left LE    Quadriceps: 4/5 Quadriceps: 5/5   Hamstrings: 4/5 Hamstrings: 5/5   Hip flexion (seated): 4/5 Hip flexion (seated): 5/5   Hip extension:  4/5 Hip extension: 5/5   PGM: 4/5 PGM:  5/5   Hip ER:  4/5 Hip ER:  5/5   Hip IR: 4/5 Hip IR: 5/5         Kristofer received therapeutic exercises to develop strength, endurance and ROM for 30 minutes including:  Quadriceps and Iliopsoas stretching 30 seconds hold x 5  Bilateral Straight Leg Raise 10 x 3  SIdelying hip Abduction 10 x 3   DOUBLE KNEE TO CHEST 10 x 3  Single leg bridges 10 x 2 (bridges on ball too challenging on core)  LTR 10 x 3  POSTERIOR PELVIC TILT 10 seconds hold x 10 "   Prone hip extension with knee straight using 5 lb weight 10 x 3    B Hamstring curl #3 10 x 3   Prone hamstring curl #2 10 x 3    Kristofer participated in neuromuscular re-education activities to improve:glure med control for 00 minutes. The following activities were included:  Sidelying hip abduction 10 x 3  Standing hip abduction 10 x 3  Standing hip extension 10 x 3      Kristofer received the following manual therapy techniques: Joint mobilizations were applied to the: right and left knee for 15 minutes, including:  Patellar Inferior mobilization garde 1 and 2    Cross friction massage to lateral aspect of knee    Home Exercises Provided and Patient Education Provided     Education provided:   - to perform bridging thereby reducing the risk of severe genu recurvatum. (continue)    Written Home Exercises Provided: yes.  Exercises were reviewed and Kristofer was able to demonstrate them prior to the end of the session.  Kristofer demonstrated good  understanding of the education provided.     See EMR under Patient Instructions for exercises provided prior visit.    Assessment   The patient was able to achieve all of the short term goals. The patient has achieved 4/5 MMT of the right lower extremity, normal ROM of both lower extremities, and has been exercising since the beginning of the therapy. The patient tolerated therapy well but still exhibiting difficulty lifting the lower extremity due to weak core muscles and low endurance.     Kristofer Is progressing well towards her goals.   Pt prognosis is Good.     Pt will continue to benefit from skilled outpatient physical therapy to address the deficits listed in the problem list box on initial evaluation, provide pt/family education and to maximize pt's level of independence in the home and community environment.     Pt's spiritual, cultural and educational needs considered and pt agreeable to plan of care and goals.    Anticipated barriers to physical  therapy: None     GOALS: Short Term Goals:  3 weeks  1.Report decreased right lateral knee pain  < / =  3/10  to increase tolerance for ambulation on even surface. Goal achieved as of 2/14/2022    2. Increase knee ROM to 130 degrees in order to be able to perform ADLs without difficulty.   Goal achieved as of 2/14/2022    3. Increase strength by 1/3 MMT grade in the right lower extremity muscles  to increase tolerance for ADL and work activities. Goal achieved as of 1/14/2022    4. Pt to tolerate HEP to improve ROM and independence with ADL's. Goal achieved as of 2/14/2022      Long Term Goals: 6 weeks    1.Report decreased right lateral knee pain < / = 0/10  to increase tolerance for climbing up and down the stairs.    2.Patient goal: To be able to work out without any pain.    3.Increase strength to >/= 4+/5 in the right lower extremity  to increase tolerance for ADL and work activities.    4. Pt will report at CI level (1-20% impaired) on WOMAC knee to demonstrate increase in LE function with every day tasks.      PLAN   Plan of care Certification: 1/11/2022 to 4/22/2022.     Continue POC as outlined in eval: Outpatient Physical Therapy to include the following interventions: Manual Therapy, Neuromuscular Re-ed and Patient Education.        Lord Martin Quispe, PT, DPT, MTC

## 2022-02-16 ENCOUNTER — CLINICAL SUPPORT (OUTPATIENT)
Dept: REHABILITATION | Facility: HOSPITAL | Age: 42
End: 2022-02-16
Attending: ORTHOPAEDIC SURGERY
Payer: COMMERCIAL

## 2022-02-16 DIAGNOSIS — M25.661 LIMITATION OF JOINT MOTION OF KNEE, RIGHT: ICD-10-CM

## 2022-02-16 PROCEDURE — 97140 MANUAL THERAPY 1/> REGIONS: CPT | Mod: PO

## 2022-02-16 PROCEDURE — 97110 THERAPEUTIC EXERCISES: CPT | Mod: PO

## 2022-02-16 NOTE — PROGRESS NOTES
"  Physical Therapy Daily Note     Name: Kristofer Gentile  Clinic Number: [id number]    Therapy Diagnosis:   Encounter Diagnosis   Name Primary?   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     She was compliant with home exercise program.    Response to previous treatment: "I felt good, pain was the same after"    Functional change: The patient can walk longer distance attend her extra curricular activities with lesser discomfort.     Pain: 3/10  Post: 1/10  Location: right ankles and knee      Objective     Range of Motion (Passive):   Knee Right  Left    Flexion 10-0-130 5-0-130   Extension 130-0-10 130-0-5     Range of Motion (Active):   Knee Right  Left    Flexion 10-0-130 5-0-130   Extension 130-10 130-0-5       Kristofer received therapeutic exercises to develop strength, endurance and ROM for 30 minutes including:  Quadriceps and Iliopsoas stretching 30 seconds hold x 5  Bilateral Straight Leg Raise 10 x 3  SIdelying hip Abduction 10 x 3   DOUBLE KNEE TO CHEST 10 x 3  Single leg bridges 10 x 2 (bridges on ball too challenging on core)  LTR 10 x 3  Partial sit ups 10 x 6   POSTERIOR PELVIC TILT 10 seconds hold x 10   Prone hip extension with knee straight using 5 lb weight 10 x 3    B Hamstring curl #3 10 x 3   Prone hamstring curl #2 10 x 3    Kristofer participated in neuromuscular re-education activities to improve:glure med control for " 00 minutes. The following activities were included:  Sidelying hip abduction 10 x 3  Standing hip abduction 10 x 3  Standing hip extension 10 x 3      Kristofer received the following manual therapy techniques: Joint mobilizations were applied to the: right and left knee for 15 minutes, including:  Patellar Inferior mobilization gilles 1,2    Cross friction massage to lateral aspect of knee    Home Exercises Provided and Patient Education Provided     Education provided:   - to perform bridging thereby reducing the risk of severe genu recurvatum. (continue)    Written Home Exercises Provided: yes.  Exercises were reviewed and Kristofer was able to demonstrate them prior to the end of the session.  Kristofer demonstrated good  understanding of the education provided.     See EMR under Patient Instructions for exercises provided prior visit.    Assessment     The patient tolerated therapy well but still exhibiting difficulty lifting the lower extremity specifically the hip extension using 5 lb weight and weak core muscles. The patient has still difficulty controlling knee recurvatum and needs more PT sessions in order to reduce the risk of right patellar subluxation.      Kristofer Is progressing well towards her goals.   Pt prognosis is Good.     Pt will continue to benefit from skilled outpatient physical therapy to address the deficits listed in the problem list box on initial evaluation, provide pt/family education and to maximize pt's level of independence in the home and community environment.     Pt's spiritual, cultural and educational needs considered and pt agreeable to plan of care and goals.    Anticipated barriers to physical therapy: None     GOALS: Short Term Goals:  3 weeks  1.Report decreased right lateral knee pain  < / =  3/10  to increase tolerance for ambulation on even surface. Goal achieved as of 2/14/2022    2. Increase knee ROM to 130 degrees in order to be able to perform ADLs without  difficulty.   Goal achieved as of 2/14/2022    3. Increase strength by 1/3 MMT grade in the right lower extremity muscles  to increase tolerance for ADL and work activities. Goal achieved as of 1/14/2022    4. Pt to tolerate HEP to improve ROM and independence with ADL's. Goal achieved as of 2/14/2022      Long Term Goals: 6 weeks    1.Report decreased right lateral knee pain < / = 0/10  to increase tolerance for climbing up and down the stairs.    2.Patient goal: To be able to work out without any pain.    3.Increase strength to >/= 4+/5 in the right lower extremity  to increase tolerance for ADL and work activities.    4. Pt will report at CI level (1-20% impaired) on WOMAC knee to demonstrate increase in LE function with every day tasks.      PLAN   Plan of care Certification: 1/11/2022 to 4/22/2022.     Continue POC as outlined in eval: Outpatient Physical Therapy to include the following interventions: Manual Therapy, Neuromuscular Re-ed and Patient Education.        Lord Martin Quispe, PT, DPT, MTC

## 2022-02-22 ENCOUNTER — CLINICAL SUPPORT (OUTPATIENT)
Dept: REHABILITATION | Facility: HOSPITAL | Age: 42
End: 2022-02-22
Attending: ORTHOPAEDIC SURGERY
Payer: COMMERCIAL

## 2022-02-22 DIAGNOSIS — M25.661 LIMITATION OF JOINT MOTION OF KNEE, RIGHT: Primary | ICD-10-CM

## 2022-02-22 PROCEDURE — 97110 THERAPEUTIC EXERCISES: CPT | Mod: PO,CQ

## 2022-02-22 NOTE — PROGRESS NOTES
"  Physical Therapy Daily Note     Name: Kristofer Gentile  Clinic Number: [id number]    Therapy Diagnosis:   Encounter Diagnosis   Name Primary?   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 She's been mindful of hyper extending knee when she walks    She was compliant with home exercise program.    Response to previous treatment: "pt felt good, the pt worked me"    Functional change: decreased pain while walking because she's mindful of hyper extension    Pain: 0/10  Post: 1/10  Location: right ankles and knee      Objective     Range of Motion (Passive):   Knee Right  Left    Flexion 10-0-130 5-0-130   Extension 130-0-10 130-0-5     Range of Motion (Active):   Knee Right  Left    Flexion 10-0-130 5-0-130   Extension 130-10 130-0-5       Kristofer received therapeutic exercises to develop strength, endurance and ROM for 38 minutes including:  Quadriceps and Iliopsoas stretching 30 seconds hold x 5  Bilateral Straight Leg Raise 10 x 3  SIdelying hip Abduction 10 x 3   DOUBLE KNEE TO CHEST 10 x 3  Single leg bridges 10 x 2 (adjusted to a bridge into a march to encourage increased control of exercise motion)  LTR 10 x 3  Partial sit ups 10 x 6   POSTERIOR PELVIC TILT 10 seconds hold x 10   Prone hip extension with knee straight using 5 lb weight 10 x 3    B Hamstring curl #3 10 x 3   Prone hamstring curl #3 10 x 3  Stool scoots in rolling chair 180'    Bobysanjanainder " "participated in neuromuscular re-education activities to improve:glure med control for 00 minutes. The following activities were included:  Sidelying hip abduction 10 x 3  Standing hip abduction 10 x 3  Standing hip extension 10 x 3      Kristofer received the following manual therapy techniques: Joint mobilizations were applied to the: right and left knee for 00 minutes, including:  Patellar Inferior mobilization garde 1,2    Cross friction massage to lateral aspect of knee    Home Exercises Provided and Patient Education Provided     Education provided:   - to perform bridging thereby reducing the risk of severe genu recurvatum. (continue)    Written Home Exercises Provided: yes.  Exercises were reviewed and Kristofer was able to demonstrate them prior to the end of the session.  Kristofer demonstrated good  understanding of the education provided.     See EMR under Patient Instructions for exercises provided prior visit.    Assessment     Pt tolerated therapy well. Exercises added due to pt's decreased pain. Stool scoots added to strengthen B hamstring. Pt tolerated these well, resistance should be added next session to make exercise more challenging. Single leg bridges adjusted to bridged marching to improve control of movement during exercise and reduce "hopping" to raise leg. Pt externally rotates L hip with prone hamstring curl verbal cues given to pt to reduce motion. Kristofer will continue to benefit from skilled therapy.     Kristofer Is progressing well towards her goals.   Pt prognosis is Good.     Pt will continue to benefit from skilled outpatient physical therapy to address the deficits listed in the problem list box on initial evaluation, provide pt/family education and to maximize pt's level of independence in the home and community environment.     Pt's spiritual, cultural and educational needs considered and pt agreeable to plan of care and goals.    Anticipated barriers to physical therapy: " None     GOALS: Short Term Goals:  3 weeks  1.Report decreased right lateral knee pain  < / =  3/10  to increase tolerance for ambulation on even surface. Goal achieved as of 2/14/2022    2. Increase knee ROM to 130 degrees in order to be able to perform ADLs without difficulty.   Goal achieved as of 2/14/2022    3. Increase strength by 1/3 MMT grade in the right lower extremity muscles  to increase tolerance for ADL and work activities. Goal achieved as of 1/14/2022    4. Pt to tolerate HEP to improve ROM and independence with ADL's. Goal achieved as of 2/14/2022      Long Term Goals: 6 weeks    1.Report decreased right lateral knee pain < / = 0/10  to increase tolerance for climbing up and down the stairs.    2.Patient goal: To be able to work out without any pain.    3.Increase strength to >/= 4+/5 in the right lower extremity  to increase tolerance for ADL and work activities.    4. Pt will report at CI level (1-20% impaired) on WOMAC knee to demonstrate increase in LE function with every day tasks.      PLAN   Plan of care Certification: 1/11/2022 to 4/22/2022.     Continue POC as outlined in eval: Outpatient Physical Therapy to include the following interventions: Manual Therapy, Neuromuscular Re-ed and Patient Education.        Juliana Siegel PTA,

## 2022-03-02 ENCOUNTER — DOCUMENTATION ONLY (OUTPATIENT)
Dept: REHABILITATION | Facility: HOSPITAL | Age: 42
End: 2022-03-02
Payer: COMMERCIAL

## 2022-03-02 ENCOUNTER — CLINICAL SUPPORT (OUTPATIENT)
Dept: REHABILITATION | Facility: HOSPITAL | Age: 42
End: 2022-03-02
Attending: ORTHOPAEDIC SURGERY
Payer: COMMERCIAL

## 2022-03-02 DIAGNOSIS — M25.661 LIMITATION OF JOINT MOTION OF KNEE, RIGHT: Primary | ICD-10-CM

## 2022-03-02 PROCEDURE — 97110 THERAPEUTIC EXERCISES: CPT | Mod: PO

## 2022-03-02 NOTE — PROGRESS NOTES
30-day PT-PTA face-face discussion with Lord Martin GARCIAT, re: Name: Mitchellwajareth Corderofton  [facility] Number: [id number] patient status, POC, and plan for progression done.

## 2022-03-02 NOTE — PROGRESS NOTES
"    Physical Therapy Daily Note     Name: Kristofer Gentile  Clinic Number: [id number]    Therapy Diagnosis:   Encounter Diagnosis   Name Primary?   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    She was compliant with home exercise program.    Response to previous treatment: "pt felt good, the pt worked me"    Functional change: decreased pain while walking because she's mindful of hyper extension    Pain: 0/10    Post: 1/10    Location: right lateral and posterior knee.       Objective     Range of Motion (Passive):   Knee Right  Left    Flexion 10-0-130 5-0-130   Extension 130-0-10 130-0-5     Range of Motion (Active):   Knee Right  Left    Flexion 10-0-130 5-0-130   Extension 130-10 130-0-5       Kristofer received therapeutic exercises to develop strength, endurance and ROM for 45 minutes including:  Quadriceps and Iliopsoas stretching 30 seconds hold x 5  Bilateral Straight Leg Raise 10 x 3  SIdelying hip Abduction 10 x 3   DOUBLE KNEE TO CHEST 10 x 3  Single leg bridges 10 x 2 LTR 10 x 3  Partial sit ups 10 x 6   Hip extension in prone and standing position using 7 lb weight 10 x 6   POSTERIOR PELVIC TILT 10 seconds hold x 10   Prone hip extension with knee straight using 5 lb weight 10 x 3    B Hamstring curl #3 10 x 3   Prone hamstring curl # 7 10 x 3  Stool scoots in rolling chair " 180'    Kristofer participated in neuromuscular re-education activities to improve:glure med control for 00 minutes. The following activities were included:  Sidelying hip abduction 10 x 3  Standing hip abduction 10 x 3  Standing hip extension 10 x 3      Kristofer received the following manual therapy techniques: Joint mobilizations were applied to the: right and left knee for 00 minutes, including:  Patellar Inferior mobilization garde 1,2    Cross friction massage to lateral aspect of knee    Home Exercises Provided and Patient Education Provided     Education provided:   - to perform bridging thereby reducing the risk of severe genu recurvatum. (continue)    Written Home Exercises Provided: yes.  Exercises were reviewed and Kristofer was able to demonstrate them prior to the end of the session.  Kristofer demonstrated good  understanding of the education provided.     See EMR under Patient Instructions for exercises provided prior visit.    Assessment     Pt tolerated therapy well and concentrated with strengthening of the hamstring muscles. The patient is aware of standing with knee straight and not hyperextending it. No pain was noticed after the session.  Kristofer will continue to benefit from skilled therapy.     Kristofer Is progressing well towards her goals.   Pt prognosis is Good.     Pt will continue to benefit from skilled outpatient physical therapy to address the deficits listed in the problem list box on initial evaluation, provide pt/family education and to maximize pt's level of independence in the home and community environment.     Pt's spiritual, cultural and educational needs considered and pt agreeable to plan of care and goals.    Anticipated barriers to physical therapy: None     GOALS: Short Term Goals:  3 weeks  1.Report decreased right lateral knee pain  < / =  3/10  to increase tolerance for ambulation on even surface. Goal achieved as of 2/14/2022    2. Increase knee ROM to 130  degrees in order to be able to perform ADLs without difficulty.   Goal achieved as of 2/14/2022    3. Increase strength by 1/3 MMT grade in the right lower extremity muscles  to increase tolerance for ADL and work activities. Goal achieved as of 1/14/2022    4. Pt to tolerate HEP to improve ROM and independence with ADL's. Goal achieved as of 2/14/2022      Long Term Goals: 6 weeks    1.Report decreased right lateral knee pain < / = 0/10  to increase tolerance for climbing up and down the stairs.    2.Patient goal: To be able to work out without any pain.    3.Increase strength to >/= 4+/5 in the right lower extremity  to increase tolerance for ADL and work activities.    4. Pt will report at CI level (1-20% impaired) on WOMAC knee to demonstrate increase in LE function with every day tasks.      PLAN   Plan of care Certification: 1/11/2022 to 4/22/2022.     Continue POC as outlined in eval: Outpatient Physical Therapy to include the following interventions: Manual Therapy, Neuromuscular Re-ed and Patient Education.        Lord Martin Quispe, PT, DPT, MTC

## 2022-03-03 ENCOUNTER — DOCUMENTATION ONLY (OUTPATIENT)
Dept: REHABILITATION | Facility: HOSPITAL | Age: 42
End: 2022-03-03
Payer: COMMERCIAL

## 2022-03-03 NOTE — PROGRESS NOTES
30 day PT-PTA face-face discussion with Lord Martin GARCIAT, re: Name: Kristofer Gentile  [facility] Number: [id number] patient status, POC, and plan for progression done.

## 2022-03-04 ENCOUNTER — OFFICE VISIT (OUTPATIENT)
Dept: DERMATOLOGY | Facility: CLINIC | Age: 42
End: 2022-03-04
Payer: COMMERCIAL

## 2022-03-04 DIAGNOSIS — L74.510 PRIMARY FOCAL HYPERHIDROSIS OF AXILLA: ICD-10-CM

## 2022-03-04 DIAGNOSIS — L30.4 INTERTRIGO: Primary | ICD-10-CM

## 2022-03-04 PROCEDURE — 99214 PR OFFICE/OUTPT VISIT, EST, LEVL IV, 30-39 MIN: ICD-10-PCS | Mod: S$GLB,,, | Performed by: DERMATOLOGY

## 2022-03-04 PROCEDURE — 99999 PR PBB SHADOW E&M-EST. PATIENT-LVL II: ICD-10-PCS | Mod: PBBFAC,,, | Performed by: DERMATOLOGY

## 2022-03-04 PROCEDURE — 99214 OFFICE O/P EST MOD 30 MIN: CPT | Mod: S$GLB,,, | Performed by: DERMATOLOGY

## 2022-03-04 PROCEDURE — 99999 PR PBB SHADOW E&M-EST. PATIENT-LVL II: CPT | Mod: PBBFAC,,, | Performed by: DERMATOLOGY

## 2022-03-04 RX ORDER — KETOCONAZOLE 20 MG/G
CREAM TOPICAL 2 TIMES DAILY
Qty: 30 G | Refills: 6 | Status: SHIPPED | OUTPATIENT
Start: 2022-03-04

## 2022-03-04 RX ORDER — HYDROCORTISONE 25 MG/G
CREAM TOPICAL 2 TIMES DAILY
Qty: 28 G | Refills: 6 | Status: SHIPPED | OUTPATIENT
Start: 2022-03-04

## 2022-03-04 NOTE — PATIENT INSTRUCTIONS
Can use Zeasorb AF powder (purchase) daily to keep this area dry. Between flares     When flaring: Not for daily dose   Use ketoconazole cream and hydrocortisone cream twice a day for up to two weeks when flaring.     Can purchase Vanicream Deodorant and Antiperspirant.     Can still continue vinegar soaps.     Use dry sol for sweating daily.

## 2022-03-04 NOTE — PROGRESS NOTES
"  Subjective:       Patient ID:  Kristofer Gentile is a 41 y.o. female who presents for   Chief Complaint   Patient presents with    under arm irritation     HPI  42 y/o F w/ hx of intertrigo at axillae, submammary who presents for f/u of rash under bilateral axillae.     She has been using "shake lotion" daily and is also using vinegar soaps when flaring. She also uses a scrub of honey and tea tree oil almost nightly. Rash not present today. She says it is only clear for about 3-4 days at a time.       Review of Systems   Constitutional: Negative for malaise.   Skin: Positive for itching and rash.        Objective:    Physical Exam   Constitutional: She appears well-developed and well-nourished. No distress.   Neurological: She is alert and oriented to person, place, and time. She is not disoriented.   Psychiatric: She has a normal mood and affect.   Skin:   Areas Examined (abnormalities noted in diagram):   Head / Face Inspection Performed  Neck Inspection Performed  Chest / Axilla Inspection Performed  Back Inspection Performed  RUE Inspected  LUE Inspection Performed      No rash or erythema present to bilateral axilla today        Assessment / Plan:      Intertrigo  - Discussed diagnosis, etiology, and treatment options.   - Discussed that, as long as the patient's anatomy is as such, this is a chronic condition to manage NOT cure.   - Daily: Wash affected area with water and gentle non-soap cleanser. Ensure all cleanser is rinsed off prior to getting out shower/bath. Pat dry. Cool blow dry after showering. Then proceed to treatment (when rash present) or maintenance as below.   - Treatment (when rash present): Apply 50/50 mixture of rx hydrocortisone and ketoconazole creams. Do not apply daily but only if experiencing flare. Stop shake lotion.   - Maintenance (when no rash present): Apply Zeasorb AF powder as needed.      Primary focal hyperhidrosis of axilla  - Discussed diagnosis, etiology, and treatment " options.  - Resume drysol: Apply once daily at bedtime; once excessive sweating has stopped, may decrease to once or twice weekly, or as needed. Wash treated area in the morning.  - Rec'd use of Vanicream deodorant or antiperspirant.        Katalina Greco MD     I personally performed the history and physical exam for this patient and agree with the documentation and medical decision making as written by the resident above with my edits.     Anne Gamez MD   [employer] [facility] PGYII     Follow up as needed

## 2022-03-21 ENCOUNTER — TELEPHONE (OUTPATIENT)
Dept: REHABILITATION | Facility: HOSPITAL | Age: 42
End: 2022-03-21
Payer: COMMERCIAL

## 2022-03-21 NOTE — TELEPHONE ENCOUNTER
The patient was called and informed that she missed her PT session at 1:30 PM. The patient completely forgot her therapy and she promised to call back anytime tomorrow for her next visit.

## 2022-03-28 ENCOUNTER — CLINICAL SUPPORT (OUTPATIENT)
Dept: REHABILITATION | Facility: HOSPITAL | Age: 42
End: 2022-03-28
Payer: COMMERCIAL

## 2022-03-28 DIAGNOSIS — M25.661 LIMITATION OF JOINT MOTION OF KNEE, RIGHT: Primary | ICD-10-CM

## 2022-03-28 PROCEDURE — 97110 THERAPEUTIC EXERCISES: CPT | Mod: PO

## 2022-03-28 NOTE — PROGRESS NOTES
"  Physical Therapy Daily Note     Name: Kristofer Gentile  Clinic Number: [id number]    Therapy Diagnosis:   Encounter Diagnosis   Name Primary?    Limitation of joint motion of knee, right Yes     Physician: Lilly Horan MD    Visit Date: 3/28/2022  Physician Orders: Evaluate and treat  Medical Diagnosis: Chondromalacia of right patellofemoral joint [M22.41], Knee swelling [M25.469]    Evaluation Date: 1/11/2022  Authorization Period Expiration: 1/25/2022 - 12/31/2022  Plan of Care Certification Period: 4/11/2022  Visit #/Visits authorized: 10/ 20   PTA Visit # 1/6    Time In: 3:00 PM   Time Out: 3:45 PM   Total Billable Time: 45 minutes    Precautions: Standard    Subjective     Pt reports: that the right lateral and posterior and posterior aspect of the knee are hurting 1/10 NPRS upon getting up from bed.    She was compliant with home exercise program.    Response to previous treatment: "pt felt good, the pt worked me"    Functional change: decreased pain while walking because she's mindful of hyper extension    Pain: 0/10    Post: 1/10    Location: right lateral and posterior knee.       Objective     Range of Motion (Passive):   Knee Right  Left    Flexion 10-0-130 5-0-130   Extension 130-0-10 130-0-5     Range of Motion (Active):   Knee Right  Left    Flexion 10-0-130 5-0-130   Extension 130-10 130-0-5           Lower Extremity Strength  Right LE   Left LE     Quadriceps: 4+/5 Quadriceps: 5/5   Hamstrings: 4+/5 Hamstrings: 5/5   Hip flexion (seated): 4+/5 Hip flexion (seated): 5/5   Hip extension:  4+/5 Hip extension: 5/5   PGM: 4+/5 PGM:  5/5   Hip ER:  4+/5 Hip ER:  5/5   Hip IR: 4+/5 Hip IR: 5/5            Kristofer received therapeutic exercises to develop strength, endurance and ROM for 45 minutes including:  Quadriceps and Iliopsoas stretching 30 seconds hold x 5  Bilateral Straight Leg Raise 10 x 3  SIdelying hip Abduction 10 x 3   DOUBLE KNEE TO CHEST 10 x 3  Single leg bridges 10 x 2 LTR 10 x " 3  Partial sit ups 10 x 6   Hip extension in prone and standing position using 7 lb weight 10 x 6   POSTERIOR PELVIC TILT 10 seconds hold x 10   Prone hip extension with knee straight using 5 lb weight 10 x 3    B Hamstring curl #3 10 x 3   Prone hamstring curl # 7 10 x 3  Stool scoots in rolling chair 180'    Kristofer participated in neuromuscular re-education activities to improve:glure med control for 00 minutes. The following activities were included:  Sidelying hip abduction 10 x 3  Standing hip abduction 10 x 3  Standing hip extension 10 x 3      Kristofer received the following manual therapy techniques: Joint mobilizations were applied to the: right and left knee for 00 minutes, including:  Patellar Inferior mobilization garde 1,2    Cross friction massage to lateral aspect of knee    Home Exercises Provided and Patient Education Provided     Education provided:   - to perform bridging thereby reducing the risk of severe genu recurvatum. (continue)    Written Home Exercises Provided: yes.  Exercises were reviewed and Kristofer was able to demonstrate them prior to the end of the session.  Kristofer demonstrated good  understanding of the education provided.     See EMR under Patient Instructions for exercises provided prior visit.    Assessment     Pt tolerated therapy well and concentrated with strengthening of the hamstring muscles. The patient still having right lateral knee pain and still experiencing some tightness in the knee but has has better muscle strength compared with last session. Kristofer will continue to benefit from skilled therapy.     Kristofer Is progressing well towards her goals.   Pt prognosis is Good.     Pt will continue to benefit from skilled outpatient physical therapy to address the deficits listed in the problem list box on initial evaluation, provide pt/family education and to maximize pt's level of independence in the home and community environment.     Pt's spiritual,  cultural and educational needs considered and pt agreeable to plan of care and goals.    Anticipated barriers to physical therapy: None     GOALS: Short Term Goals:  3 weeks  1.Report decreased right lateral knee pain  < / =  3/10  to increase tolerance for ambulation on even surface. Goal achieved as of 2/14/2022    2. Increase knee ROM to 130 degrees in order to be able to perform ADLs without difficulty.   Goal achieved as of 2/14/2022    3. Increase strength by 1/3 MMT grade in the right lower extremity muscles  to increase tolerance for ADL and work activities. Goal achieved as of 1/14/2022    4. Pt to tolerate HEP to improve ROM and independence with ADL's. Goal achieved as of 2/14/2022      Long Term Goals: 6 weeks    1.Report decreased right lateral knee pain < / = 0/10  to increase tolerance for climbing up and down the stairs.    2.Patient goal: To be able to work out without any pain.    3.Increase strength to >/= 5/5 in the right lower extremity  to increase tolerance for ADL and work activities.    4. Pt will report at CI level (1-20% impaired) on WOMAC knee to demonstrate increase in LE function with every day tasks.      PLAN   Plan of care Certification: 3/28/2022 to 6/28/2022.     Continue POC as outlined in eval: Outpatient Physical Therapy to include the following interventions: Manual Therapy, Neuromuscular Re-ed and Patient Education.        Lord Martin Quispe, PT, DPT, MTC

## 2022-04-04 ENCOUNTER — CLINICAL SUPPORT (OUTPATIENT)
Dept: REHABILITATION | Facility: HOSPITAL | Age: 42
End: 2022-04-04
Payer: COMMERCIAL

## 2022-04-04 DIAGNOSIS — M25.661 LIMITATION OF JOINT MOTION OF KNEE, RIGHT: Primary | ICD-10-CM

## 2022-04-04 PROCEDURE — 97110 THERAPEUTIC EXERCISES: CPT | Mod: PO

## 2022-04-04 NOTE — PROGRESS NOTES
"  Physical Therapy Daily Note     Name: Kristofer Gentile  Clinic Number: [id number]    Therapy Diagnosis:   Encounter Diagnosis   Name Primary?    Limitation of joint motion of knee, right Yes     Physician: Lilly Horan MD    Visit Date: 4/4/2022  Physician Orders: Evaluate and treat  Medical Diagnosis: Chondromalacia of right patellofemoral joint [M22.41], Knee swelling [M25.469]    Evaluation Date: 1/11/2022  Authorization Period Expiration: 1/25/2022 - 12/31/2022  Plan of Care Certification Period: 4/11/2022  Visit #/Visits authorized: 11/ 20   PTA Visit # 1/6    Time In: 3:45 PM   Time Out: 4:30 PM   Total Billable Time: 45 minutes    Precautions: Standard    Subjective     Pt reports: that the right lateral and posterior and posterior aspect of the knee are hurting 1/10 NPRS upon getting up from bed.    She was compliant with home exercise program.    Response to previous treatment: "pt felt good, the pt worked me"    Functional change: decreased pain while walking because she's mindful of hyper extension    Pain: 0/10    Post: 1/10    Location: right lateral and posterior knee.       Objective     Range of Motion (Passive):   Knee Right  Left    Flexion 10-0-130 5-0-130   Extension 130-0-10 130-0-5     Range of Motion (Active):   Knee Right  Left    Flexion 10-0-130 5-0-130   Extension 130-10 130-0-5           Lower Extremity Strength  Right LE   Left LE     Quadriceps: 4+/5 Quadriceps: 5/5   Hamstrings: 4+/5 Hamstrings: 5/5   Hip flexion (seated): 4+/5 Hip flexion (seated): 5/5   Hip extension:  4+/5 Hip extension: 5/5   PGM: 4+/5 PGM:  5/5   Hip ER:  4+/5 Hip ER:  5/5   Hip IR: 4+/5 Hip IR: 5/5        Kristoefr received therapeutic exercises to develop strength, endurance and ROM for 45 minutes including:  Quadriceps and Iliopsoas stretching 30 seconds hold x 5  Bilateral Straight Leg Raise 10 x 3  SIdelying hip Abduction 10 x 3   DOUBLE KNEE TO CHEST 10 x 3  Single leg bridges 10 x 2 LTR 10 x " 3  Partial sit ups 10 x 6   Hip extension in prone and standing position using 10 lb weight 10 x 6   POSTERIOR PELVIC TILT 10 seconds hold x 10   B Hamstring curl #3 10 x 3   Prone hamstring curl # 7 10 x 3  Hip extension using black theraband 10 x 2    Stool scoots in rolling chair 180'    Kristofer participated in neuromuscular re-education activities to improve:glure med control for 00 minutes. The following activities were included:  Sidelying hip abduction 10 x 3  Standing hip abduction 10 x 3  Standing hip extension 10 x 3      Kristofer received the following manual therapy techniques: Joint mobilizations were applied to the: right and left knee for 00 minutes, including:  Patellar Inferior mobilization garde 1,2    Cross friction massage to lateral aspect of knee    Home Exercises Provided and Patient Education Provided     Education provided:   - to perform bridging thereby reducing the risk of severe genu recurvatum. (continue)    Written Home Exercises Provided: yes.  Exercises were reviewed and Kristofer was able to demonstrate them prior to the end of the session.  Kristofer demonstrated good  understanding of the education provided.     See EMR under Patient Instructions for exercises provided prior visit.    Assessment     The patient tolerated therapy well and concentrated with strengthening of the hamstring and gluteal muscles. The patient still having right lateral knee pain and still experiencing some hyperextension of the knee and needs verbal and tactile cues in order to stand straight.. Kristofer will continue to benefit from skilled therapy.     Kristofer Is progressing well towards her goals.   Pt prognosis is Good.     Pt will continue to benefit from skilled outpatient physical therapy to address the deficits listed in the problem list box on initial evaluation, provide pt/family education and to maximize pt's level of independence in the home and community environment.     Pt's  spiritual, cultural and educational needs considered and pt agreeable to plan of care and goals.    Anticipated barriers to physical therapy: None     GOALS: Short Term Goals:  3 weeks  1.Report decreased right lateral knee pain  < / =  3/10  to increase tolerance for ambulation on even surface. Goal achieved as of 2/14/2022    2. Increase knee ROM to 130 degrees in order to be able to perform ADLs without difficulty.   Goal achieved as of 2/14/2022    3. Increase strength by 1/3 MMT grade in the right lower extremity muscles  to increase tolerance for ADL and work activities. Goal achieved as of 1/14/2022    4. Pt to tolerate HEP to improve ROM and independence with ADL's. Goal achieved as of 2/14/2022      Long Term Goals: 6 weeks    1.Report decreased right lateral knee pain < / = 0/10  to increase tolerance for climbing up and down the stairs.    2.Patient goal: To be able to work out without any pain.    3.Increase strength to >/= 5/5 in the right lower extremity  to increase tolerance for ADL and work activities.    4. Pt will report at CI level (1-20% impaired) on WOMAC knee to demonstrate increase in LE function with every day tasks.      PLAN   Plan of care Certification: 3/28/2022 to 6/28/2022.     Continue POC as outlined in eval: Outpatient Physical Therapy to include the following interventions: Manual Therapy, Neuromuscular Re-ed and Patient Education.        Lord Martin Quispe, PT, DPT, MTC

## 2022-04-22 ENCOUNTER — IMMUNIZATION (OUTPATIENT)
Dept: PRIMARY CARE CLINIC | Facility: CLINIC | Age: 42
End: 2022-04-22
Payer: COMMERCIAL

## 2022-04-22 DIAGNOSIS — Z23 NEED FOR VACCINATION: Primary | ICD-10-CM

## 2022-04-22 PROCEDURE — 91300 COVID-19, MRNA, LNP-S, PF, 30 MCG/0.3 ML DOSE VACCINE: CPT | Mod: PBBFAC | Performed by: INTERNAL MEDICINE

## 2022-04-26 ENCOUNTER — PATIENT MESSAGE (OUTPATIENT)
Dept: INTERNAL MEDICINE | Facility: CLINIC | Age: 42
End: 2022-04-26
Payer: COMMERCIAL

## 2022-05-02 NOTE — TELEPHONE ENCOUNTER
I spoke to dr orellana.  patient Friday had to wait over an hour to get a travel covid test done by her.  Trying to avoid that again.  I called and told her to get testing at [facility] or [facility].  [facility] does not do travel testing.

## 2022-06-16 ENCOUNTER — OFFICE VISIT (OUTPATIENT)
Dept: OBSTETRICS AND GYNECOLOGY | Facility: CLINIC | Age: 42
End: 2022-06-16
Attending: OBSTETRICS & GYNECOLOGY
Payer: COMMERCIAL

## 2022-06-16 VITALS
DIASTOLIC BLOOD PRESSURE: 72 MMHG | SYSTOLIC BLOOD PRESSURE: 116 MMHG | WEIGHT: 242.31 LBS | BODY MASS INDEX: 40.37 KG/M2 | HEIGHT: 65 IN

## 2022-06-16 DIAGNOSIS — Z30.09 GENERAL COUNSELING AND ADVICE FOR CONTRACEPTIVE MANAGEMENT: ICD-10-CM

## 2022-06-16 DIAGNOSIS — N89.8 VAGINAL ODOR: Primary | ICD-10-CM

## 2022-06-16 DIAGNOSIS — Z30.432 ENCOUNTER FOR IUD REMOVAL: ICD-10-CM

## 2022-06-16 LAB
B-HCG UR QL: NEGATIVE
CTP QC/QA: YES

## 2022-06-16 PROCEDURE — 1159F PR MEDICATION LIST DOCUMENTED IN MEDICAL RECORD: ICD-10-PCS | Mod: CPTII,S$GLB,, | Performed by: OBSTETRICS & GYNECOLOGY

## 2022-06-16 PROCEDURE — 99213 OFFICE O/P EST LOW 20 MIN: CPT | Mod: 25,S$GLB,, | Performed by: OBSTETRICS & GYNECOLOGY

## 2022-06-16 PROCEDURE — 3078F PR MOST RECENT DIASTOLIC BLOOD PRESSURE < 80 MM HG: ICD-10-PCS | Mod: CPTII,S$GLB,, | Performed by: OBSTETRICS & GYNECOLOGY

## 2022-06-16 PROCEDURE — 3074F SYST BP LT 130 MM HG: CPT | Mod: CPTII,S$GLB,, | Performed by: OBSTETRICS & GYNECOLOGY

## 2022-06-16 PROCEDURE — 99999 PR PBB SHADOW E&M-EST. PATIENT-LVL III: CPT | Mod: PBBFAC,,, | Performed by: OBSTETRICS & GYNECOLOGY

## 2022-06-16 PROCEDURE — 3008F PR BODY MASS INDEX (BMI) DOCUMENTED: ICD-10-PCS | Mod: CPTII,S$GLB,, | Performed by: OBSTETRICS & GYNECOLOGY

## 2022-06-16 PROCEDURE — 87481 CANDIDA DNA AMP PROBE: CPT | Mod: 59 | Performed by: OBSTETRICS & GYNECOLOGY

## 2022-06-16 PROCEDURE — 1160F RVW MEDS BY RX/DR IN RCRD: CPT | Mod: CPTII,S$GLB,, | Performed by: OBSTETRICS & GYNECOLOGY

## 2022-06-16 PROCEDURE — 99999 PR PBB SHADOW E&M-EST. PATIENT-LVL III: ICD-10-PCS | Mod: PBBFAC,,, | Performed by: OBSTETRICS & GYNECOLOGY

## 2022-06-16 PROCEDURE — 81025 POCT URINE PREGNANCY: ICD-10-PCS | Mod: S$GLB,,, | Performed by: OBSTETRICS & GYNECOLOGY

## 2022-06-16 PROCEDURE — 58301 PR REMOVE, INTRAUTERINE DEVICE: ICD-10-PCS | Mod: S$GLB,,, | Performed by: OBSTETRICS & GYNECOLOGY

## 2022-06-16 PROCEDURE — 1160F PR REVIEW ALL MEDS BY PRESCRIBER/CLIN PHARMACIST DOCUMENTED: ICD-10-PCS | Mod: CPTII,S$GLB,, | Performed by: OBSTETRICS & GYNECOLOGY

## 2022-06-16 PROCEDURE — 3078F DIAST BP <80 MM HG: CPT | Mod: CPTII,S$GLB,, | Performed by: OBSTETRICS & GYNECOLOGY

## 2022-06-16 PROCEDURE — 3074F PR MOST RECENT SYSTOLIC BLOOD PRESSURE < 130 MM HG: ICD-10-PCS | Mod: CPTII,S$GLB,, | Performed by: OBSTETRICS & GYNECOLOGY

## 2022-06-16 PROCEDURE — 99213 PR OFFICE/OUTPT VISIT, EST, LEVL III, 20-29 MIN: ICD-10-PCS | Mod: 25,S$GLB,, | Performed by: OBSTETRICS & GYNECOLOGY

## 2022-06-16 PROCEDURE — 87801 DETECT AGNT MULT DNA AMPLI: CPT | Performed by: OBSTETRICS & GYNECOLOGY

## 2022-06-16 PROCEDURE — 81025 URINE PREGNANCY TEST: CPT | Mod: S$GLB,,, | Performed by: OBSTETRICS & GYNECOLOGY

## 2022-06-16 PROCEDURE — 3008F BODY MASS INDEX DOCD: CPT | Mod: CPTII,S$GLB,, | Performed by: OBSTETRICS & GYNECOLOGY

## 2022-06-16 PROCEDURE — 58301 REMOVE INTRAUTERINE DEVICE: CPT | Mod: S$GLB,,, | Performed by: OBSTETRICS & GYNECOLOGY

## 2022-06-16 PROCEDURE — 1159F MED LIST DOCD IN RCRD: CPT | Mod: CPTII,S$GLB,, | Performed by: OBSTETRICS & GYNECOLOGY

## 2022-06-16 RX ORDER — NORETHINDRONE ACETATE AND ETHINYL ESTRADIOL .02; 1 MG/1; MG/1
1 TABLET ORAL DAILY
Qty: 84 TABLET | Refills: 0 | Status: SHIPPED | OUTPATIENT
Start: 2022-06-16 | End: 2022-10-03 | Stop reason: SDUPTHER

## 2022-06-16 NOTE — PROGRESS NOTES
HISTORY OF PRESENT ILLNESS:    Kristofer Gentile is a 41 y.o. female, , No LMP recorded. (Menstrual status: Birth Control).,  presents for a problem visit, complaining of recurrent vaginitis and contraception.    Age [age] OCPs.    Has Hx AUB & dysmenorrhea    -  Mirena x 2.  Amenorrheic  2019 Mirena.  Cycle resumed 3/20, but is light, lasting 3-5 days    C/o recurrent vaginitis since  (after 3rd Mirena).  S/p Metrogel, flagyl, probiotics, boric acid.  Newly sexually active again (new partner)      Past Medical History:   Diagnosis Date    Allergy     Anti-TPO antibodies present 2018    Carpal tunnel syndrome     Dyslipidemia (high LDL; low HDL) 2013    Fever blister     Herpes simplex labialis 2013    Intraductal papilloma of left breast 3/1/2019    IUD (intrauterine device) in place     Metabolic syndrome 2013    Thyroid disease     treated with synthroid during pregnancy    Vitamin D deficiency 2018    Vitamin D insufficiency 2018       Past Surgical History:   Procedure Laterality Date    BREAST BIOPSY Left 2018    Core bx, benign    EXCISIONAL BIOPSY Left 3/1/2019    Procedure: EXCISIONAL BIOPSY- w/major duct excision KENALOG INJECTION AT TIME OF SURGERY;  Surgeon: Kelby Mcmahan MD;  Location: [facility] OR [address] [place] [address];  Service: General;  Laterality: Left;    INTRAUTERINE DEVICE INSERTION      again        MEDICATIONS AND ALLERGIES:      Current Outpatient Medications:     FLONASE ALLERGY RELIEF 50 mcg/actuation nasal spray, SPRAY TWICE IN EACH NOSTRIL ONCE DAILY, Disp: 16 g, Rfl: 5    hydrocortisone 2.5 % cream, Apply topically 2 (two) times daily. Mix with ketoconazole cream and apply twice a day for 1-2 weeks when flaring. Only use when flaring., Disp: 28 g, Rfl: 6    ketoconazole (NIZORAL) 2 % cream, Apply topically 2 (two) times daily. Apply twice a day with hydrocortisone when flaring for up to two weeks at a time.,  Disp: 30 g, Rfl: 6    multivit,calc,mins/iron/folic (ONE-A-DAY WOMENS FORMULA ORAL), Take by mouth., Disp: , Rfl:     valACYclovir (VALTREX) 500 MG tablet, TAKE 1 TABLET(500 MG) BY MOUTH EVERY DAY, Disp: 30 tablet, Rfl: 11    nitrofurantoin, macrocrystal-monohydrate, (MACROBID) 100 MG capsule, TAKE 1 CAPSULE BY MOUTH ONCE AS NEEDED( FOLLOWING INTERCOURSE) (Patient not taking: Reported on 6/16/2022), Disp: 30 capsule, Rfl: 0    norethindrone-ethinyl estradiol (LOESTRIN 1/20, 21,) 1-20 mg-mcg per tablet, Take 1 tablet by mouth once daily., Disp: 84 tablet, Rfl: 0    TAC 0.1% Loprox 0.77%  mg/5 ml, Apply topically to affected area twice daily after cool blow dry. (Patient not taking: Reported on 6/16/2022), Disp: 60 g, Rfl: 0    TAC0.1%/Ciclopirox 0.77%/MOM cream, Apply topically to affected area twice daily after cool blow dry. (Patient not taking: Reported on 6/16/2022), Disp: 60 g, Rfl: 3    Review of patient's allergies indicates:   Allergen Reactions    Bactrim [sulfamethoxazole-trimethoprim] Hives and Other (See Comments)     Stiffness to joints    Sulfa (sulfonamide antibiotics) Hives       Family History   Problem Relation Age of Onset    Diabetes Mother     Hypertension Mother     Rheum arthritis Mother     Coronary artery disease Mother     Stroke Mother         x2    Liver disease Mother         NAFLD    Sarcoidosis Mother     Thyroid disease Mother     Hyperlipidemia Mother     Atrial fibrillation Mother     Cataracts Mother     Glaucoma Mother     Hypertension Father     Dementia Father     Hyperlipidemia Father     Liver cancer Father     Rheum arthritis Maternal Grandmother         wheelchair bound    Rheum arthritis Paternal Grandmother     Sarcoidosis Maternal Uncle     Leukemia Maternal Uncle     Hypertension Maternal Aunt     Cataracts Maternal Grandfather     Breast cancer Neg Hx     Colon cancer Neg Hx     Ovarian cancer Neg Hx        Social History      Socioeconomic History    Marital status:     Number of children: 2   Occupational History     Employer: [employer]   Tobacco Use    Smoking status: Never Smoker    Smokeless tobacco: Never Used   Substance and Sexual Activity    Alcohol use: Yes     Alcohol/week: 0.8 standard drinks     Types: 1 Standard drinks or equivalent per week     Comment: Rarely    Drug use: No    Sexual activity: Yes     Partners: Male     Birth control/protection: I.U.D.     Social Determinants of Health     Financial Resource Strain: Medium Risk    Difficulty of Paying Living Expenses: Somewhat hard   Food Insecurity: No Food Insecurity    Worried About Running Out of Food in the Last Year: Never true    Ran Out of Food in the Last Year: Never true   Transportation Needs: No Transportation Needs    Lack of Transportation (Medical): No    Lack of Transportation (Non-Medical): No   Physical Activity: Sufficiently Active    Days of Exercise per Week: 3 days    Minutes of Exercise per Session: 60 min   Stress: No Stress Concern Present    Feeling of Stress : Only a little   Social Connections: Unknown    Frequency of Communication with Friends and Family: More than three times a week    Frequency of Social Gatherings with Friends and Family: Once a week    Active Member of Clubs or Organizations: Yes    Attends Club or Organization Meetings: More than 4 times per year    Marital Status:    Housing Stability: Low Risk     Unable to Pay for Housing in the Last Year: No    Number of Places Lived in the Last Year: 1    Unstable Housing in the Last Year: No       ROS:  GENERAL: No weight changes. No swelling. No fatigue. No fever.  CARDIOVASCULAR: No chest pain. No shortness of breath. No leg cramps.   NEUROLOGICAL: No headaches. No vision changes.  BREASTS: No pain. No lumps. No discharge.  ABDOMEN: No pain. No nausea. No vomiting. No diarrhea. No constipation.  REPRODUCTIVE: No abnormal  "bleeding.   VULVA: No pain. No lesions. No itching.  VAGINA: No relaxation. No itching. No odor. No discharge. No lesions.  URINARY: No incontinence. No nocturia. No frequency. No dysuria.    /72   Ht 5' 5" (1.651 m)   Wt 109.9 kg (242 lb 4.6 oz)   BMI 40.32 kg/m²     PE:  APPEARANCE: Well nourished, well developed, in no acute distress.  ABDOMEN: Soft. No tenderness or masses. No hepatosplenomegaly. No hernias.  BREASTS, FUNDOSCOPIC, RECTAL DEFERRED  PELVIC: External female genitalia without lesions.  Female hair distribution. Adequate perineal body, Normal urethral meatus. Vagina moist and well rugated without lesions or discharge.  No significant cystocele or rectocele present. Cervix pink without lesions, discharge or tenderness. String visible.  Uterus is normal size, regular, mobile and nontender. Adnexa without masses or tenderness.  EXTREMITIES: No edema      DIAGNOSIS & PLAN  1. Vaginal odor  Vaginosis Screen by DNA Probe   2. Encounter for IUD removal  POCT Urine Pregnancy   3. General counseling and advice for contraceptive management  norethindrone-ethinyl estradiol (LOESTRIN 1/20, 21,) 1-20 mg-mcg per tablet    Device Authorization Order           COUNSELING:  D/w patient.  Desires IUD removal today.  Considering Nexplanon - discussed possible irregular bleeding  Will switch to OCPs and check insurance for nexplanon, in event patient wants to try it    PROCEDURE: Removal of   Mirena    PRE-IUD REMOVAL COUNSELING:  The patient was advised of minimal risks of bleeding and pain and she agrees to proceed.    PREGNANCY TEST:  negative    PROCEDURE:  TIME OUT PERFORMED.  IUD strings were visualized at the os and grasped. IUD removed with gentle traction.  The patient tolerated the procedure well      POST IUD REMOVAL COUNSELING:  Expect period-like flow to occur after  IUD removal and periods to return to pre-IUD pattern.  Manage post IUD removal cramping with NSAIDs, Tylenol or Rx per " [employer].    POST IUD REMOVAL CONTRACEPTION: OCPs    Counseling lasted approximately 15 minutes and all her questions were answered.    FOLLOW-UP: With me for annual gyn exam or prn.        30 minutes addressing problems.  This includes face to face time and non-face to face time preparing to see the patient (eg, review of tests), Obtaining and/or reviewing separately obtained history, Documenting clinical information in the electronic or other health record, Independently interpreting resultsand communicating results to the patient/family/caregiver, or Care coordination.

## 2022-06-18 ENCOUNTER — PATIENT MESSAGE (OUTPATIENT)
Dept: OBSTETRICS AND GYNECOLOGY | Facility: CLINIC | Age: 42
End: 2022-06-18
Payer: COMMERCIAL

## 2022-06-18 LAB
BACTERIAL VAGINOSIS DNA: POSITIVE
CANDIDA GLABRATA DNA: NEGATIVE
CANDIDA KRUSEI DNA: NEGATIVE
CANDIDA RRNA VAG QL PROBE: NEGATIVE
T VAGINALIS RRNA GENITAL QL PROBE: NEGATIVE

## 2022-06-20 ENCOUNTER — PATIENT MESSAGE (OUTPATIENT)
Dept: OBSTETRICS AND GYNECOLOGY | Facility: CLINIC | Age: 42
End: 2022-06-20
Payer: COMMERCIAL

## 2022-06-21 ENCOUNTER — PATIENT MESSAGE (OUTPATIENT)
Dept: OBSTETRICS AND GYNECOLOGY | Facility: CLINIC | Age: 42
End: 2022-06-21
Payer: COMMERCIAL

## 2022-06-21 RX ORDER — METRONIDAZOLE 500 MG/1
500 TABLET ORAL 2 TIMES DAILY
Qty: 14 TABLET | Refills: 0 | Status: SHIPPED | OUTPATIENT
Start: 2022-06-21 | End: 2022-06-28

## 2022-09-01 ENCOUNTER — TELEPHONE (OUTPATIENT)
Dept: INTERNAL MEDICINE | Facility: CLINIC | Age: 42
End: 2022-09-01
Payer: COMMERCIAL

## 2022-09-01 DIAGNOSIS — Z12.31 BREAST CANCER SCREENING BY MAMMOGRAM: Primary | ICD-10-CM

## 2022-09-01 NOTE — TELEPHONE ENCOUNTER
----- Message from Bianka Toscano sent at 8/31/2022  2:22 PM CDT -----  Contact: Kristofer   Kristofer would like to have the orders for a Mammo & also she said she is available on this Friday if someone would like to schedule for her.

## 2022-10-03 ENCOUNTER — OFFICE VISIT (OUTPATIENT)
Dept: OBSTETRICS AND GYNECOLOGY | Facility: CLINIC | Age: 42
End: 2022-10-03
Attending: OBSTETRICS & GYNECOLOGY
Payer: COMMERCIAL

## 2022-10-03 VITALS
SYSTOLIC BLOOD PRESSURE: 118 MMHG | WEIGHT: 245.13 LBS | HEIGHT: 65 IN | DIASTOLIC BLOOD PRESSURE: 76 MMHG | BODY MASS INDEX: 40.84 KG/M2

## 2022-10-03 DIAGNOSIS — Z30.09 GENERAL COUNSELING AND ADVICE FOR CONTRACEPTIVE MANAGEMENT: ICD-10-CM

## 2022-10-03 DIAGNOSIS — Z01.419 WELL WOMAN EXAM: Primary | ICD-10-CM

## 2022-10-03 DIAGNOSIS — Z71.85 HPV VACCINE COUNSELING: ICD-10-CM

## 2022-10-03 PROCEDURE — 1160F RVW MEDS BY RX/DR IN RCRD: CPT | Mod: CPTII,S$GLB,, | Performed by: OBSTETRICS & GYNECOLOGY

## 2022-10-03 PROCEDURE — 3008F BODY MASS INDEX DOCD: CPT | Mod: CPTII,S$GLB,, | Performed by: OBSTETRICS & GYNECOLOGY

## 2022-10-03 PROCEDURE — 99999 PR PBB SHADOW E&M-EST. PATIENT-LVL III: ICD-10-PCS | Mod: PBBFAC,,, | Performed by: OBSTETRICS & GYNECOLOGY

## 2022-10-03 PROCEDURE — 3078F PR MOST RECENT DIASTOLIC BLOOD PRESSURE < 80 MM HG: ICD-10-PCS | Mod: CPTII,S$GLB,, | Performed by: OBSTETRICS & GYNECOLOGY

## 2022-10-03 PROCEDURE — 3008F PR BODY MASS INDEX (BMI) DOCUMENTED: ICD-10-PCS | Mod: CPTII,S$GLB,, | Performed by: OBSTETRICS & GYNECOLOGY

## 2022-10-03 PROCEDURE — 3074F SYST BP LT 130 MM HG: CPT | Mod: CPTII,S$GLB,, | Performed by: OBSTETRICS & GYNECOLOGY

## 2022-10-03 PROCEDURE — 99396 PREV VISIT EST AGE 40-64: CPT | Mod: S$GLB,,, | Performed by: OBSTETRICS & GYNECOLOGY

## 2022-10-03 PROCEDURE — 88175 CYTOPATH C/V AUTO FLUID REDO: CPT | Performed by: OBSTETRICS & GYNECOLOGY

## 2022-10-03 PROCEDURE — 3078F DIAST BP <80 MM HG: CPT | Mod: CPTII,S$GLB,, | Performed by: OBSTETRICS & GYNECOLOGY

## 2022-10-03 PROCEDURE — 87624 HPV HI-RISK TYP POOLED RSLT: CPT | Performed by: OBSTETRICS & GYNECOLOGY

## 2022-10-03 PROCEDURE — 3074F PR MOST RECENT SYSTOLIC BLOOD PRESSURE < 130 MM HG: ICD-10-PCS | Mod: CPTII,S$GLB,, | Performed by: OBSTETRICS & GYNECOLOGY

## 2022-10-03 PROCEDURE — 99396 PR PREVENTIVE VISIT,EST,40-64: ICD-10-PCS | Mod: S$GLB,,, | Performed by: OBSTETRICS & GYNECOLOGY

## 2022-10-03 PROCEDURE — 99999 PR PBB SHADOW E&M-EST. PATIENT-LVL III: CPT | Mod: PBBFAC,,, | Performed by: OBSTETRICS & GYNECOLOGY

## 2022-10-03 PROCEDURE — 1159F MED LIST DOCD IN RCRD: CPT | Mod: CPTII,S$GLB,, | Performed by: OBSTETRICS & GYNECOLOGY

## 2022-10-03 PROCEDURE — 1160F PR REVIEW ALL MEDS BY PRESCRIBER/CLIN PHARMACIST DOCUMENTED: ICD-10-PCS | Mod: CPTII,S$GLB,, | Performed by: OBSTETRICS & GYNECOLOGY

## 2022-10-03 PROCEDURE — 1159F PR MEDICATION LIST DOCUMENTED IN MEDICAL RECORD: ICD-10-PCS | Mod: CPTII,S$GLB,, | Performed by: OBSTETRICS & GYNECOLOGY

## 2022-10-03 RX ORDER — NORETHINDRONE ACETATE AND ETHINYL ESTRADIOL .02; 1 MG/1; MG/1
1 TABLET ORAL DAILY
Qty: 63 TABLET | Refills: 5 | Status: SHIPPED | OUTPATIENT
Start: 2022-10-03 | End: 2023-08-30

## 2022-10-03 NOTE — PROGRESS NOTES
CC: Well woman exam    Kristofer Gentile is a 42 y.o. female  presents for well woman exam.  LMP: Patient's last menstrual period was 10/02/2022..  No gyn issues, problems, or complaints.      Age [age] OCPs.    Has Hx AUB & dysmenorrhea     -  Mirena x 2.  Amenorrheic  2019 Mirena.  Cycle resumed 3/20, but is light, lasting 3-5 days      visit:  C/o recurrent vaginitis since  (after 3rd Mirena).  S/p Metrogel, flagyl, probiotics, boric acid.  Newly sexually active again (new partner)     Mirena removed and started OCPs  Cycles regular, normal/light flow.  No breakthrough bleeding unless she misses pills  No further issues with vaginitis.     pap & HPV negative   Hx abnormal pap/ +HPV  She has not had Gardasil    MMG scheduled    Past Medical History:   Diagnosis Date    Allergy     Anti-TPO antibodies present 2018    Carpal tunnel syndrome     Dyslipidemia (high LDL; low HDL) 2013    Fever blister     Herpes simplex labialis 2013    Intraductal papilloma of left breast 3/1/2019    IUD (intrauterine device) in place     Metabolic syndrome 2013    Thyroid disease     treated with synthroid during pregnancy    Vitamin D deficiency 2018    Vitamin D insufficiency 2018     Past Surgical History:   Procedure Laterality Date    BREAST BIOPSY Left 2018    Core bx, benign    EXCISIONAL BIOPSY Left 3/1/2019    Procedure: EXCISIONAL BIOPSY- w/major duct excision KENALOG INJECTION AT TIME OF SURGERY;  Surgeon: Kelby Mcmhaan MD;  Location: [facility] OR [address] [place] [address];  Service: General;  Laterality: Left;    INTRAUTERINE DEVICE INSERTION      again      Social History     Socioeconomic History    Marital status:     Number of children: 2   Occupational History     Employer: [employer]   Tobacco Use    Smoking status: Never    Smokeless tobacco: Never   Substance and Sexual Activity    Alcohol use: Yes     Alcohol/week: 0.8 standard  drinks     Types: 1 Standard drinks or equivalent per week     Comment: Rarely    Drug use: No    Sexual activity: Yes     Partners: Male     Birth control/protection: I.U.D.     Social Determinants of Health     Financial Resource Strain: Medium Risk    Difficulty of Paying Living Expenses: Somewhat hard   Food Insecurity: No Food Insecurity    Worried About Running Out of Food in the Last Year: Never true    Ran Out of Food in the Last Year: Never true   Transportation Needs: No Transportation Needs    Lack of Transportation (Medical): No    Lack of Transportation (Non-Medical): No   Physical Activity: Sufficiently Active    Days of Exercise per Week: 3 days    Minutes of Exercise per Session: 60 min   Stress: No Stress Concern Present    Feeling of Stress : Only a little   Social Connections: Unknown    Frequency of Communication with Friends and Family: More than three times a week    Frequency of Social Gatherings with Friends and Family: Once a week    Active Member of Clubs or Organizations: Yes    Attends Club or Organization Meetings: More than 4 times per year    Marital Status:    Housing Stability: Low Risk     Unable to Pay for Housing in the Last Year: No    Number of Places Lived in the Last Year: 1    Unstable Housing in the Last Year: No     Family History   Problem Relation Age of Onset    Diabetes Mother     Hypertension Mother     Rheum arthritis Mother     Coronary artery disease Mother     Stroke Mother         x2    Liver disease Mother         NAFLD    Sarcoidosis Mother     Thyroid disease Mother     Hyperlipidemia Mother     Atrial fibrillation Mother     Cataracts Mother     Glaucoma Mother     Hypertension Father     Dementia Father     Hyperlipidemia Father     Liver cancer Father     Rheum arthritis Maternal Grandmother         wheelchair bound    Rheum arthritis Paternal Grandmother     Sarcoidosis Maternal Uncle     Leukemia Maternal Uncle     Hypertension Maternal Aunt      "Cataracts Maternal Grandfather     Breast cancer Neg Hx     Colon cancer Neg Hx     Ovarian cancer Neg Hx      OB History          2    Para   2    Term   2            AB        Living             SAB        IAB        Ectopic        Multiple        Live Births               Obstetric Comments   Menarche age 14.  No menses on Mirena.  No history of abnormal PAP smear.               /76 (BP Location: Right arm, Patient Position: Sitting)   Ht 5' 5" (1.651 m)   Wt 111.2 kg (245 lb 2.4 oz)   LMP 10/02/2022   BMI 40.80 kg/m²       ROS:  GENERAL: Denies weight gain or weight loss. Feeling well overall.   SKIN: Denies rash or lesions.   HEAD: Denies head injury or headache.   NODES: Denies enlarged lymph nodes.   CHEST: Denies chest pain or shortness of breath.   CARDIOVASCULAR: Denies palpitations or left sided chest pain.   ABDOMEN: No abdominal pain, constipation, diarrhea, nausea, vomiting or rectal bleeding.   URINARY: No frequency, dysuria, hematuria, or burning on urination.  REPRODUCTIVE: See HPI.   BREASTS: The patient performs breast self-examination and denies pain, lumps, or nipple discharge.   HEMATOLOGIC: No easy bruisability or excessive bleeding.   MUSCULOSKELETAL: Denies joint pain or swelling.   NEUROLOGIC: Denies syncope or weakness.   PSYCHIATRIC: Denies depression, anxiety or mood swings.    PHYSICAL EXAM:  APPEARANCE: Well nourished, well developed, in no acute distress.  AFFECT: WNL, alert and oriented x 3  SKIN: No acne or hirsutism  NECK: Neck symmetric without masses or thyromegaly  NODES: No inguinal, cervical, axillary, or femoral lymph node enlargement  CHEST: Good respiratory effect  ABDOMEN: Soft.  No tenderness or masses.  No hepatosplenomegaly.  No hernias.  BREASTS: Symmetrical, no skin changes or visible lesions.  No palpable masses, nipple discharge bilaterally.  PELVIC: Normal external genitalia without lesions.  Normal hair distribution.  Adequate perineal " body, normal urethral meatus.  Vagina moist and well rugated without lesions or discharge.  Cervix pink, without lesions, discharge or tenderness.  No significant cystocele or rectocele.  Bimanual exam shows uterus to be normal size, regular, mobile and nontender.  Adnexa without masses or tenderness.    EXTREMITIES: No edema.      ASSESSMENT & PLAN    ICD-10-CM ICD-9-CM    1. Well woman exam  Z01.419 V72.31 Liquid-Based Pap Smear, Screening      HPV High Risk Genotypes, PCR      2. General counseling and advice for contraceptive management  Z30.09 V25.09 norethindrone-ethinyl estradiol (LOESTRIN 1/20, 21,) 1-20 mg-mcg per tablet      3. HPV vaccine counseling  Z71.85 V65.49              Patient was counseled today on A.C.S. Pap guidelines and recommendations for yearly pelvic exams, mammograms and monthly self breast exams; to see her PCP for other health maintenance.

## 2022-12-23 ENCOUNTER — HOSPITAL ENCOUNTER (OUTPATIENT)
Dept: RADIOLOGY | Facility: HOSPITAL | Age: 42
Discharge: HOME | End: 2022-12-23
Attending: INTERNAL MEDICINE
Payer: COMMERCIAL

## 2022-12-23 DIAGNOSIS — Z12.31 BREAST CANCER SCREENING BY MAMMOGRAM: ICD-10-CM

## 2022-12-23 PROCEDURE — 77063 BREAST TOMOSYNTHESIS BI: CPT | Mod: TC

## 2022-12-23 PROCEDURE — 77063 MAMMO DIGITAL SCREENING BILAT WITH TOMO: ICD-10-PCS | Mod: 26,,, | Performed by: RADIOLOGY

## 2022-12-23 PROCEDURE — 77067 SCR MAMMO BI INCL CAD: CPT | Mod: 26,,, | Performed by: RADIOLOGY

## 2022-12-23 PROCEDURE — 77063 BREAST TOMOSYNTHESIS BI: CPT | Mod: 26,,, | Performed by: RADIOLOGY

## 2022-12-23 PROCEDURE — 77067 SCR MAMMO BI INCL CAD: CPT | Mod: TC

## 2022-12-23 PROCEDURE — 77067 MAMMO DIGITAL SCREENING BILAT WITH TOMO: ICD-10-PCS | Mod: 26,,, | Performed by: RADIOLOGY

## 2023-03-10 ENCOUNTER — OFFICE VISIT (OUTPATIENT)
Dept: INTERNAL MEDICINE | Facility: CLINIC | Age: 43
End: 2023-03-10
Payer: COMMERCIAL

## 2023-03-10 ENCOUNTER — TELEPHONE (OUTPATIENT)
Dept: INTERNAL MEDICINE | Facility: CLINIC | Age: 43
End: 2023-03-10

## 2023-03-10 VITALS
TEMPERATURE: 98 F | SYSTOLIC BLOOD PRESSURE: 112 MMHG | DIASTOLIC BLOOD PRESSURE: 78 MMHG | HEIGHT: 65 IN | OXYGEN SATURATION: 97 % | BODY MASS INDEX: 39.85 KG/M2 | HEART RATE: 98 BPM | RESPIRATION RATE: 18 BRPM | WEIGHT: 239.19 LBS

## 2023-03-10 DIAGNOSIS — L98.9 SKIN LESION OF CHEST WALL: ICD-10-CM

## 2023-03-10 DIAGNOSIS — Z00.00 ANNUAL PHYSICAL EXAM: Primary | ICD-10-CM

## 2023-03-10 DIAGNOSIS — E66.9 OBESITY (BMI 30-39.9): ICD-10-CM

## 2023-03-10 DIAGNOSIS — G89.29 CHRONIC PAIN OF RIGHT KNEE: ICD-10-CM

## 2023-03-10 DIAGNOSIS — E78.5 DYSLIPIDEMIA (HIGH LDL; LOW HDL): ICD-10-CM

## 2023-03-10 DIAGNOSIS — M25.561 CHRONIC PAIN OF RIGHT KNEE: ICD-10-CM

## 2023-03-10 DIAGNOSIS — R76.8 ANTI-TPO ANTIBODIES PRESENT: ICD-10-CM

## 2023-03-10 PROCEDURE — 3008F BODY MASS INDEX DOCD: CPT | Mod: CPTII,S$GLB,, | Performed by: INTERNAL MEDICINE

## 2023-03-10 PROCEDURE — 99396 PREV VISIT EST AGE 40-64: CPT | Mod: S$GLB,,, | Performed by: INTERNAL MEDICINE

## 2023-03-10 PROCEDURE — 3074F SYST BP LT 130 MM HG: CPT | Mod: CPTII,S$GLB,, | Performed by: INTERNAL MEDICINE

## 2023-03-10 PROCEDURE — 3078F PR MOST RECENT DIASTOLIC BLOOD PRESSURE < 80 MM HG: ICD-10-PCS | Mod: CPTII,S$GLB,, | Performed by: INTERNAL MEDICINE

## 2023-03-10 PROCEDURE — 3008F PR BODY MASS INDEX (BMI) DOCUMENTED: ICD-10-PCS | Mod: CPTII,S$GLB,, | Performed by: INTERNAL MEDICINE

## 2023-03-10 PROCEDURE — 1160F PR REVIEW ALL MEDS BY PRESCRIBER/CLIN PHARMACIST DOCUMENTED: ICD-10-PCS | Mod: CPTII,S$GLB,, | Performed by: INTERNAL MEDICINE

## 2023-03-10 PROCEDURE — 1159F PR MEDICATION LIST DOCUMENTED IN MEDICAL RECORD: ICD-10-PCS | Mod: CPTII,S$GLB,, | Performed by: INTERNAL MEDICINE

## 2023-03-10 PROCEDURE — 99999 PR PBB SHADOW E&M-EST. PATIENT-LVL V: CPT | Mod: PBBFAC,,, | Performed by: INTERNAL MEDICINE

## 2023-03-10 PROCEDURE — 1160F RVW MEDS BY RX/DR IN RCRD: CPT | Mod: CPTII,S$GLB,, | Performed by: INTERNAL MEDICINE

## 2023-03-10 PROCEDURE — 99999 PR PBB SHADOW E&M-EST. PATIENT-LVL V: ICD-10-PCS | Mod: PBBFAC,,, | Performed by: INTERNAL MEDICINE

## 2023-03-10 PROCEDURE — 3074F PR MOST RECENT SYSTOLIC BLOOD PRESSURE < 130 MM HG: ICD-10-PCS | Mod: CPTII,S$GLB,, | Performed by: INTERNAL MEDICINE

## 2023-03-10 PROCEDURE — 1159F MED LIST DOCD IN RCRD: CPT | Mod: CPTII,S$GLB,, | Performed by: INTERNAL MEDICINE

## 2023-03-10 PROCEDURE — 99396 PR PREVENTIVE VISIT,EST,40-64: ICD-10-PCS | Mod: S$GLB,,, | Performed by: INTERNAL MEDICINE

## 2023-03-10 PROCEDURE — 3078F DIAST BP <80 MM HG: CPT | Mod: CPTII,S$GLB,, | Performed by: INTERNAL MEDICINE

## 2023-03-10 NOTE — PROGRESS NOTES
Subjective:     PCP: Melonie Villanueva MD    Kristofer Gentile is a 42 y.o. female who presents for an annual exam.    Medical History:   Past Medical History:   Diagnosis Date    Allergy     Anti-TPO antibodies present 11/8/2018    Carpal tunnel syndrome     Dyslipidemia (high LDL; low HDL) 6/25/2013    Fever blister     Herpes simplex labialis 6/25/2013    Intraductal papilloma of left breast 3/1/2019    IUD (intrauterine device) in place 2009    Metabolic syndrome 7/22/2013    Thyroid disease     treated with synthroid during pregnancy    Vitamin D deficiency 11/5/2018    Vitamin D insufficiency 11/5/2018       Family History: family history includes Atrial fibrillation in her mother; Cataracts in her maternal grandfather and mother; Coronary artery disease in her maternal uncle and mother; Dementia in her father; Diabetes in her mother; Glaucoma in her mother; Hyperlipidemia in her father and mother; Hypertension in her father, maternal aunt, and mother; Leukemia in her maternal uncle; Liver cancer in her father; Liver disease in her mother; Rheum arthritis in her maternal grandmother, mother, and paternal grandmother; Sarcoidosis in her maternal uncle and mother; Stroke in her mother; Thyroid disease in her mother.    Surgical History:   Past Surgical History:   Procedure Laterality Date    BREAST BIOPSY Left 12/14/2018    Core bx, benign    EXCISIONAL BIOPSY Left 3/1/2019    Procedure: EXCISIONAL BIOPSY- w/major duct excision KENALOG INJECTION AT TIME OF SURGERY;  Surgeon: Kelby Mcmahan MD;  Location: [address];  Service: General;  Laterality: Left;    INTRAUTERINE DEVICE INSERTION  2009    again 2014        Social History:  reports that she has never smoked. She has never used smokeless tobacco. She reports current alcohol use of about 0.8 standard drinks per week. She reports that she does not use drugs.     Allergies:   Review of patient's allergies indicates:   Allergen Reactions    Bactrim  [sulfamethoxazole-trimethoprim] Hives and Other (See Comments)     Stiffness to joints    Sulfa (sulfonamide antibiotics) Hives       Medications:   Current Outpatient Medications   Medication Sig    FLONASE ALLERGY RELIEF 50 mcg/actuation nasal spray SPRAY TWICE IN EACH NOSTRIL ONCE DAILY    hydrocortisone 2.5 % cream Apply topically 2 (two) times daily. Mix with ketoconazole cream and apply twice a day for 1-2 weeks when flaring. Only use when flaring.    ketoconazole (NIZORAL) 2 % cream Apply topically 2 (two) times daily. Apply twice a day with hydrocortisone when flaring for up to two weeks at a time.    multivit,calc,mins/iron/folic (ONE-A-DAY WOMENS FORMULA ORAL) Take by mouth.    nitrofurantoin, macrocrystal-monohydrate, (MACROBID) 100 MG capsule TAKE 1 CAPSULE BY MOUTH AS NEEDED    norethindrone-ethinyl estradiol (LOESTRIN 1/20, 21,) 1-20 mg-mcg per tablet Take 1 tablet by mouth once daily. Skip placebo pills and take continuously    TAC 0.1% Loprox 0.77%  mg/5 ml Apply topically to affected area twice daily after cool blow dry.    valACYclovir (VALTREX) 500 MG tablet Take 1 tablet (500 mg total) by mouth once daily.     No current facility-administered medications for this visit.       Health Maintenance:   Health Maintenance Topics with due status: Not Due       Topic Last Completion Date    TETANUS VACCINE 10/31/2018    Hemoglobin A1c (Diabetic Prevention Screening) 12/31/2020    Cervical Cancer Screening 10/03/2022    Mammogram 12/23/2022     Lab Results   Component Value Date    HEPCAB Negative 08/20/2020     Eye Exam:   2020  Dental Exam: regularly  OB/GYN: Dr. Loo   Pap smear: 10/2022  Mammogram: 12/2022  Hepatitic C  Ab: 8/2020, neg    Vaccinations:  Immunization History   Administered Date(s) Administered    COVID-19, MRNA, LN-S, PF (Pfizer) (Purple Cap) 04/06/2021, 04/27/2021, 04/22/2022    DTP 1980, 1980, 01/13/1981, 06/01/1982    Influenza - Quadrivalent - PF *Preferred* (6  months and older) 10/31/2018, 11/25/2019, 01/04/2021    MMR 10/19/1981    OPV 1980, 1980, 01/13/1981, 06/01/1982    Td - PF (ADULT) 10/31/2018    Tdap 03/28/2008     Influenza: due  Tetanus: 2018  Covid vaccine: booster due    Body mass index is 39.8 kg/m².  Wt Readings from Last 3 Encounters:   03/10/23 108.5 kg (239 lb 3.2 oz)   10/03/22 111.2 kg (245 lb 2.4 oz)   06/16/22 109.9 kg (242 lb 4.6 oz)   - currently fasting in AM and low carb diet on 28-d plan with her  and she lost 6 lbs but went on a cruise for 1 week and gained 2 lbs which she lost   - she is concerned about the sugar cravings but current diet is very low in calories and low in carb  - weight 190 lbs before kids but increased  - she would like to use an appetite suppressant that ill help her remain on current diet and she does not know what she will eat after diet is completed      Review of Systems   Constitutional:  Negative for chills, diaphoresis, fatigue and fever.   HENT:  Negative for congestion, dental problem, ear discharge, ear pain, postnasal drip, rhinorrhea, sinus pressure, sore throat and trouble swallowing.    Eyes:  Negative for redness and visual disturbance.   Respiratory:  Negative for cough, chest tightness and shortness of breath.    Cardiovascular:  Negative for chest pain, palpitations and leg swelling.   Gastrointestinal:  Negative for abdominal pain, blood in stool, constipation, diarrhea, nausea and vomiting.   Endocrine: Positive for cold intolerance and heat intolerance. Negative for polydipsia and polyuria.   Genitourinary:  Negative for decreased urine volume, dysuria, frequency and hematuria.   Musculoskeletal:  Positive for arthralgias (right knee pain is improving). Negative for back pain and myalgias.   Skin:  Positive for wound (raised lesion on chest, initially was a bump that she tried to squeeze but it enlarged, present for 1 year, + itch, + irritation). Negative for rash.   Neurological:   Negative for dizziness, weakness, numbness and headaches.   Hematological:  Negative for adenopathy.   Psychiatric/Behavioral:  Negative for dysphoric mood and sleep disturbance. The patient is not nervous/anxious.         Objective:     Physical Exam  Vitals reviewed.   Constitutional:       General: She is awake. She is not in acute distress.     Appearance: Normal appearance. She is well-developed and well-groomed. She is not diaphoretic.   HENT:      Head: Normocephalic and atraumatic.      Right Ear: Hearing, tympanic membrane, ear canal and external ear normal. Tympanic membrane is not erythematous or bulging.      Left Ear: Hearing, tympanic membrane, ear canal and external ear normal. Tympanic membrane is not erythematous or bulging.      Nose: Nose normal. No congestion.      Mouth/Throat:      Mouth: Mucous membranes are moist.      Tongue: No lesions.      Pharynx: Oropharynx is clear. Uvula midline. No oropharyngeal exudate or posterior oropharyngeal erythema.   Eyes:      General: Lids are normal. Vision grossly intact. Gaze aligned appropriately. No scleral icterus.     Conjunctiva/sclera:      Right eye: Right conjunctiva is not injected.      Left eye: Left conjunctiva is not injected.      Pupils: Pupils are equal, round, and reactive to light.   Neck:      Thyroid: No thyroid mass or thyromegaly.   Cardiovascular:      Rate and Rhythm: Normal rate and regular rhythm.      Pulses: Normal pulses.      Heart sounds: Normal heart sounds. No murmur heard.  Pulmonary:      Effort: Pulmonary effort is normal. No respiratory distress.      Breath sounds: Normal breath sounds. No decreased breath sounds or wheezing.   Abdominal:      General: Bowel sounds are normal. There is no distension.      Palpations: Abdomen is soft. Abdomen is not rigid.      Tenderness: There is no abdominal tenderness. There is no guarding or rebound.   Musculoskeletal:         General: Normal range of motion.      Cervical back:  Normal range of motion and neck supple.      Right lower leg: No edema.      Left lower leg: No edema.   Lymphadenopathy:      Cervical: No cervical adenopathy.      Upper Body:      Right upper body: No supraclavicular adenopathy.      Left upper body: No supraclavicular adenopathy.   Skin:     General: Skin is warm and dry.      Coloration: Skin is not cyanotic.      Findings: Lesion (round, raised, hyperpigmented, firm lesion on mid-chest that feels like scar tissue, also has multiple keloids on abdomen) present. No rash.      Nails: There is no clubbing.   Neurological:      General: No focal deficit present.      Mental Status: She is alert and oriented to person, place, and time.      Sensory: Sensation is intact.      Coordination: Coordination is intact.      Gait: Gait is intact.      Deep Tendon Reflexes: Reflexes are normal and symmetric.   Psychiatric:         Attention and Perception: Attention normal.         Mood and Affect: Mood normal.         Behavior: Behavior is cooperative.            Assessment:        1. Annual physical exam    2. Chronic pain of right knee    3. Dyslipidemia (high LDL; low HDL)    4. Anti-TPO antibodies present    5. Obesity (BMI 30-39.9)    6. Skin lesion of chest wall           Plan:     1. Annual physical exam  - CBC Auto Differential; Future  - Comprehensive Metabolic Panel; Future  - Lipid Panel; Future  - TSH; Future  - Urinalysis; Future  - Hemoglobin A1C; Future    2. Chronic pain of right knee  - improved after PT, will monitor    3. Dyslipidemia (high LDL; low HDL)  - Lipid Panel; Future    4. Anti-TPO antibodies present  - TSH; Future    5. Obesity (BMI 30-39.9)  - Insulin, random; Future  - Hemoglobin A1C; Future  - discussed a few options for treatment, check labs and determine which treatment options are available but she needs to work on a realistic eating plan    6. Skin lesion of chest wall  - likely a keloid, will refer to Derm  - Ambulatory referral/consult  to Dermatology; Future      RTC in 6 months for follow-up or sooner if needed    __________________________    Melonie Villanueva MD, PharmD  [facility]  American Board of Obesity Medicine diplomate  Office [phone]

## 2023-03-10 NOTE — TELEPHONE ENCOUNTER
----- Message from Melonie Villanueva MD sent at 3/10/2023  1:28 PM CST -----  >>>>>>>>>> annual recall 3/10/2024  >>>> 6 mo f/u

## 2023-07-28 ENCOUNTER — TELEPHONE (OUTPATIENT)
Dept: OBSTETRICS AND GYNECOLOGY | Facility: CLINIC | Age: 43
End: 2023-07-28
Payer: COMMERCIAL

## 2023-07-28 NOTE — TELEPHONE ENCOUNTER
Pt advised Dr. Loo is no longer with [facility] pt states she will look on line for another provider.

## 2023-07-28 NOTE — TELEPHONE ENCOUNTER
----- Message from Juana Keenan sent at 7/26/2023 12:54 PM CDT -----  Contact: pt/[phone]  Type:  Appointment Request    Caller is requesting a sooner appointment.  Caller declined first available appointment listed below.  Caller will not accept being placed on the waitlist and is requesting a message be sent to doctor.  Name of Caller: Pt    When is the first available appointment? No  dates  given   Symptoms: Medication/ annual    Would the patient rather a call back or a response via [facility]? Call   Best Call Back Number:[phone]  Additional Information: Please  call pt  regarding  scheduling

## 2023-08-08 ENCOUNTER — OFFICE VISIT (OUTPATIENT)
Dept: DERMATOLOGY | Facility: CLINIC | Age: 43
End: 2023-08-08
Payer: COMMERCIAL

## 2023-08-08 DIAGNOSIS — R20.3 HYPERESTHESIA: ICD-10-CM

## 2023-08-08 DIAGNOSIS — L91.0 KELOID: Primary | ICD-10-CM

## 2023-08-08 PROCEDURE — 1160F RVW MEDS BY RX/DR IN RCRD: CPT | Mod: CPTII,S$GLB,, | Performed by: DERMATOLOGY

## 2023-08-08 PROCEDURE — 99214 PR OFFICE/OUTPT VISIT, EST, LEVL IV, 30-39 MIN: ICD-10-PCS | Mod: 25,S$GLB,, | Performed by: DERMATOLOGY

## 2023-08-08 PROCEDURE — 3044F HG A1C LEVEL LT 7.0%: CPT | Mod: CPTII,S$GLB,, | Performed by: DERMATOLOGY

## 2023-08-08 PROCEDURE — 99214 OFFICE O/P EST MOD 30 MIN: CPT | Mod: 25,S$GLB,, | Performed by: DERMATOLOGY

## 2023-08-08 PROCEDURE — 1159F MED LIST DOCD IN RCRD: CPT | Mod: CPTII,S$GLB,, | Performed by: DERMATOLOGY

## 2023-08-08 PROCEDURE — 1159F PR MEDICATION LIST DOCUMENTED IN MEDICAL RECORD: ICD-10-PCS | Mod: CPTII,S$GLB,, | Performed by: DERMATOLOGY

## 2023-08-08 PROCEDURE — 17110 PR DESTRUCTION BENIGN LESIONS UP TO 14: ICD-10-PCS | Mod: S$GLB,,, | Performed by: DERMATOLOGY

## 2023-08-08 PROCEDURE — 1160F PR REVIEW ALL MEDS BY PRESCRIBER/CLIN PHARMACIST DOCUMENTED: ICD-10-PCS | Mod: CPTII,S$GLB,, | Performed by: DERMATOLOGY

## 2023-08-08 PROCEDURE — 3044F PR MOST RECENT HEMOGLOBIN A1C LEVEL <7.0%: ICD-10-PCS | Mod: CPTII,S$GLB,, | Performed by: DERMATOLOGY

## 2023-08-08 PROCEDURE — 17110 DESTRUCTION B9 LES UP TO 14: CPT | Mod: S$GLB,,, | Performed by: DERMATOLOGY

## 2023-08-08 NOTE — PROGRESS NOTES
Patient Information  Name: Kristofer Gentile  : 1980  MRN: [id number]     Referring Physician:  Rich   Primary Care Physician:  Melonie Villanueva MD   Date of Visit: 2023      Subjective:     History of Present lllness:    Kristofer Gentile is a 43 y.o. female who presents with a chief complaint of keloids.  Location: L chest, R breast and abdomen  Duration: ones on abdomen and breasts have been present for years, one on chest is more recent  Signs/Symptoms: hard growths, new one on chest is particularly tender  Relieving factors/Prior treatments: none    Clinical documentation obtained by nursing staff reviewed.    Review of Systems    Objective:   Physical Exam   Constitutional: She appears well-developed and well-nourished. No distress.   Neurological: She is alert and oriented to person, place, and time. She is not disoriented.   Psychiatric: She has a normal mood and affect.   Skin:   Areas Examined (abnormalities noted in diagram):   Chest / Axilla Inspection Performed  Abdomen Inspection Performed            Diagram Legend     Erythematous scaling macule/papule c/w actinic keratosis       Vascular papule c/w angioma      Pigmented verrucoid papule/plaque c/w seborrheic keratosis      Yellow umbilicated papule c/w sebaceous hyperplasia      Irregularly shaped tan macule c/w lentigo     1-2 mm smooth white papules consistent with Milia      Movable subcutaneous cyst with punctum c/w epidermal inclusion cyst      Subcutaneous movable cyst c/w pilar cyst      Firm pink to brown papule c/w dermatofibroma      Pedunculated fleshy papule(s) c/w skin tag(s)      Evenly pigmented macule c/w junctional nevus     Mildly variegated pigmented, slightly irregular-bordered macule c/w mildly atypical nevus      Flesh colored to evenly pigmented papule c/w intradermal nevus       Pink pearly papule/plaque c/w basal cell carcinoma      Erythematous hyperkeratotic cursted plaque c/w SCC      Surgical scar with  no sign of skin cancer recurrence      Open and closed comedones      Inflammatory papules and pustules      Verrucoid papule consistent consistent with wart     Erythematous eczematous patches and plaques     Dystrophic onycholytic nail with subungual debris c/w onychomycosis     Umbilicated papule    Erythematous-base heme-crusted tan verrucoid plaque consistent with inflamed seborrheic keratosis     Erythematous Silvery Scaling Plaque c/w Psoriasis     See annotation    No images are attached to the encounter or orders placed in the encounter.      [] Data reviewed  [] Prior external notes reviewed  [] Independent review of test  [] Management discussed with another provider  [] Independent historian    Assessment / Plan:        Keloid  Hyperesthesia  - chronic problem with exacerbation/progression  Discussed patient's diagnosis, prognosis, and treatment options, including excision by plastics and radiation therapy.  Pt would like to minimize painful treatments, so will start with treatment of one on chest which is most symptomatic.    Recommend OTC silicone gel sheets, such as ScarAway, or OTC silicone tape, such as CicaTape or Mepitac. Sheets/tape can be cut to size and should be worn for 12-24 hours per day.  Minimum treatment time is 2-3 months. Larger and older scars may take longer, therefore continued use is recommended if improvement is still seen after the initial 3 months.   Scar massage (apply firm pressure and rock finger side-to-side) for 5 minutes 5 times per day can also help.    Cryosurgery procedure note:  Risk, benefits, and alternatives of cryosurgery are discussed with the patient, including but not limited to the risks of hypopigmentation, hyperpigmentation, scar, infection, recurrence of lesion(s), development of new lesion(s), and need for additional treatment of the lesion(s). Verbal consent obtained from patient. Liquid nitrogen cryosurgery applied to 1 lesion(s) to produce a freeze  injury. Counseled patient that blisters may form, and instructed patient on wound care with gentle cleansing and use of Vaseline ointment to keep moist until healed. Handout was provided, and patient was instructed to return to clinic in 1-2 months if lesions do not completely resolve.    Intralesional Rx Kenalog 40 mg/mL (0.08 mL total) injected into 1 lesions on the upper chest today after obtaining verbal consent including risk of atrophy and surrounding hypopigmentation. Patient tolerated procedure well.  Units:1  NDC for Kenalog 40mg/mL:  8410-6347-51      Follow up in about 6 weeks (around 9/19/2023).      Aurda Soliz MD, FAAD  [facility]

## 2023-08-30 ENCOUNTER — OFFICE VISIT (OUTPATIENT)
Dept: OBSTETRICS AND GYNECOLOGY | Facility: CLINIC | Age: 43
End: 2023-08-30
Payer: COMMERCIAL

## 2023-08-30 VITALS
DIASTOLIC BLOOD PRESSURE: 68 MMHG | SYSTOLIC BLOOD PRESSURE: 120 MMHG | WEIGHT: 215.19 LBS | HEIGHT: 65 IN | BODY MASS INDEX: 35.85 KG/M2

## 2023-08-30 DIAGNOSIS — N92.1 BREAKTHROUGH BLEEDING: ICD-10-CM

## 2023-08-30 DIAGNOSIS — Z30.41 ENCOUNTER FOR SURVEILLANCE OF CONTRACEPTIVE PILLS: Primary | ICD-10-CM

## 2023-08-30 PROCEDURE — 99213 PR OFFICE/OUTPT VISIT, EST, LEVL III, 20-29 MIN: ICD-10-PCS | Mod: S$GLB,,, | Performed by: OBSTETRICS & GYNECOLOGY

## 2023-08-30 PROCEDURE — 3008F BODY MASS INDEX DOCD: CPT | Mod: CPTII,S$GLB,, | Performed by: OBSTETRICS & GYNECOLOGY

## 2023-08-30 PROCEDURE — 3074F SYST BP LT 130 MM HG: CPT | Mod: CPTII,S$GLB,, | Performed by: OBSTETRICS & GYNECOLOGY

## 2023-08-30 PROCEDURE — 99213 OFFICE O/P EST LOW 20 MIN: CPT | Mod: S$GLB,,, | Performed by: OBSTETRICS & GYNECOLOGY

## 2023-08-30 PROCEDURE — 3078F PR MOST RECENT DIASTOLIC BLOOD PRESSURE < 80 MM HG: ICD-10-PCS | Mod: CPTII,S$GLB,, | Performed by: OBSTETRICS & GYNECOLOGY

## 2023-08-30 PROCEDURE — 1160F RVW MEDS BY RX/DR IN RCRD: CPT | Mod: CPTII,S$GLB,, | Performed by: OBSTETRICS & GYNECOLOGY

## 2023-08-30 PROCEDURE — 3008F PR BODY MASS INDEX (BMI) DOCUMENTED: ICD-10-PCS | Mod: CPTII,S$GLB,, | Performed by: OBSTETRICS & GYNECOLOGY

## 2023-08-30 PROCEDURE — 3044F PR MOST RECENT HEMOGLOBIN A1C LEVEL <7.0%: ICD-10-PCS | Mod: CPTII,S$GLB,, | Performed by: OBSTETRICS & GYNECOLOGY

## 2023-08-30 PROCEDURE — 99999 PR PBB SHADOW E&M-EST. PATIENT-LVL III: ICD-10-PCS | Mod: PBBFAC,,, | Performed by: OBSTETRICS & GYNECOLOGY

## 2023-08-30 PROCEDURE — 3044F HG A1C LEVEL LT 7.0%: CPT | Mod: CPTII,S$GLB,, | Performed by: OBSTETRICS & GYNECOLOGY

## 2023-08-30 PROCEDURE — 1160F PR REVIEW ALL MEDS BY PRESCRIBER/CLIN PHARMACIST DOCUMENTED: ICD-10-PCS | Mod: CPTII,S$GLB,, | Performed by: OBSTETRICS & GYNECOLOGY

## 2023-08-30 PROCEDURE — 99999 PR PBB SHADOW E&M-EST. PATIENT-LVL III: CPT | Mod: PBBFAC,,, | Performed by: OBSTETRICS & GYNECOLOGY

## 2023-08-30 PROCEDURE — 3074F PR MOST RECENT SYSTOLIC BLOOD PRESSURE < 130 MM HG: ICD-10-PCS | Mod: CPTII,S$GLB,, | Performed by: OBSTETRICS & GYNECOLOGY

## 2023-08-30 PROCEDURE — 1159F MED LIST DOCD IN RCRD: CPT | Mod: CPTII,S$GLB,, | Performed by: OBSTETRICS & GYNECOLOGY

## 2023-08-30 PROCEDURE — 1159F PR MEDICATION LIST DOCUMENTED IN MEDICAL RECORD: ICD-10-PCS | Mod: CPTII,S$GLB,, | Performed by: OBSTETRICS & GYNECOLOGY

## 2023-08-30 PROCEDURE — 3078F DIAST BP <80 MM HG: CPT | Mod: CPTII,S$GLB,, | Performed by: OBSTETRICS & GYNECOLOGY

## 2023-08-30 NOTE — PROGRESS NOTES
Gynecology    SUBJECTIVE:     Chief Complaint: Contraception       History of Present Illness:  43 year old who presents to discuss IUD.  She has had several mirenas that were great, but last year, she had constant discharge with the IUD.  So she had it removed and started on OCPs.  Since then, she has been having periods and now has discharge with her cycle.  The discharge also has an odor that is foul.  She also is having cycles that are 5-7 days.  She really likes the IUD and prefers to go back to it because she doesn't want to have a period.        Review of Systems:  Review of Systems   Genitourinary:  Positive for menstrual problem and vaginal discharge. Negative for menorrhagia, pelvic pain, vaginal bleeding, vaginal pain and vaginal odor.        OBJECTIVE:     Physical Exam:  Physical Exam  Vitals and nursing note reviewed.   Constitutional:       Appearance: She is well-developed.   Pulmonary:      Effort: Pulmonary effort is normal.   Skin:     Coloration: Skin is not pale.   Neurological:      Mental Status: She is oriented to person, place, and time.   Psychiatric:         Behavior: Behavior normal.         Thought Content: Thought content normal.         Judgment: Judgment normal.         ASSESSMENT:       ICD-10-CM ICD-9-CM    1. Encounter for surveillance of contraceptive pills  Z30.41 V25.41 norgestrel-ethinyl estradioL (LO/OVRAL) 0.3-30 mg-mcg per tablet      2. Breakthrough bleeding  N92.1 626.6 norgestrel-ethinyl estradioL (LO/OVRAL) 0.3-30 mg-mcg per tablet             Plan:      Kristofer was seen today for contraception.    Diagnoses and all orders for this visit:    Encounter for surveillance of contraceptive pills  -     norgestrel-ethinyl estradioL (LO/OVRAL) 0.3-30 mg-mcg per tablet; Take 1 tablet by mouth once daily.    Breakthrough bleeding  -     norgestrel-ethinyl estradioL (LO/OVRAL) 0.3-30 mg-mcg per tablet; Take 1 tablet by mouth once daily.    20 minutes of total time was spent on  the encounter which included face-to-face time and non-face-to-face time preparing to see the patient, obtaining and or reviewing   separately obtained history, documenting clinical information in the electronic or other health record, independently interpreting results (not separately reported) and   communicating results to the patient, or care coordination (not separately reported).      - long discussion about options; discussed replacing the mirena, vs increasing the estrogen dose for OCPs, vs ablation  - patient would like to return to the mirena so as to have the highest likelihood of not having her period; however, wants to try a higher dose of estrogen in the meantime.    - will schedule for her IUD appointment in October; she will change this to an annual visit if she decides to stick with OCPs.    No orders of the defined types were placed in this encounter.        Ursula Ortega

## 2023-09-06 ENCOUNTER — PATIENT MESSAGE (OUTPATIENT)
Dept: INTERNAL MEDICINE | Facility: CLINIC | Age: 43
End: 2023-09-06
Payer: COMMERCIAL

## 2023-09-06 ENCOUNTER — LAB VISIT (OUTPATIENT)
Dept: LAB | Facility: HOSPITAL | Age: 43
End: 2023-09-06
Attending: INTERNAL MEDICINE
Payer: COMMERCIAL

## 2023-09-06 DIAGNOSIS — R30.0 DYSURIA: ICD-10-CM

## 2023-09-06 DIAGNOSIS — R30.0 DYSURIA: Primary | ICD-10-CM

## 2023-09-06 LAB
BACTERIA #/AREA URNS AUTO: ABNORMAL /HPF
BILIRUB UR QL STRIP: NEGATIVE
CLARITY UR REFRACT.AUTO: ABNORMAL
COLOR UR AUTO: ABNORMAL
GLUCOSE UR QL STRIP: NEGATIVE
HGB UR QL STRIP: ABNORMAL
HYALINE CASTS UR QL AUTO: 0 /LPF
KETONES UR QL STRIP: ABNORMAL
LEUKOCYTE ESTERASE UR QL STRIP: NEGATIVE
MICROSCOPIC COMMENT: ABNORMAL
NITRITE UR QL STRIP: POSITIVE
PH UR STRIP: 5 [PH] (ref 5–8)
PROT UR QL STRIP: ABNORMAL
RBC #/AREA URNS AUTO: 10 /HPF (ref 0–4)
SP GR UR STRIP: 1.02 (ref 1–1.03)
SQUAMOUS #/AREA URNS AUTO: 9 /HPF
URN SPEC COLLECT METH UR: ABNORMAL
WBC #/AREA URNS AUTO: >100 /HPF (ref 0–5)
WBC CLUMPS UR QL AUTO: ABNORMAL

## 2023-09-06 PROCEDURE — 81001 URINALYSIS AUTO W/SCOPE: CPT | Performed by: INTERNAL MEDICINE

## 2023-09-06 PROCEDURE — 87088 URINE BACTERIA CULTURE: CPT | Performed by: INTERNAL MEDICINE

## 2023-09-06 PROCEDURE — 87186 SC STD MICRODIL/AGAR DIL: CPT | Performed by: INTERNAL MEDICINE

## 2023-09-06 PROCEDURE — 87077 CULTURE AEROBIC IDENTIFY: CPT | Performed by: INTERNAL MEDICINE

## 2023-09-06 PROCEDURE — 87086 URINE CULTURE/COLONY COUNT: CPT | Performed by: INTERNAL MEDICINE

## 2023-09-06 NOTE — TELEPHONE ENCOUNTER
Pt experiencing UTI symptoms, frequency, blood in urine, burning when urinating entered orders so pt can schedule when she is available she will go to [facility]

## 2023-09-08 LAB — BACTERIA UR CULT: ABNORMAL

## 2023-09-11 ENCOUNTER — TELEPHONE (OUTPATIENT)
Dept: INTERNAL MEDICINE | Facility: CLINIC | Age: 43
End: 2023-09-11
Payer: COMMERCIAL

## 2023-09-11 RX ORDER — NITROFURANTOIN 25; 75 MG/1; MG/1
100 CAPSULE ORAL 2 TIMES DAILY
Qty: 10 CAPSULE | Refills: 0 | Status: SHIPPED | OUTPATIENT
Start: 2023-09-11 | End: 2023-10-05 | Stop reason: ALTCHOICE

## 2023-09-11 NOTE — TELEPHONE ENCOUNTER
----- Message from Lesli Rojo sent at 9/11/2023  1:13 PM CDT -----  Contact: P [phone]  [email or web address]     Patient is calling for Medical Advice regarding: UTI     How long has patient had these symptoms: since last Monday    Pharmacy name and phone#:     [facility]   [facility] [place] [address]  Phone    [phone]    Would like response via Shortlistt:  #Both #    Comments:

## 2023-10-03 ENCOUNTER — OFFICE VISIT (OUTPATIENT)
Dept: DERMATOLOGY | Facility: CLINIC | Age: 43
End: 2023-10-03
Payer: COMMERCIAL

## 2023-10-03 DIAGNOSIS — L91.0 KELOID: Primary | ICD-10-CM

## 2023-10-03 PROCEDURE — 1159F MED LIST DOCD IN RCRD: CPT | Mod: CPTII,S$GLB,, | Performed by: DERMATOLOGY

## 2023-10-03 PROCEDURE — 1160F PR REVIEW ALL MEDS BY PRESCRIBER/CLIN PHARMACIST DOCUMENTED: ICD-10-PCS | Mod: CPTII,S$GLB,, | Performed by: DERMATOLOGY

## 2023-10-03 PROCEDURE — 3044F PR MOST RECENT HEMOGLOBIN A1C LEVEL <7.0%: ICD-10-PCS | Mod: CPTII,S$GLB,, | Performed by: DERMATOLOGY

## 2023-10-03 PROCEDURE — 3044F HG A1C LEVEL LT 7.0%: CPT | Mod: CPTII,S$GLB,, | Performed by: DERMATOLOGY

## 2023-10-03 PROCEDURE — 1160F RVW MEDS BY RX/DR IN RCRD: CPT | Mod: CPTII,S$GLB,, | Performed by: DERMATOLOGY

## 2023-10-03 PROCEDURE — 99213 PR OFFICE/OUTPT VISIT, EST, LEVL III, 20-29 MIN: ICD-10-PCS | Mod: S$GLB,,, | Performed by: DERMATOLOGY

## 2023-10-03 PROCEDURE — 99213 OFFICE O/P EST LOW 20 MIN: CPT | Mod: S$GLB,,, | Performed by: DERMATOLOGY

## 2023-10-03 PROCEDURE — 1159F PR MEDICATION LIST DOCUMENTED IN MEDICAL RECORD: ICD-10-PCS | Mod: CPTII,S$GLB,, | Performed by: DERMATOLOGY

## 2023-10-03 NOTE — PROGRESS NOTES
Patient Information  Name: Kristofer Gentile  : 1980  MRN: [id number]     Referring Physician:  No ref. provider found   Primary Care Physician:  Melonie Villanueva MD   Date of Visit: 10/03/2023      Subjective:     History of Present lllness:    Kristofer Gentile is a 43 y.o. female who presents with a chief complaint of keloids.  Location: L chest, R breast and abdomen  Symptom course: unchanged (one that was injected is slightly flatter)  Current treatment: ILK injection x 1, silicone tape, vit d      Patient was last seen: 2023.  Prior notes by myself reviewed.   Clinical documentation obtained by nursing staff reviewed.    Review of Systems    Objective:   Physical Exam   Constitutional: She appears well-developed and well-nourished. No distress.   Neurological: She is alert and oriented to person, place, and time. She is not disoriented.   Psychiatric: She has a normal mood and affect.   Skin:   Areas Examined (abnormalities noted in diagram):   Chest / Axilla Inspection Performed  Abdomen Inspection Performed            Diagram Legend     Erythematous scaling macule/papule c/w actinic keratosis       Vascular papule c/w angioma      Pigmented verrucoid papule/plaque c/w seborrheic keratosis      Yellow umbilicated papule c/w sebaceous hyperplasia      Irregularly shaped tan macule c/w lentigo     1-2 mm smooth white papules consistent with Milia      Movable subcutaneous cyst with punctum c/w epidermal inclusion cyst      Subcutaneous movable cyst c/w pilar cyst      Firm pink to brown papule c/w dermatofibroma      Pedunculated fleshy papule(s) c/w skin tag(s)      Evenly pigmented macule c/w junctional nevus     Mildly variegated pigmented, slightly irregular-bordered macule c/w mildly atypical nevus      Flesh colored to evenly pigmented papule c/w intradermal nevus       Pink pearly papule/plaque c/w basal cell carcinoma      Erythematous hyperkeratotic cursted plaque c/w SCC      Surgical  scar with no sign of skin cancer recurrence      Open and closed comedones      Inflammatory papules and pustules      Verrucoid papule consistent consistent with wart     Erythematous eczematous patches and plaques     Dystrophic onycholytic nail with subungual debris c/w onychomycosis     Umbilicated papule    Erythematous-base heme-crusted tan verrucoid plaque consistent with inflamed seborrheic keratosis     Erythematous Silvery Scaling Plaque c/w Psoriasis     See annotation    No images are attached to the encounter or orders placed in the encounter.      [] Data reviewed  [] Prior external notes reviewed  [] Independent review of test  [] Management discussed with another provider  [] Independent historian    Assessment / Plan:        Keloid  - chronic problem, not at treatment goal  Minimal improvement with ILK and pt prefers to avoid numerous injections due to pain.  Referred to Dr. Oakes for his opinion on radiation (+/- excision prior)  -     Ambulatory referral/consult to Radiation Oncology; Future; Expected date: 10/10/2023  Continue OTC silicone tape and start scar massage.      Follow up for ILK injections if desired.      Audra Soliz MD, FAAD  [facility]

## 2023-10-05 ENCOUNTER — LAB VISIT (OUTPATIENT)
Dept: LAB | Facility: HOSPITAL | Age: 43
End: 2023-10-05
Attending: INTERNAL MEDICINE
Payer: COMMERCIAL

## 2023-10-05 ENCOUNTER — OFFICE VISIT (OUTPATIENT)
Dept: INTERNAL MEDICINE | Facility: CLINIC | Age: 43
End: 2023-10-05
Payer: COMMERCIAL

## 2023-10-05 VITALS
DIASTOLIC BLOOD PRESSURE: 78 MMHG | HEIGHT: 65 IN | SYSTOLIC BLOOD PRESSURE: 118 MMHG | TEMPERATURE: 98 F | WEIGHT: 212.31 LBS | RESPIRATION RATE: 18 BRPM | BODY MASS INDEX: 35.37 KG/M2 | OXYGEN SATURATION: 99 % | HEART RATE: 73 BPM

## 2023-10-05 DIAGNOSIS — E78.49 OTHER HYPERLIPIDEMIA: ICD-10-CM

## 2023-10-05 DIAGNOSIS — E87.1 HYPONATREMIA: ICD-10-CM

## 2023-10-05 DIAGNOSIS — E66.01 SEVERE OBESITY (BMI 35.0-39.9) WITH COMORBIDITY: ICD-10-CM

## 2023-10-05 DIAGNOSIS — Z12.31 ENCOUNTER FOR SCREENING MAMMOGRAM FOR HIGH-RISK PATIENT: ICD-10-CM

## 2023-10-05 DIAGNOSIS — E78.5 DYSLIPIDEMIA (HIGH LDL; LOW HDL): Primary | ICD-10-CM

## 2023-10-05 PROCEDURE — 3044F PR MOST RECENT HEMOGLOBIN A1C LEVEL <7.0%: ICD-10-PCS | Mod: CPTII,S$GLB,, | Performed by: INTERNAL MEDICINE

## 2023-10-05 PROCEDURE — 3044F HG A1C LEVEL LT 7.0%: CPT | Mod: CPTII,S$GLB,, | Performed by: INTERNAL MEDICINE

## 2023-10-05 PROCEDURE — 36415 COLL VENOUS BLD VENIPUNCTURE: CPT | Mod: PO | Performed by: INTERNAL MEDICINE

## 2023-10-05 PROCEDURE — 1160F RVW MEDS BY RX/DR IN RCRD: CPT | Mod: CPTII,S$GLB,, | Performed by: INTERNAL MEDICINE

## 2023-10-05 PROCEDURE — 82306 VITAMIN D 25 HYDROXY: CPT | Performed by: INTERNAL MEDICINE

## 2023-10-05 PROCEDURE — 3074F SYST BP LT 130 MM HG: CPT | Mod: CPTII,S$GLB,, | Performed by: INTERNAL MEDICINE

## 2023-10-05 PROCEDURE — 3078F PR MOST RECENT DIASTOLIC BLOOD PRESSURE < 80 MM HG: ICD-10-PCS | Mod: CPTII,S$GLB,, | Performed by: INTERNAL MEDICINE

## 2023-10-05 PROCEDURE — 3008F BODY MASS INDEX DOCD: CPT | Mod: CPTII,S$GLB,, | Performed by: INTERNAL MEDICINE

## 2023-10-05 PROCEDURE — 99213 OFFICE O/P EST LOW 20 MIN: CPT | Mod: S$GLB,,, | Performed by: INTERNAL MEDICINE

## 2023-10-05 PROCEDURE — 3008F PR BODY MASS INDEX (BMI) DOCUMENTED: ICD-10-PCS | Mod: CPTII,S$GLB,, | Performed by: INTERNAL MEDICINE

## 2023-10-05 PROCEDURE — 1160F PR REVIEW ALL MEDS BY PRESCRIBER/CLIN PHARMACIST DOCUMENTED: ICD-10-PCS | Mod: CPTII,S$GLB,, | Performed by: INTERNAL MEDICINE

## 2023-10-05 PROCEDURE — 3078F DIAST BP <80 MM HG: CPT | Mod: CPTII,S$GLB,, | Performed by: INTERNAL MEDICINE

## 2023-10-05 PROCEDURE — 80061 LIPID PANEL: CPT | Performed by: INTERNAL MEDICINE

## 2023-10-05 PROCEDURE — 1159F PR MEDICATION LIST DOCUMENTED IN MEDICAL RECORD: ICD-10-PCS | Mod: CPTII,S$GLB,, | Performed by: INTERNAL MEDICINE

## 2023-10-05 PROCEDURE — 99213 PR OFFICE/OUTPT VISIT, EST, LEVL III, 20-29 MIN: ICD-10-PCS | Mod: S$GLB,,, | Performed by: INTERNAL MEDICINE

## 2023-10-05 PROCEDURE — 99999 PR PBB SHADOW E&M-EST. PATIENT-LVL IV: ICD-10-PCS | Mod: PBBFAC,,, | Performed by: INTERNAL MEDICINE

## 2023-10-05 PROCEDURE — 1159F MED LIST DOCD IN RCRD: CPT | Mod: CPTII,S$GLB,, | Performed by: INTERNAL MEDICINE

## 2023-10-05 PROCEDURE — 80048 BASIC METABOLIC PNL TOTAL CA: CPT | Performed by: INTERNAL MEDICINE

## 2023-10-05 PROCEDURE — 3074F PR MOST RECENT SYSTOLIC BLOOD PRESSURE < 130 MM HG: ICD-10-PCS | Mod: CPTII,S$GLB,, | Performed by: INTERNAL MEDICINE

## 2023-10-05 PROCEDURE — 99999 PR PBB SHADOW E&M-EST. PATIENT-LVL IV: CPT | Mod: PBBFAC,,, | Performed by: INTERNAL MEDICINE

## 2023-10-05 NOTE — PROGRESS NOTES
Subjective:     Kristofer Gentile is a 43 y.o. female who presents for   Chief Complaint   Patient presents with    Follow-up    Hyperlipidemia    Obesity       HPI    Hyperlipidemia: She has not been treated but has been working on lifestyle modifications.  The patient exercises intermittently. She has increased fiber intake. Previous history of cardiac disease includes: none.    Lab Results   Component Value Date    CHOL 246 (H) 10/05/2023    HDL 41 10/05/2023        Body mass index is 35.33 kg/m².  Wt Readings from Last 3 Encounters:   11/06/23 96.6 kg (212 lb 15.4 oz)   10/05/23 96.3 kg (212 lb 4.9 oz)   08/30/23 97.6 kg (215 lb 2.7 oz)   239.... 212 lbs,  goal wt is 170 lbs      Review of Systems   Constitutional:  Negative for chills, diaphoresis and fever.   HENT:  Negative for congestion, ear pain, postnasal drip, sinus pressure and sore throat.    Respiratory:  Negative for cough and shortness of breath.    Cardiovascular:  Negative for chest pain, palpitations and leg swelling.   Gastrointestinal:  Negative for abdominal pain, constipation, diarrhea, nausea and vomiting.   Musculoskeletal:  Negative for arthralgias and myalgias.   Skin:  Negative for rash and wound.        Has keloid on abdomen   Neurological:  Negative for dizziness, tremors, numbness and headaches.          Objective:     Physical Exam  Vitals reviewed.   Constitutional:       General: She is awake. She is not in acute distress.     Appearance: Normal appearance. She is well-developed and well-groomed.   HENT:      Head: Normocephalic and atraumatic.      Right Ear: Hearing and external ear normal.      Left Ear: Hearing and external ear normal.      Nose: Nose normal. No congestion.      Mouth/Throat:      Mouth: Mucous membranes are moist.   Eyes:      General: Lids are normal. Vision grossly intact.   Cardiovascular:      Rate and Rhythm: Normal rate and regular rhythm.      Heart sounds: Normal heart sounds. No murmur  heard.  Pulmonary:      Effort: Pulmonary effort is normal.      Breath sounds: Normal breath sounds. No decreased breath sounds or wheezing.   Abdominal:      General: Bowel sounds are normal. There is no distension.   Musculoskeletal:         General: Normal range of motion.      Cervical back: Normal range of motion.      Right lower leg: No edema.      Left lower leg: No edema.   Skin:     General: Skin is warm and dry.      Findings: No lesion or rash.   Neurological:      Mental Status: She is alert and oriented to person, place, and time.   Psychiatric:         Attention and Perception: Attention normal.         Mood and Affect: Mood normal.         Behavior: Behavior is cooperative.            Assessment:      1. Dyslipidemia (high LDL; low HDL)    2. Hyponatremia    3. Encounter for screening mammogram for high-risk patient    4. Severe obesity (BMI 35.0-39.9) with comorbidity           Plan:     1. Dyslipidemia (high LDL; low HDL)  - Lipid Panel; Future    2. Hyponatremia  - Vitamin D; Future  - Basic Metabolic Panel; Future    3. Encounter for screening mammogram for high-risk patient  - Mammo Digital Screening Bilat w/ Juan A; Future    4. Severe obesity (BMI 35.0-39.9) with comorbidity  - discussed a few lifestyle changes, add or increase strength training, will monitor    RTC in March 2024 for annual exam or sooner if needed    __________________________    Melonie Villanueva MD, PharmD  [facility]  American Board of Obesity Medicine diplomate  Office [phone]

## 2023-10-06 LAB
25(OH)D3+25(OH)D2 SERPL-MCNC: 75 NG/ML (ref 30–96)
ANION GAP SERPL CALC-SCNC: 9 MMOL/L (ref 8–16)
BUN SERPL-MCNC: 13 MG/DL (ref 6–20)
CALCIUM SERPL-MCNC: 9.3 MG/DL (ref 8.7–10.5)
CHLORIDE SERPL-SCNC: 105 MMOL/L (ref 95–110)
CHOLEST SERPL-MCNC: 246 MG/DL (ref 120–199)
CHOLEST/HDLC SERPL: 6 {RATIO} (ref 2–5)
CO2 SERPL-SCNC: 21 MMOL/L (ref 23–29)
CREAT SERPL-MCNC: 0.9 MG/DL (ref 0.5–1.4)
EST. GFR  (NO RACE VARIABLE): >60 ML/MIN/1.73 M^2
GLUCOSE SERPL-MCNC: 70 MG/DL (ref 70–110)
HDLC SERPL-MCNC: 41 MG/DL (ref 40–75)
HDLC SERPL: 16.7 % (ref 20–50)
LDLC SERPL CALC-MCNC: 192.2 MG/DL (ref 63–159)
NONHDLC SERPL-MCNC: 205 MG/DL
POTASSIUM SERPL-SCNC: 3.9 MMOL/L (ref 3.5–5.1)
SODIUM SERPL-SCNC: 135 MMOL/L (ref 136–145)
TRIGL SERPL-MCNC: 64 MG/DL (ref 30–150)

## 2023-10-10 ENCOUNTER — TELEPHONE (OUTPATIENT)
Dept: OBSTETRICS AND GYNECOLOGY | Facility: CLINIC | Age: 43
End: 2023-10-10
Payer: COMMERCIAL

## 2023-10-10 DIAGNOSIS — Z30.014 ENCOUNTER FOR INITIAL PRESCRIPTION OF INTRAUTERINE CONTRACEPTIVE DEVICE (IUD): Primary | ICD-10-CM

## 2023-10-11 DIAGNOSIS — Z30.41 ENCOUNTER FOR SURVEILLANCE OF CONTRACEPTIVE PILLS: ICD-10-CM

## 2023-10-11 DIAGNOSIS — N92.1 BREAKTHROUGH BLEEDING: ICD-10-CM

## 2023-10-11 NOTE — TELEPHONE ENCOUNTER
Pt wants to cancel the IUD placement and will stick with the pills.Sent 90 day supply with refills for the year to pharmacy for her

## 2023-10-18 ENCOUNTER — OFFICE VISIT (OUTPATIENT)
Dept: PRIMARY CARE CLINIC | Facility: CLINIC | Age: 43
End: 2023-10-18
Payer: COMMERCIAL

## 2023-10-18 DIAGNOSIS — N30.00 ACUTE CYSTITIS WITHOUT HEMATURIA: Primary | ICD-10-CM

## 2023-10-18 PROCEDURE — 1159F MED LIST DOCD IN RCRD: CPT | Mod: CPTII,95,, | Performed by: FAMILY MEDICINE

## 2023-10-18 PROCEDURE — 3044F HG A1C LEVEL LT 7.0%: CPT | Mod: CPTII,95,, | Performed by: FAMILY MEDICINE

## 2023-10-18 PROCEDURE — 1159F PR MEDICATION LIST DOCUMENTED IN MEDICAL RECORD: ICD-10-PCS | Mod: CPTII,95,, | Performed by: FAMILY MEDICINE

## 2023-10-18 PROCEDURE — 3044F PR MOST RECENT HEMOGLOBIN A1C LEVEL <7.0%: ICD-10-PCS | Mod: CPTII,95,, | Performed by: FAMILY MEDICINE

## 2023-10-18 PROCEDURE — 99213 PR OFFICE/OUTPT VISIT, EST, LEVL III, 20-29 MIN: ICD-10-PCS | Mod: 95,,, | Performed by: FAMILY MEDICINE

## 2023-10-18 PROCEDURE — 1160F RVW MEDS BY RX/DR IN RCRD: CPT | Mod: CPTII,95,, | Performed by: FAMILY MEDICINE

## 2023-10-18 PROCEDURE — 1160F PR REVIEW ALL MEDS BY PRESCRIBER/CLIN PHARMACIST DOCUMENTED: ICD-10-PCS | Mod: CPTII,95,, | Performed by: FAMILY MEDICINE

## 2023-10-18 PROCEDURE — 99213 OFFICE O/P EST LOW 20 MIN: CPT | Mod: 95,,, | Performed by: FAMILY MEDICINE

## 2023-10-18 RX ORDER — NITROFURANTOIN 25; 75 MG/1; MG/1
100 CAPSULE ORAL 2 TIMES DAILY
Qty: 10 CAPSULE | Refills: 0 | Status: SHIPPED | OUTPATIENT
Start: 2023-10-18 | End: 2023-10-23

## 2023-10-18 NOTE — PROGRESS NOTES
The patient location is: [place]  The chief complaint leading to consultation is:  UTI    Visit type: audiovisual    Face to Face time with patient:   15 minutes of total time spent on the encounter, which includes face to face time and non-face to face time preparing to see the patient (eg, review of tests), Obtaining and/or reviewing separately obtained history, Documenting clinical information in the electronic or other health record, Independently interpreting results (not separately reported) and communicating results to the patient/family/caregiver, or Care coordination (not separately reported).         Each patient to whom he or she provides medical services by telemedicine is:  (1) informed of the relationship between the physician and patient and the respective role of any other health care provider with respect to management of the patient; and (2) notified that he or she may decline to receive medical services by telemedicine and may withdraw from such care at any time.    Notes:    HPI:  Patient is a 23-year-old female who presents for recurrent UTI 2 days ago.  Patient reports urinary frequency, dysuria, and bloating.  She denies fever or hematuria.  She does report some low back pain but contributes to exercise.  She was recently treated for UTI 1 month ago.  UTI usually associated with intercourse.      Physical exam:  General: Alert, no acute distress   HEENT:  NC/AT, no facial swelling of deformity  Neck:  supple  Resp:  Normal effort, no respiratory distress   Psych:  Normal affect     Diagnoses and all orders for this visit:    Acute cystitis without hematuria  Patient with acute UTI symptoms for the past 3 days.  Bed 100 mg 1 tablet twice daily for 5 days prescribed.  Discussed UTI prevention methods.  Encouraged urination after intercourse.  Patient also encouraged to resume probiotics.  Patient advised to follow-up in 5 days if symptoms not resolved or sooner if they become worse.  -      nitrofurantoin, macrocrystal-monohydrate, (MACROBID) 100 MG capsule; Take 1 capsule (100 mg total) by mouth 2 (two) times daily. for 5 days

## 2023-11-01 NOTE — TELEPHONE ENCOUNTER
Reason for Disposition   Requesting regular office appointment    Protocols used: INFORMATION ONLY CALL-A-AH    Pt transferred to OOC from . Pt requesting appt. Spoke with PCP through [facility]. PCP suggested pt schedule appt due to symptoms of anxiety, stress and headache. Pt states she has high BP but does not know what her BP is, feels that it is high due to headache. Pt requesting to be seen tomorrow, appt scheduled.    PE, on 2023  • Previous:  6, on 23    ORT:  0, on 2023  • Previous:  0, on 23    Oswestry Disability:  33.33%, on 2023  • Previous:  33.33%, on 23

## 2023-11-03 ENCOUNTER — PATIENT MESSAGE (OUTPATIENT)
Dept: RADIOLOGY | Facility: HOSPITAL | Age: 43
End: 2023-11-03
Payer: COMMERCIAL

## 2023-11-06 ENCOUNTER — OFFICE VISIT (OUTPATIENT)
Dept: RADIATION ONCOLOGY | Facility: CLINIC | Age: 43
End: 2023-11-06
Attending: RADIOLOGY
Payer: COMMERCIAL

## 2023-11-06 VITALS
HEART RATE: 77 BPM | BODY MASS INDEX: 35.48 KG/M2 | HEIGHT: 65 IN | RESPIRATION RATE: 16 BRPM | SYSTOLIC BLOOD PRESSURE: 119 MMHG | WEIGHT: 212.94 LBS | DIASTOLIC BLOOD PRESSURE: 67 MMHG

## 2023-11-06 DIAGNOSIS — L91.0 KELOID: ICD-10-CM

## 2023-11-06 PROCEDURE — 3078F DIAST BP <80 MM HG: CPT | Mod: CPTII,S$GLB,, | Performed by: RADIOLOGY

## 2023-11-06 PROCEDURE — 99999 PR PBB SHADOW E&M-EST. PATIENT-LVL IV: ICD-10-PCS | Mod: PBBFAC,,, | Performed by: RADIOLOGY

## 2023-11-06 PROCEDURE — 1159F MED LIST DOCD IN RCRD: CPT | Mod: CPTII,S$GLB,, | Performed by: RADIOLOGY

## 2023-11-06 PROCEDURE — 1160F PR REVIEW ALL MEDS BY PRESCRIBER/CLIN PHARMACIST DOCUMENTED: ICD-10-PCS | Mod: CPTII,S$GLB,, | Performed by: RADIOLOGY

## 2023-11-06 PROCEDURE — 3078F PR MOST RECENT DIASTOLIC BLOOD PRESSURE < 80 MM HG: ICD-10-PCS | Mod: CPTII,S$GLB,, | Performed by: RADIOLOGY

## 2023-11-06 PROCEDURE — 3008F BODY MASS INDEX DOCD: CPT | Mod: CPTII,S$GLB,, | Performed by: RADIOLOGY

## 2023-11-06 PROCEDURE — 3074F SYST BP LT 130 MM HG: CPT | Mod: CPTII,S$GLB,, | Performed by: RADIOLOGY

## 2023-11-06 PROCEDURE — 99202 PR OFFICE/OUTPT VISIT, NEW, LEVL II, 15-29 MIN: ICD-10-PCS | Mod: S$GLB,,, | Performed by: RADIOLOGY

## 2023-11-06 PROCEDURE — 1160F RVW MEDS BY RX/DR IN RCRD: CPT | Mod: CPTII,S$GLB,, | Performed by: RADIOLOGY

## 2023-11-06 PROCEDURE — 3008F PR BODY MASS INDEX (BMI) DOCUMENTED: ICD-10-PCS | Mod: CPTII,S$GLB,, | Performed by: RADIOLOGY

## 2023-11-06 PROCEDURE — 3044F HG A1C LEVEL LT 7.0%: CPT | Mod: CPTII,S$GLB,, | Performed by: RADIOLOGY

## 2023-11-06 PROCEDURE — 3074F PR MOST RECENT SYSTOLIC BLOOD PRESSURE < 130 MM HG: ICD-10-PCS | Mod: CPTII,S$GLB,, | Performed by: RADIOLOGY

## 2023-11-06 PROCEDURE — 3044F PR MOST RECENT HEMOGLOBIN A1C LEVEL <7.0%: ICD-10-PCS | Mod: CPTII,S$GLB,, | Performed by: RADIOLOGY

## 2023-11-06 PROCEDURE — 99999 PR PBB SHADOW E&M-EST. PATIENT-LVL IV: CPT | Mod: PBBFAC,,, | Performed by: RADIOLOGY

## 2023-11-06 PROCEDURE — 99202 OFFICE O/P NEW SF 15 MIN: CPT | Mod: S$GLB,,, | Performed by: RADIOLOGY

## 2023-11-06 PROCEDURE — 1159F PR MEDICATION LIST DOCUMENTED IN MEDICAL RECORD: ICD-10-PCS | Mod: CPTII,S$GLB,, | Performed by: RADIOLOGY

## 2023-11-07 PROBLEM — L91.0 KELOID: Status: ACTIVE | Noted: 2023-11-07

## 2023-11-07 NOTE — PROGRESS NOTES
[facility]     HISTORY OF PRESENT ILLNESS:   This patient presents for discussion of radiotherapy for her ongoing keloid skin disorder.    Ms.. Gentile has a history of keloid formation following surgeries and procedures associated with pregnancy, minor trauma and acne.  She complains of painful keloid formation throughout the lower abdomen, chest and bilateral nipples.  She presents for discussion of management options       REVIEW OF SYSTEMS:   Review of Systems   Constitutional:  Negative for chills, fever and weight loss.   Skin:  Positive for rash (keloids, associated with pain).         PAST MEDICAL HISTORY:  Past Medical History:   Diagnosis Date    Allergy     Anti-TPO antibodies present 11/8/2018    Carpal tunnel syndrome     Dyslipidemia (high LDL; low HDL) 6/25/2013    Fever blister     Herpes simplex labialis 6/25/2013    Intraductal papilloma of left breast 3/1/2019    IUD (intrauterine device) in place 2009    Metabolic syndrome 7/22/2013    Thyroid disease     treated with synthroid during pregnancy    Vitamin D deficiency 11/5/2018    Vitamin D insufficiency 11/5/2018       PAST SURGICAL HISTORY:  Past Surgical History:   Procedure Laterality Date    BREAST BIOPSY Left 12/14/2018    Core bx, benign    EXCISIONAL BIOPSY Left 3/1/2019    Procedure: EXCISIONAL BIOPSY- w/major duct excision KENALOG INJECTION AT TIME OF SURGERY;  Surgeon: Kelby Mcmahan MD;  [facility]: [facility] OR [address] [place] [address];  Service: General;  Laterality: Left;    INTRAUTERINE DEVICE INSERTION  2009    again 2014       ALLERGIES:   Review of patient's allergies indicates:   Allergen Reactions    Bactrim [sulfamethoxazole-trimethoprim] Hives and Other (See Comments)     Stiffness to joints    Sulfa (sulfonamide antibiotics) Hives       MEDICATIONS:  Current Outpatient Medications   Medication Sig    FLONASE ALLERGY RELIEF 50 mcg/actuation nasal spray SPRAY TWICE IN EACH NOSTRIL ONCE DAILY    hydrocortisone 2.5 % cream  Apply topically 2 (two) times daily. Mix with ketoconazole cream and apply twice a day for 1-2 weeks when flaring. Only use when flaring.    ketoconazole (NIZORAL) 2 % cream Apply topically 2 (two) times daily. Apply twice a day with hydrocortisone when flaring for up to two weeks at a time.    multivit,calc,mins/iron/folic (ONE-A-DAY WOMENS FORMULA ORAL) Take by mouth.    valACYclovir (VALTREX) 500 MG tablet Take 1 tablet (500 mg total) by mouth once daily.    norgestrel-ethinyl estradioL (LO/OVRAL) 0.3-30 mg-mcg per tablet Take 1 tablet by mouth once daily.     No current facility-administered medications for this visit.       SOCIAL HISTORY:  Social History     Socioeconomic History    Marital status:     Number of children: 2   Occupational History     Employer: [employer]   Tobacco Use    Smoking status: Never    Smokeless tobacco: Never   Substance and Sexual Activity    Alcohol use: Yes     Alcohol/week: 0.8 standard drinks of alcohol     Types: 1 Standard drinks or equivalent per week     Comment: Rarely    Drug use: No    Sexual activity: Yes     Partners: Male     Birth control/protection: I.U.D.     Social Determinants of Health     Financial Resource Strain: Low Risk  (9/28/2023)    Overall Financial Resource Strain (CARDIA)     Difficulty of Paying Living Expenses: Not very hard   Food Insecurity: No Food Insecurity (9/28/2023)    Hunger Vital Sign     Worried About Running Out of Food in the Last Year: Never true     Ran Out of Food in the Last Year: Never true   Transportation Needs: No Transportation Needs (9/28/2023)    PRAPARE - Transportation     Lack of Transportation (Medical): No     Lack of Transportation (Non-Medical): No   Physical Activity: Insufficiently Active (9/28/2023)    Exercise Vital Sign     Days of Exercise per Week: 3 days     Minutes of Exercise per Session: 30 min   Stress: No Stress Concern Present (9/28/2023)    Citizen of [place] of Occupational Health -  Occupational Stress Questionnaire     Feeling of Stress : Only a little   Social Connections: Unknown (2023)    Social Connection and Isolation Panel [NHANES]     Frequency of Communication with Friends and Family: More than three times a week     Frequency of Social Gatherings with Friends and Family: Once a week     Attends Club or Organization Meetings: More than 4 times per year     Marital Status:    Housing Stability: Unknown (2023)    Housing Stability Vital Sign     Unstable Housing in the Last Year: No       FAMILY HISTORY:  Family History   Problem Relation Age of Onset    Diabetes Mother     Hypertension Mother     Rheum arthritis Mother     Coronary artery disease Mother     Stroke Mother         x2    Liver disease Mother         NAFLD    Sarcoidosis Mother     Thyroid disease Mother     Hyperlipidemia Mother     Atrial fibrillation Mother     Cataracts Mother     Glaucoma Mother     Hypertension Father     Dementia Father     Hyperlipidemia Father     Liver cancer Father     Rheum arthritis Maternal Grandmother         wheelchair bound    Cataracts Maternal Grandfather     Rheum arthritis Paternal Grandmother     Hypertension Maternal Aunt     Coronary artery disease Maternal Uncle         with stent    Sarcoidosis Maternal Uncle     Leukemia Maternal Uncle     Breast cancer Neg Hx     Colon cancer Neg Hx     Ovarian cancer Neg Hx          PHYSICAL EXAMINATION:  Vitals:    23 1304   BP: 119/67   Pulse: 77   Resp: 16     Physical Exam  Constitutional:       General: She is not in acute distress.     Appearance: Normal appearance.   Skin:            Comments: Multiple small diffuse keloids throughout the lower abdomen  small keloid in the mid sternal area.  flat keloid on the Rt. and Lt. areola.     Neurological:      Mental Status: She is alert.         ASSESSMENT/PLAN:  Keloid skin disorder.     ECO    Discussed the role of radiotherapy in this setting.  Explained  radiotherapy is often used in the postoperative setting to help prevent recurrent keloid formation following resection.  Currently the keloid formation in the lower abdomen is active and diffuse.  I recommend evaluation with plastic surgery to discuss possible abdominal plasty followed by postoperative radiotherapy to the incision.  Would also remove the smaller keloid in the mid sternal area.  The keloids along the areolas appear flat and currently are not tender.  Would recommend observation at this time.  Will plan referral to plastic surgery . Thank you for allowing us to participate in the care of this patient.     I spent approximately 20 minutes reviewing the available records and evaluating the patient, out of which over 50% of the time was spent face to face with the patient in counseling and coordinating this patient's care.

## 2023-11-08 ENCOUNTER — TELEPHONE (OUTPATIENT)
Dept: PLASTIC SURGERY | Facility: CLINIC | Age: 43
End: 2023-11-08
Payer: COMMERCIAL

## 2023-11-17 RX ORDER — ACETAMINOPHEN 500 MG
10000 TABLET ORAL DAILY
COMMUNITY

## 2024-02-07 ENCOUNTER — OFFICE VISIT (OUTPATIENT)
Dept: PLASTIC SURGERY | Facility: CLINIC | Age: 44
End: 2024-02-07
Payer: COMMERCIAL

## 2024-02-07 VITALS — HEIGHT: 64 IN | BODY MASS INDEX: 35.51 KG/M2 | WEIGHT: 208 LBS

## 2024-02-07 DIAGNOSIS — L91.0 KELOID: ICD-10-CM

## 2024-02-07 PROCEDURE — 99999 PR PBB SHADOW E&M-EST. PATIENT-LVL III: CPT | Mod: PBBFAC,,, | Performed by: SURGERY

## 2024-02-07 PROCEDURE — 1159F MED LIST DOCD IN RCRD: CPT | Mod: CPTII,S$GLB,, | Performed by: SURGERY

## 2024-02-07 PROCEDURE — 3008F BODY MASS INDEX DOCD: CPT | Mod: CPTII,S$GLB,, | Performed by: SURGERY

## 2024-02-07 PROCEDURE — 99204 OFFICE O/P NEW MOD 45 MIN: CPT | Mod: S$GLB,,, | Performed by: SURGERY

## 2024-02-07 NOTE — PROGRESS NOTES
Subjective:      Kristofer Gentile is a 43 y.o. year old female who presents to the [facility] on 02/07/2024 for consultation regarding keloids on her abdomen. Patient reports that they developed after giving birth 20 years prior.  The patient states that she has had no trauma to her abdomen but she has multiple multiple keloids all in her lower abdomen mostly below the umbilicus but some at or slightly above the umbilicus.  The only way to remove these would be to take the lower abdominal tissue out from hip to hip.  She would need postoperative radiation treatment for this.  Physical examination is very impressive for multiple multiple keloids of the abdomen.  We have taken photos of this.  Patient states not only of the itch and burn but they do sometimes bleed.  Patient states it is difficult sometimes with close because they cause severe irritation.  It is interfering with her daily life.      There were no vitals filed for this visit.     Review of patient's allergies indicates:   Allergen Reactions    Bactrim [sulfamethoxazole-trimethoprim] Hives and Other (See Comments)     Stiffness to joints    Sulfa (sulfonamide antibiotics) Hives       Current Outpatient Medications on File Prior to Visit   Medication Sig Dispense Refill    CALCIUM ORAL Take by mouth.      cholecalciferol, vitamin D3, (VITAMIN D3) 125 mcg (5,000 unit) Tab Take 10,000 Units by mouth once daily.      COLLAGEN MISC by Misc.(Non-Drug; Combo Route) route.      docosahexaenoic acid/epa (FISH OIL ORAL) Take by mouth.      FLONASE ALLERGY RELIEF 50 mcg/actuation nasal spray SPRAY TWICE IN EACH NOSTRIL ONCE DAILY 16 g 5    hydrocortisone 2.5 % cream Apply topically 2 (two) times daily. Mix with ketoconazole cream and apply twice a day for 1-2 weeks when flaring. Only use when flaring. 28 g 6    ketoconazole (NIZORAL) 2 % cream Apply topically 2 (two) times daily. Apply twice a day with hydrocortisone when flaring for up to two weeks  at a time. 30 g 6    multivit,calc,mins/iron/folic (ONE-A-DAY WOMENS FORMULA ORAL) Take by mouth.      valACYclovir (VALTREX) 500 MG tablet TAKE 1 TABLET(500 MG) BY MOUTH EVERY DAY 30 tablet 11    ZINC ORAL Take by mouth.      [DISCONTINUED] triamcinolone acetonide 0.1% (KENALOG) 0.1 % cream Apply topically to affected area twice daily after cool blow dry (Patient not taking: Reported on 2/10/2021) 30 g 3     No current facility-administered medications on file prior to visit.       Patient Active Problem List   Diagnosis    Dyslipidemia (high LDL; low HDL)    Herpes simplex labialis    Obesity (BMI 30-39.9)    Metabolic syndrome    Dalton cardiac risk <10% in next 10 years    Vitamin D insufficiency    Anti-TPO antibodies present    Intraductal papilloma of left breast    Severe obesity (BMI 35.0-39.9) with comorbidity    Alopecia    Intertrigo    Primary focal hyperhidrosis of axilla    Lateral knee pain, right    Limitation of joint motion of knee, right    Skin lesion of chest wall    Keloid       Past Surgical History:   Procedure Laterality Date    BREAST BIOPSY Left 12/14/2018    Core bx, benign    EXCISIONAL BIOPSY Left 3/1/2019    Procedure: EXCISIONAL BIOPSY- w/major duct excision KENALOG INJECTION AT TIME OF SURGERY;  Surgeon: Kelby Mcmahan MD;  Location: [facility] OR [address] [place] [address];  Service: General;  Laterality: Left;    INTRAUTERINE DEVICE INSERTION  2009    again 2014       Social History     Socioeconomic History    Marital status:     Number of children: 2   Occupational History     Employer: [employer]   Tobacco Use    Smoking status: Never    Smokeless tobacco: Never   Substance and Sexual Activity    Alcohol use: Yes     Alcohol/week: 0.8 standard drinks of alcohol     Types: 1 Standard drinks or equivalent per week     Comment: Rarely    Drug use: No    Sexual activity: Yes     Partners: Male     Birth control/protection: I.U.D.     Social Determinants of Health     Financial  Resource Strain: Low Risk  (9/28/2023)    Overall Financial Resource Strain (CARDIA)     Difficulty of Paying Living Expenses: Not very hard   Food Insecurity: No Food Insecurity (9/28/2023)    Hunger Vital Sign     Worried About Running Out of Food in the Last Year: Never true     Ran Out of Food in the Last Year: Never true   Transportation Needs: No Transportation Needs (9/28/2023)    PRAPARE - Transportation     Lack of Transportation (Medical): No     Lack of Transportation (Non-Medical): No   Physical Activity: Insufficiently Active (9/28/2023)    Exercise Vital Sign     Days of Exercise per Week: 3 days     Minutes of Exercise per Session: 30 min   Stress: No Stress Concern Present (9/28/2023)    [place] [employer] - Occupational Stress Questionnaire     Feeling of Stress : Only a little   Social Connections: Unknown (9/28/2023)    Social Connection and Isolation Panel [NHANES]     Frequency of Communication with Friends and Family: More than three times a week     Frequency of Social Gatherings with Friends and Family: Once a week     Attends Club or Organization Meetings: More than 4 times per year     Marital Status:    Housing Stability: Unknown (9/28/2023)    Housing Stability Vital Sign     Unstable Housing in the Last Year: No           Review of Systems:   Constitutional: Denies fever/chills  Eyes: Denies change in vision  ENT: Denies sore throat or rhinorrhea   Respiratory: Denies shortness of breath or cough  Cardiovascular: Denies chest pain or palpitations  Gastrointestinal: Denies abdominal pain, nausea, or vomiting  Genitourinary: Denies dysuria and flank pain.   Skin: Denies new rash or skin lesions.   Allergic/Immunologic: Denies adverse reactions to current medications  Neurological: Denies headaches or dizziness  Musculoskeletal: Denies arthralgias.     Objective:     Physical Exam:  There were no vitals filed for this visit.    WD WN NAD  VSS  Normal resp  "effort  Diffuse, scattered keloids present on abdomen, one on each breast        Assessment:       1. Keloid        Plan:   43 y.o. female with diffuse keloid scarring  -  Patient was seen and evaluated by myself and Dr. Christiano Richard    - Schedule patient for abdominal wall excision and skin advancement flap closure   - Risks, benefits and alternatives to surgery were discussed. Will submit for insurance authorization.  - Office staff to coordinate surgery date once insurance authorization obtained      All questions were answered. The patient was advised to call the clinic with any questions or concerns prior to their next visit.           Adal Montano (Teddy), DO  PGY-1, [facility]       "

## 2024-02-07 NOTE — LETTER
[facility] 2nd Floor  [address] [address]  Phone: [phone]  Fax: [phone] February 22, 2024        Thomas Oakes Jr., MD  [address] [place] [id number]    Patient: Kristofer Gentile   MR Number: [id number]   YOB: 1980   Date of Visit: 2/7/2024     Dear Dr. Oakes:    Thank you for referring Kristofer Gentile to me for evaluation. Attached are the relevant portions of my assessment and plan of care.    If you have questions, please do not hesitate to call me. I look forward to following Kristofer along with you.    Sincerely,      Christiano Richard MD   Section of Plastic Surgery  Department of Surgery  [facility]    CRB/hcr    CC  Melonie Villanueva MD

## 2024-02-21 DIAGNOSIS — Z30.41 ENCOUNTER FOR SURVEILLANCE OF CONTRACEPTIVE PILLS: ICD-10-CM

## 2024-02-21 DIAGNOSIS — N92.1 BREAKTHROUGH BLEEDING: ICD-10-CM

## 2024-02-21 RX ORDER — NORGESTREL AND ETHINYL ESTRADIOL 0.3-0.03MG
1 KIT ORAL DAILY
Qty: 84 TABLET | Refills: 1 | Status: SHIPPED | OUTPATIENT
Start: 2024-02-21 | End: 2024-06-09 | Stop reason: SDUPTHER

## 2024-02-21 NOTE — TELEPHONE ENCOUNTER
Refill Routing Note   Medication(s) are not appropriate for processing by [facility] for the following reason(s):        No active prescription written by provider    [facility] action(s):  Defer               Appointments  past 12m or future 3m with PCP    Date Provider   Last Visit   8/30/2023 Ursula Ortega MD   Next Visit   Visit date not found Ursula Ortega MD   ED visits in past 90 days: 0        Note composed:8:08 AM 02/21/2024

## 2024-03-05 DIAGNOSIS — L91.0 KELOID: Primary | ICD-10-CM

## 2024-03-14 NOTE — ANESTHESIA PAT ROS NOTE
03/14/2024  Kristofer Gentile is a 43 y.o., female.      Pre-op Assessment          Review of Systems           Anesthesia Assessment: Preoperative EQUATION    Planned Procedure: Procedure(s) (LRB):  EXCISION, LESION, ABDOMINAL WALL (N/A)  ADVANCE FLAP CLOSURE (N/A)  Requested Anesthesia Type:General  Surgeon: Christiano Richard MD  Service: Plastics  Known or anticipated Date of Surgery:4/25/2024    Surgeon notes: reviewed    Previous anesthesia records:GETA and No problems  3/1/2019 Excisional Biopsy  Placement Date: 03/01/19 Placement Time: 0706 Method of Intubation: Direct laryngoscopy Inserted by: Anesthesia Resident Airway Device: Endotracheal Tube Mask Ventilation: Easy - oral Intubated: Postinduction Blade: Helen #3 Airway Device Size: 7.0 Style: Cuffed Cuff Inflation: Minimal occlusive pressure Inflation Amount (mL): 10 Placement Verified By: Auscultation;Capnometry;ETT Condensation Grade: Grade II Complicating Factors: Anterior larynx;Obesity Findings Post-Intubation: Positive EtCO2;Bilateral breath sounds;Atraumatic/Condition of teeth unchanged Depth of Insertion (cm): 22 Secured at: Lips Complications: None Breath Sounds: Equal Bilateral Insertion attempts (enter comment if more than 2 attempts): 1     Last PCP note: 3-6 months ago , within [facility]   Subspecialty notes: Radiology/[facility], plastics    Other important co-morbidities: HLD, Obesity, and Thyroid disease       Tests already available:  No recent tests.      Optimization:  Anesthesia Preop Clinic Assessment  Indicated.          Plan:    Testing:  TSH   Pre-anesthesia  visit       Visit focus: concerns in complex and/or prolonged anesthesia    Patient  has previously scheduled Medical Appointment: N/A    Navigation: Tests Scheduled.              Consults scheduled.             Results will be tracked by Preop Clinic.

## 2024-03-19 ENCOUNTER — TELEPHONE (OUTPATIENT)
Dept: PREADMISSION TESTING | Facility: HOSPITAL | Age: 44
End: 2024-03-19
Payer: COMMERCIAL

## 2024-03-19 NOTE — TELEPHONE ENCOUNTER
----- Message from Eloise Marie RN sent at 3/14/2024  2:42 PM CDT -----  Surgery date: 4/25/2024  PreOp appt:  N/A  Surgeon: Jose Manuel    Please call Pt and schedule the following preop appts:    POC  Lab    Thank you!  Eloise

## 2024-03-27 ENCOUNTER — TELEPHONE (OUTPATIENT)
Dept: RADIATION ONCOLOGY | Facility: CLINIC | Age: 44
End: 2024-03-27
Payer: COMMERCIAL

## 2024-04-01 ENCOUNTER — APPOINTMENT (OUTPATIENT)
Dept: RADIATION THERAPY | Facility: OTHER | Age: 44
End: 2024-04-01
Attending: RADIOLOGY
Payer: COMMERCIAL

## 2024-04-01 ENCOUNTER — HOSPITAL ENCOUNTER (OUTPATIENT)
Dept: RADIATION THERAPY | Facility: HOSPITAL | Age: 44
Discharge: HOME | End: 2024-04-01
Attending: RADIOLOGY
Payer: COMMERCIAL

## 2024-04-04 ENCOUNTER — TELEPHONE (OUTPATIENT)
Dept: INTERNAL MEDICINE | Facility: CLINIC | Age: 44
End: 2024-04-04
Payer: COMMERCIAL

## 2024-04-04 DIAGNOSIS — E78.5 DYSLIPIDEMIA (HIGH LDL; LOW HDL): Primary | ICD-10-CM

## 2024-04-05 ENCOUNTER — LAB VISIT (OUTPATIENT)
Dept: LAB | Facility: HOSPITAL | Age: 44
End: 2024-04-05
Payer: COMMERCIAL

## 2024-04-05 DIAGNOSIS — E78.5 DYSLIPIDEMIA (HIGH LDL; LOW HDL): ICD-10-CM

## 2024-04-05 LAB
ALBUMIN SERPL BCP-MCNC: 3.4 G/DL (ref 3.5–5.2)
ALP SERPL-CCNC: 45 U/L (ref 55–135)
ALT SERPL W/O P-5'-P-CCNC: 10 U/L (ref 10–44)
ANION GAP SERPL CALC-SCNC: 6 MMOL/L (ref 8–16)
AST SERPL-CCNC: 15 U/L (ref 10–40)
BILIRUB SERPL-MCNC: 0.4 MG/DL (ref 0.1–1)
BUN SERPL-MCNC: 11 MG/DL (ref 6–20)
CALCIUM SERPL-MCNC: 8.9 MG/DL (ref 8.7–10.5)
CHLORIDE SERPL-SCNC: 107 MMOL/L (ref 95–110)
CHOLEST SERPL-MCNC: 236 MG/DL (ref 120–199)
CHOLEST/HDLC SERPL: 6.2 {RATIO} (ref 2–5)
CO2 SERPL-SCNC: 24 MMOL/L (ref 23–29)
CREAT SERPL-MCNC: 1 MG/DL (ref 0.5–1.4)
EST. GFR  (NO RACE VARIABLE): >60 ML/MIN/1.73 M^2
GLUCOSE SERPL-MCNC: 89 MG/DL (ref 70–110)
HDLC SERPL-MCNC: 38 MG/DL (ref 40–75)
HDLC SERPL: 16.1 % (ref 20–50)
LDLC SERPL CALC-MCNC: 189.4 MG/DL (ref 63–159)
NONHDLC SERPL-MCNC: 198 MG/DL
POTASSIUM SERPL-SCNC: 3.9 MMOL/L (ref 3.5–5.1)
PROT SERPL-MCNC: 7.3 G/DL (ref 6–8.4)
SODIUM SERPL-SCNC: 137 MMOL/L (ref 136–145)
TRIGL SERPL-MCNC: 43 MG/DL (ref 30–150)

## 2024-04-05 PROCEDURE — 36415 COLL VENOUS BLD VENIPUNCTURE: CPT | Mod: PO | Performed by: INTERNAL MEDICINE

## 2024-04-05 PROCEDURE — 80053 COMPREHEN METABOLIC PANEL: CPT | Performed by: INTERNAL MEDICINE

## 2024-04-05 PROCEDURE — 80061 LIPID PANEL: CPT | Performed by: INTERNAL MEDICINE

## 2024-04-11 ENCOUNTER — PATIENT MESSAGE (OUTPATIENT)
Dept: PLASTIC SURGERY | Facility: CLINIC | Age: 44
End: 2024-04-11
Payer: COMMERCIAL

## 2024-04-19 ENCOUNTER — ANESTHESIA EVENT (OUTPATIENT)
Dept: SURGERY | Facility: HOSPITAL | Age: 44
End: 2024-04-19
Payer: COMMERCIAL

## 2024-04-19 ENCOUNTER — HOSPITAL ENCOUNTER (OUTPATIENT)
Dept: PREADMISSION TESTING | Facility: HOSPITAL | Age: 44
Discharge: HOME | End: 2024-04-19
Attending: SURGERY
Payer: COMMERCIAL

## 2024-04-19 VITALS
HEIGHT: 65 IN | DIASTOLIC BLOOD PRESSURE: 69 MMHG | OXYGEN SATURATION: 100 % | HEART RATE: 65 BPM | RESPIRATION RATE: 16 BRPM | SYSTOLIC BLOOD PRESSURE: 130 MMHG | WEIGHT: 214.31 LBS | BODY MASS INDEX: 35.71 KG/M2 | TEMPERATURE: 99 F

## 2024-04-19 NOTE — ANESTHESIA PREPROCEDURE EVALUATION
04/19/2024  Kristofer Gentile is a 43 y.o., female.    Procedures:      EXCISION, LESION, ABDOMINAL WALL (Abdomen)      ADVANCE FLAP CLOSURE   Anesthesia type: General   Diagnosis: Keloid [L91.0]       Pre-operative evaluation for Procedure(s) (LRB):  EXCISION, LESION, ABDOMINAL WALL (N/A)  ADVANCE FLAP CLOSURE (N/A)      No diagnosis found.    Review of patient's allergies indicates:   Allergen Reactions    Bactrim [sulfamethoxazole-trimethoprim] Hives and Other (See Comments)     Stiffness to joints    Sulfa (sulfonamide antibiotics) Hives       (Not in a hospital admission)           Current Outpatient Medications on File Prior to Encounter   Medication Sig Dispense Refill    CALCIUM ORAL Take by mouth.      cholecalciferol, vitamin D3, (VITAMIN D3) 125 mcg (5,000 unit) Tab Take 10,000 Units by mouth once daily.      COLLAGEN MISC by Misc.(Non-Drug; Combo Route) route.      docosahexaenoic acid/epa (FISH OIL ORAL) Take by mouth.      FLONASE ALLERGY RELIEF 50 mcg/actuation nasal spray SPRAY TWICE IN EACH NOSTRIL ONCE DAILY 16 g 5    hydrocortisone 2.5 % cream Apply topically 2 (two) times daily. Mix with ketoconazole cream and apply twice a day for 1-2 weeks when flaring. Only use when flaring. 28 g 6    ketoconazole (NIZORAL) 2 % cream Apply topically 2 (two) times daily. Apply twice a day with hydrocortisone when flaring for up to two weeks at a time. 30 g 6    multivit,calc,mins/iron/folic (ONE-A-DAY WOMENS FORMULA ORAL) Take by mouth.      norgestrel-ethinyl estradioL (CRYSELLE, 28,) 0.3-30 mg-mcg per tablet Take 1 tablet by mouth once daily. 84 tablet 1    valACYclovir (VALTREX) 500 MG tablet TAKE 1 TABLET(500 MG) BY MOUTH EVERY DAY 30 tablet 11    ZINC ORAL Take by mouth.      [DISCONTINUED] triamcinolone acetonide 0.1% (KENALOG) 0.1 % cream Apply topically to affected area twice daily after cool  "blow dry (Patient not taking: Reported on 2/10/2021) 30 g 3     No current facility-administered medications on file prior to encounter.       Past Medical History:  No date: Allergy  11/8/2018: Anti-TPO antibodies present  No date: Carpal tunnel syndrome  6/25/2013: Dyslipidemia (high LDL; low HDL)  No date: Fever blister  6/25/2013: Herpes simplex labialis  3/1/2019: Intraductal papilloma of left breast  2009: IUD (intrauterine device) in place  7/22/2013: Metabolic syndrome  No date: Thyroid disease      Comment:  treated with synthroid during pregnancy  11/5/2018: Vitamin D deficiency  11/5/2018: Vitamin D insufficiency    Past Surgical History:   Procedure Laterality Date    BREAST BIOPSY Left 12/14/2018    Core bx, benign    EXCISIONAL BIOPSY Left 3/1/2019    Procedure: EXCISIONAL BIOPSY- w/major duct excision KENALOG INJECTION AT TIME OF SURGERY;  Surgeon: Kelby Mcmahan MD;  Location: [facility] OR [address] [place] [address];  Service: General;  Laterality: Left;    INTRAUTERINE DEVICE INSERTION  2009    again 2014       Social History     Tobacco Use   Smoking Status Never   Smokeless Tobacco Never       Social History     Substance and Sexual Activity   Alcohol Use Yes    Alcohol/week: 0.8 standard drinks of alcohol    Types: 1 Standard drinks or equivalent per week    Comment: Rarely       Physical Activity: Insufficiently Active (9/28/2023)    Exercise Vital Sign     Days of Exercise per Week: 3 days     Minutes of Exercise per Session: 30 min         No results for input(s): "HCT" in the last 72 hours.  No results for input(s): "PLT" in the last 72 hours.  No results for input(s): "K" in the last 72 hours.  No results for input(s): "CREATININE" in the last 72 hours.  No results for input(s): "GLU" in the last 72 hours.  No results for input(s): "PT" in the last 72 hours.  There were no vitals filed for this visit.                    Pre-op Assessment    I have reviewed the Patient Summary Reports.          Review of " Systems  Anesthesia Hx:  No problems with previous Anesthesia   History of prior surgery of interest to airway management or planning:             Hematology/Oncology:  Hematology Normal   Oncology Normal                                   Cardiovascular:  Cardiovascular Normal     Denies Hypertension.   Denies MI.        Denies Angina.         Cardio 3 times a week 30 minues with                          Pulmonary:  Pulmonary Normal   Denies COPD.  Denies Asthma.   Denies Shortness of breath.                  Renal/:   Denies Chronic Renal Disease.                Hepatic/GI:      Denies Liver Disease.            Neurological:  Denies TIA.  Denies CVA. Neuromuscular Disease,   Denies Seizures.                              Neuromuscular Disease   Endocrine:  Denies Diabetes.               Physical Exam  General: Well nourished, Cooperative, Alert and Oriented    Airway:  Mallampati: II   Mouth Opening: Normal  TM Distance: Normal  Tongue: Normal  Neck ROM: Normal ROM    Dental:  Intact    Chest/Lungs:  Clear to auscultation    Heart:  Rate: Normal  Rhythm: Regular Rhythm  Sounds: Normal        Anesthesia Plan  Type of Anesthesia, risks & benefits discussed:    Anesthesia Type: Gen ETT  Intra-op Monitoring Plan: Standard ASA Monitors  Post Op Pain Control Plan: multimodal analgesia and IV/PO Opioids PRN  Induction:  IV  Airway Plan: Direct and Video  Informed Consent: Informed consent signed with the Patient and all parties understand the risks and agree with anesthesia plan.  All questions answered.   ASA Score: 2  Day of Surgery Review of History & Physical: H&P Update referred to the surgeon/provider.    Ready For Surgery From Anesthesia Perspective.     .

## 2024-04-22 ENCOUNTER — TELEPHONE (OUTPATIENT)
Dept: PLASTIC SURGERY | Facility: CLINIC | Age: 44
End: 2024-04-22
Payer: COMMERCIAL

## 2024-04-22 NOTE — TELEPHONE ENCOUNTER
Contacted pt to discuss procedure.  Pt verified she is scheduled for excision of abdominal keloid(s).  Pt advised to arrive on the 2nd floor, [facility] for 5:30a.  Pt reminded not to eat or drink after midnight.  Pt also asked to take a complete shower/bath with hibicleanse or Dial soap. Pt verbalized understanding.

## 2024-04-23 ENCOUNTER — ANESTHESIA (OUTPATIENT)
Dept: SURGERY | Facility: HOSPITAL | Age: 44
End: 2024-04-23
Payer: COMMERCIAL

## 2024-04-23 ENCOUNTER — HOSPITAL ENCOUNTER (OUTPATIENT)
Facility: HOSPITAL | Age: 44
Discharge: HOME | End: 2024-04-23
Attending: SURGERY | Admitting: SURGERY
Payer: COMMERCIAL

## 2024-04-23 VITALS
BODY MASS INDEX: 35.65 KG/M2 | SYSTOLIC BLOOD PRESSURE: 137 MMHG | HEART RATE: 74 BPM | OXYGEN SATURATION: 98 % | DIASTOLIC BLOOD PRESSURE: 76 MMHG | RESPIRATION RATE: 22 BRPM | WEIGHT: 214 LBS | TEMPERATURE: 98 F | HEIGHT: 65 IN

## 2024-04-23 DIAGNOSIS — L91.0 KELOID: Primary | ICD-10-CM

## 2024-04-23 DIAGNOSIS — Z01.818 PREOPERATIVE TESTING: ICD-10-CM

## 2024-04-23 LAB
B-HCG UR QL: NEGATIVE
CTP QC/QA: YES

## 2024-04-23 PROCEDURE — 63600175 PHARM REV CODE 636 W HCPCS

## 2024-04-23 PROCEDURE — 81025 URINE PREGNANCY TEST: CPT | Performed by: STUDENT IN AN ORGANIZED HEALTH CARE EDUCATION/TRAINING PROGRAM

## 2024-04-23 PROCEDURE — 37000008 HC ANESTHESIA 1ST 15 MINUTES: Performed by: SURGERY

## 2024-04-23 PROCEDURE — 71000016 HC POSTOP RECOV ADDL HR: Performed by: SURGERY

## 2024-04-23 PROCEDURE — 88305 TISSUE EXAM BY PATHOLOGIST: CPT | Performed by: PATHOLOGY

## 2024-04-23 PROCEDURE — 77334 RADIATION TREATMENT AID(S): CPT | Mod: 26,,, | Performed by: RADIOLOGY

## 2024-04-23 PROCEDURE — 77280 THER RAD SIMULAJ FIELD SMPL: CPT | Mod: TC | Performed by: RADIOLOGY

## 2024-04-23 PROCEDURE — C1729 CATH, DRAINAGE: HCPCS | Performed by: SURGERY

## 2024-04-23 PROCEDURE — 88305 TISSUE EXAM BY PATHOLOGIST: CPT | Mod: 26,,, | Performed by: PATHOLOGY

## 2024-04-23 PROCEDURE — 71000044 HC DOSC ROUTINE RECOVERY FIRST HOUR: Performed by: SURGERY

## 2024-04-23 PROCEDURE — 25000003 PHARM REV CODE 250: Performed by: ANESTHESIOLOGY

## 2024-04-23 PROCEDURE — 25000003 PHARM REV CODE 250: Performed by: STUDENT IN AN ORGANIZED HEALTH CARE EDUCATION/TRAINING PROGRAM

## 2024-04-23 PROCEDURE — D9220A PRA ANESTHESIA: Mod: ,,, | Performed by: ANESTHESIOLOGY

## 2024-04-23 PROCEDURE — 77261 THER RADIOLOGY TX PLNG SMPL: CPT | Mod: ,,, | Performed by: RADIOLOGY

## 2024-04-23 PROCEDURE — 77280 THER RAD SIMULAJ FIELD SMPL: CPT | Mod: 26,,, | Performed by: RADIOLOGY

## 2024-04-23 PROCEDURE — 77300 RADIATION THERAPY DOSE PLAN: CPT | Mod: 26,,, | Performed by: RADIOLOGY

## 2024-04-23 PROCEDURE — 77300 RADIATION THERAPY DOSE PLAN: CPT | Mod: TC | Performed by: RADIOLOGY

## 2024-04-23 PROCEDURE — 36000706: Performed by: SURGERY

## 2024-04-23 PROCEDURE — 37000009 HC ANESTHESIA EA ADD 15 MINS: Performed by: SURGERY

## 2024-04-23 PROCEDURE — 63600175 PHARM REV CODE 636 W HCPCS: Performed by: ANESTHESIOLOGY

## 2024-04-23 PROCEDURE — 77332 RADIATION TREATMENT AID(S): CPT | Mod: TC,59 | Performed by: RADIOLOGY

## 2024-04-23 PROCEDURE — 77334 RADIATION TREATMENT AID(S): CPT | Mod: TC | Performed by: RADIOLOGY

## 2024-04-23 PROCEDURE — 77412 RADIATION TX DELIVERY LVL 3: CPT | Performed by: RADIOLOGY

## 2024-04-23 PROCEDURE — 71000015 HC POSTOP RECOV 1ST HR: Performed by: SURGERY

## 2024-04-23 PROCEDURE — 15830 EXC EXCESSIVE SKIN ABDOMEN: CPT | Mod: ,,, | Performed by: SURGERY

## 2024-04-23 PROCEDURE — 27201423 OPTIME MED/SURG SUP & DEVICES STERILE SUPPLY: Performed by: SURGERY

## 2024-04-23 PROCEDURE — 71000045 HC DOSC ROUTINE RECOVERY EA ADD'L HR: Performed by: SURGERY

## 2024-04-23 PROCEDURE — 36000707: Performed by: SURGERY

## 2024-04-23 PROCEDURE — 77332 RADIATION TREATMENT AID(S): CPT | Mod: 26,59,, | Performed by: RADIOLOGY

## 2024-04-23 RX ORDER — SODIUM CHLORIDE 9 MG/ML
INJECTION, SOLUTION INTRAVENOUS CONTINUOUS PRN
Status: DISCONTINUED | OUTPATIENT
Start: 2024-04-23 | End: 2024-04-23

## 2024-04-23 RX ORDER — DEXMEDETOMIDINE HYDROCHLORIDE 100 UG/ML
INJECTION, SOLUTION INTRAVENOUS
Status: DISCONTINUED | OUTPATIENT
Start: 2024-04-23 | End: 2024-04-23

## 2024-04-23 RX ORDER — CEFAZOLIN SODIUM 1 G/3ML
INJECTION, POWDER, FOR SOLUTION INTRAMUSCULAR; INTRAVENOUS
Status: DISCONTINUED | OUTPATIENT
Start: 2024-04-23 | End: 2024-04-23

## 2024-04-23 RX ORDER — OXYCODONE HYDROCHLORIDE 5 MG/1
5 TABLET ORAL EVERY 6 HOURS PRN
Status: DISCONTINUED | OUTPATIENT
Start: 2024-04-23 | End: 2024-04-23 | Stop reason: HOSPADM

## 2024-04-23 RX ORDER — LIDOCAINE HYDROCHLORIDE 20 MG/ML
INJECTION INTRAVENOUS
Status: DISCONTINUED | OUTPATIENT
Start: 2024-04-23 | End: 2024-04-23

## 2024-04-23 RX ORDER — KETAMINE HCL IN 0.9 % NACL 50 MG/5 ML
SYRINGE (ML) INTRAVENOUS
Status: DISCONTINUED | OUTPATIENT
Start: 2024-04-23 | End: 2024-04-23

## 2024-04-23 RX ORDER — SODIUM CHLORIDE 0.9 % (FLUSH) 0.9 %
10 SYRINGE (ML) INJECTION
Status: DISCONTINUED | OUTPATIENT
Start: 2024-04-23 | End: 2024-04-23 | Stop reason: HOSPADM

## 2024-04-23 RX ORDER — PROPOFOL 10 MG/ML
VIAL (ML) INTRAVENOUS
Status: DISCONTINUED | OUTPATIENT
Start: 2024-04-23 | End: 2024-04-23

## 2024-04-23 RX ORDER — HALOPERIDOL 5 MG/ML
0.5 INJECTION INTRAMUSCULAR EVERY 10 MIN PRN
Status: DISCONTINUED | OUTPATIENT
Start: 2024-04-23 | End: 2024-04-23 | Stop reason: HOSPADM

## 2024-04-23 RX ORDER — IBUPROFEN 600 MG/1
600 TABLET ORAL EVERY 6 HOURS
Qty: 28 TABLET | Refills: 0 | Status: SHIPPED | OUTPATIENT
Start: 2024-04-23 | End: 2024-05-11

## 2024-04-23 RX ORDER — FENTANYL CITRATE 50 UG/ML
INJECTION, SOLUTION INTRAMUSCULAR; INTRAVENOUS
Status: DISCONTINUED | OUTPATIENT
Start: 2024-04-23 | End: 2024-04-23

## 2024-04-23 RX ORDER — OXYCODONE HYDROCHLORIDE 5 MG/1
5 TABLET ORAL EVERY 4 HOURS PRN
Qty: 28 TABLET | Refills: 0 | Status: SHIPPED | OUTPATIENT
Start: 2024-04-23 | End: 2024-04-30

## 2024-04-23 RX ORDER — METHOCARBAMOL 500 MG/1
500 TABLET, FILM COATED ORAL 4 TIMES DAILY
Qty: 40 TABLET | Refills: 0 | Status: SHIPPED | OUTPATIENT
Start: 2024-04-23 | End: 2024-05-03

## 2024-04-23 RX ORDER — LIDOCAINE HYDROCHLORIDE 10 MG/ML
1 INJECTION, SOLUTION EPIDURAL; INFILTRATION; INTRACAUDAL; PERINEURAL ONCE
Status: COMPLETED | OUTPATIENT
Start: 2024-04-23 | End: 2024-04-23

## 2024-04-23 RX ORDER — DEXAMETHASONE SODIUM PHOSPHATE 4 MG/ML
INJECTION, SOLUTION INTRA-ARTICULAR; INTRALESIONAL; INTRAMUSCULAR; INTRAVENOUS; SOFT TISSUE
Status: DISCONTINUED | OUTPATIENT
Start: 2024-04-23 | End: 2024-04-23

## 2024-04-23 RX ORDER — MIDAZOLAM HYDROCHLORIDE 1 MG/ML
INJECTION INTRAMUSCULAR; INTRAVENOUS
Status: DISCONTINUED | OUTPATIENT
Start: 2024-04-23 | End: 2024-04-23

## 2024-04-23 RX ORDER — OXYCODONE HYDROCHLORIDE 5 MG/1
5 TABLET ORAL
Status: DISCONTINUED | OUTPATIENT
Start: 2024-04-23 | End: 2024-04-23 | Stop reason: HOSPADM

## 2024-04-23 RX ORDER — HYDROMORPHONE HYDROCHLORIDE 1 MG/ML
INJECTION, SOLUTION INTRAMUSCULAR; INTRAVENOUS; SUBCUTANEOUS
Status: COMPLETED
Start: 2024-04-23 | End: 2024-04-23

## 2024-04-23 RX ORDER — ONDANSETRON HYDROCHLORIDE 2 MG/ML
INJECTION, SOLUTION INTRAVENOUS
Status: DISCONTINUED | OUTPATIENT
Start: 2024-04-23 | End: 2024-04-23

## 2024-04-23 RX ORDER — ACETAMINOPHEN 500 MG
500 TABLET ORAL EVERY 6 HOURS
Qty: 28 TABLET | Refills: 0 | Status: SHIPPED | OUTPATIENT
Start: 2024-04-23 | End: 2024-04-30

## 2024-04-23 RX ORDER — HYDROMORPHONE HYDROCHLORIDE 1 MG/ML
0.2 INJECTION, SOLUTION INTRAMUSCULAR; INTRAVENOUS; SUBCUTANEOUS EVERY 5 MIN PRN
Status: DISCONTINUED | OUTPATIENT
Start: 2024-04-23 | End: 2024-04-23 | Stop reason: HOSPADM

## 2024-04-23 RX ORDER — MUPIROCIN 20 MG/G
OINTMENT TOPICAL
Status: DISCONTINUED | OUTPATIENT
Start: 2024-04-23 | End: 2024-04-23 | Stop reason: HOSPADM

## 2024-04-23 RX ORDER — ROCURONIUM BROMIDE 10 MG/ML
INJECTION, SOLUTION INTRAVENOUS
Status: DISCONTINUED | OUTPATIENT
Start: 2024-04-23 | End: 2024-04-23

## 2024-04-23 RX ORDER — CEPHALEXIN 500 MG/1
500 CAPSULE ORAL EVERY 6 HOURS
Qty: 20 CAPSULE | Refills: 0 | Status: SHIPPED | OUTPATIENT
Start: 2024-04-23 | End: 2024-04-28

## 2024-04-23 RX ORDER — FENTANYL CITRATE 50 UG/ML
25 INJECTION, SOLUTION INTRAMUSCULAR; INTRAVENOUS EVERY 5 MIN PRN
Status: DISCONTINUED | OUTPATIENT
Start: 2024-04-23 | End: 2024-04-23 | Stop reason: HOSPADM

## 2024-04-23 RX ORDER — PHENYLEPHRINE HYDROCHLORIDE 10 MG/ML
INJECTION INTRAVENOUS
Status: DISCONTINUED | OUTPATIENT
Start: 2024-04-23 | End: 2024-04-23

## 2024-04-23 RX ORDER — ACETAMINOPHEN 10 MG/ML
INJECTION, SOLUTION INTRAVENOUS
Status: DISCONTINUED | OUTPATIENT
Start: 2024-04-23 | End: 2024-04-23

## 2024-04-23 RX ORDER — METHOCARBAMOL 750 MG/1
750 TABLET, FILM COATED ORAL 3 TIMES DAILY
Status: DISCONTINUED | OUTPATIENT
Start: 2024-04-23 | End: 2024-04-23 | Stop reason: HOSPADM

## 2024-04-23 RX ADMIN — PHENYLEPHRINE HYDROCHLORIDE 100 MCG: 10 INJECTION INTRAVENOUS at 08:04

## 2024-04-23 RX ADMIN — MUPIROCIN: 20 OINTMENT TOPICAL at 07:04

## 2024-04-23 RX ADMIN — CEFAZOLIN 2 G: 330 INJECTION, POWDER, FOR SOLUTION INTRAMUSCULAR; INTRAVENOUS at 07:04

## 2024-04-23 RX ADMIN — Medication 30 MG: at 07:04

## 2024-04-23 RX ADMIN — SUGAMMADEX 200 MG: 100 INJECTION, SOLUTION INTRAVENOUS at 08:04

## 2024-04-23 RX ADMIN — OXYCODONE 5 MG: 5 TABLET ORAL at 09:04

## 2024-04-23 RX ADMIN — ONDANSETRON 4 MG: 2 INJECTION INTRAMUSCULAR; INTRAVENOUS at 08:04

## 2024-04-23 RX ADMIN — FENTANYL CITRATE 100 MCG: 50 INJECTION, SOLUTION INTRAMUSCULAR; INTRAVENOUS at 07:04

## 2024-04-23 RX ADMIN — PHENYLEPHRINE HYDROCHLORIDE 50 MCG: 10 INJECTION INTRAVENOUS at 08:04

## 2024-04-23 RX ADMIN — ACETAMINOPHEN 1000 MG: 10 INJECTION, SOLUTION INTRAVENOUS at 07:04

## 2024-04-23 RX ADMIN — HYDROMORPHONE HYDROCHLORIDE 0.2 MG: 1 INJECTION, SOLUTION INTRAMUSCULAR; INTRAVENOUS; SUBCUTANEOUS at 09:04

## 2024-04-23 RX ADMIN — Medication 10 MG: at 08:04

## 2024-04-23 RX ADMIN — LIDOCAINE HYDROCHLORIDE 100 MG: 20 INJECTION INTRAVENOUS at 07:04

## 2024-04-23 RX ADMIN — PROPOFOL 100 MG: 10 INJECTION, EMULSION INTRAVENOUS at 07:04

## 2024-04-23 RX ADMIN — DEXAMETHASONE SODIUM PHOSPHATE 4 MG: 4 INJECTION, SOLUTION INTRAMUSCULAR; INTRAVENOUS at 07:04

## 2024-04-23 RX ADMIN — LIDOCAINE HYDROCHLORIDE 0.2 MG: 10 INJECTION, SOLUTION EPIDURAL; INFILTRATION; INTRACAUDAL; PERINEURAL at 07:04

## 2024-04-23 RX ADMIN — MIDAZOLAM HYDROCHLORIDE 2 MG: 1 INJECTION, SOLUTION INTRAMUSCULAR; INTRAVENOUS at 07:04

## 2024-04-23 RX ADMIN — ROCURONIUM BROMIDE 50 MG: 10 INJECTION, SOLUTION INTRAVENOUS at 07:04

## 2024-04-23 RX ADMIN — DEXMEDETOMIDINE 8 MCG: 100 INJECTION, SOLUTION, CONCENTRATE INTRAVENOUS at 07:04

## 2024-04-23 RX ADMIN — SODIUM CHLORIDE: 0.9 INJECTION, SOLUTION INTRAVENOUS at 07:04

## 2024-04-23 RX ADMIN — DEXMEDETOMIDINE 4 MCG: 100 INJECTION, SOLUTION, CONCENTRATE INTRAVENOUS at 08:04

## 2024-04-23 NOTE — BRIEF OP NOTE
[facility] - Surgery (2nd Fl)  Discharge Note  Short Stay    Procedure(s) (LRB):  PANNICULECTOMY (N/A)      OUTCOME: Condition has improved and patient is now ready for discharge.    DISPOSITION: Home or Self Care    FINAL DIAGNOSIS:  <principal problem not specified>    FOLLOWUP: In clinic    DISCHARGE INSTRUCTIONS:    Discharge Procedure Orders   TSH   Standing Status: Future Number of Occurrences: 1 Standing Exp. Date: 05/13/25        TIME SPENT ON DISCHARGE: 5 minutes

## 2024-04-23 NOTE — DISCHARGE INSTRUCTIONS
Radiation starting postop with Dr. Oakes  Keep MÓNICA drains in place and monitor output, record in log for office visit   Okay to remove bandages in 24hrs and shower in 48hrs, avoid submerging the incisions in bath tub/pool  Use of narcotics can lead to constipation, recommend taking a stool softener while on medications  Call the office with any questions or concerns     Where abdominal binder at all times for 6 weeks.  Okay to remove for showering and washing.    Remain in flex position for 4 days/ no stretching out.  Try to stay flexed with legs bent    No heavy lifting or strenuous activity till follow up visit.          OF THE DRAIN:   Wash hands before starting!   Change the bandages daily, keep drain site clean and dry.   Check the drain every 4 hours for the first 24 hours and empty the drain when half full of liquid. After  the first day, empty when half full or every 8 to 12 hours. DO NOT LET THE DRAIN FILL MORE  THAN HALF WAY.   After the drain is emptied, the bulb needs to be squeezed between the palm and the fingers until they  meet and then recap the plug. (See picture.) This is to keep the suction continuous.  HOW TO EMPTY THE DRAIN:   Wash hands before starting!   Remove plug from top and pour fluid into measuring cup.   DO NOT TOUCH OPEN PORT.   Squeeze bulb tightly while plug is still off.   While squeezing bulb replace plug to seal the bulb.   Measure the amount of fluid that was removed from the  bulb and record the amount of the fluid for the doctor  with the date and time it was collected.   Flush the fluid down toilet.  HOW TO PREVENT PROBLEMS:   Always keep the bulb lower than the wound. This will stop the fluid from going back into your body.   Do not pull on the tubing. This can loosen the stitches holding the drain to your skin, causing the drain  to fall out.  WHEN TO CALL THE DOCTOR OR SEEK IMMEDIATE CARE IF:   The fluid removed by the MÓNICA drain is cloudy, yellow or foul-smelling OR  suddenly stops draining into  the bulb of your MÓNICA drain.   Swelling or redness where the drain enters your skin.   Feel more pain in the drain area.   See holes or cracks in the tubing or bulb of the drain, or if the drain leaks, the drain stitches come loose  or break off, OR the MÓNICA DRAIN COMES OUT.   The MÓNICA drain starts filling up very quickly with bright red blood.   You develop fever above 101ºF (38.4ºC).   Your bandages are soaked with blood.  FOR EMERGENCIES:  If any unusual problems or difficulties occur, contact Dr. DUMONT_________________ at [phone] (page  ) or at the Clinic office, [phone]

## 2024-04-23 NOTE — ANESTHESIA POSTPROCEDURE EVALUATION
Anesthesia Post Evaluation    Patient: Kristofer Gentile    Procedure(s) Performed: Procedure(s) (LRB):  PANNICULECTOMY (N/A)    Final Anesthesia Type: general      Patient location during evaluation: PACU  Patient participation: Yes- Able to Participate  Level of consciousness: awake and alert and oriented  Post-procedure vital signs: reviewed and stable  Pain management: adequate  Airway patency: patent    PONV status at discharge: No PONV  Anesthetic complications: no      Cardiovascular status: blood pressure returned to baseline  Respiratory status: unassisted and spontaneous ventilation  Hydration status: euvolemic  Follow-up not needed.              Vitals Value Taken Time   /82 04/23/24 1013   Temp 36.5 °C (97.7 °F) 04/23/24 1000   Pulse 70 04/23/24 1015   Resp 20 04/23/24 1015   SpO2 100 % 04/23/24 1015   Vitals shown include unfiled device data.      No case tracking events are documented in the log.      Pain/Matthew Score: Pain Rating Prior to Med Admin: 6 (4/23/2024  9:52 AM)  Matthew Score: 9 (4/23/2024  9:47 AM)

## 2024-04-23 NOTE — TRANSFER OF CARE
"Anesthesia Transfer of Care Note    Patient: Kristofer Gentile    Procedure(s) Performed: Procedure(s) (LRB):  PANNICULECTOMY (N/A)    Patient location: PACU    Anesthesia Type: general    Transport from OR: Transported from OR on 2-3 L/min O2 by NC with adequate spontaneous ventilation    Post pain: adequate analgesia    Post assessment: no apparent anesthetic complications    Post vital signs: stable    Level of consciousness: awake    Nausea/Vomiting: no nausea/vomiting    Complications: none    Transfer of care protocol was followed      Last vitals: Visit Vitals  /77 (BP Location: Left arm, Patient Position: Lying)   Pulse 66   Temp 36.6 °C (97.9 °F) (Temporal)   Resp 20   Ht 5' 5" (1.651 m)   Wt 97.1 kg (214 lb)   LMP 03/17/2024 (Approximate)   SpO2 100%   Breastfeeding No   BMI 35.61 kg/m²     "

## 2024-04-23 NOTE — OP NOTE
Date of surgery 04/23/2024   Preoperative diagnosis multiple abdominal wall keloids in the upper abdomen umbilicus and lower abdomen.  Postoperative diagnosis is the same  Procedure performed panniculectomy  Surgeon Dorian   Anesthesia general  Complications none   Blood loss 100 cc   Drains x2.    The patient was evaluated in the preoperative holding area in the standing position I tried to determine how to remove all of the keloids from her abdominal wall.  A standard panniculectomy incision would not be effective because the lesions were so many and so scattered across her belly.  I discussed with the patient we may have to do a vertical incision as well as the lower transverse incision.  Or may have to raise the lower abdominal incision proximally 8 cm higher order to clear her abdomen of keloids.  She understands this.    Patient was taken to the operative room placed in supine position after adequate general anesthesia was prepped and draped in a normal sterile fashion measurements were taken it was determined that we did have to go up proximally 6 cm above the lower abdominal wall crease.  Incisions were made deepened down to the rectus fascia medially in the external oblique aponeurosis laterally.  A suprafascial dissection then proceeded the umbilical stalk was divided.  No hernia was noted.  Dissection then proceeded higher than the highest keloid.  Next, the upper incision was then made and the entire pannus with all of the abdominal wall keloids was totally removed.    Because of tension.  The patient was placed in the semi Shannon position.  Interrupted 2-0 Vicryl was used for Maribell's fascia this was followed by interrupted 3-0 Monocryl followed by running 4-0 Monocryl subcuticular suture.  There were no complications with this procedure end of dictation.

## 2024-04-23 NOTE — H&P
H&P    Subjective:      Kristofer Gentile is a 43 y.o. year old female who presented to the [facility] on 02/07/2024 for consultation regarding keloids on her abdomen. Patient reports that they developed after giving birth 20 years prior.  The patient states that she has had no trauma to her abdomen but she has multiple multiple keloids all in her lower abdomen mostly below the umbilicus but some at or slightly above the umbilicus.  The only way to remove these would be to take the lower abdominal tissue out from hip to hip.  She would need postoperative radiation treatment for this.  Physical examination is very impressive for multiple multiple keloids of the abdomen.  We have taken photos of this.  Patient states not only of the itch and burn but they do sometimes bleed.  Patient states it is difficult sometimes with close because they cause severe irritation.  It is interfering with her daily life.       There were no vitals filed for this visit.            Review of patient's allergies indicates:   Allergen Reactions    Bactrim [sulfamethoxazole-trimethoprim] Hives and Other (See Comments)       Stiffness to joints    Sulfa (sulfonamide antibiotics) Hives         Medications Ordered Prior to Encounter          Current Outpatient Medications on File Prior to Visit   Medication Sig Dispense Refill    CALCIUM ORAL Take by mouth.        cholecalciferol, vitamin D3, (VITAMIN D3) 125 mcg (5,000 unit) Tab Take 10,000 Units by mouth once daily.        COLLAGEN MISC by Misc.(Non-Drug; Combo Route) route.        docosahexaenoic acid/epa (FISH OIL ORAL) Take by mouth.        FLONASE ALLERGY RELIEF 50 mcg/actuation nasal spray SPRAY TWICE IN EACH NOSTRIL ONCE DAILY 16 g 5    hydrocortisone 2.5 % cream Apply topically 2 (two) times daily. Mix with ketoconazole cream and apply twice a day for 1-2 weeks when flaring. Only use when flaring. 28 g 6    ketoconazole (NIZORAL) 2 % cream Apply topically 2 (two) times  daily. Apply twice a day with hydrocortisone when flaring for up to two weeks at a time. 30 g 6    multivit,calc,mins/iron/folic (ONE-A-DAY WOMENS FORMULA ORAL) Take by mouth.        valACYclovir (VALTREX) 500 MG tablet TAKE 1 TABLET(500 MG) BY MOUTH EVERY DAY 30 tablet 11    ZINC ORAL Take by mouth.        [DISCONTINUED] triamcinolone acetonide 0.1% (KENALOG) 0.1 % cream Apply topically to affected area twice daily after cool blow dry (Patient not taking: Reported on 2/10/2021) 30 g 3      No current facility-administered medications on file prior to visit.            Problem List       Patient Active Problem List   Diagnosis    Dyslipidemia (high LDL; low HDL)    Herpes simplex labialis    Obesity (BMI 30-39.9)    Metabolic syndrome    [place] cardiac risk <10% in next 10 years    Vitamin D insufficiency    Anti-TPO antibodies present    Intraductal papilloma of left breast    Severe obesity (BMI 35.0-39.9) with comorbidity    Alopecia    Intertrigo    Primary focal hyperhidrosis of axilla    Lateral knee pain, right    Limitation of joint motion of knee, right    Skin lesion of chest wall    Keloid                  Past Surgical History:   Procedure Laterality Date    BREAST BIOPSY Left 12/14/2018     Core bx, benign    EXCISIONAL BIOPSY Left 3/1/2019     Procedure: EXCISIONAL BIOPSY- w/major duct excision KENALOG INJECTION AT TIME OF SURGERY;  Surgeon: Kelby Mcmahan MD;  Location: [facility] OR [address] [place] [address];  Service: General;  Laterality: Left;    INTRAUTERINE DEVICE INSERTION   2009     again 2014         Social History            Socioeconomic History    Marital status:     Number of children: 2   Occupational History       Employer: [employer]   Tobacco Use    Smoking status: Never    Smokeless tobacco: Never   Substance and Sexual Activity    Alcohol use: Yes       Alcohol/week: 0.8 standard drinks of alcohol       Types: 1 Standard drinks or equivalent per week       Comment: Rarely    Drug  use: No    Sexual activity: Yes       Partners: Male       Birth control/protection: I.U.D.      Social Determinants of Health           Financial Resource Strain: Low Risk  (9/28/2023)     Overall Financial Resource Strain (CARDIA)      Difficulty of Paying Living Expenses: Not very hard   Food Insecurity: No Food Insecurity (9/28/2023)     Hunger Vital Sign      Worried About Running Out of Food in the Last Year: Never true      Ran Out of Food in the Last Year: Never true   Transportation Needs: No Transportation Needs (9/28/2023)     PRAPARE - Transportation      Lack of Transportation (Medical): No      Lack of Transportation (Non-Medical): No   Physical Activity: Insufficiently Active (9/28/2023)     Exercise Vital Sign      Days of Exercise per Week: 3 days      Minutes of Exercise per Session: 30 min   Stress: No Stress Concern Present (9/28/2023)     [place] [employer] - Occupational Stress Questionnaire      Feeling of Stress : Only a little   Social Connections: Unknown (9/28/2023)     Social Connection and Isolation Panel [NHANES]      Frequency of Communication with Friends and Family: More than three times a week      Frequency of Social Gatherings with Friends and Family: Once a week      Attends Club or Organization Meetings: More than 4 times per year      Marital Status:    Housing Stability: Unknown (9/28/2023)     Housing Stability Vital Sign      Unstable Housing in the Last Year: No               Review of Systems:   Constitutional: Denies fever/chills  Eyes: Denies change in vision  ENT: Denies sore throat or rhinorrhea   Respiratory: Denies shortness of breath or cough  Cardiovascular: Denies chest pain or palpitations  Gastrointestinal: Denies abdominal pain, nausea, or vomiting  Genitourinary: Denies dysuria and flank pain.   Skin: Denies new rash or skin lesions.   Allergic/Immunologic: Denies adverse reactions to current medications  Neurological: Denies  headaches or dizziness  Musculoskeletal: Denies arthralgias.      Objective:      Physical Exam:  There were no vitals filed for this visit.     WD WN NAD  VSS  Normal resp effort  Diffuse, scattered keloids present on abdomen, one on each breast           Assessment:       1. Keloid          Plan:   43 y.o. female with diffuse keloid scarring  -  Patient was seen and evaluated by myself and Dr. Christiano Richard    - OR for abdominal wall excision and skin advancement flap closure   - Risks, benefits and alternatives to surgery were discussed. Will follow with radiation

## 2024-04-23 NOTE — ANESTHESIA PROCEDURE NOTES
Intubation    Date/Time: 4/23/2024 7:38 AM    Performed by: Nichole Casper MD  Authorized by: Nichole Casper MD    Intubation:     Induction:  Intravenous    Intubated:  Postinduction    Mask Ventilation:  Easy with oral airway    Attempts:  1    Attempted By:  Staff anesthesiologist    Method of Intubation:  Video laryngoscopy    Blade:  Herrera 3    Laryngeal View Grade: Grade I - full view of cords      Difficult Airway Encountered?: No      Complications:  None    Airway Device:  Oral endotracheal tube    Airway Device Size:  7.0    Style/Cuff Inflation:  Cuffed (inflated to minimal occlusive pressure)    Inflation Amount (mL):  6    Tube secured:  23    Secured at:  The lips    Placement Verified By:  Capnometry    Complicating Factors:  None    Findings Post-Intubation:  BS equal bilateral and atraumatic/condition of teeth unchanged

## 2024-04-25 PROCEDURE — 77263 THER RADIOLOGY TX PLNG CPLX: CPT | Mod: ,,, | Performed by: RADIOLOGY

## 2024-04-26 LAB
FINAL PATHOLOGIC DIAGNOSIS: NORMAL
GROSS: NORMAL
Lab: NORMAL

## 2024-04-26 PROCEDURE — 77412 RADIATION TX DELIVERY LVL 3: CPT | Performed by: RADIOLOGY

## 2024-04-30 ENCOUNTER — PATIENT MESSAGE (OUTPATIENT)
Dept: PLASTIC SURGERY | Facility: CLINIC | Age: 44
End: 2024-04-30
Payer: COMMERCIAL

## 2024-04-30 PROCEDURE — 77336 RADIATION PHYSICS CONSULT: CPT | Performed by: RADIOLOGY

## 2024-05-01 ENCOUNTER — OFFICE VISIT (OUTPATIENT)
Dept: PLASTIC SURGERY | Facility: CLINIC | Age: 44
End: 2024-05-01
Payer: COMMERCIAL

## 2024-05-01 VITALS
HEART RATE: 64 BPM | BODY MASS INDEX: 35.61 KG/M2 | HEIGHT: 65 IN | OXYGEN SATURATION: 99 % | SYSTOLIC BLOOD PRESSURE: 120 MMHG | DIASTOLIC BLOOD PRESSURE: 65 MMHG

## 2024-05-01 DIAGNOSIS — Z09 SURGERY FOLLOW-UP EXAMINATION: Primary | ICD-10-CM

## 2024-05-01 PROCEDURE — 3074F SYST BP LT 130 MM HG: CPT | Mod: CPTII,S$GLB,, | Performed by: SURGERY

## 2024-05-01 PROCEDURE — 99999 PR PBB SHADOW E&M-EST. PATIENT-LVL III: CPT | Mod: PBBFAC,,, | Performed by: SURGERY

## 2024-05-01 PROCEDURE — 3078F DIAST BP <80 MM HG: CPT | Mod: CPTII,S$GLB,, | Performed by: SURGERY

## 2024-05-01 PROCEDURE — 99024 POSTOP FOLLOW-UP VISIT: CPT | Mod: S$GLB,,, | Performed by: SURGERY

## 2024-05-01 NOTE — PROGRESS NOTES
Plastic Surgery Clinic Postop Visit    Subjective:      Kristofer Gentile is a 43 y.o. year old female who presents to the Plastic Surgery Clinic on 05/01/2024 for follow up visit status post panniculectomy for multiple abdominal keloids on 4.23.2024. Right drain output < 5ccs in a 24 hour period. Left drain output 10 ccs in a 24 hour period. Pt is very pleased with her results. She has completed her radiation.   Denies fever, chills, nausea, vomiting, or other systemic signs of infection.    Date of surgery 04/23/2024   Preoperative diagnosis multiple abdominal wall keloids in the upper abdomen umbilicus and lower abdomen.  Postoperative diagnosis is the same  Procedure performed panniculectomy  Surgeon Dorian   Anesthesia general  Complications none   Blood loss 100 cc   Drains x2.        ROS:  Negative unless otherwise stated above in HPI    Objective:     Physical Exam:  Vitals:    05/01/24 1013   BP: 120/65   Pulse: 64       WD WN NAD  VSS  Normal resp effort  Abdomen: incision CDI. No erythema or drainage. No signs of infection. Surgically absent umbilicus. Tape in place. Drain in place w/ SS output.         Assessment:       1. Surgery follow-up examination        Plan:   43 y.o. female status post panniculectomy  - Doing well, no issues  - Pt was seen and evaluated by myself and Dr. Christiano Richard.   - Right drain removed today with no complications. Pt tolerated well.   - Return to clinic in 1 week. Staff to schedule.      All questions were answered. The patient was advised to call the clinic with any questions or concerns prior to their next visit.       Katalina Llanes PA-C  Plastic and Reconstructive Surgery  [phone]

## 2024-05-08 ENCOUNTER — OFFICE VISIT (OUTPATIENT)
Dept: PLASTIC SURGERY | Facility: CLINIC | Age: 44
End: 2024-05-08
Payer: COMMERCIAL

## 2024-05-08 VITALS
BODY MASS INDEX: 35.67 KG/M2 | HEIGHT: 65 IN | SYSTOLIC BLOOD PRESSURE: 121 MMHG | HEART RATE: 64 BPM | WEIGHT: 214.06 LBS | DIASTOLIC BLOOD PRESSURE: 69 MMHG

## 2024-05-08 DIAGNOSIS — Z09 SURGERY FOLLOW-UP EXAMINATION: Primary | ICD-10-CM

## 2024-05-08 PROCEDURE — 3074F SYST BP LT 130 MM HG: CPT | Mod: CPTII,S$GLB,, | Performed by: SURGERY

## 2024-05-08 PROCEDURE — 99999 PR PBB SHADOW E&M-EST. PATIENT-LVL III: CPT | Mod: PBBFAC,,, | Performed by: SURGERY

## 2024-05-08 PROCEDURE — 99024 POSTOP FOLLOW-UP VISIT: CPT | Mod: S$GLB,,, | Performed by: SURGERY

## 2024-05-08 PROCEDURE — 1159F MED LIST DOCD IN RCRD: CPT | Mod: CPTII,S$GLB,, | Performed by: SURGERY

## 2024-05-08 PROCEDURE — 3078F DIAST BP <80 MM HG: CPT | Mod: CPTII,S$GLB,, | Performed by: SURGERY

## 2024-05-08 NOTE — PROGRESS NOTES
Plastic Surgery Clinic Postop Visit    Subjective:      Kristofer Gentile is a 43 y.o. year old female who presents to the Plastic Surgery Clinic on 05/08/2024 for follow up visit status post panniculectomy for multiple abdominal keloids on 4.23.2024. Left drain output < 20 cc in a 24 hour period. Pt is  pleased with her results. She has completed her radiation.   Denies fever, chills, nausea, vomiting, or other systemic signs of infection.    Date of surgery 04/23/2024   Preoperative diagnosis multiple abdominal wall keloids in the upper abdomen umbilicus and lower abdomen.  Postoperative diagnosis is the same  Procedure performed panniculectomy  Surgeon Dorian   Anesthesia general  Complications none   Blood loss 100 cc   Drains x2.        ROS:  Negative unless otherwise stated above in HPI    Objective:     Physical Exam:  Vitals:    05/08/24 0946   BP: 121/69   Pulse: 64       WD WN NAD  VSS  Normal resp effort  Abdomen: incision CDI. No erythema or drainage. No signs of infection. Surgically absent umbilicus. Tape in place. Left drain in place w/ SS output.  Mild swelling on the right side, incision healing well.         Assessment:       No diagnosis found.      Plan:   43 y.o. female status post panniculectomy for abdominal keloid scars  - Doing well, no issues, some minimal soft tissue swelling on right side, patient reassured  - Pt was seen and evaluated by myself and Dr. Christiano Richard.   - Left drain removed today with no complications. Pt tolerated well.   - Return to clinic in 2 weeks. Staff to schedule.      All questions were answered. The patient was advised to call the clinic with any questions or concerns prior to their next visit.       Gail Willis MD  [facility]  General Surgery PGY-1

## 2024-05-28 ENCOUNTER — OFFICE VISIT (OUTPATIENT)
Dept: INTERNAL MEDICINE | Facility: CLINIC | Age: 44
End: 2024-05-28
Payer: COMMERCIAL

## 2024-05-28 ENCOUNTER — LAB VISIT (OUTPATIENT)
Dept: LAB | Facility: HOSPITAL | Age: 44
End: 2024-05-28
Attending: NURSE PRACTITIONER
Payer: COMMERCIAL

## 2024-05-28 VITALS
HEIGHT: 65 IN | HEART RATE: 69 BPM | RESPIRATION RATE: 16 BRPM | OXYGEN SATURATION: 98 % | BODY MASS INDEX: 34.22 KG/M2 | SYSTOLIC BLOOD PRESSURE: 120 MMHG | WEIGHT: 205.38 LBS | TEMPERATURE: 98 F | DIASTOLIC BLOOD PRESSURE: 88 MMHG

## 2024-05-28 DIAGNOSIS — E66.9 OBESITY (BMI 30-39.9): ICD-10-CM

## 2024-05-28 DIAGNOSIS — L28.2 PRURITIC RASH: Primary | ICD-10-CM

## 2024-05-28 DIAGNOSIS — L28.2 PRURITIC RASH: ICD-10-CM

## 2024-05-28 LAB
BASOPHILS # BLD AUTO: 0.05 K/UL (ref 0–0.2)
BASOPHILS NFR BLD: 1.3 % (ref 0–1.9)
CRP SERPL-MCNC: 1.5 MG/L (ref 0–8.2)
DIFFERENTIAL METHOD BLD: ABNORMAL
EOSINOPHIL # BLD AUTO: 0.4 K/UL (ref 0–0.5)
EOSINOPHIL NFR BLD: 11.8 % (ref 0–8)
ERYTHROCYTE [DISTWIDTH] IN BLOOD BY AUTOMATED COUNT: 13.6 % (ref 11.5–14.5)
ERYTHROCYTE [SEDIMENTATION RATE] IN BLOOD BY PHOTOMETRIC METHOD: 8 MM/HR (ref 0–36)
HCT VFR BLD AUTO: 37.8 % (ref 37–48.5)
HGB BLD-MCNC: 12.8 G/DL (ref 12–16)
IMM GRANULOCYTES # BLD AUTO: 0.02 K/UL (ref 0–0.04)
IMM GRANULOCYTES NFR BLD AUTO: 0.5 % (ref 0–0.5)
LYMPHOCYTES # BLD AUTO: 1.4 K/UL (ref 1–4.8)
LYMPHOCYTES NFR BLD: 36.6 % (ref 18–48)
MCH RBC QN AUTO: 30.7 PG (ref 27–31)
MCHC RBC AUTO-ENTMCNC: 33.9 G/DL (ref 32–36)
MCV RBC AUTO: 91 FL (ref 82–98)
MONOCYTES # BLD AUTO: 0.4 K/UL (ref 0.3–1)
MONOCYTES NFR BLD: 10.7 % (ref 4–15)
NEUTROPHILS # BLD AUTO: 1.5 K/UL (ref 1.8–7.7)
NEUTROPHILS NFR BLD: 39.1 % (ref 38–73)
NRBC BLD-RTO: 0 /100 WBC
PLATELET # BLD AUTO: 272 K/UL (ref 150–450)
PMV BLD AUTO: 10.9 FL (ref 9.2–12.9)
RBC # BLD AUTO: 4.17 M/UL (ref 4–5.4)
WBC # BLD AUTO: 3.74 K/UL (ref 3.9–12.7)

## 2024-05-28 PROCEDURE — 3008F BODY MASS INDEX DOCD: CPT | Mod: CPTII,S$GLB,, | Performed by: NURSE PRACTITIONER

## 2024-05-28 PROCEDURE — 85652 RBC SED RATE AUTOMATED: CPT | Performed by: NURSE PRACTITIONER

## 2024-05-28 PROCEDURE — 3074F SYST BP LT 130 MM HG: CPT | Mod: CPTII,S$GLB,, | Performed by: NURSE PRACTITIONER

## 2024-05-28 PROCEDURE — 86140 C-REACTIVE PROTEIN: CPT | Performed by: NURSE PRACTITIONER

## 2024-05-28 PROCEDURE — 1159F MED LIST DOCD IN RCRD: CPT | Mod: CPTII,S$GLB,, | Performed by: NURSE PRACTITIONER

## 2024-05-28 PROCEDURE — 3079F DIAST BP 80-89 MM HG: CPT | Mod: CPTII,S$GLB,, | Performed by: NURSE PRACTITIONER

## 2024-05-28 PROCEDURE — 1160F RVW MEDS BY RX/DR IN RCRD: CPT | Mod: CPTII,S$GLB,, | Performed by: NURSE PRACTITIONER

## 2024-05-28 PROCEDURE — 99999 PR PBB SHADOW E&M-EST. PATIENT-LVL IV: CPT | Mod: PBBFAC,,, | Performed by: NURSE PRACTITIONER

## 2024-05-28 PROCEDURE — 36415 COLL VENOUS BLD VENIPUNCTURE: CPT | Mod: PO | Performed by: NURSE PRACTITIONER

## 2024-05-28 PROCEDURE — 85025 COMPLETE CBC W/AUTO DIFF WBC: CPT | Performed by: NURSE PRACTITIONER

## 2024-05-28 PROCEDURE — 99214 OFFICE O/P EST MOD 30 MIN: CPT | Mod: S$GLB,,, | Performed by: NURSE PRACTITIONER

## 2024-05-28 RX ORDER — PREDNISONE 20 MG/1
20 TABLET ORAL DAILY
Qty: 5 TABLET | Refills: 0 | Status: SHIPPED | OUTPATIENT
Start: 2024-05-28 | End: 2024-06-18

## 2024-05-28 NOTE — PROGRESS NOTES
Subjective     Patient ID: Kristofer Gentile is a 43 y.o. female.    Chief Complaint: Rash (Full body fash/itching believes may be lupus) and Itching (Whole body)    Pt in office for evaluation of a pruritic rash. Pt has had similar flares in the past which would usually resolve but this time it lingers and is worse on her face. Rash is a fine sandpaper type, pruritic rash localized on her face with significant dry skin. She denies any recent soap, lotion or detergent changes. She has been using aloe very to keep moisturized. She denies any hx or dx eczema. She also denies any other systemic symptoms such as fever, body aches or chills. She is 5 weeks post op panniculectomy and states she has been healing well from this. She was released by her plastic surgeon to resume all activities.  She is concerned about an autoimmune etiology since she has a family hx of condition. She denies any other acute concerns.     Rash    Itching  Associated symptoms include a rash.   Review of Systems   Integumentary:  Positive for itching and rash.      Objective   Physical Exam  Vitals reviewed.   Constitutional:       Appearance: Normal appearance. She is obese.   HENT:      Head: Normocephalic and atraumatic.   Eyes:      Conjunctiva/sclera: Conjunctivae normal.      Pupils: Pupils are equal, round, and reactive to light.   Cardiovascular:      Rate and Rhythm: Normal rate and regular rhythm.   Pulmonary:      Effort: Pulmonary effort is normal.      Breath sounds: Normal breath sounds.   Abdominal:      General: Bowel sounds are normal.      Palpations: Abdomen is soft.   Musculoskeletal:         General: Normal range of motion.      Cervical back: Normal range of motion and neck supple.   Skin:     General: Skin is warm.      Comments: Fine, sand paper, pruritic rash on face, predominantly around her eyes   Neurological:      General: No focal deficit present.   Psychiatric:         Mood and Affect: Mood normal.         Assessment and Plan   1. Pruritic rash  Assessment & Plan:  Unsure of etiology.  Will treat with systemic steroids  CRP,  CBC and ESR ordered.   If there is no resolution of rash will send to derm for further work up        Orders:  -     predniSONE (DELTASONE) 20 MG tablet; Take 1 tablet (20 mg total) by mouth once daily.  Dispense: 5 tablet; Refill: 0  -     C-REACTIVE PROTEIN; Future; Expected date: 05/28/2024  -     Sedimentation rate; Future; Expected date: 05/28/2024  -     CBC W/ AUTO DIFFERENTIAL; Future; Expected date: 05/28/2024    2. Obesity (BMI 30-39.9)  Assessment & Plan:  Pt recently released to resume prior activities.  Encouraged to do so carefully since recent surgery.      RTC PRN.

## 2024-05-30 NOTE — ASSESSMENT & PLAN NOTE
Pt recently released to resume prior activities.  Encouraged to do so carefully since recent surgery.

## 2024-05-30 NOTE — ASSESSMENT & PLAN NOTE
Unsure of etiology.  Will treat with systemic steroids  CRP,  CBC and ESR ordered.   If there is no resolution of rash will send to derm for further work up

## 2024-06-03 ENCOUNTER — OFFICE VISIT (OUTPATIENT)
Dept: INTERNAL MEDICINE | Facility: CLINIC | Age: 44
End: 2024-06-03
Payer: COMMERCIAL

## 2024-06-03 VITALS
BODY MASS INDEX: 34.53 KG/M2 | HEIGHT: 65 IN | RESPIRATION RATE: 16 BRPM | HEART RATE: 86 BPM | WEIGHT: 207.25 LBS | SYSTOLIC BLOOD PRESSURE: 120 MMHG | TEMPERATURE: 98 F | DIASTOLIC BLOOD PRESSURE: 78 MMHG | OXYGEN SATURATION: 98 %

## 2024-06-03 DIAGNOSIS — L28.2 PRURITIC RASH: ICD-10-CM

## 2024-06-03 DIAGNOSIS — E66.9 OBESITY (BMI 30-39.9): ICD-10-CM

## 2024-06-03 DIAGNOSIS — T78.40XD ALLERGIC REACTION, SUBSEQUENT ENCOUNTER: Primary | ICD-10-CM

## 2024-06-03 PROBLEM — T78.40XA ALLERGIC REACTION: Status: ACTIVE | Noted: 2024-06-03

## 2024-06-03 PROBLEM — E66.01 SEVERE OBESITY (BMI 35.0-39.9) WITH COMORBIDITY: Status: RESOLVED | Noted: 2019-11-25 | Resolved: 2024-06-03

## 2024-06-03 PROCEDURE — 3008F BODY MASS INDEX DOCD: CPT | Mod: CPTII,S$GLB,, | Performed by: NURSE PRACTITIONER

## 2024-06-03 PROCEDURE — 3074F SYST BP LT 130 MM HG: CPT | Mod: CPTII,S$GLB,, | Performed by: NURSE PRACTITIONER

## 2024-06-03 PROCEDURE — 1160F RVW MEDS BY RX/DR IN RCRD: CPT | Mod: CPTII,S$GLB,, | Performed by: NURSE PRACTITIONER

## 2024-06-03 PROCEDURE — 99214 OFFICE O/P EST MOD 30 MIN: CPT | Mod: S$GLB,,, | Performed by: NURSE PRACTITIONER

## 2024-06-03 PROCEDURE — 1159F MED LIST DOCD IN RCRD: CPT | Mod: CPTII,S$GLB,, | Performed by: NURSE PRACTITIONER

## 2024-06-03 PROCEDURE — 3078F DIAST BP <80 MM HG: CPT | Mod: CPTII,S$GLB,, | Performed by: NURSE PRACTITIONER

## 2024-06-03 PROCEDURE — 99999 PR PBB SHADOW E&M-EST. PATIENT-LVL V: CPT | Mod: PBBFAC,,, | Performed by: NURSE PRACTITIONER

## 2024-06-03 NOTE — ASSESSMENT & PLAN NOTE
Referral to allergist placed.   She can take her last dose of prednisone tomorrow.   When completed she can start taking OTC antihistamines.   F/u with allergist referral.

## 2024-06-03 NOTE — PROGRESS NOTES
Subjective     Patient ID: Kristofer Gentile is a 43 y.o. female.    Chief Complaint: Allergic Reaction    Pt in office for ongoing management of her facial rash. She has singled ' slap your mama ' seasoning as the offending agent. At last visit she took 3 doses of prednisone with resolution of her symptoms. She took one this AM and rash has improved. She is now requesting a referral to allergist for further evaluation.     Allergic Reaction    Itching  Associated symptoms include a rash.   Review of Systems   Integumentary:  Positive for itching and rash.      Objective  Physical Exam  Vitals reviewed.   Constitutional:       Appearance: Normal appearance. She is obese.   HENT:      Head: Normocephalic and atraumatic.   Eyes:      Conjunctiva/sclera: Conjunctivae normal.      Pupils: Pupils are equal, round, and reactive to light.   Cardiovascular:      Rate and Rhythm: Normal rate and regular rhythm.   Pulmonary:      Effort: Pulmonary effort is normal.      Breath sounds: Normal breath sounds.   Abdominal:      General: Bowel sounds are normal.      Palpations: Abdomen is soft.   Musculoskeletal:         General: Normal range of motion.      Cervical back: Normal range of motion and neck supple.   Skin:     General: Skin is warm.      Comments: Fine, sand paper, pruritic rash on face, predominantly around her eyes   Neurological:      General: No focal deficit present.   Psychiatric:         Mood and Affect: Mood normal.      Assessment and Plan   1. Allergic reaction, subsequent encounter  Assessment & Plan:  Referral to allergist placed.   She can take her last dose of prednisone tomorrow.   When completed she can start taking OTC antihistamines.   F/u with allergist referral.     Orders:  -     Ambulatory referral/consult to Allergy; Future; Expected date: 06/10/2024    2. Pruritic rash  Assessment & Plan:  Improving.       3. Obesity (BMI 30-39.9)  Assessment & Plan:  Pt continues to make diet and lifestyle  changes       RTC PRN.

## 2024-06-05 ENCOUNTER — OFFICE VISIT (OUTPATIENT)
Dept: ALLERGY | Facility: CLINIC | Age: 44
End: 2024-06-05
Payer: COMMERCIAL

## 2024-06-05 VITALS
SYSTOLIC BLOOD PRESSURE: 124 MMHG | HEIGHT: 65 IN | OXYGEN SATURATION: 100 % | HEART RATE: 66 BPM | WEIGHT: 205.94 LBS | DIASTOLIC BLOOD PRESSURE: 83 MMHG | BODY MASS INDEX: 34.31 KG/M2

## 2024-06-05 DIAGNOSIS — J31.0 RHINITIS, UNSPECIFIED TYPE: ICD-10-CM

## 2024-06-05 DIAGNOSIS — R21 PAPULAR RASH: Primary | ICD-10-CM

## 2024-06-05 DIAGNOSIS — Z91.018 HISTORY OF FOOD ALLERGY: ICD-10-CM

## 2024-06-05 PROCEDURE — 3074F SYST BP LT 130 MM HG: CPT | Mod: CPTII,S$GLB,, | Performed by: STUDENT IN AN ORGANIZED HEALTH CARE EDUCATION/TRAINING PROGRAM

## 2024-06-05 PROCEDURE — 1159F MED LIST DOCD IN RCRD: CPT | Mod: CPTII,S$GLB,, | Performed by: STUDENT IN AN ORGANIZED HEALTH CARE EDUCATION/TRAINING PROGRAM

## 2024-06-05 PROCEDURE — 99205 OFFICE O/P NEW HI 60 MIN: CPT | Mod: S$GLB,,, | Performed by: STUDENT IN AN ORGANIZED HEALTH CARE EDUCATION/TRAINING PROGRAM

## 2024-06-05 PROCEDURE — 3008F BODY MASS INDEX DOCD: CPT | Mod: CPTII,S$GLB,, | Performed by: STUDENT IN AN ORGANIZED HEALTH CARE EDUCATION/TRAINING PROGRAM

## 2024-06-05 PROCEDURE — 1160F RVW MEDS BY RX/DR IN RCRD: CPT | Mod: CPTII,S$GLB,, | Performed by: STUDENT IN AN ORGANIZED HEALTH CARE EDUCATION/TRAINING PROGRAM

## 2024-06-05 PROCEDURE — 3079F DIAST BP 80-89 MM HG: CPT | Mod: CPTII,S$GLB,, | Performed by: STUDENT IN AN ORGANIZED HEALTH CARE EDUCATION/TRAINING PROGRAM

## 2024-06-05 PROCEDURE — 99999 PR PBB SHADOW E&M-EST. PATIENT-LVL IV: CPT | Mod: PBBFAC,,, | Performed by: STUDENT IN AN ORGANIZED HEALTH CARE EDUCATION/TRAINING PROGRAM

## 2024-06-05 RX ORDER — TRIAMCINOLONE ACETONIDE 0.25 MG/G
OINTMENT TOPICAL 2 TIMES DAILY
Qty: 80 G | Refills: 1 | Status: SHIPPED | OUTPATIENT
Start: 2024-06-05

## 2024-06-05 NOTE — PROGRESS NOTES
"ALLERGY & IMMUNOLOGY CLINIC - INITIAL CONSULTATION      HISTORY OF PRESENT ILLNESS     Patient ID: Kristofer Gentile is a 43 y.o. female    CC: facial rash    HPI: Kristofer Gentile is a 43 y.o. female with a history of food allergy and rhinitis, presenting for facial rash.   Patient was referred by Rakesh Coles NP.    The facial rash started around May 25th. Very itchy. Photos show papular rash most noticeable on her cheeks and around her eyes with some swelling in the area.   Went to doctor 5/28/24 and was prescribed prednisone. She took the prednisone or 3 days starting 5/29/24. It helped, so she stopped the prednisone early. The rash started to come back a couple of days later (on 6/2/24). She says it started again after eating, wondering if it could be "slap ya mamma" seasoning. She restarted the prednisone 6/3/24, and took the final 2 days of the course. It helped, but not as well as the first time. She finished the prednisone yesterday. The rash is present currently, but not as bad as it was at its worst.     Her current ginger are from April. She says her ginger do irritate her skin in general. She says when they cause itchy neck, she uses hydrocortisone.   No new hair products. She was using a new leave-in condition that was irritating skin, but realized it was irritating skin so stopped it in early May.  No new facial products.   She rarely wears makeup.  No new activities.   Unsure if she had eczema as a child, but knows she had skin issues as child.  She uses cetaphil cleanser. She uses a collagen cream cleanser, but not daily (maybe 3x per week for 2 years now). She might spray on a light toner and a moisturizer (elemis marine oil). Since this has happened, she has been using aloe and vitamin E instead of her usual moisturizer.  She wears dip nails (and this isn't new for her).     She was taking some pain medications after her surgery, but had stopped these medications by the end of April. Started " taking ibuprofen and tylenol instead, but she doesn't even think she has taken these in the past couple of weeks.  She took benadryl for a couple of days before symptoms started, but this was around May 10th.     She says she knows she is allergic to whatever pepper is used in spicy popeyes batter. She says this would cause hives. This started in childhood and has occurred even in adulthood (if popJemstepes mistakenly gives her spicy instead of regular chicken). Symptoms start within a few minutes. She says symptoms don't usually last long.     She says she has been noticing some increased rhinitis recently. She gets rhinorrhea. Sometimes itchy watery eyes, sometimes dry eye eyes. She says she was taking zyrtec prn, but felt like it wasn't helping anymore, so started using benadryl prn. She also uses flonase BID prn, which she finds helpdul.     MEDICAL HISTORY     Vaccines:   Immunization History   Administered Date(s) Administered    COVID-19, MRNA, LN-S, PF (Pfizer) (Purple Cap) 04/06/2021, 04/27/2021, 04/22/2022    DTP 1980, 1980, 01/13/1981, 06/01/1982    Influenza - Quadrivalent - PF *Preferred* (6 months and older) 10/31/2018, 11/25/2019, 01/04/2021    MMR 10/19/1981    OPV 1980, 1980, 01/13/1981, 06/01/1982    Td - PF (ADULT) 10/31/2018    Tdap 03/28/2008     Medical Hx:   Patient Active Problem List   Diagnosis    Dyslipidemia (high LDL; low HDL)    Herpes simplex labialis    Obesity (BMI 30-39.9)    Metabolic syndrome    [place] cardiac risk <10% in next 10 years    Vitamin D insufficiency    Anti-TPO antibodies present    Intraductal papilloma of left breast    Alopecia    Intertrigo    Primary focal hyperhidrosis of axilla    Lateral knee pain, right    Limitation of joint motion of knee, right    Skin lesion of chest wall    Keloid    Pruritic rash    Allergic reaction     Surgical Hx:   Past Surgical History:   Procedure Laterality Date    BREAST BIOPSY Left 12/14/2018    Core bx,  benign    EXCISIONAL BIOPSY Left 3/1/2019    Procedure: EXCISIONAL BIOPSY- w/major duct excision KENALOG INJECTION AT TIME OF SURGERY;  Surgeon: Kelby Mcmahan MD;  Location: [facility] OR [address] [place] [address];  Service: General;  Laterality: Left;    INTRAUTERINE DEVICE INSERTION  2009    again 2014    PANNICULECTOMY N/A 4/23/2024    Procedure: PANNICULECTOMY;  Surgeon: Christiano Richard MD;  Location: [facility] OR [address] [place] [address];  Service: Plastics;  Laterality: N/A;     Medications:   Current Outpatient Medications on File Prior to Visit   Medication Sig Dispense Refill    CALCIUM ORAL Take by mouth.      cholecalciferol, vitamin D3, (VITAMIN D3) 125 mcg (5,000 unit) Tab Take 10,000 Units by mouth once daily.      COLLAGEN MISC by Misc.(Non-Drug; Combo Route) route.      docosahexaenoic acid/epa (FISH OIL ORAL) Take by mouth.      FLONASE ALLERGY RELIEF 50 mcg/actuation nasal spray SPRAY TWICE IN EACH NOSTRIL ONCE DAILY 16 g 5    norgestrel-ethinyl estradioL (CRYSELLE, 28,) 0.3-30 mg-mcg per tablet Take 1 tablet by mouth once daily. 84 tablet 1    predniSONE (DELTASONE) 20 MG tablet Take 1 tablet (20 mg total) by mouth once daily. 5 tablet 0    valACYclovir (VALTREX) 500 MG tablet TAKE 1 TABLET(500 MG) BY MOUTH EVERY DAY 30 tablet 11    ZINC ORAL Take by mouth.      hydrocortisone 2.5 % cream Apply topically 2 (two) times daily. Mix with ketoconazole cream and apply twice a day for 1-2 weeks when flaring. Only use when flaring. (Patient not taking: Reported on 5/28/2024) 28 g 6    ketoconazole (NIZORAL) 2 % cream Apply topically 2 (two) times daily. Apply twice a day with hydrocortisone when flaring for up to two weeks at a time. (Patient not taking: Reported on 5/28/2024) 30 g 6    [DISCONTINUED] triamcinolone acetonide 0.1% (KENALOG) 0.1 % cream Apply topically to affected area twice daily after cool blow dry (Patient not taking: Reported on 2/10/2021) 30 g 3     No current facility-administered medications on file prior to visit.  "    Drug Allergies:   Review of patient's allergies indicates:   Allergen Reactions    Bactrim [sulfamethoxazole-trimethoprim] Hives and Other (See Comments)     Stiffness to joints    Sulfa (sulfonamide antibiotics) Hives     Social Hx:   Social History     Tobacco Use    Smoking status: Never     Passive exposure: Past    Smokeless tobacco: Never   Substance Use Topics    Alcohol use: Yes     Alcohol/week: 0.8 standard drinks of alcohol     Types: 1 Standard drinks or equivalent per week     Comment: Rarely    Drug use: No     Family Hx:   Family History   Problem Relation Name Age of Onset    Angioedema Mother      Diabetes Mother      Hypertension Mother      Rheum arthritis Mother      Coronary artery disease Mother      Stroke Mother          x2    Liver disease Mother          NAFLD    Sarcoidosis Mother      Thyroid disease Mother      Hyperlipidemia Mother      Atrial fibrillation Mother      Cataracts Mother      Glaucoma Mother      Allergies Father      Hypertension Father      Dementia Father      Hyperlipidemia Father      Liver cancer Father      Hypertension Maternal Aunt      Coronary artery disease Maternal Uncle          with stent    Sarcoidosis Maternal Uncle      Leukemia Maternal Uncle      Rheum arthritis Maternal Grandmother          wheelchair bound    Cataracts Maternal Grandfather      Rheum arthritis Paternal Grandmother      Allergies Child      Breast cancer Neg Hx      Colon cancer Neg Hx      Ovarian cancer Neg Hx        PHYSICAL EXAM     VS: /83 (BP Location: Left arm, Patient Position: Sitting)   Pulse 66   Ht 5' 5" (1.651 m)   Wt 93.4 kg (205 lb 14.6 oz)   LMP 05/21/2024 (Exact Date)   SpO2 100%   BMI 34.27 kg/m²   GENERAL: Alert, NAD, well-appearing  EYES: EOMI, no conjunctival injection, no discharge, no infraorbital shiners  NOSE: NT 2+ B/L, no stringing mucus, no polyps visualized  ORAL: MMM, no ulcers, no thrush  LUNGS: CTAB, no w/r/c, no increased WOB  HEART: " RRR, normal S1/S2, no m/g/r  DERM: faint papules on temples and on cheeks below eyes bilaterally  NEURO: normal speech, normal gait, no facial asymmetry     LABORATORY STUDIES     Component      Latest Ref Rng 3/10/2023 4/5/2024 4/19/2024 5/28/2024   WBC      3.90 - 12.70 K/uL 4.54    3.74 (L)    RBC      4.00 - 5.40 M/uL 4.28    4.17    Hemoglobin      12.0 - 16.0 g/dL 12.8    12.8    Hematocrit      37.0 - 48.5 % 40.2    37.8    MCV      82 - 98 fL 94    91    MCH      27.0 - 31.0 pg 29.9    30.7    MCHC      32.0 - 36.0 g/dL 31.8 (L)    33.9    RDW      11.5 - 14.5 % 12.8    13.6    Platelet Count      150 - 450 K/uL 302    272    MPV      9.2 - 12.9 fL 10.7    10.9    Immature Granulocytes      0.0 - 0.5 % 0.2    0.5    Gran # (ANC)      1.8 - 7.7 K/uL 2.4    1.5 (L)    Immature Grans (Abs)      0.00 - 0.04 K/uL 0.01    0.02    Lymph #      1.0 - 4.8 K/uL 1.6    1.4    Mono #      0.3 - 1.0 K/uL 0.4    0.4    Eos #      0.0 - 0.5 K/uL 0.1    0.4    Baso #      0.00 - 0.20 K/uL 0.04    0.05    Differential Method Automated    Automated    Sodium      136 - 145 mmol/L 134 (L)  137      Potassium      3.5 - 5.1 mmol/L 4.5  3.9      Chloride      95 - 110 mmol/L 104  107      CO2      23 - 29 mmol/L 21 (L)  24      Glucose      70 - 110 mg/dL 81  89      BUN      6 - 20 mg/dL 10  11      Creatinine      0.5 - 1.4 mg/dL 1.0  1.0      Calcium      8.7 - 10.5 mg/dL 9.6  8.9      PROTEIN TOTAL      6.0 - 8.4 g/dL 7.7  7.3      Albumin      3.5 - 5.2 g/dL 3.6  3.4 (L)      BILIRUBIN TOTAL      0.1 - 1.0 mg/dL 0.4  0.4      ALP      55 - 135 U/L 54 (L)  45 (L)      AST      10 - 40 U/L 18  15      ALT      10 - 44 U/L 14  10      TSH      0.400 - 4.000 uIU/mL   1.765     CRP      0.0 - 8.2 mg/L    1.5    Sed Rate      0 - 36 mm/Hr    8        ALLERGEN TESTING     Immunocaps: Ordered     CHART REVIEW     Reviewed IM notes, labs.      ASSESSMENT & PLAN     Kristofer Gentile is a 43 y.o. female with     # Facial rash:  Papular and pruritic. Started around 5/25/24, progressively worsening until she started prednisone on 5/29/24. Symptoms worsening again now that she is off the prednisone. Based on photos and duration, allergic contact dermatiditis seems most likely. Atopic dermatitis also on ddx, but less likely. Considered delayed drug eruption, but there are no likely culprit medications based on her history. Counseled patient that appearance and duration of rash isn't consistent with food allergy.   -referring to dermatology for further evaluation and consideration of patch testing.  -start triamcinolone 0.025% ointment BID prn. Counseled against over use.    # Rhinitis and ocular pruritus: She says she used to find cetirizine helpful, but now less so, so using benadryl prn instead.   -immunocaps for aeroallergens ordered.  -instead of benadryl prn, recommend she try levocetirizine prn.  -continue flonase BID prn.     # History of food allergy: She reports popeyes spicy batter causes urticaria (and has since childhood). Unclear what culprit ingredient might be, but unlikely to be common food allergen (as she doesn't get these symptoms with other foods). Counseled that allergy testing for other types of food would not be helpful as a positive result does not equate to clinical allergy.   -continue avoidance of popeyes spicy batter.       Follow up: as needed    I spent a total of 70 minutes on the day of the visit.  This includes face to face time and non-face to face time preparing to see the patient (eg, review of tests), obtaining and/or reviewing separately obtained history, documenting clinical information in the electronic or other health record.    Faith Burch MD  Allergy/Immunology

## 2024-06-06 ENCOUNTER — LAB VISIT (OUTPATIENT)
Dept: LAB | Facility: HOSPITAL | Age: 44
End: 2024-06-06
Attending: STUDENT IN AN ORGANIZED HEALTH CARE EDUCATION/TRAINING PROGRAM
Payer: COMMERCIAL

## 2024-06-06 DIAGNOSIS — J31.0 RHINITIS, UNSPECIFIED TYPE: ICD-10-CM

## 2024-06-06 PROCEDURE — 86003 ALLG SPEC IGE CRUDE XTRC EA: CPT | Mod: 59 | Performed by: STUDENT IN AN ORGANIZED HEALTH CARE EDUCATION/TRAINING PROGRAM

## 2024-06-06 PROCEDURE — 36415 COLL VENOUS BLD VENIPUNCTURE: CPT | Mod: PO | Performed by: STUDENT IN AN ORGANIZED HEALTH CARE EDUCATION/TRAINING PROGRAM

## 2024-06-06 PROCEDURE — 86003 ALLG SPEC IGE CRUDE XTRC EA: CPT | Performed by: STUDENT IN AN ORGANIZED HEALTH CARE EDUCATION/TRAINING PROGRAM

## 2024-06-09 DIAGNOSIS — Z30.41 ENCOUNTER FOR SURVEILLANCE OF CONTRACEPTIVE PILLS: ICD-10-CM

## 2024-06-09 DIAGNOSIS — N92.1 BREAKTHROUGH BLEEDING: ICD-10-CM

## 2024-06-10 ENCOUNTER — PATIENT MESSAGE (OUTPATIENT)
Dept: ALLERGY | Facility: CLINIC | Age: 44
End: 2024-06-10
Payer: COMMERCIAL

## 2024-06-10 LAB
A ALTERNATA IGE QN: 0.1 KU/L
A FUMIGATUS IGE QN: 0.11 KU/L
ALLERGEN BOXELDER MAPLE TREE IGE: <0.1 KU/L
ALLERGEN MAPLE (BOX ELDER) CLASS: NORMAL
ALLERGEN WHITE ASH TREE IGE: <0.1 KU/L
BAHIA GRASS IGE QN: 4.42 KU/L
BERMUDA GRASS IGE QN: 1.68 KU/L
CAT DANDER IGE QN: <0.1 KU/L
CEDAR IGE QN: <0.1 KU/L
COTTONWOOD IGE QN: <0.1 KU/L
D FARINAE IGE QN: 0.19 KU/L
D PTERONYSS IGE QN: 0.14 KU/L
DEPRECATED A ALTERNATA IGE RAST QL: NORMAL
DEPRECATED A FUMIGATUS IGE RAST QL: ABNORMAL
DEPRECATED BAHIA GRASS IGE RAST QL: ABNORMAL
DEPRECATED BERMUDA GRASS IGE RAST QL: ABNORMAL
DEPRECATED CAT DANDER IGE RAST QL: NORMAL
DEPRECATED CEDAR IGE RAST QL: NORMAL
DEPRECATED COTTONWOOD IGE RAST QL: NORMAL
DEPRECATED D FARINAE IGE RAST QL: ABNORMAL
DEPRECATED D PTERONYSS IGE RAST QL: ABNORMAL
DEPRECATED DOG DANDER IGE RAST QL: ABNORMAL
DEPRECATED ELDER IGE RAST QL: ABNORMAL
DEPRECATED ENGL PLANTAIN IGE RAST QL: ABNORMAL
DEPRECATED JOHNSON GRASS IGE RAST QL: ABNORMAL
DEPRECATED PECAN/HICK TREE IGE RAST QL: ABNORMAL
DEPRECATED ROACH IGE RAST QL: ABNORMAL
DEPRECATED SALTWORT IGE RAST QL: NORMAL
DEPRECATED SHEEP SORREL IGE RAST QL: ABNORMAL
DEPRECATED TIMOTHY IGE RAST QL: ABNORMAL
DEPRECATED WEST RAGWEED IGE RAST QL: ABNORMAL
DEPRECATED WHITE OAK IGE RAST QL: ABNORMAL
DOG DANDER IGE QN: 0.12 KU/L
ELDER IGE QN: 0.19 KU/L
ENGL PLANTAIN IGE QN: 0.17 KU/L
JOHNSON GRASS IGE QN: 0.82 KU/L
PECAN/HICK TREE IGE QN: 0.72 KU/L
ROACH IGE QN: 0.18 KU/L
SALTWORT IGE QN: <0.1 KU/L
SHEEP SORREL IGE QN: 0.13 KU/L
TIMOTHY IGE QN: 7.34 KU/L
WEST RAGWEED IGE QN: 0.15 KU/L
WHITE ASH CLASS: NORMAL
WHITE OAK IGE QN: 0.14 KU/L

## 2024-06-10 NOTE — TELEPHONE ENCOUNTER
"Refill Routing Note   Medication(s) are not appropriate for processing by [facility] for the following reason(s):        Clarification of medication (Rx) details(skipping placebos? Previous order indicated this, most recent did not-please review)    [facility] action(s):  Defer        Medication Therapy Plan: Previous orders have noted " skipping placebos". Deferring to clarify if SIG needs to be updated to reflect this to the pharmacy      Appointments  past 12m or future 3m with PCP    Date Provider   Last Visit   8/30/2023 Ursula Ortega MD   Next Visit   Visit date not found Ursula Ortega MD   ED visits in past 90 days: 0        Note composed:8:27 AM 06/10/2024          "

## 2024-06-12 RX ORDER — NORGESTREL AND ETHINYL ESTRADIOL 0.3-0.03MG
1 KIT ORAL DAILY
Qty: 84 TABLET | Refills: 0 | Status: SHIPPED | OUTPATIENT
Start: 2024-06-12

## 2024-06-18 ENCOUNTER — TELEPHONE (OUTPATIENT)
Dept: INTERNAL MEDICINE | Facility: CLINIC | Age: 44
End: 2024-06-18

## 2024-06-18 ENCOUNTER — OFFICE VISIT (OUTPATIENT)
Dept: INTERNAL MEDICINE | Facility: CLINIC | Age: 44
End: 2024-06-18
Payer: COMMERCIAL

## 2024-06-18 ENCOUNTER — TELEPHONE (OUTPATIENT)
Dept: INTERNAL MEDICINE | Facility: CLINIC | Age: 44
End: 2024-06-18
Payer: COMMERCIAL

## 2024-06-18 ENCOUNTER — CLINICAL SUPPORT (OUTPATIENT)
Dept: PRIMARY CARE CLINIC | Facility: CLINIC | Age: 44
End: 2024-06-18
Payer: COMMERCIAL

## 2024-06-18 VITALS — BODY MASS INDEX: 34.16 KG/M2 | WEIGHT: 205 LBS | HEIGHT: 65 IN

## 2024-06-18 DIAGNOSIS — Z00.00 ANNUAL PHYSICAL EXAM: ICD-10-CM

## 2024-06-18 DIAGNOSIS — N39.0 UTI (URINARY TRACT INFECTION), UNCOMPLICATED: ICD-10-CM

## 2024-06-18 DIAGNOSIS — R30.0 DYSURIA: ICD-10-CM

## 2024-06-18 DIAGNOSIS — N39.0 UTI (URINARY TRACT INFECTION), UNCOMPLICATED: Primary | ICD-10-CM

## 2024-06-18 PROCEDURE — 3008F BODY MASS INDEX DOCD: CPT | Mod: CPTII,95,, | Performed by: INTERNAL MEDICINE

## 2024-06-18 PROCEDURE — 87086 URINE CULTURE/COLONY COUNT: CPT | Performed by: INTERNAL MEDICINE

## 2024-06-18 PROCEDURE — 1159F MED LIST DOCD IN RCRD: CPT | Mod: CPTII,95,, | Performed by: INTERNAL MEDICINE

## 2024-06-18 PROCEDURE — 99214 OFFICE O/P EST MOD 30 MIN: CPT | Mod: 95,,, | Performed by: INTERNAL MEDICINE

## 2024-06-18 PROCEDURE — 81001 URINALYSIS AUTO W/SCOPE: CPT | Performed by: INTERNAL MEDICINE

## 2024-06-18 PROCEDURE — 1160F RVW MEDS BY RX/DR IN RCRD: CPT | Mod: CPTII,95,, | Performed by: INTERNAL MEDICINE

## 2024-06-18 RX ORDER — NITROFURANTOIN (MACROCRYSTALS) 100 MG/1
100 CAPSULE ORAL 2 TIMES DAILY
Qty: 10 CAPSULE | Refills: 0 | Status: SHIPPED | OUTPATIENT
Start: 2024-06-18 | End: 2024-06-23

## 2024-06-18 RX ORDER — PHENAZOPYRIDINE HYDROCHLORIDE 200 MG/1
200 TABLET, FILM COATED ORAL 3 TIMES DAILY PRN
Qty: 9 TABLET | Refills: 0 | Status: SHIPPED | OUTPATIENT
Start: 2024-06-18 | End: 2024-06-21

## 2024-06-18 NOTE — PROGRESS NOTES
The patient location is: [place]  The chief complaint leading to consultation is: Urinary Tract Infection  Visit type: Virtual visit with synchronous audio and video  Contact time spent with patient: 13 minutes  Each patient to whom he or she provides medical services by telemedicine is:  (1) informed of the relationship between the physician and patient and the respective role of any other health care provider with respect to management of the patient; and (2) notified that he or she may decline to receive medical services by telemedicine and may withdraw from such care at any time.    Subjective:       Patient ID: Kristofer Gentile is a 43 y.o. female who  has a past medical history of Allergy, Anti-TPO antibodies present (11/8/2018), Carpal tunnel syndrome, Dyslipidemia (high LDL; low HDL) (6/25/2013), Fever blister, Herpes simplex labialis (6/25/2013), Intraductal papilloma of left breast (3/1/2019), IUD (intrauterine device) in place (2009), Metabolic syndrome (7/22/2013), Thyroid disease, Vitamin D deficiency (11/5/2018), and Vitamin D insufficiency (11/5/2018).    Chief Complaint: Urinary Tract Infection     History was obtained from the patient and supplemented through chart review.  She is a patient of Melonie Villanueva MD.  Was last seen  for HLD.  Following with Allergy Clinic for rash, rhinitis.    Dysuria   The current episode started today. The problem occurs every urination. The problem has been rapidly worsening. The quality of the pain is described as burning. The pain is at a severity of 8/10. The pain is mild. There has been no fever. She is Sexually active. There is A history of pyelonephritis. Associated symptoms include frequency, urgency and constipation. Pertinent negatives include no behavior changes, chills, discharge, flank pain, hematuria, hesitancy, nausea, possible pregnancy, sweats, vomiting, weight loss, rash or withholding. She has tried nothing for the symptoms. The treatment  provided no relief. Her past medical history is significant for recurrent UTIs. There is no history of catheterization, diabetes insipidus, diabetes mellitus, genitourinary reflux, hypertension, kidney stones, a single kidney, STD, urinary stasis or a urological procedure.     Urinary symptoms started last night with increased urinary frequency.  Dysuria this morning.  Occurs with every urination.    Denies hematuria, cloudy appearing urine, urinary odor.   +mild pelvic pain.  No fever, N/V, back/flank pain.  No vaginal discharge.    She had intercourse 2 days ago.  Still has menstrual cycles. No dyspareunia.    Had 1 UTI last year.  Last UTI was 9/2023.  Taking probiotic for urinary tract.    Urine culture: .  : E coli, pansensitive.    Lots of UTIs in 2020.  She had gone through a divorce and was more sexually active.  : E coli, resistant to penicillins, Bactrim.  :  Klebsiella pneumoniae, resistant to ampicillin sulbactam, intermediate sensitivity to Macrobid.  Resistant to Bactrim  : Klebsiella pneumoniae, < 50K CFU. Resistant to ampicillin sulbactam, intermediate sensitivity to Macrobid.  Resistant to Bactrim.  :  E coli, < 50 K CFU.  Resistant to penicillins, tetracycline, Bactrim.    Allergies: Sulfa causes hives, joint stiffness.    Review of Systems   Constitutional:  Negative for chills and weight loss.   Gastrointestinal:  Positive for constipation. Negative for nausea and vomiting.   Genitourinary:  Positive for dysuria, frequency and urgency. Negative for flank pain, hematuria and hesitancy.   Skin:  Negative for rash.       Past Medical History:   Diagnosis Date    Allergy     Anti-TPO antibodies present 11/8/2018    Carpal tunnel syndrome     Dyslipidemia (high LDL; low HDL) 6/25/2013    Fever blister     Herpes simplex labialis 6/25/2013    Intraductal papilloma of left breast 3/1/2019    IUD (intrauterine device) in place 2009    Metabolic syndrome 7/22/2013     Thyroid disease     treated with synthroid during pregnancy    Vitamin D deficiency 11/5/2018    Vitamin D insufficiency 11/5/2018     Past Surgical History:   Procedure Laterality Date    BREAST BIOPSY Left 12/14/2018    Core bx, benign    BREAST SURGERY  March 2019    Had a growth removed from my milk duct    EXCISIONAL BIOPSY Left 03/01/2019    Procedure: EXCISIONAL BIOPSY- w/major duct excision KENALOG INJECTION AT TIME OF SURGERY;  Surgeon: Kelby Mcmahan MD;  Location: [facility] OR 2ND FLR;  Service: General;  Laterality: Left;    INTRAUTERINE DEVICE INSERTION  2009    again 2014    PANNICULECTOMY N/A 04/23/2024    Procedure: PANNICULECTOMY;  Surgeon: Christiano Richard MD;  Location: [facility] OR 2ND FLR;  Service: Plastics;  Laterality: N/A;     Family History   Problem Relation Name Age of Onset    Angioedema Mother Janee Romulus     Diabetes Mother Janee Romulus     Hypertension Mother Janee Romulus     Rheum arthritis Mother Janee Romulus     Coronary artery disease Mother Janee Romulus     Stroke Mother Janee Romulus         x2    Liver disease Mother Janee Romulus         NAFLD    Sarcoidosis Mother Janee Romulus     Thyroid disease Mother Janee Romulus     Hyperlipidemia Mother Janee Romulus     Atrial fibrillation Mother Janee Romulus     Cataracts Mother Janee Romulus     Glaucoma Mother Janee Romulus     Arthritis Mother Janee Romulus     Kidney disease Mother Janee Romulus     Miscarriages / Stillbirths Mother Janee Romulus     Vision loss Mother Janee Romulus     Allergies Father Milton Romulus     Hypertension Father Milton Romulus     Dementia Father Milton Romulus     Hyperlipidemia Father Milton Romulus     Liver cancer Father Milton Romulus     Cancer Father Milton Romulus         Liver    Stroke Father Milton Romulus     Hypertension Maternal Aunt Gely Lopez     Coronary artery disease Maternal Uncle          with stent    Sarcoidosis Maternal Uncle      Leukemia Maternal Uncle      Rheum arthritis Maternal Grandmother          wheelchair bound    Cataracts Maternal  "Grandfather      Rheum arthritis Paternal Grandmother      Allergies Child      Breast cancer Neg Hx      Colon cancer Neg Hx      Ovarian cancer Neg Hx       Social History     Socioeconomic History    Marital status:     Number of children: 2   Occupational History     Employer: [employer]   Tobacco Use    Smoking status: Never     Passive exposure: Past    Smokeless tobacco: Never   Substance and Sexual Activity    Alcohol use: Not Currently     Alcohol/week: 0.8 standard drinks of alcohol     Comment: Rarely    Drug use: No    Sexual activity: Yes     Partners: Male     Birth control/protection: I.U.D.     Social Determinants of Health     Financial Resource Strain: Low Risk  (5/28/2024)    Overall Financial Resource Strain (CARDIA)     Difficulty of Paying Living Expenses: Not hard at all   Food Insecurity: No Food Insecurity (5/28/2024)    Hunger Vital Sign     Worried About Running Out of Food in the Last Year: Never true     Ran Out of Food in the Last Year: Never true   Transportation Needs: No Transportation Needs (9/28/2023)    PRAPARE - Transportation     Lack of Transportation (Medical): No     Lack of Transportation (Non-Medical): No   Physical Activity: Insufficiently Active (5/28/2024)    Exercise Vital Sign     Days of Exercise per Week: 3 days     Minutes of Exercise per Session: 30 min   Stress: No Stress Concern Present (5/28/2024)    [employer] - Occupational Stress Questionnaire     Feeling of Stress : Only a little   Housing Stability: High Risk (5/28/2024)    Housing Stability Vital Sign     Unable to Pay for Housing in the Last Year: Yes     Objective:      Vitals:    06/18/24 0943   Weight: 93 kg (205 lb)   Height: 5' 5" (1.651 m)      Physical Exam  Constitutional:       General: She is not in acute distress.     Appearance: She is well-developed. She is not ill-appearing or diaphoretic.   HENT:      Head: Normocephalic.   Eyes:      General: " No scleral icterus.        Right eye: No discharge.         Left eye: No discharge.   Pulmonary:      Effort: Pulmonary effort is normal. No respiratory distress.   Skin:     Coloration: Skin is not pale.      Findings: No erythema.   Neurological:      Mental Status: She is alert.   Psychiatric:         Behavior: Behavior normal.         Lab Results   Component Value Date    WBC 3.74 (L) 05/28/2024    HGB 12.8 05/28/2024    HCT 37.8 05/28/2024     05/28/2024    CHOL 236 (H) 04/05/2024    TRIG 43 04/05/2024    HDL 38 (L) 04/05/2024    ALT 10 04/05/2024    AST 15 04/05/2024     04/05/2024    K 3.9 04/05/2024     04/05/2024    CREATININE 1.0 04/05/2024    BUN 11 04/05/2024    CO2 24 04/05/2024    TSH 1.765 04/19/2024    HGBA1C 5.3 03/10/2023       The 10-year ASCVD risk score (Enma ALLEN, et al., 2019) is: 1.8%    Values used to calculate the score:      Age: 43 years      Sex: Female      Is Non- : Yes      Diabetic: No      Tobacco smoker: No      Systolic Blood Pressure: 124 mmHg      Is BP treated: No      HDL Cholesterol: 38 mg/dL      Total Cholesterol: 236 mg/dL    Assessment:       1. UTI (urinary tract infection), uncomplicated    2. Dysuria    3. Annual physical exam      Plan:       Kristofer was seen today for urinary tract infection.    Diagnoses and all orders for this visit:    UTI (urinary tract infection), uncomplicated  Comments:  Discussed mid stream clean-catch technique. Submit urine studies, then start Macrobid. Complete entire course. Encouraged hydration. Urinate after intercourse.  Orders:  -     Urinalysis; Future  -     Urine culture; Future  -     Urinalysis Microscopic; Future  -     nitrofurantoin (MACRODANTIN) 100 MG capsule; Take 1 capsule (100 mg total) by mouth 2 (two) times daily. for 5 days  -     phenazopyridine (PYRIDIUM) 200 MG tablet; Take 1 tablet (200 mg total) by mouth 3 (three) times daily as needed for  Pain.    Dysuria  Comments:  As above.  Discussed unclear efficacy of supplements.  May take Pyridium p.r.n..  Orders:  -     phenazopyridine (PYRIDIUM) 200 MG tablet; Take 1 tablet (200 mg total) by mouth 3 (three) times daily as needed for Pain.    Annual physical exam  -     Urinalysis; Future  -     Urine culture; Future  -     Urinalysis Microscopic; Future     Side effects of medication(s) were discussed in detail and patient voiced understanding.  Patient will call back for any issues or complications.     RTC PRN.  Schedule annual with PCP.

## 2024-06-18 NOTE — TELEPHONE ENCOUNTER
Unable to schedule at  due to their schedule being private  Called pt to give her their number  Routing to PCP's staff for scheduling of annual

## 2024-06-18 NOTE — TELEPHONE ENCOUNTER
----- Message from Lizzette White MD sent at 6/18/2024 10:00 AM CDT -----  1. I ordered urine studies. ##Please make sure the urine tests are linked together, especially the urine culture.## Thank you!  She'll do this at [facility].  2.  Please schedule a visit with her PCP for an annual.  Not urgent.  Thank you!

## 2024-06-20 LAB
BACTERIA #/AREA URNS AUTO: ABNORMAL /HPF
BACTERIA UR CULT: NO GROWTH
BILIRUB UR QL STRIP: NEGATIVE
CLARITY UR REFRACT.AUTO: ABNORMAL
COLOR UR AUTO: YELLOW
GLUCOSE UR QL STRIP: ABNORMAL
HGB UR QL STRIP: ABNORMAL
HYALINE CASTS UR QL AUTO: 0 /LPF
KETONES UR QL STRIP: ABNORMAL
LEUKOCYTE ESTERASE UR QL STRIP: ABNORMAL
MICROSCOPIC COMMENT: ABNORMAL
NITRITE UR QL STRIP: NEGATIVE
PH UR STRIP: 6 [PH] (ref 5–8)
PROT UR QL STRIP: ABNORMAL
RBC #/AREA URNS AUTO: 19 /HPF (ref 0–4)
SP GR UR STRIP: 1.02 (ref 1–1.03)
SQUAMOUS #/AREA URNS AUTO: 5 /HPF
URN SPEC COLLECT METH UR: ABNORMAL
WBC #/AREA URNS AUTO: >100 /HPF (ref 0–5)

## 2024-06-27 ENCOUNTER — OFFICE VISIT (OUTPATIENT)
Dept: DERMATOLOGY | Facility: CLINIC | Age: 44
End: 2024-06-27
Payer: COMMERCIAL

## 2024-06-27 DIAGNOSIS — L90.5 SCAR: ICD-10-CM

## 2024-06-27 DIAGNOSIS — L91.0 KELOID: Primary | ICD-10-CM

## 2024-06-27 NOTE — PROGRESS NOTES
Patient Information  Name: Kristofer Gentile  : 1980  MRN: [id number]     Referring Physician:  Kiersten   Primary Care Physician:  Melonie Villanueva MD   Date of Visit: 2024      Subjective:     History of Present lllness:    Kristofer Gentile is a 43 y.o. female who presents with a chief complaint of rash and keloid.  Location: face  Duration: much better over past two weeks  Signs/Symptoms: itchy red, dry, cracking, much better now  Exacerbating factors: Elemis cream and toner  Relieving factors/Prior treatments: stopping above cream    Keloid  Location: chest  Signs/Symptoms: getting smaller  Symptom course: improving  Current treatment: ILK for chest, keloids on abdomen were radiated, tummy tuck was preformed, then radiation again-doing well, no sign of keloid forming    Patient was last seen: 10/3/2023.  Prior notes by myself reviewed.   Clinical documentation obtained by nursing staff reviewed.    Review of Systems    Objective:   Physical Exam   Constitutional: She appears well-developed and well-nourished. No distress.   Neurological: She is alert and oriented to person, place, and time. She is not disoriented.   Psychiatric: She has a normal mood and affect.   Skin:   Areas Examined (abnormalities noted in diagram):   Head / Face Inspection Performed  Chest / Axilla Inspection Performed  Abdomen Inspection Performed                 Diagram Legend     Erythematous scaling macule/papule c/w actinic keratosis       Vascular papule c/w angioma      Pigmented verrucoid papule/plaque c/w seborrheic keratosis      Yellow umbilicated papule c/w sebaceous hyperplasia      Irregularly shaped tan macule c/w lentigo     1-2 mm smooth white papules consistent with Milia      Movable subcutaneous cyst with punctum c/w epidermal inclusion cyst      Subcutaneous movable cyst c/w pilar cyst      Firm pink to brown papule c/w dermatofibroma      Pedunculated fleshy papule(s) c/w skin tag(s)      Evenly pigmented  macule c/w junctional nevus     Mildly variegated pigmented, slightly irregular-bordered macule c/w mildly atypical nevus      Flesh colored to evenly pigmented papule c/w intradermal nevus       Pink pearly papule/plaque c/w basal cell carcinoma      Erythematous hyperkeratotic cursted plaque c/w SCC      Surgical scar with no sign of skin cancer recurrence      Open and closed comedones      Inflammatory papules and pustules      Verrucoid papule consistent consistent with wart     Erythematous eczematous patches and plaques     Dystrophic onycholytic nail with subungual debris c/w onychomycosis     Umbilicated papule    Erythematous-base heme-crusted tan verrucoid plaque consistent with inflamed seborrheic keratosis     Erythematous Silvery Scaling Plaque c/w Psoriasis     See annotation    No images are attached to the encounter or orders placed in the encounter.      [] Data reviewed  [] Prior external notes reviewed  [] Independent review of test  [] Management discussed with another provider  [] Independent historian    Assessment / Plan:        Keloid  Cryosurgery procedure note:  Risk, benefits, and alternatives of cryosurgery are discussed with the patient, including but not limited to the risks of hypopigmentation, hyperpigmentation, scar, infection, recurrence of lesion(s), development of new lesion(s), and need for additional treatment of the lesion(s). Verbal consent obtained from patient. Liquid nitrogen cryosurgery applied to 1 lesion(s) to produce a freeze injury. Counseled patient that blisters may form, and instructed patient on wound care with gentle cleansing and use of Vaseline ointment to keep moist until healed. Handout was provided, and patient was instructed to return to clinic in 1-2 months if lesions do not completely resolve.    Intralesional Kenalog 40 mg/mL (0.1 mL total) injected into 1 lesions on the chest today after obtaining verbal consent including risk of atrophy and surrounding  hypopigmentation. Patient tolerated procedure well.  Units:1  NDC for Kenalog 40mg/mL:  3004-4934-91    Scar  Recommend OTC silicone gel sheets, such as ScarAway, or OTC silicone tape, such as CicaTape or Mepitac. Sheets/tape can be cut to size and should be worn for 12-24 hours per day.  Minimum treatment time is 2-3 months. Larger and older scars may take longer, therefore continued use is recommended if improvement is still seen after the initial 3 months.   Scar massage (apply firm pressure and rock finger side-to-side) for 5 minutes 5 times per day can also help.         Follow up in about 1 month (around 7/27/2024).      Audra Soliz MD, FAAD  [facility]

## 2024-06-27 NOTE — PATIENT INSTRUCTIONS
Recommend OTC silicone gel sheets, such as ScarAway, or OTC silicone tape, such as CicaTape or Mepitac. Sheets/tape can be cut to size and should be worn for 12-24 hours per day.  Minimum treatment time is 2-3 months.

## 2024-07-30 ENCOUNTER — LAB VISIT (OUTPATIENT)
Dept: LAB | Facility: HOSPITAL | Age: 44
End: 2024-07-30
Attending: INTERNAL MEDICINE
Payer: COMMERCIAL

## 2024-07-30 ENCOUNTER — OFFICE VISIT (OUTPATIENT)
Dept: INTERNAL MEDICINE | Facility: CLINIC | Age: 44
End: 2024-07-30
Payer: COMMERCIAL

## 2024-07-30 DIAGNOSIS — R30.0 DYSURIA: ICD-10-CM

## 2024-07-30 DIAGNOSIS — R30.0 DYSURIA: Primary | ICD-10-CM

## 2024-07-30 PROCEDURE — 99213 OFFICE O/P EST LOW 20 MIN: CPT | Mod: 95,,, | Performed by: INTERNAL MEDICINE

## 2024-07-30 PROCEDURE — 87086 URINE CULTURE/COLONY COUNT: CPT | Performed by: INTERNAL MEDICINE

## 2024-07-30 RX ORDER — NITROFURANTOIN 25; 75 MG/1; MG/1
100 CAPSULE ORAL 2 TIMES DAILY
Qty: 14 CAPSULE | Refills: 0 | Status: SHIPPED | OUTPATIENT
Start: 2024-07-30

## 2024-07-31 LAB — BACTERIA UR CULT: NORMAL

## 2024-08-01 ENCOUNTER — OFFICE VISIT (OUTPATIENT)
Dept: UROLOGY | Facility: CLINIC | Age: 44
End: 2024-08-01
Payer: COMMERCIAL

## 2024-08-01 VITALS
DIASTOLIC BLOOD PRESSURE: 74 MMHG | BODY MASS INDEX: 34.78 KG/M2 | WEIGHT: 209 LBS | SYSTOLIC BLOOD PRESSURE: 109 MMHG | HEART RATE: 61 BPM

## 2024-08-01 DIAGNOSIS — N39.0 RECURRENT UTI: Primary | ICD-10-CM

## 2024-08-01 DIAGNOSIS — R30.0 DYSURIA: ICD-10-CM

## 2024-08-01 PROCEDURE — 99204 OFFICE O/P NEW MOD 45 MIN: CPT | Mod: S$GLB,,, | Performed by: UROLOGY

## 2024-08-01 PROCEDURE — 3074F SYST BP LT 130 MM HG: CPT | Mod: CPTII,S$GLB,, | Performed by: UROLOGY

## 2024-08-01 PROCEDURE — 99999 PR PBB SHADOW E&M-EST. PATIENT-LVL III: CPT | Mod: PBBFAC,,, | Performed by: UROLOGY

## 2024-08-01 PROCEDURE — 1159F MED LIST DOCD IN RCRD: CPT | Mod: CPTII,S$GLB,, | Performed by: UROLOGY

## 2024-08-01 PROCEDURE — 3008F BODY MASS INDEX DOCD: CPT | Mod: CPTII,S$GLB,, | Performed by: UROLOGY

## 2024-08-01 PROCEDURE — 3078F DIAST BP <80 MM HG: CPT | Mod: CPTII,S$GLB,, | Performed by: UROLOGY

## 2024-08-01 RX ORDER — DOXYCYCLINE HYCLATE 100 MG
100 TABLET ORAL ONCE
OUTPATIENT
Start: 2024-08-01 | End: 2024-08-01

## 2024-08-01 RX ORDER — LIDOCAINE HYDROCHLORIDE 20 MG/ML
JELLY TOPICAL ONCE
OUTPATIENT
Start: 2024-08-01 | End: 2024-08-01

## 2024-08-01 NOTE — PROGRESS NOTES
CHIEF COMPLAINT:    Mrs. Gentile is a 44 y.o. female presenting for a consultation at the request of  ***. Patient presents with ***.    PRESENTING ILLNESS:    Kristofer Gentile is a 44 y.o. female who presents with a history of     REVIEW OF SYSTEMS:    ROS    PATIENT HISTORY:    Past Medical History:   Diagnosis Date    Allergy     Anti-TPO antibodies present 11/8/2018    Carpal tunnel syndrome     Dyslipidemia (high LDL; low HDL) 6/25/2013    Fever blister     Herpes simplex labialis 6/25/2013    Intraductal papilloma of left breast 3/1/2019    IUD (intrauterine device) in place 2009    Metabolic syndrome 7/22/2013    Thyroid disease     treated with synthroid during pregnancy    Vitamin D deficiency 11/5/2018    Vitamin D insufficiency 11/5/2018       Past Surgical History:   Procedure Laterality Date    BREAST BIOPSY Left 12/14/2018    Core bx, benign    BREAST SURGERY  March 2019    Had a growth removed from my milk duct    EXCISIONAL BIOPSY Left 03/01/2019    Procedure: EXCISIONAL BIOPSY- w/major duct excision KENALOG INJECTION AT TIME OF SURGERY;  Surgeon: Kelby Mcmahan MD;  Location: [facility] OR [address] [place] [address];  Service: General;  Laterality: Left;    INTRAUTERINE DEVICE INSERTION  2009    again 2014    PANNICULECTOMY N/A 04/23/2024    Procedure: PANNICULECTOMY;  Surgeon: Christiano Richard MD;  Location: [facility] OR [address] [place] [address];  Service: Plastics;  Laterality: N/A;       Family History   Problem Relation Name Age of Onset    Angioedema Mother Janee Bristol     Diabetes Mother Janee Bristol     Hypertension Mother Janee Bristol     Rheum arthritis Mother Janee Bristol     Coronary artery disease Mother Janee Bristol     Stroke Mother Janee Bristol         x2    Liver disease Mother Janee Bristol         NAFLD    Sarcoidosis Mother Janee Bristol     Thyroid disease Mother Janee Bristol     Hyperlipidemia Mother Janee Bristol     Atrial fibrillation Mother Janee Bristol     Cataracts Mother Janee Bristol     Glaucoma Mother Janee Bristol     Arthritis  Mother Janee Wisdom     Kidney disease Mother Janee Wisdom     Miscarriages / Stillbirths Mother Janee Wisdom     Vision loss Mother Janee Wisdom     Allergies Father Milton Wisdom     Hypertension Father Milton [place]     Dementia Father Milton [place]     Hyperlipidemia Father Milton [place]     Liver cancer Father Milton [place]     Cancer Father Milton [place]         Liver    Stroke Father Milton [place]     Hypertension Maternal Aunt Gely Lopez     Coronary artery disease Maternal Uncle          with stent    Sarcoidosis Maternal Uncle      Leukemia Maternal Uncle      Rheum arthritis Maternal Grandmother          wheelchair bound    Cataracts Maternal Grandfather      Rheum arthritis Paternal Grandmother      Allergies Child      Breast cancer Neg Hx      Colon cancer Neg Hx      Ovarian cancer Neg Hx         Social History     Socioeconomic History    Marital status:     Number of children: 2   Occupational History     Employer: [employer]   Tobacco Use    Smoking status: Never     Passive exposure: Past    Smokeless tobacco: Never   Substance and Sexual Activity    Alcohol use: Not Currently     Alcohol/week: 0.8 standard drinks of alcohol     Comment: Rarely    Drug use: No    Sexual activity: Yes     Partners: Male     Birth control/protection: I.U.D.     Social Determinants of Health     Financial Resource Strain: Low Risk  (5/28/2024)    Overall Financial Resource Strain (CARDIA)     Difficulty of Paying Living Expenses: Not hard at all   Food Insecurity: No Food Insecurity (5/28/2024)    Hunger Vital Sign     Worried About Running Out of Food in the Last Year: Never true     Ran Out of Food in the Last Year: Never true   Transportation Needs: No Transportation Needs (9/28/2023)    PRAPARE - Transportation     Lack of Transportation (Medical): No     Lack of Transportation (Non-Medical): No   Physical Activity: Insufficiently Active (5/28/2024)    Exercise Vital Sign     Days of Exercise per Week: 3 days     Minutes of  Exercise per Session: 30 min   Stress: No Stress Concern Present (5/28/2024)    [employer] [place] [employer] - Occupational Stress Questionnaire     Feeling of Stress : Only a little   Housing Stability: High Risk (5/28/2024)    Housing Stability Vital Sign     Unable to Pay for Housing in the Last Year: Yes       Allergies:  Sulfa (sulfonamide antibiotics)    Medications:  Outpatient Encounter Medications as of 8/1/2024   Medication Sig Dispense Refill    CALCIUM ORAL Take by mouth.      cholecalciferol, vitamin D3, (VITAMIN D3) 125 mcg (5,000 unit) Tab Take 10,000 Units by mouth once daily.      COLLAGEN MISC by Misc.(Non-Drug; Combo Route) route.      docosahexaenoic acid/epa (FISH OIL ORAL) Take by mouth.      FLONASE ALLERGY RELIEF 50 mcg/actuation nasal spray SPRAY TWICE IN EACH NOSTRIL ONCE DAILY 16 g 5    hydrocortisone 2.5 % cream Apply topically 2 (two) times daily. Mix with ketoconazole cream and apply twice a day for 1-2 weeks when flaring. Only use when flaring. 28 g 6    ketoconazole (NIZORAL) 2 % cream Apply topically 2 (two) times daily. Apply twice a day with hydrocortisone when flaring for up to two weeks at a time. 30 g 6    nitrofurantoin, macrocrystal-monohydrate, (MACROBID) 100 MG capsule Take 1 capsule (100 mg total) by mouth 2 (two) times daily. 14 capsule 0    norgestrel-ethinyl estradioL (CRYSELLE, 28,) 0.3-30 mg-mcg per tablet Take 1 tablet by mouth once daily. 84 tablet 0    triamcinolone acetonide 0.025% (KENALOG) 0.025 % Oint Apply topically 2 (two) times daily. Apply twice daily as needed to itchy rash for up to 7 days at a time. 80 g 1    valACYclovir (VALTREX) 500 MG tablet TAKE 1 TABLET(500 MG) BY MOUTH EVERY DAY 30 tablet 11    ZINC ORAL Take by mouth.       No facility-administered encounter medications on file as of 8/1/2024.         PHYSICAL EXAMINATION:    The patient generally appears in good health, is appropriately interactive, and is in no apparent  distress.    Skin: No lesions.    Mental: Cooperative with normal affect.    Neuro: Grossly intact.    HEENT: Normal. No evidence of lymphadenopathy.    Chest:  normal inspiratory effort.    Abdomen: Soft, non-tender. No masses or organomegaly. Bladder is not palpable. No evidence of flank discomfort. No evidence of inguinal hernia.    Extremities: No clubbing, cyanosis, or edema    ***  LABS:    Lab Results   Component Value Date    BUN 11 04/05/2024    CREATININE 1.0 04/05/2024       UA ***, pH ***, otherwise, negative.     IMPRESSION:        PLAN:    1. ***    Copy to: ***

## 2024-08-01 NOTE — PROGRESS NOTES
CHIEF COMPLAINT:    Mrs. Gentile is a 44 y.o. female presenting for a consultation at the request of Dr. Pedroza. Patient presents with hematuria/UTI.    PRESENTING ILLNESS:    Kristofer Gentile is a 44 y.o. female who presents with a history of haematuria/UTI.    Has had a long history of UTIs. Last UTI was 2-3 months ago. Believes had a culture proven UTI, was given macrobid and that took care of it. Has about 3 UTI's a year, began taking taking probiotics (Good Girl Vitamins) for urinary tract health and that seems to be helpful. Usually has uti symptoms within 48 hours of sexual intercourse. Symptoms include bloating, burning, frequency, urgency. No hematuria, back pain noted. Drinking about 3L of water a day.   Bowel movements: Constipated, 2x a week is normal. Generally hard stools.     Urine culture given 7/30 negative.     LMP: 7/31/24.     Still taking macrobid: 7 day (5 left)    Surgical Hx: breast growth excision, panniculectomy.    No smoking  No alcohol  Occupation:       REVIEW OF SYSTEMS:    Review of Systems   All other systems reviewed and are negative.      PATIENT HISTORY:    Past Medical History:   Diagnosis Date    Allergy     Anti-TPO antibodies present 11/8/2018    Carpal tunnel syndrome     Dyslipidemia (high LDL; low HDL) 6/25/2013    Fever blister     Herpes simplex labialis 6/25/2013    Intraductal papilloma of left breast 3/1/2019    IUD (intrauterine device) in place 2009    Metabolic syndrome 7/22/2013    Thyroid disease     treated with synthroid during pregnancy    Vitamin D deficiency 11/5/2018    Vitamin D insufficiency 11/5/2018       Past Surgical History:   Procedure Laterality Date    BREAST BIOPSY Left 12/14/2018    Core bx, benign    BREAST SURGERY  March 2019    Had a growth removed from my milk duct    EXCISIONAL BIOPSY Left 03/01/2019    Procedure: EXCISIONAL BIOPSY- w/major duct excision KENALOG INJECTION AT TIME OF SURGERY;  Surgeon: Kelby Mcmahan MD;   Location: [facility] OR 2ND FLR;  Service: General;  Laterality: Left;    INTRAUTERINE DEVICE INSERTION  2009    again 2014    PANNICULECTOMY N/A 04/23/2024    Procedure: PANNICULECTOMY;  Surgeon: Christiano Richard MD;  Location: [facility] OR 2ND FLR;  Service: Plastics;  Laterality: N/A;       Family History   Problem Relation Name Age of Onset    Angioedema Mother Janee Guanica     Diabetes Mother Janee Guanica     Hypertension Mother Janee Guanica     Rheum arthritis Mother Janee Guanica     Coronary artery disease Mother Janee Guanica     Stroke Mother Janee Guanica         x2    Liver disease Mother Janee Guanica         NAFLD    Sarcoidosis Mother Janee Guanica     Thyroid disease Mother Janee Guanica     Hyperlipidemia Mother Janee Guanica     Atrial fibrillation Mother Janee Guanica     Cataracts Mother Janee Guanica     Glaucoma Mother Janee Guanica     Arthritis Mother Janee Guanica     Kidney disease Mother Janee Guanica     Miscarriages / Stillbirths Mother Janee Guanica     Vision loss Mother Janee Guanica     Allergies Father Milton Guanica     Hypertension Father Milton Guanica     Dementia Father Milton Guanica     Hyperlipidemia Father Milton Guanica     Liver cancer Father Milton Guanica     Cancer Father Milton Guanica         Liver    Stroke Father Milton Guanica     Hypertension Maternal Aunt Gely Lopez     Coronary artery disease Maternal Uncle          with stent    Sarcoidosis Maternal Uncle      Leukemia Maternal Uncle      Rheum arthritis Maternal Grandmother          wheelchair bound    Cataracts Maternal Grandfather      Rheum arthritis Paternal Grandmother      Allergies Child      Breast cancer Neg Hx      Colon cancer Neg Hx      Ovarian cancer Neg Hx         Social History     Socioeconomic History    Marital status:     Number of children: 2   Occupational History     Employer: [employer]   Tobacco Use    Smoking status: Never     Passive exposure: Past    Smokeless tobacco: Never   Substance and Sexual Activity    Alcohol use: Not Currently      Alcohol/week: 0.8 standard drinks of alcohol     Comment: Rarely    Drug use: No    Sexual activity: Yes     Partners: Male     Birth control/protection: I.U.D.     Social Determinants of Health     Financial Resource Strain: Low Risk  (5/28/2024)    Overall Financial Resource Strain (CARDIA)     Difficulty of Paying Living Expenses: Not hard at all   Food Insecurity: No Food Insecurity (5/28/2024)    Hunger Vital Sign     Worried About Running Out of Food in the Last Year: Never true     Ran Out of Food in the Last Year: Never true   Transportation Needs: No Transportation Needs (9/28/2023)    PRAPARE - Transportation     Lack of Transportation (Medical): No     Lack of Transportation (Non-Medical): No   Physical Activity: Insufficiently Active (5/28/2024)    Exercise Vital Sign     Days of Exercise per Week: 3 days     Minutes of Exercise per Session: 30 min   Stress: No Stress Concern Present (5/28/2024)    [employer] [place] [employer] - Occupational Stress Questionnaire     Feeling of Stress : Only a little   Housing Stability: High Risk (5/28/2024)    Housing Stability Vital Sign     Unable to Pay for Housing in the Last Year: Yes       Allergies:  Sulfa (sulfonamide antibiotics)    Medications:  Outpatient Encounter Medications as of 8/1/2024   Medication Sig Dispense Refill    CALCIUM ORAL Take by mouth.      cholecalciferol, vitamin D3, (VITAMIN D3) 125 mcg (5,000 unit) Tab Take 10,000 Units by mouth once daily.      COLLAGEN MISC by Misc.(Non-Drug; Combo Route) route.      docosahexaenoic acid/epa (FISH OIL ORAL) Take by mouth.      FLONASE ALLERGY RELIEF 50 mcg/actuation nasal spray SPRAY TWICE IN EACH NOSTRIL ONCE DAILY 16 g 5    hydrocortisone 2.5 % cream Apply topically 2 (two) times daily. Mix with ketoconazole cream and apply twice a day for 1-2 weeks when flaring. Only use when flaring. 28 g 6    nitrofurantoin, macrocrystal-monohydrate, (MACROBID) 100 MG capsule Take 1 capsule (100 mg  total) by mouth 2 (two) times daily. 14 capsule 0    norgestrel-ethinyl estradioL (CRYSELLE, 28,) 0.3-30 mg-mcg per tablet Take 1 tablet by mouth once daily. 84 tablet 0    ZINC ORAL Take by mouth.      ketoconazole (NIZORAL) 2 % cream Apply topically 2 (two) times daily. Apply twice a day with hydrocortisone when flaring for up to two weeks at a time. (Patient not taking: Reported on 8/1/2024) 30 g 6    triamcinolone acetonide 0.025% (KENALOG) 0.025 % Oint Apply topically 2 (two) times daily. Apply twice daily as needed to itchy rash for up to 7 days at a time. (Patient not taking: Reported on 8/1/2024) 80 g 1    valACYclovir (VALTREX) 500 MG tablet TAKE 1 TABLET(500 MG) BY MOUTH EVERY DAY (Patient not taking: Reported on 8/1/2024) 30 tablet 11     No facility-administered encounter medications on file as of 8/1/2024.         PHYSICAL EXAMINATION:    The patient generally appears in good health, is appropriately interactive, and is in no apparent distress.    Skin: No lesions.    Mental: Cooperative with normal affect.    Neuro: Grossly intact.    HEENT: Normal. No evidence of lymphadenopathy.    Chest:  normal inspiratory effort.    Abdomen: Soft, non-tender. No masses or organomegaly. Bladder is not palpable. No evidence of flank discomfort. No evidence of inguinal hernia.    Extremities: No clubbing, cyanosis, or edema    Exam deferred as the patient is on her cycle, did not have another tampon  LABS:    Lab Results   Component Value Date    BUN 11 04/05/2024    CREATININE 1.0 04/05/2024         IMPRESSION:    Recurrent UTI      PLAN:    1.  UA to be done and dropped off  2.  Renal ultrasound  3.  Cystoscopy  4.  Information sheet on R UTI's provided for the patient    Thomas Claudio, Medical Student IV    Patient seen and plan per me.    I spent 45 minutes with the patient of which more than half was spent in direct consultation with the patient in regards to our treatment and plan.

## 2024-08-09 ENCOUNTER — HOSPITAL ENCOUNTER (OUTPATIENT)
Dept: RADIOLOGY | Facility: HOSPITAL | Age: 44
Discharge: HOME | End: 2024-08-09
Attending: UROLOGY
Payer: COMMERCIAL

## 2024-08-09 DIAGNOSIS — N39.0 RECURRENT UTI: ICD-10-CM

## 2024-08-09 PROCEDURE — 76775 US EXAM ABDO BACK WALL LIM: CPT | Mod: TC

## 2024-08-27 DIAGNOSIS — Z30.41 ENCOUNTER FOR SURVEILLANCE OF CONTRACEPTIVE PILLS: ICD-10-CM

## 2024-08-27 DIAGNOSIS — N92.1 BREAKTHROUGH BLEEDING: ICD-10-CM

## 2024-08-28 RX ORDER — NORGESTREL AND ETHINYL ESTRADIOL 0.3-0.03MG
1 KIT ORAL DAILY
Qty: 84 TABLET | Refills: 0 | Status: SHIPPED | OUTPATIENT
Start: 2024-08-28

## 2024-08-28 NOTE — TELEPHONE ENCOUNTER
Refill Routing Note   Medication(s) are not appropriate for processing by [facility] for the following reason(s):        Clarification of medication (Rx) details:  Directions unclear, previous order stated Take continuously, skip placebo     [facility] action(s):  Defer        Medication Therapy Plan: LOV 8/30/23, pended 3 months refill per [facility] protocol      Appointments  past 12m or future 3m with PCP    Date Provider   Last Visit   8/30/2023 Ursula Ortega MD   Next Visit   Visit date not found Ursula Ortega MD   ED visits in past 90 days: 0        Note composed:10:02 PM 08/27/2024

## 2024-09-09 ENCOUNTER — PROCEDURE VISIT (OUTPATIENT)
Facility: CLINIC | Age: 44
End: 2024-09-09
Payer: COMMERCIAL

## 2024-09-09 VITALS
BODY MASS INDEX: 35.51 KG/M2 | DIASTOLIC BLOOD PRESSURE: 78 MMHG | RESPIRATION RATE: 17 BRPM | SYSTOLIC BLOOD PRESSURE: 119 MMHG | TEMPERATURE: 98 F | WEIGHT: 213.38 LBS | HEART RATE: 66 BPM

## 2024-09-09 DIAGNOSIS — R30.0 DYSURIA: ICD-10-CM

## 2024-09-09 DIAGNOSIS — N39.0 RECURRENT UTI: ICD-10-CM

## 2024-09-09 PROCEDURE — 52000 CYSTOURETHROSCOPY: CPT | Mod: S$GLB,,, | Performed by: UROLOGY

## 2024-09-09 RX ORDER — DOXYCYCLINE HYCLATE 100 MG
100 TABLET ORAL ONCE
Status: COMPLETED | OUTPATIENT
Start: 2024-09-09 | End: 2024-09-09

## 2024-09-09 RX ORDER — LIDOCAINE HYDROCHLORIDE 20 MG/ML
JELLY TOPICAL ONCE
Status: COMPLETED | OUTPATIENT
Start: 2024-09-09 | End: 2024-09-09

## 2024-09-09 RX ADMIN — Medication 100 MG: at 03:09

## 2024-09-09 RX ADMIN — LIDOCAINE HYDROCHLORIDE: 20 JELLY TOPICAL at 03:09

## 2024-09-09 NOTE — PATIENT INSTRUCTIONS
What to Expect After a Cystoscopy  For the next 24-48 hours, you may feel a mild burning when you urinate. This burning is normal and expected. Drink plenty of water to dilute the urine to help relieve the burning sensation. You may also see a small amount of blood in your urine after the procedure.    Unless you are already taking antibiotics, you may be given an antibiotic after the test to prevent infection.    Signs and Symptoms to Report  Call the [facility] at [phone] if you develop any of the following:  Fever of 101 degrees or higher  Chills or persistent bleeding  Inability to urinate

## 2024-09-09 NOTE — PROCEDURES
Procedures    CYSTOSCOPY REPORT    Pre Procedure Diagnosis:  microscopic hematuria/UTI    Post Procedure Diagnosis:  normal lower urinary tract    Anesthesia: 10 cc 2% lidocaine jelly applied per urethra.    14 FR Flexible Olympus cystoscope used.    FINDINGS:  Dome, anterior, posterior, lateral walls and bladder base free of urothelial abnormalities. Right and left ureteral orifices in the normal postion and configuration, both effluxed clear urine.  Bladder neck and urethra were normal.    Specimen:  none    The patient was taken to the cystoscopy suite and placed in dorsal lithotomy position.  The genitalia was prepped and draped  in the usual sterile fashion.  Time out was performed.  Two percent lidocaine jelly was inserted in the urethra.  After sufficent time had passed to allow good local anesthesia, the cystoscope was inserted in the urethra and passed into the bladder visualizing the urethra along its entire course.  The dome, anterior, posterior and lateral walls were examined systematically.  The ureteral orifices were in their usual position and configuration.  The cystoscope was turned upon itself 180 degrees to visualize the bladder neck.  The cystoscope was then brought to the level of the bladder neck, the water was turned on and the urethra was visualized.  The cystoscope was removed and the patient was instructed to urinate prior to leaving the office.     Post procedure medication:  doxycycline 100 mg x 1    ADDITIONAL NOTES:  renal ultrasound on 8/9/2024 was normal.      ASSESSMENT/PLAN:  44 year old woman status post flexible cystoscopy.  1. Push fluids for 24 hours.  2. May see blood in the urine, this should gradually improve over the next 2-3 days.  3. The patient was instructed to return to the office or go to the emergency should fever, chills, cloudy urine, or inability to urinate develop.  4. Follow up in 1 year.  Sooner if has gross hematuria.

## 2024-09-30 ENCOUNTER — OFFICE VISIT (OUTPATIENT)
Dept: INTERNAL MEDICINE | Facility: CLINIC | Age: 44
End: 2024-09-30
Payer: COMMERCIAL

## 2024-09-30 DIAGNOSIS — B35.1 FUNGAL TOENAIL INFECTION: Primary | ICD-10-CM

## 2024-09-30 PROCEDURE — 99213 OFFICE O/P EST LOW 20 MIN: CPT | Mod: 95,,, | Performed by: NURSE PRACTITIONER

## 2024-09-30 PROCEDURE — 1160F RVW MEDS BY RX/DR IN RCRD: CPT | Mod: CPTII,95,, | Performed by: NURSE PRACTITIONER

## 2024-09-30 PROCEDURE — 1159F MED LIST DOCD IN RCRD: CPT | Mod: CPTII,95,, | Performed by: NURSE PRACTITIONER

## 2024-09-30 RX ORDER — FLUCONAZOLE 150 MG/1
150 TABLET ORAL WEEKLY
Qty: 12 TABLET | Refills: 0 | Status: SHIPPED | OUTPATIENT
Start: 2024-09-30

## 2024-09-30 NOTE — PROGRESS NOTES
The patient location is: [place]  The chief complaint leading to consultation is: Fungal infection in R great toe nail    Visit type: audiovisual    Subjective:  HPI: 44 y.o. female contacted virtually for management of a toe nail fungus she acquired after placement of acrylic nails. Pt denies any pain but noticed some green discharge from under the toenail. Apparently her actual toenail was cracked and her tech attempted to patch nail before application of the acrylic. Pt is able to bare weight without any pain. She denies any fever body aches or chills. She is also afraid to loose the toenail. She denies any other acute concerns.     Review of Systems   Toe nail fungus    Physical Exam  R great toe nail in place with presence of darkened area in the middle of the nail bed.     Plan:  1. Fungal toenail infection  Assessment & Plan:  -Acrylic nail has already been removed  -We talked about conservative therapies such as tea tree oil or Vicks but pt would like to take something orally  -Will send script for diflucan 150 mg weekly x 3 months  -Pt informed that there is a possibility that she may loose the nail.   -F/u as needed.     Orders:  -     fluconazole (DIFLUCAN) 150 MG Tab; Take 1 tablet (150 mg total) by mouth once a week.  Dispense: 12 tablet; Refill: 0      Face to Face time with patient: 19   minutes of total time spent on the encounter, which includes face to face time and non-face to face time preparing to see the patient (eg, review of tests), Obtaining and/or reviewing separately obtained history, Documenting clinical information in the electronic or other health record, Independently interpreting results (not separately reported) and communicating results to the patient/family/caregiver, or Care coordination (not separately reported).     Each patient to whom he or she provides medical services by telemedicine is:  (1) informed of the relationship between the physician and patient and the respective role  of any other health care provider with respect to management of the patient; and (2) notified that he or she may decline to receive medical services by telemedicine and may withdraw from such care at any time.    Answers submitted by the patient for this visit:  Rash Questionnaire (Submitted on 9/30/2024)  Chief Complaint: Rash  Chronicity: new  Onset: yesterday  Progression since onset: unchanged  Affected locations: right toes  Exposed to: nothing  anorexia: No  facial edema: No  joint pain: No  nail changes: Yes  Treatments tried: nothing  Improvement on treatment: no relief  asthma: No  allergies: Yes  eczema: No  varicella: Yes

## 2024-09-30 NOTE — PROGRESS NOTES
Answers submitted by the patient for this visit:  Rash Questionnaire (Submitted on 9/30/2024)  Chief Complaint: Rash  Chronicity: new  Onset: yesterday  Progression since onset: unchanged  Affected locations: right toes  Exposed to: nothing  anorexia: No  congestion: No  cough: No  diarrhea: No  eye pain: No  facial edema: No  fatigue: No  fever: No  joint pain: No  nail changes: Yes  rhinorrhea: No  shortness of breath: No  sore throat: No  vomiting: No  Treatments tried: nothing  Improvement on treatment: no relief  asthma: No  allergies: Yes  eczema: No  varicella: Yes

## 2024-09-30 NOTE — ASSESSMENT & PLAN NOTE
-Acrylic nail has already been removed  -We talked about conservative therapies such as tea tree oil or Vicks but pt would like to take something orally  -Will send script for diflucan 150 mg weekly x 3 months  -F/u as needed.

## 2024-10-02 NOTE — PROGRESS NOTES
Care Transitions Program Week 3 Follow-up Call    Current Issues/Problems reviewed on call: pain    Details of Interventions/Education completed on call:    Spoke with pt. She states she saw a doctor that might be a chiropractor. They do US or lazer per pt. The appt was long since it was the first time seeing them. They did do lazer or heat treatment. She says it helped her pain a little. Her walking is doing a bit better but she feels her leg and knee are getting weaker. She does do therapy exercises. Promoted being safe when these weaknesses come on. Rates her back,leg and knee pain \"4\".  Active listening,empathy and support provided to patient throughout our conversation. Reviewed progress so far and plan for ongoing recovery. Reinforced care plans and had patient review them with writer when possible. Questions answered.   Review of Systems:   Care Transition System Evaluation:     Temp - Patient Reported :  (no)  Fever: is not present   Pain:  4  Pain Location: back  General Symptoms: WDL (absence of symptoms)  Neurologic/Neuromuscular Symptoms: Difficulty walking (improving slightly)  ENT Symptoms: WDL (absence of symptoms)  Respiratory Symptoms: WDL (absence of symptoms)  Cardiovascular Symptoms: WDL (absence of symptoms)  GI Symptoms: WDL (absence of symptoms)   Symptoms: WDL (absence of symptoms)  Skin Symptoms: WDL (absence of symptoms)   Current Lines/Drains:No    The patient is taking all medications as prescribed. The patient did not have questions or concerns regarding the medications prescribed.    Transitional Care Management (TCM) appointment was not completed.  TCM appointment plan of care and upcoming appointments were reviewed with patient.  Transportation to upcoming appointments to be provided by patient.    Next Care Transitions follow-up call will be within 1 week.    Plan for next follow-up call: 10/10   Gynecology    SUBJECTIVE:     Chief Complaint: Vaginitis       History of Present Illness:  40 year old returns with vaginitis.  Was recently was treated for yeast infection after taking UTI medication.  She reports on and off discharge now for the past year which started after antibiotics for the breast abscess.  No itching.  Discharge can fluctuate from thick and white to thin.  Discharge is not irritating.  Only occasionally does have it have odor.  Currently has a mild odor, but not fishy.      Review of Systems:  Review of Systems   Genitourinary: Positive for vaginal discharge. Negative for menorrhagia, menstrual problem, pelvic pain, vaginal bleeding, vaginal pain and vaginal odor.        OBJECTIVE:     Physical Exam:  Physical Exam  Vitals signs and nursing note reviewed.   Constitutional:       Appearance: She is well-developed.   Pulmonary:      Effort: Pulmonary effort is normal.   Abdominal:      Palpations: Abdomen is soft.   Genitourinary:     Labia:         Right: No rash, tenderness, lesion or injury.         Left: No rash, tenderness, lesion or injury.       Vagina: No signs of injury and foreign body. Vaginal discharge present. No erythema, tenderness or bleeding.      Cervix: No cervical motion tenderness, discharge or friability.      Uterus: Not deviated, not enlarged, not fixed and not tender.       Adnexa:         Right: No mass, tenderness or fullness.          Left: No mass, tenderness or fullness.        Comments: Urethra: normal appearing urethra with no masses, tenderness or lesions  Urethral meatus: normal size, anterior vaginal wall with no prolapse, no lesions  Neurological:      Mental Status: She is alert and oriented to person, place, and time.   Psychiatric:         Behavior: Behavior normal.         Thought Content: Thought content normal.         Judgment: Judgment normal.         Chaperoned by: Ariadne    ASSESSMENT:       ICD-10-CM ICD-9-CM    1. Vaginal discharge  N89.8 623.5  Vaginosis Screen by DNA Probe          Plan:      Kristofer was seen today for vaginitis.    Diagnoses and all orders for this visit:    Vaginal discharge  -     Vaginosis Screen by DNA Probe    - discussed treatment of boric acid regimen if BV becomes recurrent; for now, will stick with one time treatment of flagyl    Orders Placed This Encounter   Procedures    Vaginosis Screen by DNA Probe       No follow-ups on file.    Ursula Ortega

## 2024-10-22 DIAGNOSIS — Z30.41 ENCOUNTER FOR SURVEILLANCE OF CONTRACEPTIVE PILLS: ICD-10-CM

## 2024-10-22 DIAGNOSIS — N92.1 BREAKTHROUGH BLEEDING: ICD-10-CM

## 2024-10-22 RX ORDER — NORGESTREL AND ETHINYL ESTRADIOL 0.3-0.03MG
1 KIT ORAL
Qty: 112 TABLET | Refills: 0 | Status: SHIPPED | OUTPATIENT
Start: 2024-10-22

## 2024-10-22 NOTE — TELEPHONE ENCOUNTER
Refill Decision Note   Kristofer Gentile  is requesting a refill authorization.  Brief Assessment and Rationale for Refill:  Approve     Medication Therapy Plan:        Alert overridden per protocol: Yes   Pharmacist review requested: Yes   Comments:     Note composed:12:15 PM 10/22/2024

## 2024-10-22 NOTE — TELEPHONE ENCOUNTER
Refill Routing Note   Medication(s) are not appropriate for processing by [facility] for the following reason(s):        Drug-disease interaction ( 2 with no prev provider override)    [facility] action(s):  Defer        Medication Therapy Plan: Drug-Disease: CRYSELLE (28) and Dyslipidemia (high LDL; low HDL); Drug-Disease: Cryselle and obesity    Pharmacist review requested: Yes     Appointments  past 12m or future 3m with PCP    Date Provider   Last Visit   8/30/2023 Ursula Ortega MD   Next Visit   Visit date not found Ursula Ortega MD   ED visits in past 90 days: 0        Note composed:7:31 AM 10/22/2024

## 2024-11-06 ENCOUNTER — OFFICE VISIT (OUTPATIENT)
Dept: INTERNAL MEDICINE | Facility: CLINIC | Age: 44
End: 2024-11-06
Payer: COMMERCIAL

## 2024-11-06 VITALS — BODY MASS INDEX: 35.65 KG/M2 | WEIGHT: 214 LBS | HEIGHT: 65 IN

## 2024-11-06 DIAGNOSIS — M25.562 POSTERIOR LEFT KNEE PAIN: ICD-10-CM

## 2024-11-06 DIAGNOSIS — Z30.014 ENCOUNTER FOR INITIAL PRESCRIPTION OF INTRAUTERINE CONTRACEPTIVE DEVICE (IUD): ICD-10-CM

## 2024-11-06 DIAGNOSIS — S76.312A HAMSTRING MUSCLE STRAIN, LEFT, INITIAL ENCOUNTER: Primary | ICD-10-CM

## 2024-11-06 PROCEDURE — 99214 OFFICE O/P EST MOD 30 MIN: CPT | Mod: 95,,, | Performed by: HOSPITALIST

## 2024-11-06 PROCEDURE — 1160F RVW MEDS BY RX/DR IN RCRD: CPT | Mod: CPTII,95,, | Performed by: HOSPITALIST

## 2024-11-06 PROCEDURE — 3008F BODY MASS INDEX DOCD: CPT | Mod: CPTII,95,, | Performed by: HOSPITALIST

## 2024-11-06 PROCEDURE — 1159F MED LIST DOCD IN RCRD: CPT | Mod: CPTII,95,, | Performed by: HOSPITALIST

## 2024-11-06 RX ORDER — DICLOFENAC SODIUM 50 MG/1
50 TABLET, DELAYED RELEASE ORAL 2 TIMES DAILY PRN
Qty: 40 TABLET | Refills: 0 | Status: SHIPPED | OUTPATIENT
Start: 2024-11-06

## 2024-11-06 NOTE — PROGRESS NOTES
Virtual Visit  The patient location is: [place]   The chief complaint leading to consultation is: knee/leg pain  Visit type: Virtual visit with synchronous audio and video  Total time spent with patient: 10 min  Each patient to whom he or she provides medical services by telemedicine is:  (1) informed of the relationship between the physician and patient and the respective role of any other health care provider with respect to management of the patient; and (2) notified that he or she may decline to receive medical services by telemedicine and may withdraw from such care at any time.    Subjective:         @Patient ID: Kristofer Gentile is a 44 y.o. female.    Chief Complaint: Knee Pain (Left area behind knee)    Knee Pain             History of Present Illness    CHIEF COMPLAINT:  Ms. Gentile presents with left hamstring pain and discomfort, which has been aggravating her for the past few weeks. She requests an IUD placement.    HPI:  Ms. Gentile reports left hamstring pain for the past few weeks, which started after altering her workout routine to accommodate a new hairstyle. She switched to consecutive workout days (Wednesday, Thursday, Friday) instead of her usual every-other-day schedule, but has since reverted to her previous routine of working out on Mondays, Wednesdays, and Fridays.    The pain is localized behind the left knee and worsens with certain movements and positions. She has difficulty walking, exhibiting a limping gait, especially after prolonged sitting. She also reports discomfort when lying down, necessitating leg elevation and avoidance of certain positions to alleviate pressure on the affected area.    Last week, while on a cruise, the patient took a complete break from working out but continued to have pain. She managed the discomfort with OTC pain relievers, taking Tylenol and ibuprofen daily. She maintained her physical activities during the cruise.    After resuming her workout routine,  the patient noticed a significant increase in pain, describing it as severe this morning. She is currently wearing a brace but reports continued limping even with the brace on.    Ms. Gentile recalls feeling a popping sensation in her left knee a few weeks ago. She compares this current pain to a similar experience she had with her right knee a few years ago, which was diagnosed as hyperextension during physical therapy.    The pain is most noticeable during transitions from sitting to standing, during certain movements, and while lying down. She finds it necessary to elevate her leg and is unable to turn in certain ways or apply too much pressure on it.    MEDICATIONS:  Ms. Gentile is on Tylenol and Ibuprofen as needed for pain management during her cruise.    MEDICAL HISTORY:  Ms. Gentile has a history of right knee hyperextension from a few years ago, which was addressed with physical therapy. For contraception, she currently has an IUD and previously used birth control pills.      ROS:  Musculoskeletal: -joint pain, +muscle pain           Review of Systems   Constitutional:  Negative for activity change and unexpected weight change.   HENT:  Negative for hearing loss, rhinorrhea and trouble swallowing.    Eyes:  Negative for discharge and visual disturbance.   Respiratory:  Negative for chest tightness and wheezing.    Cardiovascular:  Negative for chest pain and palpitations.   Gastrointestinal:  Negative for blood in stool, constipation, diarrhea and vomiting.   Endocrine: Negative for polydipsia and polyuria.   Genitourinary:  Negative for difficulty urinating, dysuria, hematuria and menstrual problem.   Musculoskeletal:  Negative for arthralgias, joint swelling and neck pain.   Neurological:  Negative for weakness and headaches.   Psychiatric/Behavioral:  Negative for confusion and dysphoric mood.      Past medical history, surgical history, and family medical history reviewed and updated as  "appropriate.    Medications and allergies reviewed.     Objective:     Vitals:    11/06/24 1134   Weight: 97.1 kg (214 lb)   Height: 5' 4.5" (1.638 m)     Body mass index is 36.17 kg/m².  Physical Exam  Constitutional:       Appearance: Normal appearance.   HENT:      Head: Normocephalic and atraumatic.   Pulmonary:      Effort: Pulmonary effort is normal.   Neurological:      Mental Status: She is alert and oriented to person, place, and time.   Psychiatric:         Mood and Affect: Mood normal.         Behavior: Behavior normal.         Lab Results   Component Value Date    WBC 3.74 (L) 05/28/2024    HGB 12.8 05/28/2024    HCT 37.8 05/28/2024     05/28/2024    CHOL 236 (H) 04/05/2024    TRIG 43 04/05/2024    HDL 38 (L) 04/05/2024    ALT 10 04/05/2024    AST 15 04/05/2024     04/05/2024    K 3.9 04/05/2024     04/05/2024    CREATININE 1.0 04/05/2024    BUN 11 04/05/2024    CO2 24 04/05/2024    TSH 1.765 04/19/2024    HGBA1C 5.3 03/10/2023       Assessment:     1. Hamstring muscle strain, left, initial encounter    2. Posterior left knee pain    3. Encounter for initial prescription of intrauterine contraceptive device (IUD)      Plan:   Kristofer was seen today for knee pain.    Diagnoses and all orders for this visit:    Hamstring muscle strain, left, initial encounter  -     Ambulatory referral/consult to Sports Medicine; Future    Posterior left knee pain  -     Ambulatory referral/consult to Sports Medicine; Future    Encounter for initial prescription of intrauterine contraceptive device (IUD)  -     Ambulatory referral/consult to Obstetrics / Gynecology; Future    Other orders  -     diclofenac (VOLTAREN) 50 MG EC tablet; Take 1 tablet (50 mg total) by mouth 2 (two) times daily as needed (pain). Take with food        Assessment & Plan    KNEE PAIN:   Started diclofenac for knee pain.   Take with food, not on an empty stomach.   Referred to sports medicine for evaluation of left knee " pain.    OB/GYN REFERRAL:   Referred to OB/GYN for IUD placement.    FOLLOW UP:   Follow up after sports medicine and OB/GYN appointments are completed.   Appointment staff will contact patient to schedule sports medicine and OB/GYN appointments.        Meri Purcell MD  Internal Medicine    11/6/2024    This note was generated with the assistance of ambient listening technology. Verbal consent was obtained by the patient and accompanying visitor(s) for the recording of patient appointment to facilitate this note. I attest to having reviewed and edited the generated note for accuracy, though some syntax or spelling errors may persist. Please contact the author of this note for any clarification.

## 2024-11-07 ENCOUNTER — TELEPHONE (OUTPATIENT)
Dept: INTERNAL MEDICINE | Facility: CLINIC | Age: 44
End: 2024-11-07
Payer: COMMERCIAL

## 2024-11-07 NOTE — TELEPHONE ENCOUNTER
----- Message from Meri Purcell MD sent at 11/6/2024  2:07 PM CST -----  Regarding: Needs Sports Medicine and OB/gyn appointment  Pt needs Sports medicine and OB/gyn appointment. Please send referrals to referral coordinator to schedule.

## 2024-11-13 ENCOUNTER — OFFICE VISIT (OUTPATIENT)
Dept: OBSTETRICS AND GYNECOLOGY | Facility: CLINIC | Age: 44
End: 2024-11-13
Payer: COMMERCIAL

## 2024-11-13 VITALS
BODY MASS INDEX: 36.34 KG/M2 | SYSTOLIC BLOOD PRESSURE: 118 MMHG | WEIGHT: 215.06 LBS | DIASTOLIC BLOOD PRESSURE: 74 MMHG

## 2024-11-13 DIAGNOSIS — Z30.014 ENCOUNTER FOR INITIAL PRESCRIPTION OF INTRAUTERINE CONTRACEPTIVE DEVICE (IUD): ICD-10-CM

## 2024-11-13 DIAGNOSIS — Z12.31 SCREENING MAMMOGRAM FOR BREAST CANCER: ICD-10-CM

## 2024-11-13 DIAGNOSIS — Z01.419 ENCOUNTER FOR WELL WOMAN EXAM WITH ROUTINE GYNECOLOGICAL EXAM: Primary | ICD-10-CM

## 2024-11-13 DIAGNOSIS — Z11.3 SCREENING FOR STDS (SEXUALLY TRANSMITTED DISEASES): ICD-10-CM

## 2024-11-13 PROCEDURE — 87591 N.GONORRHOEAE DNA AMP PROB: CPT

## 2024-11-13 PROCEDURE — 3078F DIAST BP <80 MM HG: CPT | Mod: CPTII,S$GLB,,

## 2024-11-13 PROCEDURE — 3008F BODY MASS INDEX DOCD: CPT | Mod: CPTII,S$GLB,,

## 2024-11-13 PROCEDURE — 99396 PREV VISIT EST AGE 40-64: CPT | Mod: S$GLB,,,

## 2024-11-13 PROCEDURE — 99999 PR PBB SHADOW E&M-EST. PATIENT-LVL IV: CPT | Mod: PBBFAC,,,

## 2024-11-13 PROCEDURE — 3074F SYST BP LT 130 MM HG: CPT | Mod: CPTII,S$GLB,,

## 2024-11-13 NOTE — PROGRESS NOTES
Chief Complaint: Well Woman Exam     HPI:      Kristofer Gentile is a 44 y.o.  who presents today for well woman exam.  LMP: Patient's last menstrual period was 10/16/2024.  No issues, problems, or complaints. Specifically, patient denies abnormal vaginal bleeding, discharge, pelvic pain, urinary problems, or changes in appetite. Ms. Gentile is currently sexually active with a single male partner. She is currently using oral contraceptives (estrogen/progesterone) for contraception. Interested in IUD, would like to discuss. Has had Mirena in the past and did well until she developed recurrent vaginitis. \Hasn't had an issue with vaginitis since removal and thinks it was related to her partner at her time. She would like STD screening today.    Previous Pap: NILM, HPV negative (10/10/2022)  Previous Mammogram: BiRads: 1 T-C Score: 10.78% (2022)    FH:  Breast cancer: none  Colon cancer: none  Ovarian cancer: none  Endometrial cancer: none    Ms. Gentile confirms that she wears her seatbelt when riding in the car and does not text while driving.     OB History          2    Para   2    Term   2            AB        Living             SAB        IAB        Ectopic        Multiple        Live Births               Obstetric Comments   Menarche age 14.  No menses on Mirena.  No history of abnormal PAP smear.               ROS:     GENERAL: Denies unintentional weight gain or weight loss. Feeling well overall.   SKIN: Denies rash or lesions.   HEENT: Denies headaches, or vision changes.   CARDIOVASCULAR: Denies palpitations or chest pain.   RESPIRATORY: Denies shortness of breath or dyspnea on exertion.  BREASTS: Denies lumps or nipple discharge.   ABDOMEN: Denies constipation, diarrhea, nausea, vomiting, change in appetite.  URINARY: Denies frequency, dysuria.  NEUROLOGIC: Denies syncope or weakness.   PSYCHIATRIC: Denies uncontrolled depression or anxiety.    Physical Exam:      PHYSICAL EXAM:  BP  118/74 (BP Location: Left arm, Patient Position: Sitting)   Wt 97.6 kg (215 lb 0.9 oz)   LMP 10/16/2024   BMI 36.34 kg/m²   Body mass index is 36.34 kg/m².     APPEARANCE: Well nourished, well developed, in no acute distress.  PSYCH: Appropriate mood and affect.  SKIN: No acne or hirsutism  NECK: Neck symmetric without masses  NODES: No inguinal, axillary, or supraclavicular lymph node enlargement  ABDOMEN: Soft.  No tenderness or masses.    CARDIOVASCULAR: No edema of peripheral extremities  BREASTS: Symmetrical, no visible skin lesions. No palpable masses. No nipple discharge bilaterally.  PELVIC: Normal external genitalia without lesions.  Normal hair distribution.  Adequate perineal body, normal urethral meatus.  Vagina moist and well rugated. Without lesions. Vagina without discharge.  Cervix pink, without lesions, discharge or tenderness.  No significant cystocele or rectocele.  Bimanual exam shows uterus to be normal size, regular, mobile and nontender.  Adnexa without masses or tenderness.    Gyn note:      A female chaperone was present for the breast and pelvic exam.     Assessment/Plan:     Encounter for well woman exam with routine gynecological exam  -     Counseled patient regarding healthy diet and regular exercise, daily multivitamin, daily seat belt use.   -     BP normotensive  -     She denies abuse and feels safe at home.  -     Pap smear:  UTD (next due 2025)  -     Contraception:  OCP, IUD ordered  -     STD screening:  GCC collected   -     MMG:  DUE, orders placed, pt to schedule    Encounter for initial prescription of intrauterine contraceptive device (IUD)  -     Ambulatory referral/consult to Obstetrics / Gynecology  -     Device Authorization Order  -     US Limited Non-Billable; Future; Expected date: 11/13/2024    Screening mammogram for breast cancer  -     Mammo Digital Screening Bilat w/ Juan A; Future; Expected date: 11/13/2024    Screening for STDs (sexually transmitted  diseases)  -     C. trachomatis/N. gonorrhoeae by AMP DNA [facility]; Cervicovaginal    Follow up in about 1 year for annual exam or sooner PRN.    Counseling:     Patient was counseled today on current ASCCP pap guidelines, the recommendation for yearly physical exams, healthy diet and exercise routines, safe driving habits, and breast self awareness and annual mammograms. She is to see her PCP for other health maintenance.     Use of the [facility] Patient Portal discussed and encouraged during today's visit.   Counseling time: 15 minutes    Desirae Prescott (Maggie), JHONY  Obstetrics and Gynecology  [facility]

## 2024-11-15 ENCOUNTER — HOSPITAL ENCOUNTER (OUTPATIENT)
Dept: RADIOLOGY | Facility: HOSPITAL | Age: 44
Discharge: HOME | End: 2024-11-15
Attending: ORTHOPAEDIC SURGERY
Payer: COMMERCIAL

## 2024-11-15 ENCOUNTER — OFFICE VISIT (OUTPATIENT)
Dept: SPORTS MEDICINE | Facility: CLINIC | Age: 44
End: 2024-11-15
Payer: COMMERCIAL

## 2024-11-15 VITALS
DIASTOLIC BLOOD PRESSURE: 78 MMHG | WEIGHT: 215 LBS | SYSTOLIC BLOOD PRESSURE: 115 MMHG | HEIGHT: 65 IN | HEART RATE: 66 BPM | BODY MASS INDEX: 35.82 KG/M2

## 2024-11-15 DIAGNOSIS — M25.562 POSTERIOR LEFT KNEE PAIN: ICD-10-CM

## 2024-11-15 DIAGNOSIS — S76.312A HAMSTRING MUSCLE STRAIN, LEFT, INITIAL ENCOUNTER: ICD-10-CM

## 2024-11-15 DIAGNOSIS — M76.822 POSTERIOR TIBIAL TENDINITIS OF LEFT LOWER EXTREMITY: Primary | ICD-10-CM

## 2024-11-15 LAB
C TRACH DNA SPEC QL NAA+PROBE: NOT DETECTED
N GONORRHOEA DNA SPEC QL NAA+PROBE: NOT DETECTED

## 2024-11-15 PROCEDURE — 73562 X-RAY EXAM OF KNEE 3: CPT | Mod: 26,59,RT, | Performed by: RADIOLOGY

## 2024-11-15 PROCEDURE — 99999 PR PBB SHADOW E&M-EST. PATIENT-LVL IV: CPT | Mod: PBBFAC,,, | Performed by: ORTHOPAEDIC SURGERY

## 2024-11-15 PROCEDURE — 73564 X-RAY EXAM KNEE 4 OR MORE: CPT | Mod: 26,LT,, | Performed by: RADIOLOGY

## 2024-11-15 PROCEDURE — 73564 X-RAY EXAM KNEE 4 OR MORE: CPT | Mod: TC,LT

## 2024-11-15 RX ORDER — METHYLPREDNISOLONE 4 MG/1
TABLET ORAL
Qty: 21 EACH | Refills: 0 | Status: SHIPPED | OUTPATIENT
Start: 2024-11-15 | End: 2024-12-06

## 2024-11-15 NOTE — PROGRESS NOTES
ESTABLISHED  PATIENT    HISTORY OF PRESENT ILLNESS   44 y.o. Female with a history of left knee pain who I have previously treated for right knee patellofemoral chondromalacia. She states that her pain is located in her posterior knee and in her hamstrings.  She states her pain began in late October 2024 which started after altering her workout routine. She states she had a cartilage injury to her left knee when she was a child. She states her pain radiates from the back of her knee and then down the medial and lateral side of her lower leg and to the bottom of her foot. She states the thera-gun massager has been helpful     - mechanical symptoms, - instability    Is affecting ADLs.  Pain is 5/10 at it's worst.        PAST MEDICAL HISTORY    Past Medical History:   Diagnosis Date    Allergy     Anti-TPO antibodies present 11/8/2018    Carpal tunnel syndrome     Dyslipidemia (high LDL; low HDL) 6/25/2013    Fever blister     Herpes simplex labialis 6/25/2013    Intraductal papilloma of left breast 3/1/2019    IUD (intrauterine device) in place 2009    Metabolic syndrome 7/22/2013    Thyroid disease     treated with synthroid during pregnancy    Vitamin D deficiency 11/5/2018    Vitamin D insufficiency 11/5/2018       PAST SURGICAL HISTORY     Past Surgical History:   Procedure Laterality Date    BREAST BIOPSY Left 12/14/2018    Core bx, benign    BREAST SURGERY  March 2019    Had a growth removed from my milk duct    EXCISIONAL BIOPSY Left 03/01/2019    Procedure: EXCISIONAL BIOPSY- w/major duct excision KENALOG INJECTION AT TIME OF SURGERY;  Surgeon: Kelby Mcmahan MD;  Location: [facility] [address] [place] [address];  Service: General;  Laterality: Left;    INTRAUTERINE DEVICE INSERTION  2009    again 2014    PANNICULECTOMY N/A 04/23/2024    Procedure: PANNICULECTOMY;  Surgeon: Christiano Richard MD;  Location: [facility] [address] [place] [address];  Service: Plastics;  Laterality: N/A;       FAMILY HISTORY    Family History   Problem Relation Name  Age of Onset    Angioedema Mother Janee Lineville     Diabetes Mother Janee Lineville     Hypertension Mother Janee Lineville     Rheum arthritis Mother Janee Lineville     Coronary artery disease Mother Janee Lineville     Stroke Mother Janee Lineville         x2    Liver disease Mother Janee Lineville         NAFLD    Sarcoidosis Mother Janee Lineville     Thyroid disease Mother Janee Lineville     Hyperlipidemia Mother Janee Lineville     Atrial fibrillation Mother Janee Lineville     Cataracts Mother Janee Lineville     Glaucoma Mother Janee Lineville     Arthritis Mother Janee Lineville     Kidney disease Mother Janee Lineville     Miscarriages / Stillbirths Mother Janee Lineville     Vision loss Mother Janee Lineville     Allergies Father Milton Lineville     Hypertension Father Milton Lineville     Dementia Father Milton Lineville     Hyperlipidemia Father Milton Lineville     Liver cancer Father Milton Lineville     Cancer Father Milton Lineville         Liver    Stroke Father Milton Lineville     Hypertension Maternal Aunt Gely Lopez     Coronary artery disease Maternal Uncle          with stent    Sarcoidosis Maternal Uncle      Leukemia Maternal Uncle      Rheum arthritis Maternal Grandmother          wheelchair bound    Cataracts Maternal Grandfather      Rheum arthritis Paternal Grandmother      Allergies Child      Breast cancer Neg Hx      Colon cancer Neg Hx      Ovarian cancer Neg Hx         SOCIAL HISTORY    Social History     Socioeconomic History    Marital status:     Number of children: 2   Occupational History     Employer: [employer]   Tobacco Use    Smoking status: Never     Passive exposure: Past    Smokeless tobacco: Never   Substance and Sexual Activity    Alcohol use: Not Currently     Alcohol/week: 0.8 standard drinks of alcohol     Comment: Rarely    Drug use: No    Sexual activity: Yes     Partners: Male     Birth control/protection: I.U.D.     Social Drivers of Health     Financial Resource Strain: Low Risk  (5/28/2024)    Overall Financial Resource Strain (CARDIA)     Difficulty of Paying  Living Expenses: Not hard at all   Food Insecurity: No Food Insecurity (5/28/2024)    Hunger Vital Sign     Worried About Running Out of Food in the Last Year: Never true     Ran Out of Food in the Last Year: Never true   Transportation Needs: No Transportation Needs (9/28/2023)    PRAPARE - Transportation     Lack of Transportation (Medical): No     Lack of Transportation (Non-Medical): No   Physical Activity: Insufficiently Active (5/28/2024)    Exercise Vital Sign     Days of Exercise per Week: 3 days     Minutes of Exercise per Session: 30 min   Stress: No Stress Concern Present (5/28/2024)    Citizen of [place] [employer] - Occupational Stress Questionnaire     Feeling of Stress : Only a little   Housing Stability: High Risk (5/28/2024)    Housing Stability Vital Sign     Unable to Pay for Housing in the Last Year: Yes       MEDICATIONS      Current Outpatient Medications:     CALCIUM ORAL, Take by mouth., Disp: , Rfl:     cholecalciferol, vitamin D3, (VITAMIN D3) 125 mcg (5,000 unit) Tab, Take 10,000 Units by mouth once daily., Disp: , Rfl:     COLLAGEN MISC, by Misc.(Non-Drug; Combo Route) route., Disp: , Rfl:     CRYSELLE, 28, 0.3-30 mg-mcg per tablet, TAKE 1 TABLET BY MOUTH DAILY, Disp: 112 tablet, Rfl: 0    diclofenac (VOLTAREN) 50 MG EC tablet, Take 1 tablet (50 mg total) by mouth 2 (two) times daily as needed (pain). Take with food, Disp: 40 tablet, Rfl: 0    docosahexaenoic acid/epa (FISH OIL ORAL), Take by mouth., Disp: , Rfl:     FLONASE ALLERGY RELIEF 50 mcg/actuation nasal spray, SPRAY TWICE IN EACH NOSTRIL ONCE DAILY, Disp: 16 g, Rfl: 5    fluconazole (DIFLUCAN) 150 MG Tab, Take 1 tablet (150 mg total) by mouth once a week., Disp: 12 tablet, Rfl: 0    hydrocortisone 2.5 % cream, Apply topically 2 (two) times daily. Mix with ketoconazole cream and apply twice a day for 1-2 weeks when flaring. Only use when flaring., Disp: 28 g, Rfl: 6    ketoconazole (NIZORAL) 2 % cream, Apply topically 2  "(two) times daily. Apply twice a day with hydrocortisone when flaring for up to two weeks at a time., Disp: 30 g, Rfl: 6    triamcinolone acetonide 0.025% (KENALOG) 0.025 % Oint, Apply topically 2 (two) times daily. Apply twice daily as needed to itchy rash for up to 7 days at a time., Disp: 80 g, Rfl: 1    valACYclovir (VALTREX) 500 MG tablet, TAKE 1 TABLET(500 MG) BY MOUTH EVERY DAY, Disp: 30 tablet, Rfl: 11    ZINC ORAL, Take by mouth., Disp: , Rfl:     ALLERGIES     Review of patient's allergies indicates:   Allergen Reactions    Sulfa (sulfonamide antibiotics) Hives     Also causes joint stiffness.         REVIEW OF SYSTEMS   Constitution: Negative. Negative for chills, fever and night sweats.   HENT: Negative for congestion and headaches.    Eyes: Negative for blurred vision, left vision loss and right vision loss.   Cardiovascular: Negative for chest pain and syncope.   Respiratory: Negative for cough and shortness of breath.    Endocrine: Negative for polydipsia, polyphagia and polyuria.   Hematologic/Lymphatic: Negative for bleeding problem. Does not bruise/bleed easily.   Skin: Negative for dry skin, itching and rash.   Musculoskeletal: Negative for falls. Positive for posterior left knee pain   Gastrointestinal: Negative for abdominal pain and bowel incontinence.   Genitourinary: Negative for bladder incontinence and nocturia.   Neurological: Negative for disturbances in coordination, loss of balance and seizures.   Psychiatric/Behavioral: Negative for depression. The patient does not have insomnia.    Allergic/Immunologic: Negative for hives and persistent infections.     PHYSICAL EXAMINATION    Vitals: /78   Pulse 66   Ht 5' 4.5" (1.638 m)   Wt 97.5 kg (215 lb)   LMP 10/16/2024   BMI 36.33 kg/m²     General: The patient appears active and healthy with no apparent physical problems.  No disturbance of mood or affect is demonstrated. Alert and Oriented.    HEENT: Eyes normal, pupils equally " round, nose normal.    Resp: Equal and symmetrical chest rises. No wheezing    CV: Regular rate    Neck: Supple; nonpainful range of motion.    Extremities: no cyanosis, clubbing, edema, or diffuse swelling.  Palpable pulses, good capillary refill of the digits.  No coolness, discoloration, edema or obvious varicosities.    Skin: no lesions noted.    Lymphatic: no detected adenopathy in the upper or lower extremities.    Neurologic: normal mental status, normal reflexes, normal gait and balance.  Patient is alert and oriented to person, place and time.  No flaccidity or spasticity is noted.  No motor or sensory deficits are noted.  Light touch is intact    Orthopaedic: KNEE EXAM - LEFT    Inspection:   Normal skin color and appearance with no scars, no ecchymosis and no effusion.      ROM:   0° - 145°.      Palpation:   There is no tenderness along medial joint line, medial plica, medial collateral ligament, pes bursa, lateral joint line, iliotibial band, lateral collateral ligament, popliteal fossa, patellar tendon, or quadriceps tendon. + tenderness distal posterior tibialis     Stability: - anterior drawer, - Lachman, - pivot shift and - posterior drawer.      No instability with varus or valgus stress at 0° or 30°. Negative dial  test at 30° and 90°.    Tests:   - Naeems test.  - patellar compression - grind test, - patellofemoral crepitus.  There is no patellar apprehension.     - J Sign. - Ewa's. - patellar tilt. - Margarito. Lateral patella translation  2 quadrants. Medial patella translation 2 quadrants    Motor:   Quadriceps strength is 5/5 and hamstrings strength is 5/5. Hamstrings show tightness. no pain with hamstring resistance    Neuro:   Distal neurovascular status is normal    Vascular: Negative Homans and no palpable popliteal cords. 2+ pedal pulse with brisk cap refill    Gait Normal    + pain with toe and heel walking     THORACIC/LUMBAR SPINE     Inspection   Normal skin color and appearance, no  ecchymosis, no swelling, and no scars.  No increased lumbar lordosis. Pelvis is level without tilt.  No scoliosis.      ROM   Full forward flexion, extension, side bending and rotation.  There is no increased pain with ROM testing.      Palpation     There is no tenderness of the spinous processes, iliac crests, posterior superior iliac spines, sacroiliac joints, sacrum, coccyx, and greater trochanters.      Neuro   Straight leg raise is negative bilaterally.      L4 neurologic testing reveals 5/5 strength in TA , 2/2 knee reflex, and normal sensation in L4 dermatome.      L5 neurologic testing reveals 5/5 strength in EHL and normal sensation in L5 dermatome.      S1 neurologic testing reveals 5/5 strength in peroneals, 2/2 achilles tendon reflex, and normal sensation in S1 dermatome.    IMAGING    X-ray Knee Ortho Left with Flexion  Narrative: EXAMINATION:  XR KNEE ORTHO LEFT WITH FLEXION    CLINICAL HISTORY:  . Pain in left knee    TECHNIQUE:  AP standing view of both knees, PA flexion standing views of both knees, and Merchant views of both knees were performed. A lateral view of the left knee was also performed.    COMPARISON:  No 11/17/2021 ne    FINDINGS:  No significant joint space narrowing identified.  No fracture or dislocation.  No bone destruction identified.  Impression: See above    Electronically signed by: Milton Farooq MD  Date:    11/15/2024  Time:    12:42        IMPRESSION       ICD-10-CM ICD-9-CM   1. Posterior tibial tendinitis of left lower extremity  M76.822 726.72   2. Hamstring muscle strain, left, initial encounter  S76.312A 843.8   3. Posterior left knee pain  M25.562 719.46       MEDICATIONS PRESCRIBED      Medrol Dosepack     RECOMMENDATIONS     Continue to modify activity that causes pain  RTC in 6 weeks with Bernabe Gabriel PA-C, if her pain persists we will consider ordering advanced imaging       All questions were answered, pt will contact us for questions or concerns in the  interim.

## 2024-11-23 ENCOUNTER — TELEPHONE (OUTPATIENT)
Dept: INTERNAL MEDICINE | Facility: CLINIC | Age: 44
End: 2024-11-23
Payer: COMMERCIAL

## 2024-11-23 NOTE — TELEPHONE ENCOUNTER
No care due was identified.  Health [facility] Embedded Care Due Messages. Reference number: [id number].   11/23/2024 4:47:58 AM CST

## 2024-11-25 DIAGNOSIS — S76.312A HAMSTRING MUSCLE STRAIN, LEFT, INITIAL ENCOUNTER: ICD-10-CM

## 2024-11-25 DIAGNOSIS — M25.562 POSTERIOR LEFT KNEE PAIN: ICD-10-CM

## 2024-11-25 DIAGNOSIS — M76.822 POSTERIOR TIBIAL TENDINITIS OF LEFT LOWER EXTREMITY: ICD-10-CM

## 2024-11-25 RX ORDER — METHYLPREDNISOLONE 4 MG/1
TABLET ORAL
Qty: 21 EACH | Refills: 0 | Status: SHIPPED | OUTPATIENT
Start: 2024-11-25 | End: 2024-12-16

## 2024-11-25 NOTE — TELEPHONE ENCOUNTER
Refill Routing Note   Medication(s) are not appropriate for processing by [facility] for the following reason(s):        Outside of protocol    [facility] action(s):  Route               Appointments  past 12m or future 3m with PCP    Date Provider   Last Visit   10/5/2023 Melonie Villanueva MD   Next Visit   Visit date not found Melonie Villanueva MD   ED visits in past 90 days: 0        Note composed:8:18 AM 11/25/2024

## 2024-11-26 ENCOUNTER — HOSPITAL ENCOUNTER (OUTPATIENT)
Dept: RADIOLOGY | Facility: OTHER | Age: 44
Discharge: HOME | End: 2024-11-26
Payer: COMMERCIAL

## 2024-11-26 DIAGNOSIS — Z12.31 SCREENING MAMMOGRAM FOR BREAST CANCER: ICD-10-CM

## 2024-11-26 PROCEDURE — 77063 BREAST TOMOSYNTHESIS BI: CPT | Mod: TC

## 2024-11-26 RX ORDER — DICLOFENAC SODIUM 50 MG/1
TABLET, DELAYED RELEASE ORAL
Qty: 40 TABLET | Refills: 0 | OUTPATIENT
Start: 2024-11-26

## 2024-11-26 RX ORDER — DICLOFENAC SODIUM 50 MG/1
50 TABLET, DELAYED RELEASE ORAL 2 TIMES DAILY PRN
Qty: 40 TABLET | Refills: 0 | Status: CANCELLED | OUTPATIENT
Start: 2024-11-26

## 2024-11-26 NOTE — TELEPHONE ENCOUNTER
No care due was identified.  Health [facility] Embedded Care Due Messages. Reference number: [id number].   11/26/2024 12:42:29 PM CST

## 2024-11-26 NOTE — TELEPHONE ENCOUNTER
I did not order Voltaren for tendonitis. BUT, she did see Sports Medicine for this problem on 11/25/24.     She may want to request Voltaren from that provider.

## 2024-12-27 ENCOUNTER — PROCEDURE VISIT (OUTPATIENT)
Dept: OBSTETRICS AND GYNECOLOGY | Facility: CLINIC | Age: 44
End: 2024-12-27
Payer: COMMERCIAL

## 2024-12-27 ENCOUNTER — OFFICE VISIT (OUTPATIENT)
Dept: SPORTS MEDICINE | Facility: CLINIC | Age: 44
End: 2024-12-27
Payer: COMMERCIAL

## 2024-12-27 VITALS
DIASTOLIC BLOOD PRESSURE: 79 MMHG | BODY MASS INDEX: 36.96 KG/M2 | SYSTOLIC BLOOD PRESSURE: 116 MMHG | HEART RATE: 72 BPM | WEIGHT: 218.69 LBS

## 2024-12-27 VITALS — WEIGHT: 217.13 LBS | SYSTOLIC BLOOD PRESSURE: 120 MMHG | BODY MASS INDEX: 36.7 KG/M2 | DIASTOLIC BLOOD PRESSURE: 80 MMHG

## 2024-12-27 DIAGNOSIS — Z30.430 ENCOUNTER FOR INSERTION OF MIRENA IUD: Primary | ICD-10-CM

## 2024-12-27 DIAGNOSIS — G89.29 CHRONIC PAIN OF RIGHT KNEE: ICD-10-CM

## 2024-12-27 DIAGNOSIS — Z01.812 PRE-PROCEDURE LAB EXAM: ICD-10-CM

## 2024-12-27 DIAGNOSIS — M25.561 CHRONIC PAIN OF RIGHT KNEE: ICD-10-CM

## 2024-12-27 DIAGNOSIS — M25.562 CHRONIC PAIN OF LEFT KNEE: Primary | ICD-10-CM

## 2024-12-27 DIAGNOSIS — G89.29 CHRONIC PAIN OF LEFT KNEE: Primary | ICD-10-CM

## 2024-12-27 LAB
B-HCG UR QL: NEGATIVE
CTP QC/QA: YES

## 2024-12-27 PROCEDURE — 99999 PR PBB SHADOW E&M-EST. PATIENT-LVL IV: CPT | Mod: PBBFAC,,, | Performed by: PHYSICIAN ASSISTANT

## 2024-12-27 NOTE — PROGRESS NOTES
ESTABLISHED  PATIENT    History 12/27/2024:  Kristofer returns here today for follow-up evaluation of her left knee.  She is to have lateral-sided pain with occasional catching and locking sensations.  She also has instability.  She previously underwent formal physical therapy for her right knee has been doing her home exercise program for her left knee over the last 6 weeks.  She states this has given her little to no relief.  She is still having trouble exercising.    History 11/15/2024:  44 y.o. Female with a history of left knee pain who I have previously treated for right knee patellofemoral chondromalacia. She states that her pain is located in her posterior knee and in her hamstrings.  She states her pain began in late October 2024 which started after altering her workout routine. She states she had a cartilage injury to her left knee when she was a child. She states her pain radiates from the back of her knee and then down the medial and lateral side of her lower leg and to the bottom of her foot. She states the thera-gun massager has been helpful     - mechanical symptoms, - instability    Is affecting ADLs.  Pain is 5/10 at it's worst.        PAST MEDICAL HISTORY    Past Medical History:   Diagnosis Date    Allergy     Anti-TPO antibodies present 11/8/2018    Carpal tunnel syndrome     Dyslipidemia (high LDL; low HDL) 6/25/2013    Fever blister     Herpes simplex labialis 6/25/2013    Intraductal papilloma of left breast 3/1/2019    IUD (intrauterine device) in place 2009    Metabolic syndrome 7/22/2013    Thyroid disease     treated with synthroid during pregnancy    Vitamin D deficiency 11/5/2018    Vitamin D insufficiency 11/5/2018       PAST SURGICAL HISTORY     Past Surgical History:   Procedure Laterality Date    BREAST BIOPSY Left 12/14/2018    Core bx, benign    BREAST SURGERY  March 2019    Had a growth removed from my milk duct    EXCISIONAL BIOPSY Left 03/01/2019    Procedure: EXCISIONAL  BIOPSY- w/major duct excision KENALOG INJECTION AT TIME OF SURGERY;  Surgeon: Kelby Mcmahan MD;  Location: [facility] OR 2ND FLR;  Service: General;  Laterality: Left;    INTRAUTERINE DEVICE INSERTION  2009    again 2014    PANNICULECTOMY N/A 04/23/2024    Procedure: PANNICULECTOMY;  Surgeon: Christiano Richard MD;  Location: [facility] OR 2ND FLR;  Service: Plastics;  Laterality: N/A;       FAMILY HISTORY    Family History   Problem Relation Name Age of Onset    Angioedema Mother Janee [place]     Diabetes Mother Janee [place]     Hypertension Mother Janee [place]     Rheum arthritis Mother Janee [place]     Coronary artery disease Mother Janee [place]     Stroke Mother Janee [place]         x2    Liver disease Mother Janee [place]         NAFLD    Sarcoidosis Mother Janee [place]     Thyroid disease Mother Janee [place]     Hyperlipidemia Mother Janee [place]     Atrial fibrillation Mother Janee [place]     Cataracts Mother Janee [place]     Glaucoma Mother Janee [place]     Arthritis Mother Janee [place]     Kidney disease Mother Janee [place]     Miscarriages / Stillbirths Mother Janee [place]     Vision loss Mother Janee [place]     Allergies Father Milton [place]     Hypertension Father Milton [place]     Dementia Father Milton [place]     Hyperlipidemia Father Milton [place]     Liver cancer Father Milton [place]     Cancer Father Milton [place]         Liver    Stroke Father Milton [place]     Hypertension Maternal Aunt Gely Lopez     Coronary artery disease Maternal Uncle          with stent    Sarcoidosis Maternal Uncle      Leukemia Maternal Uncle      Rheum arthritis Maternal Grandmother          wheelchair bound    Cataracts Maternal Grandfather      Rheum arthritis Paternal Grandmother      Allergies Child      Breast cancer Neg Hx      Colon cancer Neg Hx      Ovarian cancer Neg Hx         SOCIAL HISTORY    Social History     Socioeconomic History    Marital status:     Number of children: 2   Occupational History     Employer: [employer]   Tobacco Use    Smoking  status: Never     Passive exposure: Past    Smokeless tobacco: Never   Substance and Sexual Activity    Alcohol use: Not Currently     Alcohol/week: 0.8 standard drinks of alcohol     Comment: Rarely    Drug use: No    Sexual activity: Yes     Partners: Male     Birth control/protection: I.U.D.     Social Drivers of Health     Financial Resource Strain: Low Risk  (5/28/2024)    Overall Financial Resource Strain (CARDIA)     Difficulty of Paying Living Expenses: Not hard at all   Food Insecurity: No Food Insecurity (5/28/2024)    Hunger Vital Sign     Worried About Running Out of Food in the Last Year: Never true     Ran Out of Food in the Last Year: Never true   Transportation Needs: No Transportation Needs (9/28/2023)    PRAPARE - Transportation     Lack of Transportation (Medical): No     Lack of Transportation (Non-Medical): No   Physical Activity: Insufficiently Active (5/28/2024)    Exercise Vital Sign     Days of Exercise per Week: 3 days     Minutes of Exercise per Session: 30 min   Stress: No Stress Concern Present (5/28/2024)    [employer] - Occupational Stress Questionnaire     Feeling of Stress : Only a little   Housing Stability: High Risk (5/28/2024)    Housing Stability Vital Sign     Unable to Pay for Housing in the Last Year: Yes       MEDICATIONS      Current Outpatient Medications:     CALCIUM ORAL, Take by mouth., Disp: , Rfl:     cholecalciferol, vitamin D3, (VITAMIN D3) 125 mcg (5,000 unit) Tab, Take 10,000 Units by mouth once daily., Disp: , Rfl:     COLLAGEN MISC, by Misc.(Non-Drug; Combo Route) route., Disp: , Rfl:     CRYSELLE, 28, 0.3-30 mg-mcg per tablet, TAKE 1 TABLET BY MOUTH DAILY, Disp: 112 tablet, Rfl: 0    diclofenac (VOLTAREN) 50 MG EC tablet, Take 1 tablet (50 mg total) by mouth 2 (two) times daily as needed (pain). Take with food, Disp: 40 tablet, Rfl: 0    docosahexaenoic acid/epa (FISH OIL ORAL), Take by mouth., Disp: , Rfl:     FLONASE ALLERGY  RELIEF 50 mcg/actuation nasal spray, SPRAY TWICE IN EACH NOSTRIL ONCE DAILY, Disp: 16 g, Rfl: 5    hydrocortisone 2.5 % cream, Apply topically 2 (two) times daily. Mix with ketoconazole cream and apply twice a day for 1-2 weeks when flaring. Only use when flaring., Disp: 28 g, Rfl: 6    ketoconazole (NIZORAL) 2 % cream, Apply topically 2 (two) times daily. Apply twice a day with hydrocortisone when flaring for up to two weeks at a time., Disp: 30 g, Rfl: 6    valACYclovir (VALTREX) 500 MG tablet, TAKE 1 TABLET(500 MG) BY MOUTH EVERY DAY, Disp: 30 tablet, Rfl: 11    ZINC ORAL, Take by mouth., Disp: , Rfl:     ALLERGIES     Review of patient's allergies indicates:   Allergen Reactions    Sulfa (sulfonamide antibiotics) Hives     Also causes joint stiffness.         REVIEW OF SYSTEMS   Constitution: Negative. Negative for chills, fever and night sweats.   HENT: Negative for congestion and headaches.    Eyes: Negative for blurred vision, left vision loss and right vision loss.   Cardiovascular: Negative for chest pain and syncope.   Respiratory: Negative for cough and shortness of breath.    Endocrine: Negative for polydipsia, polyphagia and polyuria.   Hematologic/Lymphatic: Negative for bleeding problem. Does not bruise/bleed easily.   Skin: Negative for dry skin, itching and rash.   Musculoskeletal: Negative for falls. Positive for posterior left knee pain   Gastrointestinal: Negative for abdominal pain and bowel incontinence.   Genitourinary: Negative for bladder incontinence and nocturia.   Neurological: Negative for disturbances in coordination, loss of balance and seizures.   Psychiatric/Behavioral: Negative for depression. The patient does not have insomnia.    Allergic/Immunologic: Negative for hives and persistent infections.     PHYSICAL EXAMINATION    Vitals: /79   Pulse 72   Wt 99.2 kg (218 lb 11.1 oz)   LMP 11/28/2024   BMI 36.96 kg/m²     General: The patient appears active and healthy with no  apparent physical problems.  No disturbance of mood or affect is demonstrated. Alert and Oriented.    HEENT: Eyes normal, pupils equally round, nose normal.    Resp: Equal and symmetrical chest rises. No wheezing    CV: Regular rate    Neck: Supple; nonpainful range of motion.    Extremities: no cyanosis, clubbing, edema, or diffuse swelling.  Palpable pulses, good capillary refill of the digits.  No coolness, discoloration, edema or obvious varicosities.    Skin: no lesions noted.    Lymphatic: no detected adenopathy in the upper or lower extremities.    Neurologic: normal mental status, normal reflexes, normal gait and balance.  Patient is alert and oriented to person, place and time.  No flaccidity or spasticity is noted.  No motor or sensory deficits are noted.  Light touch is intact    Orthopaedic: KNEE EXAM - LEFT    Inspection:   Normal skin color and appearance with no scars, no ecchymosis and no effusion.      ROM:   0° - 145°.      Palpation:   There is no tenderness along medial joint line, medial plica, medial collateral ligament, pes bursa, lateral joint line, iliotibial band, lateral collateral ligament, popliteal fossa, patellar tendon, or quadriceps tendon. + tenderness distal posterior tibialis     Stability: - anterior drawer, - Lachman, - pivot shift and - posterior drawer.      No instability with varus or valgus stress at 0° or 30°. Negative dial  test at 30° and 90°.    Tests:   - Naeems test.  - patellar compression - grind test, - patellofemoral crepitus.  There is no patellar apprehension.     - J Sign. - Ewa's. - patellar tilt. - Margarito. Lateral patella translation  2 quadrants. Medial patella translation 2 quadrants    Motor:   Quadriceps strength is 5/5 and hamstrings strength is 5/5. Hamstrings show tightness. no pain with hamstring resistance    Neuro:   Distal neurovascular status is normal    Vascular: Negative Homans and no palpable popliteal cords. 2+ pedal pulse with brisk cap  refill    Gait Normal    + pain with toe and heel walking     THORACIC/LUMBAR SPINE     Inspection   Normal skin color and appearance, no ecchymosis, no swelling, and no scars.  No increased lumbar lordosis. Pelvis is level without tilt.  No scoliosis.      ROM   Full forward flexion, extension, side bending and rotation.  There is no increased pain with ROM testing.      Palpation     There is no tenderness of the spinous processes, iliac crests, posterior superior iliac spines, sacroiliac joints, sacrum, coccyx, and greater trochanters.      Neuro   Straight leg raise is negative bilaterally.      L4 neurologic testing reveals 5/5 strength in TA , 2/2 knee reflex, and normal sensation in L4 dermatome.      L5 neurologic testing reveals 5/5 strength in EHL and normal sensation in L5 dermatome.      S1 neurologic testing reveals 5/5 strength in peroneals, 2/2 achilles tendon reflex, and normal sensation in S1 dermatome.    IMAGING    X-ray Knee Ortho Left with Flexion  Order: [id number]  Status: Final result       Visible to patient: Yes (seen)       Next appt: 02/21/2025 at 01:15 PM in Obstetrics and Gynecology (Chel Wade MD)       Dx: Posterior left knee pain    0 Result Notes  Details    Reading Physician Reading Date Result Priority   Milton Farooq MD  [phone]  [phone] 11/15/2024 Routine     Narrative & Impression  EXAMINATION:  XR KNEE ORTHO LEFT WITH FLEXION     CLINICAL HISTORY:  . Pain in left knee     TECHNIQUE:  AP standing view of both knees, PA flexion standing views of both knees, and Merchant views of both knees were performed. A lateral view of the left knee was also performed.     COMPARISON:  No 11/17/2021 ne     FINDINGS:  No significant joint space narrowing identified.  No fracture or dislocation.  No bone destruction identified.     Impression:     See above        Electronically signed by:Milton Farooq MD  Date:                                            11/15/2024  Time:                                            12:42        Exam Ended: 11/15/24 12:19 CST Last Resulted: 11/15/24 12:42 CST             IMPRESSION       ICD-10-CM ICD-9-CM   1. Chronic pain of left knee  M25.562 719.46    G89.29 338.29   2. Chronic pain of right knee  M25.561 719.46    G89.29 338.29         MEDICATIONS PRESCRIBED      None    RECOMMENDATIONS     MRI left knee for further evaluation compartment and lateral meniscus  We briefly discussed conservative and surgical treatment options corticosteroid injections, HA injections, and knee arthroscopy  I think HA injections are a good option for her right knee  RTC with me for the MRI results and to discuss her treatment plan      All questions were answered, pt will contact us for questions or concerns in the interim.        Bernabe Gabriel PA-C, MMS

## 2024-12-28 NOTE — PROCEDURES
Insertion of IUD    Date/Time: 12/27/2024 11:15 AM    Performed by: Chel Wade MD  Authorized by: Chel Wade MD    Consent:     Consent obtained:  Prior to procedure the appropriate consent was completed and verified    Consent given by:  Patient    Procedure risks and benefits discussed: yes      Patient questions answered: yes      Patient agrees, verbalizes understanding, and wants to proceed: yes     Device to be inserted was verified by patient: yes    Educational handouts given: yes      Instructions and paperwork completed: yes    Insertion Procedure:   1 Intra Uterine Device levonorgestreL 52 mg       Pelvic exam performed: yes      Negative GC/chlamydia test: yes      Negative serum pregnancy test: yes      Cervix cleaned and prepped: yes      Speculum placed in vagina: yes      Tenaculum applied to cervix: yes      Uterus sounded: yes      Uterus sound depth (cm):  8    IUD inserted with no complications: yes      IUD type:  Mirena    Strings trimmed: yes    Post-procedure:     Patient tolerated procedure well: yes      Patient will follow up after next period: yes    Comments:      Results for orders placed or performed in visit on 12/27/24  -POCT Urine Pregnancy:   Collection Time: 12/27/24 11:38 AM       Result                      Value             Ref Range           POC Preg Test, Ur           Negative          Negative             Accept*     Yes

## 2025-01-31 ENCOUNTER — HOSPITAL ENCOUNTER (OUTPATIENT)
Dept: RADIOLOGY | Facility: OTHER | Age: 45
Discharge: HOME | End: 2025-01-31
Attending: PHYSICIAN ASSISTANT
Payer: COMMERCIAL

## 2025-01-31 DIAGNOSIS — M25.562 CHRONIC PAIN OF LEFT KNEE: ICD-10-CM

## 2025-01-31 DIAGNOSIS — G89.29 CHRONIC PAIN OF RIGHT KNEE: ICD-10-CM

## 2025-01-31 DIAGNOSIS — G89.29 CHRONIC PAIN OF LEFT KNEE: ICD-10-CM

## 2025-01-31 DIAGNOSIS — M25.561 CHRONIC PAIN OF RIGHT KNEE: ICD-10-CM

## 2025-01-31 PROCEDURE — 73721 MRI JNT OF LWR EXTRE W/O DYE: CPT | Mod: TC,LT

## 2025-01-31 PROCEDURE — 73721 MRI JNT OF LWR EXTRE W/O DYE: CPT | Mod: 26,RT,, | Performed by: RADIOLOGY

## 2025-01-31 PROCEDURE — 73721 MRI JNT OF LWR EXTRE W/O DYE: CPT | Mod: TC,RT

## 2025-01-31 PROCEDURE — 73721 MRI JNT OF LWR EXTRE W/O DYE: CPT | Mod: 26,LT,, | Performed by: RADIOLOGY

## 2025-02-17 ENCOUNTER — OFFICE VISIT (OUTPATIENT)
Dept: SPORTS MEDICINE | Facility: CLINIC | Age: 45
End: 2025-02-17
Payer: COMMERCIAL

## 2025-02-17 VITALS
SYSTOLIC BLOOD PRESSURE: 119 MMHG | HEART RATE: 68 BPM | HEIGHT: 65 IN | DIASTOLIC BLOOD PRESSURE: 77 MMHG | BODY MASS INDEX: 37.52 KG/M2 | WEIGHT: 225.19 LBS

## 2025-02-17 DIAGNOSIS — S83.231A COMPLEX TEAR OF MEDIAL MENISCUS OF RIGHT KNEE AS CURRENT INJURY, INITIAL ENCOUNTER: ICD-10-CM

## 2025-02-17 DIAGNOSIS — M94.261 CHONDROMALACIA, KNEE, RIGHT: ICD-10-CM

## 2025-02-17 DIAGNOSIS — S83.232A COMPLEX TEAR OF MEDIAL MENISCUS OF LEFT KNEE AS CURRENT INJURY, INITIAL ENCOUNTER: Primary | ICD-10-CM

## 2025-02-17 DIAGNOSIS — M94.262 CHONDROMALACIA, KNEE, LEFT: ICD-10-CM

## 2025-02-17 NOTE — PROGRESS NOTES
ESTABLISHED  PATIENT    History 2/17/2025:  Kristofer returns here today for follow-up evaluation of both knees and to discuss her MRI results.    History 12/27/2024:  Kristofer returns here today for follow-up evaluation of her left knee.  She is to have lateral-sided pain with occasional catching and locking sensations.  She also has instability.  She previously underwent formal physical therapy for her right knee has been doing her home exercise program for her left knee over the last 6 weeks.  She states this has given her little to no relief.  She is still having trouble exercising.    History 11/15/2024:  44 y.o. Female with a history of left knee pain who I have previously treated for right knee patellofemoral chondromalacia. She states that her pain is located in her posterior knee and in her hamstrings.  She states her pain began in late October 2024 which started after altering her workout routine. She states she had a cartilage injury to her left knee when she was a child. She states her pain radiates from the back of her knee and then down the medial and lateral side of her lower leg and to the bottom of her foot. She states the thera-gun massager has been helpful     - mechanical symptoms, - instability    Is affecting ADLs.  Pain is 5/10 at it's worst.        PAST MEDICAL HISTORY    Past Medical History:   Diagnosis Date    Allergy     Anti-TPO antibodies present 11/8/2018    Carpal tunnel syndrome     Dyslipidemia (high LDL; low HDL) 6/25/2013    Fever blister     Herpes simplex labialis 6/25/2013    Intraductal papilloma of left breast 3/1/2019    IUD (intrauterine device) in place 2009    Metabolic syndrome 7/22/2013    Thyroid disease     treated with synthroid during pregnancy    Vitamin D deficiency 11/5/2018    Vitamin D insufficiency 11/5/2018       PAST SURGICAL HISTORY     Past Surgical History:   Procedure Laterality Date    BREAST BIOPSY Left 12/14/2018    Core bx, benign    BREAST  SURGERY  March 2019    Had a growth removed from my milk duct    EXCISIONAL BIOPSY Left 03/01/2019    Procedure: EXCISIONAL BIOPSY- w/major duct excision KENALOG INJECTION AT TIME OF SURGERY;  Surgeon: Kelby Mcmahan MD;  Location: [facility] OR 2ND FLR;  Service: General;  Laterality: Left;    INTRAUTERINE DEVICE INSERTION  2009    again 2014    PANNICULECTOMY N/A 04/23/2024    Procedure: PANNICULECTOMY;  Surgeon: Christiano Richard MD;  Location: [facility] OR 2ND FLR;  Service: Plastics;  Laterality: N/A;       FAMILY HISTORY    Family History   Problem Relation Name Age of Onset    Angioedema Mother Janee [place]     Diabetes Mother Janee [place]     Hypertension Mother Janee [place]     Rheum arthritis Mother Janee [place]     Coronary artery disease Mother Janee [place]     Stroke Mother Janee [place]         x2    Liver disease Mother Janee [place]         NAFLD    Sarcoidosis Mother Janee [place]     Thyroid disease Mother Janee [place]     Hyperlipidemia Mother Janee [place]     Atrial fibrillation Mother Janee [place]     Cataracts Mother Janee [place]     Glaucoma Mother Janee [place]     Arthritis Mother Janee [place]     Kidney disease Mother Janee [place]     Miscarriages / Stillbirths Mother Janee [place]     Vision loss Mother Janee [place]     Allergies Father Milton [place]     Hypertension Father Milton [place]     Dementia Father Milton [place]     Hyperlipidemia Father Milton [place]     Liver cancer Father Milton [place]     Cancer Father Milton [place]         Liver    Stroke Father Milton [place]     Hypertension Maternal Aunt Gely Toribioms     Coronary artery disease Maternal Uncle          with stent    Sarcoidosis Maternal Uncle      Leukemia Maternal Uncle      Rheum arthritis Maternal Grandmother          wheelchair bound    Cataracts Maternal Grandfather      Rheum arthritis Paternal Grandmother      Allergies Child      Breast cancer Neg Hx      Colon cancer Neg Hx      Ovarian cancer Neg Hx         SOCIAL HISTORY    Social History     Socioeconomic History    Marital  status:     Number of children: 2   Occupational History     Employer: [employer]   Tobacco Use    Smoking status: Never     Passive exposure: Past    Smokeless tobacco: Never   Substance and Sexual Activity    Alcohol use: Not Currently     Alcohol/week: 0.8 standard drinks of alcohol     Comment: Rarely    Drug use: No    Sexual activity: Yes     Partners: Male     Birth control/protection: I.U.D.     Social Drivers of Health     Financial Resource Strain: Low Risk  (5/28/2024)    Overall Financial Resource Strain (CARDIA)     Difficulty of Paying Living Expenses: Not hard at all   Food Insecurity: No Food Insecurity (5/28/2024)    Hunger Vital Sign     Worried About Running Out of Food in the Last Year: Never true     Ran Out of Food in the Last Year: Never true   Transportation Needs: No Transportation Needs (9/28/2023)    PRAPARE - Transportation     Lack of Transportation (Medical): No     Lack of Transportation (Non-Medical): No   Physical Activity: Insufficiently Active (5/28/2024)    Exercise Vital Sign     Days of Exercise per Week: 3 days     Minutes of Exercise per Session: 30 min   Stress: No Stress Concern Present (5/28/2024)    [employer] - Occupational Stress Questionnaire     Feeling of Stress : Only a little   Housing Stability: High Risk (5/28/2024)    Housing Stability Vital Sign     Unable to Pay for Housing in the Last Year: Yes       MEDICATIONS      Current Outpatient Medications:     CALCIUM ORAL, Take by mouth., Disp: , Rfl:     cholecalciferol, vitamin D3, (VITAMIN D3) 125 mcg (5,000 unit) Tab, Take 10,000 Units by mouth once daily., Disp: , Rfl:     COLLAGEN MISC, by Misc.(Non-Drug; Combo Route) route., Disp: , Rfl:     diclofenac (VOLTAREN) 50 MG EC tablet, Take 1 tablet (50 mg total) by mouth 2 (two) times daily as needed (pain). Take with food, Disp: 40 tablet, Rfl: 0    docosahexaenoic acid/epa (FISH OIL ORAL), Take by mouth., Disp: , Rfl:      FLONASE ALLERGY RELIEF 50 mcg/actuation nasal spray, SPRAY TWICE IN EACH NOSTRIL ONCE DAILY, Disp: 16 g, Rfl: 5    hydrocortisone 2.5 % cream, Apply topically 2 (two) times daily. Mix with ketoconazole cream and apply twice a day for 1-2 weeks when flaring. Only use when flaring., Disp: 28 g, Rfl: 6    ketoconazole (NIZORAL) 2 % cream, Apply topically 2 (two) times daily. Apply twice a day with hydrocortisone when flaring for up to two weeks at a time., Disp: 30 g, Rfl: 6    valACYclovir (VALTREX) 500 MG tablet, TAKE 1 TABLET(500 MG) BY MOUTH EVERY DAY, Disp: 30 tablet, Rfl: 11    ZINC ORAL, Take by mouth., Disp: , Rfl:     CRYSELLE, 28, 0.3-30 mg-mcg per tablet, TAKE 1 TABLET BY MOUTH DAILY, Disp: 112 tablet, Rfl: 0    Current Facility-Administered Medications:     levonorgestreL (Mirena) 52 mg IUD 1 Intra Uterine Device, 1 Intra Uterine Device, Intrauterine, , , 1 Intra Uterine Device at 12/27/24 1115    ALLERGIES     Review of patient's allergies indicates:   Allergen Reactions    Sulfa (sulfonamide antibiotics) Hives     Also causes joint stiffness.         REVIEW OF SYSTEMS   Constitution: Negative. Negative for chills, fever and night sweats.   HENT: Negative for congestion and headaches.    Eyes: Negative for blurred vision, left vision loss and right vision loss.   Cardiovascular: Negative for chest pain and syncope.   Respiratory: Negative for cough and shortness of breath.    Endocrine: Negative for polydipsia, polyphagia and polyuria.   Hematologic/Lymphatic: Negative for bleeding problem. Does not bruise/bleed easily.   Skin: Negative for dry skin, itching and rash.   Musculoskeletal: Negative for falls. Positive for posterior left knee pain   Gastrointestinal: Negative for abdominal pain and bowel incontinence.   Genitourinary: Negative for bladder incontinence and nocturia.   Neurological: Negative for disturbances in coordination, loss of balance and seizures.   Psychiatric/Behavioral: Negative  "for depression. The patient does not have insomnia.    Allergic/Immunologic: Negative for hives and persistent infections.     PHYSICAL EXAMINATION    Vitals: /77   Pulse 68   Ht 5' 4.5" (1.638 m)   Wt 102.2 kg (225 lb 3.2 oz)   BMI 38.06 kg/m²     General: The patient appears active and healthy with no apparent physical problems.  No disturbance of mood or affect is demonstrated. Alert and Oriented.    HEENT: Eyes normal, pupils equally round, nose normal.    Resp: Equal and symmetrical chest rises. No wheezing    CV: Regular rate    Neck: Supple; nonpainful range of motion.    Extremities: no cyanosis, clubbing, edema, or diffuse swelling.  Palpable pulses, good capillary refill of the digits.  No coolness, discoloration, edema or obvious varicosities.    Skin: no lesions noted.    Lymphatic: no detected adenopathy in the upper or lower extremities.    Neurologic: normal mental status, normal reflexes, normal gait and balance.  Patient is alert and oriented to person, place and time.  No flaccidity or spasticity is noted.  No motor or sensory deficits are noted.  Light touch is intact    Orthopaedic: KNEE EXAM - LEFT    Inspection:   Normal skin color and appearance with no scars, no ecchymosis and no effusion.      ROM:   0° - 145°.      Palpation:   There is no tenderness along medial joint line, medial plica, medial collateral ligament, pes bursa, lateral joint line, iliotibial band, lateral collateral ligament, popliteal fossa, patellar tendon, or quadriceps tendon. + tenderness distal posterior tibialis     Stability: - anterior drawer, - Lachman, - pivot shift and - posterior drawer.      No instability with varus or valgus stress at 0° or 30°. Negative dial  test at 30° and 90°.    Tests:   - Naeems test.  - patellar compression - grind test, - patellofemoral crepitus.  There is no patellar apprehension.     - J Sign. - Ewa's. - patellar tilt. - Margarito. Lateral patella translation  2 " quadrants. Medial patella translation 2 quadrants    Motor:   Quadriceps strength is 5/5 and hamstrings strength is 5/5. Hamstrings show tightness. no pain with hamstring resistance    Neuro:   Distal neurovascular status is normal    Vascular: Negative Homans and no palpable popliteal cords. 2+ pedal pulse with brisk cap refill    Gait Normal    + pain with toe and heel walking         IMAGING    MRI Knee Without Contrast Right  Narrative: EXAMINATION:  MRI KNEE WITHOUT CONTRAST RIGHT    CLINICAL HISTORY:  Knee pain, chronic, positive xray (Age >= 5y);Pain in right knee    TECHNIQUE:  Multiplanar, multisequence images were performed about the RIGHT knee.    COMPARISON:  11/15/2024    FINDINGS:  **MENISCI:    Medial meniscus: Undersurface fraying posterior horn medial meniscus    Lateral meniscus: Intact anterior horn, body, and posterior horn.    Meniscal root attachment: Abnormal appearance of the posterior horn medial meniscal root with meniscal extrusion consistent with tear    Popliteal hiatus, superior and inferior meniscal fascicles:Nonvisualized without secondary signs such as pericapsular edema.    **LIGAMENTS:    Anterior cruciate ligament: Continuous, with normal signal.    Posterior cruciate ligament: Continuous, with normal signal.    Medial collateral ligament: Intact.    Lateral collateral ligament and  biceps femoris: Intact.    Iliotibial band (ITB): No evidence for ITB syndrome.    Popliteus tendon and popliteofibular ligament: Intact.    Posterior medial and posterior lateral corners: Intact.    **TENDONS:    Quadriceps tendon: Intact.    Patella tendon: Intact with irregularity at the level of the tibial tuberosity likely chronic Osgood-Schlatter    Joint fluid: Small amount of joint fluid    Hoffa's fat pad: Normal.    Intact medial retinaculum/ MPFL.    Intact lateral retinaculum.    **CARTILAGE:    Patellofemoral:Delamination at the level of the median patellar ridge/eminence and medial  patellar facet.    Medial tibiofemoral: Articular cartilage demonstrates cartilaginous irregularity without subchondral edema subchondral marrow edema.Scratch    Lateral tibiofemoral: Articular cartilage demonstrates cartilaginous irregularity with minimal underlying subchondral edema lateral femoral condyle/lateral tibial plateau    **OTHER:    Bone marrow: No fracture or marrow replacing process.    Miscellaneous: The gastrocnemius muscles are normal.Proximal tibia-fibula joint relationships preserved. No evident plica thickening.  Impression: Undersurface fraying posterior horn medial meniscus.  Early posterior horn medial meniscal root tear with meniscal extrusion    Cartilage delamination, early median patellar eminence/ridge and medial patellar facet with cartilaginous fissuring predominantly at the level of the lateral tibiofemoral joint space with minimal underlying subchondral edema.    Electronically signed by: Tyler Smith MD  Date:    01/31/2025  Time:    18:22  MRI Knee Without Contrast Left  Narrative: EXAMINATION:  MRI KNEE WITHOUT CONTRAST LEFT    CLINICAL HISTORY:  Knee pain, chronic, negative xray (Age >= 5y);Evaluate the lateral meniscus;Pain in left knee    TECHNIQUE:  Multiplanar, multisequence images were performed about the LEFT knee.    COMPARISON:  11/15/2024    FINDINGS:  **MENISCI:    Medial meniscus: Complex tear posterior horn medial meniscus.  Mild extrusion of the medial meniscus    Lateral meniscus: Intact anterior horn, body, and posterior horn.    Meniscal root attachment: Irregularity at the level of the posterior horn medial meniscal root    Popliteal hiatus, superior and inferior meniscal fascicles:Intact    **LIGAMENTS:    Anterior cruciate ligament: Continuous, with normal signal.    Posterior cruciate ligament: Continuous, with normal signal.    Medial collateral ligament: Intact.    Lateral collateral ligament and  biceps femoris: Intact.    Iliotibial band (ITB): No  evidence for ITB syndrome.    Popliteus tendon and popliteofibular ligament: Intact.    Posterior medial and posterior lateral corners: Intact.    **TENDONS:    Quadriceps tendon: Intact.    Patella tendon: Intact.    Joint fluid: Mild effusion    Hoffa's fat pad: Normal.    Intact medial retinaculum/ MPFL.    Intact lateral retinaculum.    **CARTILAGE:    Patellofemoral:Preserved medial and  lateral patellar facet cartilage.Median ridge demonstrates no focal abnormality.  Preserved trochlear groove cartilage.  Preservedanterior medial and anterior lateral femoral trochlea facet cartilage.    Medial tibiofemoral: Articular cartilage is preserved without focal defects or subchondral marrow edema.Internal aspect of medial femoral condyle cartilage is intact.    Lateral tibiofemoral: Articular cartilage demonstrates full-thickness cartilage defect posterior compartment lateral femoral condyle weight-bearing aspect, approximately 15 x 6 mm with minimal subchondral marrow edema.    **OTHER:    Bone marrow: No fracture or marrow replacing process.    Miscellaneous: The gastrocnemius muscles are normal.Proximal tibia-fibula joint relationships preserved. No evident plica thickening.  Impression: Complex tear posterior horn medial meniscus.    Full-thickness cartilage loss posterior compartment lateral femoral condyle with minimal underlying subchondral edema.    Electronically signed by: Tyler Smith MD  Date:    01/31/2025  Time:    18:17        X-ray Knee Ortho Left with Flexion  Order: [id number]  Status: Final result       Visible to patient: Yes (seen)       Next appt: 02/21/2025 at 01:15 PM in Obstetrics and Gynecology (Chel Wade MD)       Dx: Posterior left knee pain    0 Result Notes  Details    Reading Physician Reading Date Result Priority   Milton Farooq MD  [phone]  [phone] 11/15/2024 Routine     Narrative & Impression  EXAMINATION:  XR KNEE ORTHO LEFT WITH FLEXION     CLINICAL HISTORY:  .  Pain in left knee     TECHNIQUE:  AP standing view of both knees, PA flexion standing views of both knees, and Merchant views of both knees were performed. A lateral view of the left knee was also performed.     COMPARISON:  No 11/17/2021 ne     FINDINGS:  No significant joint space narrowing identified.  No fracture or dislocation.  No bone destruction identified.     Impression:     See above        Electronically signed by:Milton Farooq MD  Date:                                            11/15/2024  Time:                                           12:42        Exam Ended: 11/15/24 12:19 CST Last Resulted: 11/15/24 12:42 CST             IMPRESSION       ICD-10-CM ICD-9-CM   1. Complex tear of medial meniscus of left knee as current injury, initial encounter  S83.232A 836.0   2. Complex tear of medial meniscus of right knee as current injury, initial encounter  S83.231A 836.0   3. Chondromalacia, knee, left  M94.262 717.7   4. Chondromalacia, knee, right  M94.261 717.7           MEDICATIONS PRESCRIBED      None    RECOMMENDATIONS     MRI images and report were reviewed and discussed with the patient in depth today; complex tear of the medial meniscus on the left with full-thickness cartilage loss in the lateral compartment and medial meniscal root tear with meniscal excursion and chondromalacia on the right  Surgical and conservative treatment options were discussed in depth today  She has failed conservative treatment including time, activity modifications, prescription strength oral anti-inflammatories, and formal physical therapy  She would like to consider surgical intervention; knee arthroscopy with meniscal repair versus meniscectomy, chondroplasty, and all other indicated procedures  The risks, benefits, and postoperative recovery of the procedure were discussed in depth today.  All questions have been answered.  Return to clinic with sports surgeon for potential surgical planning  Follow-up with me as  needed      All questions were answered, pt will contact us for questions or concerns in the interim.        Bernabe Gabriel PA-C, MMS

## 2025-02-20 ENCOUNTER — OFFICE VISIT (OUTPATIENT)
Dept: SPORTS MEDICINE | Facility: CLINIC | Age: 45
End: 2025-02-20
Payer: COMMERCIAL

## 2025-02-20 VITALS
BODY MASS INDEX: 38.58 KG/M2 | DIASTOLIC BLOOD PRESSURE: 71 MMHG | HEART RATE: 73 BPM | WEIGHT: 226 LBS | SYSTOLIC BLOOD PRESSURE: 114 MMHG | HEIGHT: 64 IN

## 2025-02-20 DIAGNOSIS — M17.0 BILATERAL PRIMARY OSTEOARTHRITIS OF KNEE: Primary | ICD-10-CM

## 2025-02-20 DIAGNOSIS — S83.231A COMPLEX TEAR OF MEDIAL MENISCUS OF RIGHT KNEE AS CURRENT INJURY, INITIAL ENCOUNTER: ICD-10-CM

## 2025-02-20 DIAGNOSIS — S83.232A COMPLEX TEAR OF MEDIAL MENISCUS OF LEFT KNEE AS CURRENT INJURY, INITIAL ENCOUNTER: ICD-10-CM

## 2025-02-20 PROCEDURE — 3078F DIAST BP <80 MM HG: CPT | Mod: CPTII,S$GLB,, | Performed by: STUDENT IN AN ORGANIZED HEALTH CARE EDUCATION/TRAINING PROGRAM

## 2025-02-20 PROCEDURE — 1160F RVW MEDS BY RX/DR IN RCRD: CPT | Mod: CPTII,S$GLB,, | Performed by: STUDENT IN AN ORGANIZED HEALTH CARE EDUCATION/TRAINING PROGRAM

## 2025-02-20 PROCEDURE — 99999 PR PBB SHADOW E&M-EST. PATIENT-LVL V: CPT | Mod: PBBFAC,,, | Performed by: STUDENT IN AN ORGANIZED HEALTH CARE EDUCATION/TRAINING PROGRAM

## 2025-02-20 PROCEDURE — 99205 OFFICE O/P NEW HI 60 MIN: CPT | Mod: S$GLB,,, | Performed by: STUDENT IN AN ORGANIZED HEALTH CARE EDUCATION/TRAINING PROGRAM

## 2025-02-20 PROCEDURE — 3008F BODY MASS INDEX DOCD: CPT | Mod: CPTII,S$GLB,, | Performed by: STUDENT IN AN ORGANIZED HEALTH CARE EDUCATION/TRAINING PROGRAM

## 2025-02-20 PROCEDURE — 3074F SYST BP LT 130 MM HG: CPT | Mod: CPTII,S$GLB,, | Performed by: STUDENT IN AN ORGANIZED HEALTH CARE EDUCATION/TRAINING PROGRAM

## 2025-02-20 PROCEDURE — 1159F MED LIST DOCD IN RCRD: CPT | Mod: CPTII,S$GLB,, | Performed by: STUDENT IN AN ORGANIZED HEALTH CARE EDUCATION/TRAINING PROGRAM

## 2025-02-20 NOTE — PROGRESS NOTES
"Subjective:          Chief Complaint: Kristofer Gentile is a 44 y.o. female who had concerns including Pain of the Left Knee and Pain of the Right Knee.    History of Present Illness    CHIEF COMPLAINT:  - Bilateral knee pain, left knee more severe    HPI:  Client presents with bilateral knee pain due to meniscus tears. Her right knee issue began in November 2021, with an MRI confirming the tear. The left knee problem started in September 2024, when she felt a "pop" during a simple activity.    For the right knee, pain is mainly on the side and back, but not on the inside. She describes it as intermittent, sometimes aching for days before subsiding. The knee feels weak and renita when aggravated. It swells when overused, especially after activities like running or excessive work.    Left knee pain is more extensive, affecting multiple areas including the front, back, and sides. She states it is significantly more severe than the right knee, with pain radiating down her leg. She reports constant swelling and a feeling of the knee catching or getting stuck.    Her activities are limited due to the knee issues. She attempted rowing and biking a few weeks ago, which caused significant swelling in her knee that took weeks to subside. As a , her job primarily involves sitting, which does not cause issues. However, she reports being unable to engage in recreational activities.    Client has been managing symptoms with Diclofenac, but has not undergone any physical therapy or injections. She denies any specific injury to the left knee, mentioning it occurred during a simple activity, possibly due to overcompensation for the right knee.    Client denies any specific medical diagnoses.    PREVIOUS TREATMENTS:  - Diclofenac    WORK STATUS:  - Works as a   - Job primarily involves sitting  - Currently working from home  - May be required to return to the office soon  - Expresses concern about mobility " issues when returning to the office, particularly navigating from the parking garage to the office in the [facility] if they have to use crutches post-surgery         Past Medical History:   Diagnosis Date    Allergy     Anti-TPO antibodies present 11/8/2018    Carpal tunnel syndrome     Dyslipidemia (high LDL; low HDL) 6/25/2013    Fever blister     Herpes simplex labialis 6/25/2013    Intraductal papilloma of left breast 3/1/2019    IUD (intrauterine device) in place 2009    Metabolic syndrome 7/22/2013    Thyroid disease     treated with synthroid during pregnancy    Vitamin D deficiency 11/5/2018    Vitamin D insufficiency 11/5/2018       Medications Ordered Prior to Encounter[1]    Past Surgical History:   Procedure Laterality Date    BREAST BIOPSY Left 12/14/2018    Core bx, benign    BREAST SURGERY  March 2019    Had a growth removed from my milk duct    EXCISIONAL BIOPSY Left 03/01/2019    Procedure: EXCISIONAL BIOPSY- w/major duct excision KENALOG INJECTION AT TIME OF SURGERY;  Surgeon: Kelby Mcmahan MD;  Location: [facility] OR [address] [place] [address];  Service: General;  Laterality: Left;    INTRAUTERINE DEVICE INSERTION  2009    again 2014    PANNICULECTOMY N/A 04/23/2024    Procedure: PANNICULECTOMY;  Surgeon: Christiano Richard MD;  Location: [facility] OR [address] [place] [address];  Service: Plastics;  Laterality: N/A;       Family History   Problem Relation Name Age of Onset    Angioedema Mother Janee Deforest     Diabetes Mother Janee Deforest     Hypertension Mother Janee Deforest     Rheum arthritis Mother Janee Deforest     Coronary artery disease Mother Janee Deforest     Stroke Mother Janee Deforest         x2    Liver disease Mother Janee Deforest         NAFLD    Sarcoidosis Mother Janee Deforest     Thyroid disease Mother Janee Deforest     Hyperlipidemia Mother Janee Deforest     Atrial fibrillation Mother Janee Deforest     Cataracts Mother Janee Deforest     Glaucoma Mother Janee Deforest     Arthritis Mother Janee Deforest     Kidney disease Mother Janee Deforest      Miscarriages / Stillbirths Mother Janee Wisdom     Vision loss Mother Janee Wisdom     Allergies Father [place]     Hypertension Father [place]     Dementia Father [place]     Hyperlipidemia Father Milton [place]     Liver cancer Father Milton [place]     Cancer Father Milton [place]         Liver    Stroke Father Milton [place]     Hypertension Maternal Aunt Gely Lopez     Coronary artery disease Maternal Uncle          with stent    Sarcoidosis Maternal Uncle      Leukemia Maternal Uncle      Rheum arthritis Maternal Grandmother          wheelchair bound    Cataracts Maternal Grandfather      Rheum arthritis Paternal Grandmother      Allergies Child      Breast cancer Neg Hx      Colon cancer Neg Hx      Ovarian cancer Neg Hx         Social History[2]     Review of Systems   All other systems reviewed and are negative.      Pain Related Questions  Over the past 3 days, what was your average pain during activity? (I.e. running, jogging, walking, climbing stairs, getting dressed, ect.): 6  Over the past 3 days, what was your highest pain level?: 6  Over the past 3 days, what was your lowest pain level? : 5    Other  Was the patient's HEIGHT measured or patient reported?: Patient Reported  Was the patient's WEIGHT measured or patient reported?: Measured      Objective:        General: Kristofer is well-developed, well-nourished, appears stated age, in no acute distress, alert and oriented to time, place and person.     General    Vitals reviewed.  Constitutional: She is oriented to person, place, and time. She appears well-developed and well-nourished. No distress.   HENT: Mouth/Throat: No oropharyngeal exudate.   Eyes: Right eye exhibits no discharge. Left eye exhibits no discharge.   Pulmonary/Chest: Effort normal and breath sounds normal. No respiratory distress.   Neurological: She is alert and oriented to person, place, and time. She has normal reflexes. No cranial nerve deficit. Coordination normal.   Psychiatric: She  has a normal mood and affect. Her behavior is normal. Judgment and thought content normal.     General Musculoskeletal Exam   Gait: normal       Right Knee Exam     Inspection   Erythema: absent  Scars: absent  Swelling: absent  Effusion: absent  Deformity: absent  Bruising: absent    Tenderness   The patient is tender to palpation of the medial joint line.    Range of Motion   Extension:  0 normal   Flexion:  130 normal     Tests   Meniscus   Naeem:  Medial - positive Lateral - negative  Ligament Examination   Lachman: normal (-1 to 2mm)   PCL-Posterior Drawer: normal (0 to 2mm)     MCL - Valgus: normal (0 to 2mm)  LCL - Varus: normal  Pivot Shift: normal (Equal)  Reverse Pivot Shift: normal (Equal)  Dial Test at 30 degrees: normal (< 5 degrees)  Dial Test at 90 degrees: normal (< 5 degrees)  Posterior Sag Test: negative  Posterolateral Corner: stable  Patella   Patellar apprehension: negative  Passive Patellar Tilt: neutral  Patellar Tracking: normal  Patellar Glide (quadrants): Lateral - 1   Medial - 2  Q-Angle at 90 degrees: normal  Patellar Grind: negative  J-Sign: none    Other   Meniscal Cyst: absent  Popliteal (Baker's) Cyst: absent  Sensation: normal    Left Knee Exam     Inspection   Erythema: absent  Scars: absent  Swelling: absent  Effusion: present  Deformity: absent  Bruising: absent    Tenderness   The patient is experiencing no tenderness and medial joint line.   The patient tender to palpation of the no tenderness and medial joint line.    Range of Motion   Extension:  0 normal   Flexion:  130 normal     Tests   Meniscus   Naeem:  Medial - positive Lateral - negative  Stability   Lachman: normal (-1 to 2mm)   PCL-Posterior Drawer: normal (0 to 2mm)  MCL - Valgus: normal (0 to 2mm)  LCL - Varus: normal (0 to 2mm)  Pivot Shift: normal (Equal)  Reverse Pivot Shift: normal (Equal)  Dial Test at 30 degrees: normal (< 5 degrees)  Dial Test at 90 degrees: normal (< 5 degrees)  Posterior Sag Test:  negative  Posterolateral Corner: stable  Patella   Patellar apprehension: negative  Passive Patellar Tilt: neutral  Patellar Tracking: normal  Patellar Glide (Quadrants): Lateral - 1 Medial - 2  Q-Angle at 90 degrees: normal  Patellar Grind: negative  J-Sign: J sign absent    Other   Meniscal Cyst: absent  Popliteal (Baker's) Cyst: absent  Sensation: normal    Comments:  Pain with forced flexion and extension    Right Hip Exam     Tests   Margarito: negative  Left Hip Exam     Tests   Margarito: negative          Muscle Strength   Right Lower Extremity   Quadriceps:  5/5   Hamstrin/5   Left Lower Extremity   Quadriceps:  5/5   Hamstrin/5     Reflexes     Left Side  Achilles:  2+  Quadriceps:  2+    Right Side   Achilles:  2+  Quadriceps:  2+    Vascular Exam     Right Pulses  Dorsalis Pedis:      2+  Posterior Tibial:      2+        Left Pulses  Dorsalis Pedis:      2+  Posterior Tibial:      2+        Edema  Right Lower Leg: absent  Left Lower Leg: absent      Imaging:   X-rays of the bilateral knees 11/15/2024 personally viewed by me 2025.  These weight-bearing AP, PA flexion lateral, Merchant views.  Apparent genu varum bilaterally.  The right knee demonstrates Kellgren Lukasz grade 3 arthritic changes in the patellofemoral medial compartment with osteophyte formation and decreased joint space.  The left knee demonstrates preserved joint space although maybe slightly decreased in the medial compartment.  Cruciate and collateral ligaments are intact.    MRI of the left knee from 2025 personally viewed by me 2025.  Complex tear of the posterior horn of the medial meniscus which extends into the root.  Unclear whether or not the root ligament is intact.  Focal full-thickness area of grade 4 chondromalacia to the lateral femoral condyle measuring 16 x 7 mm with underlying bone marrow lesion.  Medial compartment and patellofemoral cartilage overall appears preserved.      MRI of the right knee from  01/31/2025 personally viewed by me 02/20/2025.  Medial meniscus has tear of the posterior horn into the root with mild extrusion into the medial gutter.  Full-thickness cartilage loss tricompartmentally.  Cruciate collateral ligaments are intact.        Assessment:     Kristofer Gentile is a 44 y.o. female with right greater than left knee osteoarthritis and bilateral posterior horn medial meniscus tears  Encounter Diagnoses   Name Primary?    Bilateral primary osteoarthritis of knee Yes    Complex tear of medial meniscus of left knee as current injury, initial encounter     Complex tear of medial meniscus of right knee as current injury, initial encounter           Plan:         Assessment & Plan    PROCEDURES:  - Client elects to proceed with arthroscopic surgery on the left knee to either repair or clean out the meniscus tear, depending on intraoperative findings.  - Surgery scheduled for March 21st.  - Recommend gel injection for the right knee during left knee surgery while patient is under anesthesia.  - Explained potential risks and benefits of meniscus repair versus clean-out, including differences in recovery time and long-term outcomes.    FOLLOW UP:  - Follow up with LEWIS Soliman, 1-2 weeks before surgery to review details and paperwork.  - Begin physical therapy 3-4 days after surgery.  - Contact office to send [facility] paperwork through patient portal.    PATIENT INSTRUCTIONS:  - Use crutches for 1-2 days after surgery if debridement is performed.  - Avoid driving while using knee brace or narcotic pain medication.  - Work from home for 6 weeks after surgery.         The diagnosis and treatment options were discussed at length with the patient and all their questions were answered.  I showed her the x-rays and the MRI and explained the findings to them.      We discussed treatment options for meniscal tears.  I stated there is the nonoperative management options.  This would consist of corticosteroid  injections or viscosupplementation injection.  Additionally, a directed home exercise program or physical therapy course would be initiated.  We will continue to monitor the progress.  If they fail to improve, or develops mechanical symptoms, we would discuss surgical intervention.  We then discussed surgical intervention.  I explained that this would entail left arthroscopy.  I explained that this would likely entail partial medial meniscectomy, however depending on the tear pattern a meniscal repair could be performed.  If the tear extending into the root, a meniscal root repair would be performed.  The differences in the procedures, as well as the risks, benefits, and rehabilitation protocol were discussed at length and all their questions were answered.    After this discussion, she elected to proceed with surgical intervention.  We will plan this for 03/12/2025.  she does need clearance from their primary care physician.    Additionally, we will administer right knee viscosupplementation injection intraoperatively.    The risks, benefits and alternatives to surgery were discussed with the patient at great length.  These include, but not limited to, bleeding, infection, vessel/nerve damage, pain, numbness, tingling, complex regional pain syndrome, hardware/surgical failure, need for further surgery, tissue retear, persistent symptoms, progression of arthritis, failure of repair, DVT, PE, arthritis and death.  Patient states an understanding and wishes to proceed with surgery.   All questions were answered.  No guarantees were implied or stated.  Informed consent was obtained.                         [1]   Current Outpatient Medications on File Prior to Visit   Medication Sig Dispense Refill    CALCIUM ORAL Take by mouth.      cholecalciferol, vitamin D3, (VITAMIN D3) 125 mcg (5,000 unit) Tab Take 10,000 Units by mouth once daily.      COLLAGEN MISC by Misc.(Non-Drug; Combo Route) route.      diclofenac  (VOLTAREN) 50 MG EC tablet Take 1 tablet (50 mg total) by mouth 2 (two) times daily as needed (pain). Take with food 40 tablet 0    docosahexaenoic acid/epa (FISH OIL ORAL) Take by mouth.      FLONASE ALLERGY RELIEF 50 mcg/actuation nasal spray SPRAY TWICE IN EACH NOSTRIL ONCE DAILY 16 g 5    hydrocortisone 2.5 % cream Apply topically 2 (two) times daily. Mix with ketoconazole cream and apply twice a day for 1-2 weeks when flaring. Only use when flaring. 28 g 6    ketoconazole (NIZORAL) 2 % cream Apply topically 2 (two) times daily. Apply twice a day with hydrocortisone when flaring for up to two weeks at a time. 30 g 6    valACYclovir (VALTREX) 500 MG tablet TAKE 1 TABLET(500 MG) BY MOUTH EVERY DAY 30 tablet 11    ZINC ORAL Take by mouth.       Current Facility-Administered Medications on File Prior to Visit   Medication Dose Route Frequency Provider Last Rate Last Admin    levonorgestreL (Mirena) 52 mg IUD 1 Intra Uterine Device  1 Intra Uterine Device Intrauterine     1 Intra Uterine Device at 12/27/24 1115   [2]   Social History  Socioeconomic History    Marital status:     Number of children: 2   Occupational History     Employer: [employer]   Tobacco Use    Smoking status: Never     Passive exposure: Past    Smokeless tobacco: Never   Substance and Sexual Activity    Alcohol use: Not Currently     Alcohol/week: 0.8 standard drinks of alcohol     Comment: Rarely    Drug use: No    Sexual activity: Yes     Partners: Male     Birth control/protection: I.U.D.     Social Drivers of Health     Financial Resource Strain: Low Risk  (5/28/2024)    Overall Financial Resource Strain (CARDIA)     Difficulty of Paying Living Expenses: Not hard at all   Food Insecurity: No Food Insecurity (5/28/2024)    Hunger Vital Sign     Worried About Running Out of Food in the Last Year: Never true     Ran Out of Food in the Last Year: Never true   Transportation Needs: No Transportation Needs (9/28/2023)    PRAPARE -  Transportation     Lack of Transportation (Medical): No     Lack of Transportation (Non-Medical): No   Physical Activity: Insufficiently Active (5/28/2024)    Exercise Vital Sign     Days of Exercise per Week: 3 days     Minutes of Exercise per Session: 30 min   Stress: No Stress Concern Present (5/28/2024)    [employer] - Occupational Stress Questionnaire     Feeling of Stress : Only a little   Housing Stability: High Risk (5/28/2024)    Housing Stability Vital Sign     Unable to Pay for Housing in the Last Year: Yes

## 2025-02-21 ENCOUNTER — OFFICE VISIT (OUTPATIENT)
Dept: OBSTETRICS AND GYNECOLOGY | Facility: CLINIC | Age: 45
End: 2025-02-21
Payer: COMMERCIAL

## 2025-02-21 VITALS
BODY MASS INDEX: 37.86 KG/M2 | SYSTOLIC BLOOD PRESSURE: 116 MMHG | DIASTOLIC BLOOD PRESSURE: 80 MMHG | WEIGHT: 220.56 LBS

## 2025-02-21 DIAGNOSIS — N89.8 VAGINAL DISCHARGE: ICD-10-CM

## 2025-02-21 DIAGNOSIS — Z30.431 SURVEILLANCE OF INTRAUTERINE CONTRACEPTION: Primary | ICD-10-CM

## 2025-02-21 DIAGNOSIS — N89.8 VAGINAL DISCHARGE: Primary | ICD-10-CM

## 2025-02-21 DIAGNOSIS — Z30.431 SURVEILLANCE OF INTRAUTERINE CONTRACEPTION: ICD-10-CM

## 2025-02-21 DIAGNOSIS — N76.0 BACTERIAL VAGINITIS: ICD-10-CM

## 2025-02-21 DIAGNOSIS — B96.89 BACTERIAL VAGINITIS: ICD-10-CM

## 2025-02-21 PROCEDURE — 1159F MED LIST DOCD IN RCRD: CPT | Mod: CPTII,S$GLB,, | Performed by: OBSTETRICS & GYNECOLOGY

## 2025-02-21 PROCEDURE — 3074F SYST BP LT 130 MM HG: CPT | Mod: CPTII,S$GLB,, | Performed by: OBSTETRICS & GYNECOLOGY

## 2025-02-21 PROCEDURE — 99999 PR PBB SHADOW E&M-EST. PATIENT-LVL III: CPT | Mod: PBBFAC,,, | Performed by: OBSTETRICS & GYNECOLOGY

## 2025-02-21 PROCEDURE — 81515 NFCT DS BV&VAGINITIS DNA ALG: CPT | Performed by: OBSTETRICS & GYNECOLOGY

## 2025-02-21 PROCEDURE — 3079F DIAST BP 80-89 MM HG: CPT | Mod: CPTII,S$GLB,, | Performed by: OBSTETRICS & GYNECOLOGY

## 2025-02-21 PROCEDURE — 1160F RVW MEDS BY RX/DR IN RCRD: CPT | Mod: CPTII,S$GLB,, | Performed by: OBSTETRICS & GYNECOLOGY

## 2025-02-21 PROCEDURE — 3008F BODY MASS INDEX DOCD: CPT | Mod: CPTII,S$GLB,, | Performed by: OBSTETRICS & GYNECOLOGY

## 2025-02-21 PROCEDURE — 99213 OFFICE O/P EST LOW 20 MIN: CPT | Mod: S$GLB,,, | Performed by: OBSTETRICS & GYNECOLOGY

## 2025-02-21 RX ORDER — METRONIDAZOLE 500 MG/1
500 TABLET ORAL EVERY 12 HOURS
Qty: 10 TABLET | Refills: 0 | Status: SHIPPED | OUTPATIENT
Start: 2025-02-21 | End: 2025-02-26

## 2025-02-24 LAB
BACTERIAL VAGINOSIS DNA: DETECTED
CANDIDA GLABRATA/KRUSEI: NOT DETECTED
CANDIDA RRNA VAG QL PROBE: NOT DETECTED
TRICHOMONAS VAGINALIS: NOT DETECTED

## 2025-03-05 ENCOUNTER — OFFICE VISIT (OUTPATIENT)
Dept: INTERNAL MEDICINE | Facility: CLINIC | Age: 45
End: 2025-03-05
Payer: COMMERCIAL

## 2025-03-05 ENCOUNTER — LAB VISIT (OUTPATIENT)
Dept: LAB | Facility: HOSPITAL | Age: 45
End: 2025-03-05
Attending: INTERNAL MEDICINE
Payer: COMMERCIAL

## 2025-03-05 ENCOUNTER — RESULTS FOLLOW-UP (OUTPATIENT)
Dept: OBSTETRICS AND GYNECOLOGY | Facility: CLINIC | Age: 45
End: 2025-03-05

## 2025-03-05 VITALS
SYSTOLIC BLOOD PRESSURE: 118 MMHG | HEIGHT: 64 IN | TEMPERATURE: 98 F | RESPIRATION RATE: 18 BRPM | BODY MASS INDEX: 37.41 KG/M2 | DIASTOLIC BLOOD PRESSURE: 70 MMHG | OXYGEN SATURATION: 99 % | WEIGHT: 219.13 LBS | HEART RATE: 62 BPM

## 2025-03-05 DIAGNOSIS — E78.5 DYSLIPIDEMIA (HIGH LDL; LOW HDL): ICD-10-CM

## 2025-03-05 DIAGNOSIS — S83.232S COMPLEX TEAR OF MEDIAL MENISCUS OF LEFT KNEE AS CURRENT INJURY, SEQUELA: ICD-10-CM

## 2025-03-05 DIAGNOSIS — E88.810 METABOLIC SYNDROME: ICD-10-CM

## 2025-03-05 DIAGNOSIS — E66.01 SEVERE OBESITY (BMI 35.0-39.9) WITH COMORBIDITY: ICD-10-CM

## 2025-03-05 DIAGNOSIS — Z01.818 PRE-OP EVALUATION: Primary | ICD-10-CM

## 2025-03-05 DIAGNOSIS — R21 RASH AND NONSPECIFIC SKIN ERUPTION: ICD-10-CM

## 2025-03-05 DIAGNOSIS — Z01.818 PRE-OP EVALUATION: ICD-10-CM

## 2025-03-05 LAB
25(OH)D3+25(OH)D2 SERPL-MCNC: 41 NG/ML (ref 30–96)
ALBUMIN SERPL BCP-MCNC: 3.4 G/DL (ref 3.5–5.2)
ALP SERPL-CCNC: 56 U/L (ref 40–150)
ALT SERPL W/O P-5'-P-CCNC: 23 U/L (ref 10–44)
ANION GAP SERPL CALC-SCNC: 6 MMOL/L (ref 8–16)
AST SERPL-CCNC: 37 U/L (ref 10–40)
BASOPHILS # BLD AUTO: 0.05 K/UL (ref 0–0.2)
BASOPHILS NFR BLD: 1.4 % (ref 0–1.9)
BILIRUB SERPL-MCNC: 0.3 MG/DL (ref 0.1–1)
BUN SERPL-MCNC: 12 MG/DL (ref 6–20)
CALCIUM SERPL-MCNC: 8.7 MG/DL (ref 8.7–10.5)
CHLORIDE SERPL-SCNC: 107 MMOL/L (ref 95–110)
CHOLEST SERPL-MCNC: 226 MG/DL (ref 120–199)
CHOLEST/HDLC SERPL: 4.9 {RATIO} (ref 2–5)
CO2 SERPL-SCNC: 25 MMOL/L (ref 23–29)
CREAT SERPL-MCNC: 0.8 MG/DL (ref 0.5–1.4)
DIFFERENTIAL METHOD BLD: ABNORMAL
EOSINOPHIL # BLD AUTO: 0.2 K/UL (ref 0–0.5)
EOSINOPHIL NFR BLD: 5 % (ref 0–8)
ERYTHROCYTE [DISTWIDTH] IN BLOOD BY AUTOMATED COUNT: 12.6 % (ref 11.5–14.5)
EST. GFR  (NO RACE VARIABLE): >60 ML/MIN/1.73 M^2
GLUCOSE SERPL-MCNC: 92 MG/DL (ref 70–110)
HCT VFR BLD AUTO: 36.4 % (ref 37–48.5)
HDLC SERPL-MCNC: 46 MG/DL (ref 40–75)
HDLC SERPL: 20.4 % (ref 20–50)
HGB BLD-MCNC: 11.9 G/DL (ref 12–16)
IMM GRANULOCYTES # BLD AUTO: 0.01 K/UL (ref 0–0.04)
IMM GRANULOCYTES NFR BLD AUTO: 0.3 % (ref 0–0.5)
INR PPP: 1 (ref 0.8–1.2)
LDLC SERPL CALC-MCNC: 171.2 MG/DL (ref 63–159)
LYMPHOCYTES # BLD AUTO: 1.4 K/UL (ref 1–4.8)
LYMPHOCYTES NFR BLD: 40.1 % (ref 18–48)
MCH RBC QN AUTO: 30.1 PG (ref 27–31)
MCHC RBC AUTO-ENTMCNC: 32.7 G/DL (ref 32–36)
MCV RBC AUTO: 92 FL (ref 82–98)
MONOCYTES # BLD AUTO: 0.4 K/UL (ref 0.3–1)
MONOCYTES NFR BLD: 9.7 % (ref 4–15)
NEUTROPHILS # BLD AUTO: 1.6 K/UL (ref 1.8–7.7)
NEUTROPHILS NFR BLD: 43.5 % (ref 38–73)
NONHDLC SERPL-MCNC: 180 MG/DL
NRBC BLD-RTO: 0 /100 WBC
OHS QRS DURATION: 78 MS
OHS QTC CALCULATION: 404 MS
PLATELET # BLD AUTO: 243 K/UL (ref 150–450)
PMV BLD AUTO: 10.6 FL (ref 9.2–12.9)
POTASSIUM SERPL-SCNC: 3.9 MMOL/L (ref 3.5–5.1)
PROT SERPL-MCNC: 7 G/DL (ref 6–8.4)
PROTHROMBIN TIME: 11.4 SEC (ref 9–12.5)
RBC # BLD AUTO: 3.96 M/UL (ref 4–5.4)
SODIUM SERPL-SCNC: 138 MMOL/L (ref 136–145)
TRIGL SERPL-MCNC: 44 MG/DL (ref 30–150)
WBC # BLD AUTO: 3.59 K/UL (ref 3.9–12.7)

## 2025-03-05 PROCEDURE — 99999 PR PBB SHADOW E&M-EST. PATIENT-LVL IV: CPT | Mod: PBBFAC,,, | Performed by: INTERNAL MEDICINE

## 2025-03-05 PROCEDURE — 93010 ELECTROCARDIOGRAM REPORT: CPT | Mod: S$GLB,,, | Performed by: INTERNAL MEDICINE

## 2025-03-05 PROCEDURE — 3008F BODY MASS INDEX DOCD: CPT | Mod: CPTII,S$GLB,, | Performed by: INTERNAL MEDICINE

## 2025-03-05 PROCEDURE — 80053 COMPREHEN METABOLIC PANEL: CPT | Performed by: INTERNAL MEDICINE

## 2025-03-05 PROCEDURE — 1159F MED LIST DOCD IN RCRD: CPT | Mod: CPTII,S$GLB,, | Performed by: INTERNAL MEDICINE

## 2025-03-05 PROCEDURE — 3078F DIAST BP <80 MM HG: CPT | Mod: CPTII,S$GLB,, | Performed by: INTERNAL MEDICINE

## 2025-03-05 PROCEDURE — 99214 OFFICE O/P EST MOD 30 MIN: CPT | Mod: S$GLB,,, | Performed by: INTERNAL MEDICINE

## 2025-03-05 PROCEDURE — 93005 ELECTROCARDIOGRAM TRACING: CPT | Mod: S$GLB,,, | Performed by: INTERNAL MEDICINE

## 2025-03-05 PROCEDURE — 80061 LIPID PANEL: CPT | Performed by: INTERNAL MEDICINE

## 2025-03-05 PROCEDURE — 85610 PROTHROMBIN TIME: CPT | Performed by: INTERNAL MEDICINE

## 2025-03-05 PROCEDURE — 3074F SYST BP LT 130 MM HG: CPT | Mod: CPTII,S$GLB,, | Performed by: INTERNAL MEDICINE

## 2025-03-05 PROCEDURE — 85025 COMPLETE CBC W/AUTO DIFF WBC: CPT | Performed by: INTERNAL MEDICINE

## 2025-03-05 PROCEDURE — 82306 VITAMIN D 25 HYDROXY: CPT | Performed by: INTERNAL MEDICINE

## 2025-03-05 PROCEDURE — 36415 COLL VENOUS BLD VENIPUNCTURE: CPT | Mod: PO | Performed by: INTERNAL MEDICINE

## 2025-03-05 PROCEDURE — 1160F RVW MEDS BY RX/DR IN RCRD: CPT | Mod: CPTII,S$GLB,, | Performed by: INTERNAL MEDICINE

## 2025-03-05 RX ORDER — KETOCONAZOLE 20 MG/G
CREAM TOPICAL 2 TIMES DAILY
Qty: 30 G | Refills: 1 | Status: SHIPPED | OUTPATIENT
Start: 2025-03-05

## 2025-03-05 RX ORDER — HYDROCORTISONE 25 MG/G
CREAM TOPICAL 2 TIMES DAILY
Qty: 28 G | Refills: 1 | Status: SHIPPED | OUTPATIENT
Start: 2025-03-05

## 2025-03-05 NOTE — PROGRESS NOTES
Subjective:     Kristofer Gentile is a 44 y.o. female who presents for pre-op assessment.  Chief Complaint   Patient presents with    Pre-op Exam     Left knee/surgery date 3/21/25    Knee Pain           Constipation       HPI    Surgery: L knee arthroscopy for torn meniscus  Surgeon: Dr. Caro  Date of Surgery: 3/21/25    Active cardiac issues:  Active decompensated heart failure? No   Unstable angina?  No   Significant uncontrolled arrhythmias? No   Severe valvular heart disease-Aortic or Mitral Stenosis? No   Recent MI or coronary revascularization < 30 days? No     Cardiac Risk Factors  History of CAD/ischemic heart disease? No   History of cerebrovascular disease? No   History of compensated heart failure? No   Type 2 diabetes requiring insulin? No   Serum Creatinine > 2? ordered   Total cardiac risk factors --     Functional mets >4, no symptoms during workouts    Constipation: The patient presents for evaluation of constipation. Onset was several months ago. Defecation has been difficult. Current Health Habits: Eating fiber? No because of fairly low-fiber keto diet, Exercise? yes, Adequate hydration? yes. Current over the counter/prescription laxative: bulk (psyllium) and stimulant sometimes  which has been somewhat effective.      Body mass index is 37.61 kg/m².  Wt Readings from Last 3 Encounters:   03/05/25 99.4 kg (219 lb 2.2 oz)   02/21/25 100 kg (220 lb 9.1 oz)   02/20/25 102.5 kg (225 lb 15.5 oz)   - keto diet x2 years but she limits saturated fats, eats more cheese than before  - HIIT 3x weekly with no limitations  - lost 40 lbs on low fat keto diet      Review of Systems   Constitutional:  Positive for activity change. Negative for chills, diaphoresis, fever and unexpected weight change.   HENT:  Negative for congestion, ear pain, hearing loss, rhinorrhea, sinus pressure and trouble swallowing.    Eyes:  Negative for discharge, redness and visual disturbance.   Respiratory:  Negative for cough,  shortness of breath and wheezing.    Cardiovascular:  Negative for chest pain, palpitations and leg swelling.   Gastrointestinal:  Positive for constipation. Negative for abdominal pain, blood in stool, diarrhea, nausea and vomiting.   Endocrine: Negative for polydipsia and polyuria.   Genitourinary:  Negative for difficulty urinating, dysuria, hematuria and menstrual problem.   Musculoskeletal:  Positive for arthralgias and joint swelling. Negative for myalgias and neck pain.   Skin:  Positive for rash (left side of neck). Negative for wound.   Neurological:  Negative for dizziness, tremors, weakness and headaches.   Psychiatric/Behavioral:  Negative for confusion and dysphoric mood.         Answers submitted by the patient for this visit:  Review of Systems Questionnaire (Submitted on 2/26/2025)  activity change: Yes  unexpected weight change: No  neck pain: No  hearing loss: No  rhinorrhea: No  trouble swallowing: No  eye discharge: No  visual disturbance: No  chest tightness: No  wheezing: No  chest pain: No  palpitations: No  blood in stool: No  constipation: No  vomiting: No  diarrhea: No  polydipsia: No  polyuria: No  difficulty urinating: No  hematuria: No  menstrual problem: No  dysuria: No  joint swelling: Yes  arthralgias: Yes  headaches: No  weakness: No  confusion: No  dysphoric mood: No      Objective:     Physical Exam  Vitals reviewed.   Constitutional:       General: She is awake. She is not in acute distress.     Appearance: Normal appearance. She is well-developed and well-groomed.   HENT:      Head: Normocephalic and atraumatic.      Right Ear: Hearing and external ear normal.      Left Ear: Hearing and external ear normal.      Nose: Nose normal. No congestion.      Mouth/Throat:      Mouth: Mucous membranes are moist.   Eyes:      General: Lids are normal. Vision grossly intact.   Cardiovascular:      Rate and Rhythm: Normal rate and regular rhythm.      Heart sounds: Normal heart sounds. No  murmur heard.  Pulmonary:      Effort: Pulmonary effort is normal.      Breath sounds: Normal breath sounds. No decreased breath sounds or wheezing.   Abdominal:      General: Bowel sounds are normal. There is no distension.   Musculoskeletal:         General: Normal range of motion.      Cervical back: Normal range of motion.      Right lower leg: No edema.      Left lower leg: No edema.   Skin:     General: Skin is warm and dry.      Findings: Rash (left side of neck with dryness and hyperpigmentation) present. No lesion.   Neurological:      Mental Status: She is alert and oriented to person, place, and time.   Psychiatric:         Attention and Perception: Attention normal.         Mood and Affect: Mood normal.         Behavior: Behavior is cooperative.            Assessment:      1. Pre-op evaluation    2. Complex tear of medial meniscus of left knee as current injury, sequela    3. Rash and nonspecific skin eruption    4. Dyslipidemia (high LDL; low HDL)    5. Metabolic syndrome    6. BMI 37.0-37.9, adult    7. Severe obesity (BMI 35.0-39.9) with comorbidity           Plan:     1. Pre-op evaluation  - IN OFFICE EKG 12-LEAD (to Muse)  - CBC Auto Differential; Future  - Comprehensive Metabolic Panel; Future  - PROTIME-INR; Future  - increase water intake, try a stool softener for constipation    2. Complex tear of medial meniscus of left knee as current injury, sequela  - check labs and EKG before knee arthroscopy    3. Rash and nonspecific skin eruption  - Ambulatory referral/consult to Dermatology; Future  - hydrocortisone 2.5 % cream; Apply topically 2 (two) times daily. Mix with ketoconazole cream and apply twice a day for 1-2 weeks when flaring. Only use when flaring.  Dispense: 28 g; Refill: 1  - ketoconazole (NIZORAL) 2 % cream; Apply topically 2 (two) times daily. Apply twice a day with hydrocortisone when flaring for up to two weeks at a time.  Dispense: 30 g; Refill: 1    4,5. Dyslipidemia (high LDL;  low HDL)/ Metabolic syndrome  - Lipid Panel; Future  - will discuss need for a statin after lab review    6. BMI 37.0-37.9, adult  - Vitamin D; Future    7. Severe obesity (BMI 35.0-39.9) with comorbidity  - currently losing weight with eating habits and lifestyle modifications, will monitor    RTC in June 2025 for annual exam or sooner if needed    __________________________    Melonie Villanueva MD, PharmD  [facility]- Internal Medicine  American Board of Obesity Medicine diplomate  Office [phone]      ADDENDUM:    Reviewed EKG results and labs. The patient does not have any active cardiac conditions or clinical cardiac risk factors and her exercise tolerance is more than 4 METS. She will be undergoing a procedure with low-intermediate risk. She may proceed with surgery as planned.     Melonie Villanueva MD

## 2025-03-11 ENCOUNTER — PATIENT MESSAGE (OUTPATIENT)
Dept: PREADMISSION TESTING | Facility: HOSPITAL | Age: 45
End: 2025-03-11
Payer: COMMERCIAL

## 2025-03-11 NOTE — ANESTHESIA PAT ROS NOTE
03/11/2025  Kristofer Gentile is a 44 y.o., female.      Pre-op Assessment    I have reviewed the Patient Summary Reports.       I have reviewed the Medications.     Review of Systems  Anesthesia Hx:  No problems with previous Anesthesia   History of prior surgery of interest to airway management or planning:  Previous anesthesia: General 4/23/2024  Paniculectomy with general anesthesia.  Procedure performed at an [facility].      Airway issues documented on chart review include mask, easy, GETA, videolaryngoscope used  , view on video-laryngoscopy Grade I      Denies Personal Hx of Anesthesia complications.                    Social:  Non-Smoker, Social Alcohol Use       Hematology/Oncology:    Oncology Normal    -- Denies Anemia:               Hematology Comments: Vitamin D deficiency                    EENT/Dental:   Allergies          Cardiovascular:  Exercise tolerance: good      Denies MI.  Denies CAD.       Denies Angina.     hyperlipidemia  Denies HILLS.  ECG has been reviewed.  Patient not on beta blockers                          Pulmonary:  Pulmonary Normal   Denies COPD.  Denies Asthma.   Denies Shortness of breath.                  Renal/:     3/12/25 Being treated for UTI             Hepatic/GI:      Denies GERD.   H/O Paniculectomy Not Taking GLP-1 Agonists            Musculoskeletal:  Arthritis   Bilateral primary osteoarthritis of knee,  Complex tear of medial meniscus of left knee,   Complex tear of medial meniscus of right knee              OB/GYN/PEDS:  H/O Breast biopsy, benign,  Growth removed from right breast           Neurological:    Denies CVA. Neuromuscular Disease,   Denies Seizures.    Carpal Tunnel Syndrome                            Endocrine:  Denies Diabetes. Hypothyroidism  Anti-TPO (Thyroid Peroxidase) antibodies present,  Thyroid disease, treated with Synthroid  during pregnancy,  Metabolic Syndrome        Obesity / BMI > 30  Dermatological:  Alopecia,   Primary focal hyperhidrosis of axilla,  Keloid,  Pruritic rash,  Fungal toenail infection     Psych:  Psychiatric Normal                   Past Medical History:   Diagnosis Date    Allergy     Anti-TPO antibodies present 11/8/2018    Carpal tunnel syndrome     Dyslipidemia (high LDL; low HDL) 6/25/2013    Fever blister     Herpes simplex labialis 6/25/2013    Intraductal papilloma of left breast 3/1/2019    IUD (intrauterine device) in place 2009    Metabolic syndrome 7/22/2013    Thyroid disease     treated with synthroid during pregnancy    Vitamin D deficiency 11/5/2018    Vitamin D insufficiency 11/5/2018     Past Surgical History:   Procedure Laterality Date    BREAST BIOPSY Left 12/14/2018    Core bx, benign    BREAST SURGERY  March 2019    Had a growth removed from my milk duct    EXCISIONAL BIOPSY Left 03/01/2019    Procedure: EXCISIONAL BIOPSY- w/major duct excision KENALOG INJECTION AT TIME OF SURGERY;  Surgeon: Kelby Mcmahan MD;  Location: [facility] OR [address] [place] [address];  Service: General;  Laterality: Left;    INTRAUTERINE DEVICE INSERTION  2009    again 2014    PANNICULECTOMY N/A 04/23/2024    Procedure: PANNICULECTOMY;  Surgeon: Christiano Richard MD;  Location: [facility] OR [address] [place] [address];  Service: Plastics;  Laterality: N/A;       Anesthesia Assessment: Preoperative EQUATION    Planned Procedure: Procedure(s) (LRB):  ARTHROSCOPY,KNEE,WITH MENISCUS REPAIR (Left)  Requested Anesthesia Type:Regional  Surgeon: Cullen Caro MD  Service: Orthopedics  Known or anticipated Date of Surgery:3/21/2025    Surgeon notes: reviewed    Electronic QUestionnaire Assessment completed via nurse interview with patient.        Triage considerations:     The patient has no apparent active cardiac condition (No unstable coronary Syndrome such as severe unstable angina or recent [<1 month] myocardial infarction, decompensated CHF, severe  valvular   disease or significant arrhythmia)    Previous anesthesia records:GETA, Easy airway, Easy intubation, and No problems, video laryngoscopy, Herrera 3    Last PCP note: within 1 month , within [facility]   Subspecialty notes: n/a    Other important co-morbidities: HLD and Obesity       EKG 3/5/2025:  Vent. Rate :  60 BPM     Atrial Rate :  60 BPM      P-R Int : 168 ms          QRS Dur :  78 ms       QT Int : 404 ms       P-R-T Axes :  33  36  19 degrees     QTcB Int : 404 ms   Normal sinus rhythm   Normal ECG   No previous ECGs available   Confirmed by Joseph Hubbard ([id number]) on 3/5/2025 12:11:33 PM        Tests already available:  Results have been reviewed.             Instructions given. (See in Nurse's note) Preop medication instructions sent via portal message.     Optimization:  Anesthesia Preop Clinic Assessment Not Indicated    Medical Opinion Indicated: Yes, PCP       Sub-specialist consult indicated: No      Plan: Consultation:Patient's PCP for a statement of optimization     Patient  has previously scheduled Medical Appointment: 3/5/2025    Navigation: Patient is cleared/ optimized for surgery by PCP.       Ht: 5'4  Wt: 99.4 kg (219 kg)  BMI: 37.61

## 2025-03-12 ENCOUNTER — OFFICE VISIT (OUTPATIENT)
Dept: INTERNAL MEDICINE | Facility: CLINIC | Age: 45
End: 2025-03-12
Payer: COMMERCIAL

## 2025-03-12 ENCOUNTER — LAB VISIT (OUTPATIENT)
Dept: LAB | Facility: HOSPITAL | Age: 45
End: 2025-03-12
Payer: COMMERCIAL

## 2025-03-12 DIAGNOSIS — R30.0 DYSURIA: ICD-10-CM

## 2025-03-12 DIAGNOSIS — R30.0 DYSURIA: Primary | ICD-10-CM

## 2025-03-12 DIAGNOSIS — R35.0 FREQUENCY OF MICTURITION: ICD-10-CM

## 2025-03-12 LAB
BACTERIA #/AREA URNS AUTO: ABNORMAL /HPF
BILIRUB UR QL STRIP: NEGATIVE
CLARITY UR REFRACT.AUTO: ABNORMAL
COLOR UR AUTO: YELLOW
GLUCOSE UR QL STRIP: NEGATIVE
HGB UR QL STRIP: ABNORMAL
KETONES UR QL STRIP: NEGATIVE
LEUKOCYTE ESTERASE UR QL STRIP: ABNORMAL
MICROSCOPIC COMMENT: ABNORMAL
NITRITE UR QL STRIP: NEGATIVE
PH UR STRIP: 6 [PH] (ref 5–8)
PROT UR QL STRIP: ABNORMAL
RBC #/AREA URNS AUTO: 38 /HPF (ref 0–4)
SP GR UR STRIP: 1.01 (ref 1–1.03)
SQUAMOUS #/AREA URNS AUTO: 15 /HPF
URN SPEC COLLECT METH UR: ABNORMAL
WBC #/AREA URNS AUTO: >100 /HPF (ref 0–5)

## 2025-03-12 PROCEDURE — 87086 URINE CULTURE/COLONY COUNT: CPT | Performed by: NURSE PRACTITIONER

## 2025-03-12 PROCEDURE — 87088 URINE BACTERIA CULTURE: CPT | Performed by: NURSE PRACTITIONER

## 2025-03-12 PROCEDURE — 87186 SC STD MICRODIL/AGAR DIL: CPT | Performed by: NURSE PRACTITIONER

## 2025-03-12 PROCEDURE — 81001 URINALYSIS AUTO W/SCOPE: CPT | Performed by: NURSE PRACTITIONER

## 2025-03-12 RX ORDER — PHENAZOPYRIDINE HYDROCHLORIDE 200 MG/1
200 TABLET, FILM COATED ORAL 3 TIMES DAILY PRN
Qty: 9 TABLET | Refills: 0 | Status: SHIPPED | OUTPATIENT
Start: 2025-03-12 | End: 2025-03-15

## 2025-03-12 NOTE — PROGRESS NOTES
The patient location is: [place]  The chief complaint leading to consultation is: Dysuria    Visit type: audiovisual    Subjective:  HPI: 44 y.o. female History of Present Illness    CHIEF COMPLAINT:  Patient presents today for suspected urinary tract infection    HISTORY OF PRESENT ILLNESS:  She reports symptoms of urinary tract infection that began this morning, including dysuria, urinary frequency, bloating, and associated back pain. She denies fever, body aches, chills, and nausea.    GENITOURINARY HISTORY:  She recently underwent Mirena IUD insertion and was diagnosed with bacterial vaginosis, which was treated with Metronidazole. She denies current vaginal symptoms including discharge and itching. Last urinalysis was negative in July of last year.    ROS:  Constitutional: -fevers, -chills  Gastrointestinal: -nausea  Genitourinary: +dysuria, +frequency        Physical Exam  Pt no in any acute distress    Plan:  Assessment & Plan    IMPRESSION:  Suspected UTI based on patient's reported symptoms of burning, urinary frequency, and bloating  Considered previous negative urinary test from July last year  Will obtain urine sample for culture before initiating antibiotics  Noted recent treatment for bacterial vaginosis (BV) with Metronidazole, prescribed by Dr. Arias  Assessed that BV symptoms have resolved    URINARY TRACT INFECTION:  - Collected a urine sample for culture to confirm UTI diagnosis.  - Prescribed Pyridium (phenazopyridine) for symptom relief: 9 tablets, to be taken for up to 3 days as needed.  - Informed the patient that Pyridium may cause orange discoloration of urine.  - Advised that urine culture results typically take 48 to 72 hours.  - Scheduled the patient for urine sample collection at the [facility] today.  - Instructed the patient to contact the office if symptoms worsen or persist beyond 3 days.  - Noted that the patient reports symptoms of UTI including dysuria, urinary frequency,  bloating, and back pain, which started this morning.    INTRAUTERINE CONTRACEPTIVE DEVICE:  - Noted that the patient recently had a new Mirena IUD inserted.    HISTORY OF BACTERIAL VAGINOSIS:  - Confirmed positive bacterial vaginosis (BV) test result from Dr. Arias.  - Inquired about the resolution of BV symptoms.  - Noted that Metronidazole was prescribed for BV treatment.  - Documented the patient's recent history of bacterial vaginosis infection.     Face to Face time with patient: 8 minutes of total time spent on the encounter, which includes face to face time and non-face to face time preparing to see the patient (eg, review of tests), Obtaining and/or reviewing separately obtained history, Documenting clinical information in the electronic or other health record, Independently interpreting results (not separately reported) and communicating results to the patient/family/caregiver, or Care coordination (not separately reported).     Each patient to whom he or she provides medical services by telemedicine is:  (1) informed of the relationship between the physician and patient and the respective role of any other health care provider with respect to management of the patient; and (2) notified that he or she may decline to receive medical services by telemedicine and may withdraw from such care at any time.      Answers submitted by the patient for this visit:  Painful Urination Questionnaire (Submitted on 3/12/2025)  Chief Complaint: Dysuria  Chronicity: recurrent  Onset: today  Frequency: every urination  Progression since onset: rapidly worsening  Pain quality: burning  Pain - numeric: 7/10  Fever: no fever  Sexually active?: Yes  History of pyelonephritis?: Yes  chills: No  discharge: No  flank pain: No  frequency: Yes  hematuria: No  hesitancy: No  nausea: No  possible pregnancy: No  sweats: No  urgency: Yes  vomiting: No  constipation: No  rash: No  weight loss: No  withholding: Yes  behavior changes:  No  Treatments tried: nothing  Pain severity: moderate  catheterization: No  diabetes insipidus: No  diabetes mellitus: No  genitourinary reflux: No  hypertension: No  recurrent UTIs: Yes  single kidney: No  STD: No  urinary stasis: No  urological procedure: No  kidney stones: No

## 2025-03-14 ENCOUNTER — PATIENT MESSAGE (OUTPATIENT)
Dept: SPORTS MEDICINE | Facility: CLINIC | Age: 45
End: 2025-03-14
Payer: COMMERCIAL

## 2025-03-14 ENCOUNTER — PATIENT MESSAGE (OUTPATIENT)
Dept: INTERNAL MEDICINE | Facility: CLINIC | Age: 45
End: 2025-03-14
Payer: COMMERCIAL

## 2025-03-14 ENCOUNTER — RESULTS FOLLOW-UP (OUTPATIENT)
Dept: INTERNAL MEDICINE | Facility: CLINIC | Age: 45
End: 2025-03-14

## 2025-03-14 DIAGNOSIS — N39.0 UTI (URINARY TRACT INFECTION), UNCOMPLICATED: Primary | ICD-10-CM

## 2025-03-14 LAB — BACTERIA UR CULT: ABNORMAL

## 2025-03-14 RX ORDER — NITROFURANTOIN 25; 75 MG/1; MG/1
100 CAPSULE ORAL 2 TIMES DAILY
Qty: 10 CAPSULE | Refills: 0 | Status: SHIPPED | OUTPATIENT
Start: 2025-03-14

## 2025-03-18 ENCOUNTER — OFFICE VISIT (OUTPATIENT)
Dept: SPORTS MEDICINE | Facility: CLINIC | Age: 45
End: 2025-03-18
Payer: COMMERCIAL

## 2025-03-18 VITALS
DIASTOLIC BLOOD PRESSURE: 78 MMHG | WEIGHT: 218.13 LBS | HEIGHT: 64 IN | HEART RATE: 66 BPM | SYSTOLIC BLOOD PRESSURE: 114 MMHG | BODY MASS INDEX: 37.24 KG/M2

## 2025-03-18 DIAGNOSIS — S83.232A COMPLEX TEAR OF MEDIAL MENISCUS OF LEFT KNEE AS CURRENT INJURY, INITIAL ENCOUNTER: Primary | ICD-10-CM

## 2025-03-18 DIAGNOSIS — M94.262 CHONDROMALACIA, KNEE, LEFT: ICD-10-CM

## 2025-03-18 PROCEDURE — 99499 UNLISTED E&M SERVICE: CPT | Mod: S$GLB,,, | Performed by: ORTHOPAEDIC SURGERY

## 2025-03-18 PROCEDURE — 99999 PR PBB SHADOW E&M-EST. PATIENT-LVL IV: CPT | Mod: PBBFAC,,, | Performed by: ORTHOPAEDIC SURGERY

## 2025-03-18 RX ORDER — SODIUM CHLORIDE 9 MG/ML
INJECTION, SOLUTION INTRAVENOUS CONTINUOUS
Status: CANCELLED | OUTPATIENT
Start: 2025-03-18

## 2025-03-18 RX ORDER — CELECOXIB 200 MG/1
200 CAPSULE ORAL 2 TIMES DAILY WITH MEALS
Qty: 60 CAPSULE | Refills: 0 | Status: SHIPPED | OUTPATIENT
Start: 2025-03-18

## 2025-03-18 RX ORDER — ASPIRIN 81 MG/1
81 TABLET ORAL DAILY
Qty: 42 TABLET | Refills: 0 | Status: SHIPPED | OUTPATIENT
Start: 2025-03-18 | End: 2025-05-02

## 2025-03-18 RX ORDER — OXYCODONE HYDROCHLORIDE 5 MG/1
5 TABLET ORAL EVERY 6 HOURS PRN
Qty: 28 TABLET | Refills: 0 | Status: SHIPPED | OUTPATIENT
Start: 2025-03-18

## 2025-03-18 RX ORDER — METHOCARBAMOL 500 MG/1
500 TABLET, FILM COATED ORAL 3 TIMES DAILY PRN
Qty: 30 TABLET | Refills: 0 | Status: SHIPPED | OUTPATIENT
Start: 2025-03-18

## 2025-03-18 RX ORDER — PROMETHAZINE HYDROCHLORIDE 25 MG/1
25 TABLET ORAL EVERY 6 HOURS PRN
Qty: 30 TABLET | Refills: 0 | Status: SHIPPED | OUTPATIENT
Start: 2025-03-18

## 2025-03-18 NOTE — H&P
Kristofer Gentile  is here for a completion of her perioperative paperwork. she  Is scheduled to undergo:    LEFT Knee  Arthroscopy  Medial meniscus repair versus partial meniscectomy   Chondroplasty   All other indicated procedures    Right knee viscosupplementation injection       on 03/21/2025.  She is a healthy individual and does need clearance for this procedure.     Cleared by PCP on 03/05/2025    Risks, indications and benefits of the surgical procedure were discussed with the patient. All questions with regard to surgery, rehab, expected return to functional activities, activities of daily living and recreational endeavors were answered to her satisfaction.    Discussed COVID-19 with the patient, they are aware of our current policies and procedures, were given the option of delaying surgery, and they elect to proceed.    Patient was informed and understands the risks of surgery are greater for patients with a current condition or history of heart disease, obesity, clotting disorders, recurrent infections, steroid use, current or past smoking, and factors such as sedentary lifestyle and noncompliance with medications, therapy or follow-up. The degree of the increased risk is hard to estimate with any degree of precision.    Once no other questions were asked, a brief history and physical exam was then performed.    PAST MEDICAL HISTORY:   Past Medical History:   Diagnosis Date    Allergy     Anti-TPO antibodies present 11/8/2018    Carpal tunnel syndrome     Dyslipidemia (high LDL; low HDL) 6/25/2013    Fever blister     Herpes simplex labialis 6/25/2013    Intraductal papilloma of left breast 3/1/2019    IUD (intrauterine device) in place 2009    Metabolic syndrome 7/22/2013    Thyroid disease     treated with synthroid during pregnancy    Vitamin D deficiency 11/5/2018    Vitamin D insufficiency 11/5/2018     PAST SURGICAL HISTORY:   Past Surgical History:   Procedure Laterality Date    BREAST BIOPSY Left  12/14/2018    Core bx, benign    BREAST SURGERY  March 2019    Had a growth removed from my milk duct    EXCISIONAL BIOPSY Left 03/01/2019    Procedure: EXCISIONAL BIOPSY- w/major duct excision KENALOG INJECTION AT TIME OF SURGERY;  Surgeon: Kelby Mcmahan MD;  [facility];  Service: General;  Laterality: Left;    INTRAUTERINE DEVICE INSERTION  2009    again 2014    PANNICULECTOMY N/A 04/23/2024    Procedure: PANNICULECTOMY;  Surgeon: Christiano Richard MD;  Location: [facility] OR [address] [place] [address];  Service: Plastics;  Laterality: N/A;     FAMILY HISTORY:   Family History   Problem Relation Name Age of Onset    Angioedema Mother Janee [place]     Diabetes Mother Janee [place]     Hypertension Mother Janee [place]     Rheum arthritis Mother Janee [place]     Coronary artery disease Mother Janee [place]         early 40's    Stroke Mother Janee [place]         x2    Liver disease Mother Janee [place]         NAFLD    Sarcoidosis Mother Janee [place]     Thyroid disease Mother Janee [place]     Hyperlipidemia Mother Janee [place]     Atrial fibrillation Mother Janee [place]     Cataracts Mother Janee [place]     Glaucoma Mother Janee [place]     Arthritis Mother Janee [place]     Kidney disease Mother Janee [place]     Miscarriages / Stillbirths Mother Janee [place]     Vision loss Mother Janee [place]     Allergies Father [place]     Hypertension Father Milton [place]     Dementia Father Milton [place]     Hyperlipidemia Father Milton [place]     Liver cancer Father Milton [place]     Cancer Father Milton [place]         Liver    Stroke Father Milton [place]     Rheum arthritis Maternal Grandmother          wheelchair bound    Cataracts Maternal Grandfather      Rheum arthritis Paternal Grandmother      Allergies Child      Hypertension Maternal Aunt Gely Lopez     Coronary artery disease Maternal Uncle          with stent    Asbestos Maternal Uncle      Leukemia Maternal Uncle      Breast cancer Neg Hx      Colon cancer Neg Hx      Ovarian cancer Neg Hx       SOCIAL HISTORY:  Social History[1]    MEDICATIONS: Current Medications[2]  ALLERGIES:   Review of patient's allergies indicates:   Allergen Reactions    Sulfa (sulfonamide antibiotics) Hives     Also causes joint stiffness.       Review of Systems   Constitution: Negative. Negative for chills, fever and night sweats.   HENT: Negative for congestion and headaches.    Eyes: Negative for blurred vision, left vision loss and right vision loss.   Cardiovascular: Negative for chest pain and syncope.   Respiratory: Negative for cough and shortness of breath.    Endocrine: Negative for polydipsia, polyphagia and polyuria.   Hematologic/Lymphatic: Negative for bleeding problem. Does not bruise/bleed easily.   Skin: Negative for dry skin, itching and rash.   Musculoskeletal: Negative for falls and muscle weakness.   Gastrointestinal: Negative for abdominal pain and bowel incontinence.   Genitourinary: Negative for bladder incontinence and nocturia.   Neurological: Negative for disturbances in coordination, loss of balance and seizures.   Psychiatric/Behavioral: Negative for depression. The patient does not have insomnia.    Allergic/Immunologic: Negative for hives and persistent infections.     PHYSICAL EXAM:  GEN: A&Ox3, WD WN NAD  HEENT: WNL  CHEST: CTAB, no W/R/R  HEART: RRR, no M/R/G   ABD: Soft, NT ND, BS x4 QUADS  MS: Refer to previous note for detailed MS exam  NEURO: CN II-XII intact       The surgical consent was then reviewed with the patient, who agreed with all the contents of the consent form and it was signed. she was instructed to wait for a phone call from the anesthesia department prior to surgery to discuss past medical history, medications, and clearance. Also, informed she may be required to get additional testing per the anesthesia department prior to having surgery.     PHYSICAL THERAPY:  She was also instructed regarding physical therapy and will begin POD # 1-3. She was given a copy of the original prescription to  schedule. Another copy of this prescription was also faxed to [facility].    POST OP CARE:instructions were reviewed including care of the wound and dressing after surgery and when she can shower.     PAIN MANAGEMENT: Kristofer Gentile was also given a pain management regime, which includes the TENS unit given to her by  Symone Ghosh  along with the education required for its use. She was also instructed regarding the Polar ice unit that will be in place after surgery and her postoperative pain medications.     PAIN MEDICATION:  Roxicodone (Oxycodone) 5 mg po q 4-6 hours prn pain   Phenergan 25 mg one p.o. q.4-6 hours prn nausea and vomiting.  Celebrex 200 mg BID with meals  Robaxin 500mg q 8 hours prn pain  Aspirin 81mg daily x 6 weeks for DVT prophylaxis starting on the evening after surgery.    Post op meds to be delivered bedside prior to discharge. Deliver to family if patient is in surgery at 5pm.   Patient denies history of seizures.     Patient will also use bilateral TEDs on lower extremities, SCDs during surgery, and early ambulation post-op. If the patient was previously taking 81mg baby aspirin, they were told to not take it will using the above stated aspirin and to restart the 81mg aspirin after completion of the aspirin dose.       Patient was also told to buy over the counter Prilosec medication and take it once daily for GI protection as long as they are taking NSAIDs or Aspirin.    DVT prophylaxis was discussed with the patient today including risk factors for developing DVTs and history of DVTs. The patient was asked if any specific recommendations were given from the doctor/s that did pre-operative surgical clearance.      Patient was asked if they were taking or using OCP pills or devices. If they answered yes, then they were instructed to stop using OCPs at this pre-operative appointment until 2 months post-op to help prevent DVT development. They understand that there are other forms of  birth control that do not involve hormones. They expressed understanding that ignoring/not following this instruction could result in a DVT which could turn into a deadly pulmonary embolism.      The patient was told that narcotic pain medications may make them drowsy and instructions were given to not sign legal documents, drive or operate heavy machinery, cars, or equipment while under the influence of narcotic medications.     My supervising physician Cullen Caro MD was also present during the appointment.  He discussed with the patient all the potential complications, risks, and benefits of their upcoming surgery.    As there were no other questions to be asked, she was given my business card along with Cullen Caro's, MD business card if she has any questions or concerns prior to surgery or in the postop period.          [1]   Social History  Socioeconomic History    Marital status:     Number of children: 2   Occupational History     Employer: [employer]   Tobacco Use    Smoking status: Never     Passive exposure: Past    Smokeless tobacco: Never   Substance and Sexual Activity    Alcohol use: Not Currently     Alcohol/week: 0.8 standard drinks of alcohol     Comment: Rarely    Drug use: No    Sexual activity: Yes     Partners: Male     Birth control/protection: I.U.D.     Social Drivers of Health     Financial Resource Strain: Low Risk  (5/28/2024)    Overall Financial Resource Strain (CARDIA)     Difficulty of Paying Living Expenses: Not hard at all   Food Insecurity: No Food Insecurity (5/28/2024)    Hunger Vital Sign     Worried About Running Out of Food in the Last Year: Never true     Ran Out of Food in the Last Year: Never true   Transportation Needs: No Transportation Needs (9/28/2023)    PRAPARE - Transportation     Lack of Transportation (Medical): No     Lack of Transportation (Non-Medical): No   Physical Activity: Insufficiently Active (5/28/2024)    Exercise Vital Sign     Days  of Exercise per Week: 3 days     Minutes of Exercise per Session: 30 min   Stress: No Stress Concern Present (5/28/2024)    [employer] [place] [employer] - Occupational Stress Questionnaire     Feeling of Stress : Only a little   Housing Stability: High Risk (5/28/2024)    Housing Stability Vital Sign     Unable to Pay for Housing in the Last Year: Yes   [2]   Current Outpatient Medications:     CALCIUM ORAL, Take by mouth., Disp: , Rfl:     cholecalciferol, vitamin D3, (VITAMIN D3) 125 mcg (5,000 unit) Tab, Take 2,000 Units by mouth once daily., Disp: , Rfl:     COLLAGEN MISC, by Misc.(Non-Drug; Combo Route) route., Disp: , Rfl:     diclofenac (VOLTAREN) 50 MG EC tablet, Take 1 tablet (50 mg total) by mouth 2 (two) times daily as needed (pain). Take with food, Disp: 40 tablet, Rfl: 0    docosahexaenoic acid/epa (FISH OIL ORAL), Take by mouth., Disp: , Rfl:     FLONASE ALLERGY RELIEF 50 mcg/actuation nasal spray, SPRAY TWICE IN EACH NOSTRIL ONCE DAILY, Disp: 16 g, Rfl: 5    hydrocortisone 2.5 % cream, Apply topically 2 (two) times daily. Mix with ketoconazole cream and apply twice a day for 1-2 weeks when flaring. Only use when flaring., Disp: 28 g, Rfl: 1    ketoconazole (NIZORAL) 2 % cream, Apply topically 2 (two) times daily. Apply twice a day with hydrocortisone when flaring for up to two weeks at a time., Disp: 30 g, Rfl: 1    L.acid/L.casei/B.bif/B.deanna/FOS (PROBIOTIC BLEND ORAL), Take by mouth., Disp: , Rfl:     nitrofurantoin, macrocrystal-monohydrate, (MACROBID) 100 MG capsule, Take 1 capsule (100 mg total) by mouth 2 (two) times daily., Disp: 10 capsule, Rfl: 0    thymol/chlorophyllin (CHLOROPHYLL ORAL), Take by mouth., Disp: , Rfl:     valACYclovir (VALTREX) 500 MG tablet, TAKE 1 TABLET(500 MG) BY MOUTH EVERY DAY, Disp: 30 tablet, Rfl: 11    Current Facility-Administered Medications:     levonorgestreL (Mirena) 52 mg IUD 1 Intra Uterine Device, 1 Intra Uterine Device, Intrauterine, , , 1 Intra  Uterine Device at 12/27/24 1398

## 2025-03-18 NOTE — H&P (VIEW-ONLY)
Kristofer Gentile  is here for a completion of her perioperative paperwork. she  Is scheduled to undergo:    LEFT Knee  Arthroscopy  Medial meniscus repair versus partial meniscectomy   Chondroplasty   All other indicated procedures    Right knee viscosupplementation injection       on 03/21/2025.  She is a healthy individual and does need clearance for this procedure.     Cleared by PCP on 03/05/2025    Risks, indications and benefits of the surgical procedure were discussed with the patient. All questions with regard to surgery, rehab, expected return to functional activities, activities of daily living and recreational endeavors were answered to her satisfaction.    Discussed COVID-19 with the patient, they are aware of our current policies and procedures, were given the option of delaying surgery, and they elect to proceed.    Patient was informed and understands the risks of surgery are greater for patients with a current condition or history of heart disease, obesity, clotting disorders, recurrent infections, steroid use, current or past smoking, and factors such as sedentary lifestyle and noncompliance with medications, therapy or follow-up. The degree of the increased risk is hard to estimate with any degree of precision.    Once no other questions were asked, a brief history and physical exam was then performed.    PAST MEDICAL HISTORY:   Past Medical History:   Diagnosis Date    Allergy     Anti-TPO antibodies present 11/8/2018    Carpal tunnel syndrome     Dyslipidemia (high LDL; low HDL) 6/25/2013    Fever blister     Herpes simplex labialis 6/25/2013    Intraductal papilloma of left breast 3/1/2019    IUD (intrauterine device) in place 2009    Metabolic syndrome 7/22/2013    Thyroid disease     treated with synthroid during pregnancy    Vitamin D deficiency 11/5/2018    Vitamin D insufficiency 11/5/2018     PAST SURGICAL HISTORY:   Past Surgical History:   Procedure Laterality Date    BREAST BIOPSY Left  12/14/2018    Core bx, benign    BREAST SURGERY  March 2019    Had a growth removed from my milk duct    EXCISIONAL BIOPSY Left 03/01/2019    Procedure: EXCISIONAL BIOPSY- w/major duct excision KENALOG INJECTION AT TIME OF SURGERY;  Surgeon: Kelby Mcmahan MD;  [facility];  Service: General;  Laterality: Left;    INTRAUTERINE DEVICE INSERTION  2009    again 2014    PANNICULECTOMY N/A 04/23/2024    Procedure: PANNICULECTOMY;  Surgeon: Christiano Richard MD;  Location: [facility] OR [address] [place] [address];  Service: Plastics;  Laterality: N/A;     FAMILY HISTORY:   Family History   Problem Relation Name Age of Onset    Angioedema Mother Janee Ethel     Diabetes Mother Janee Ethel     Hypertension Mother Janee Ethel     Rheum arthritis Mother Janee Ethel     Coronary artery disease Mother Janee Ethel         early 40's    Stroke Mother Janee Ethel         x2    Liver disease Mother Janee Ethel         NAFLD    Sarcoidosis Mother Janee Ethel     Thyroid disease Mother Janee Ethel     Hyperlipidemia Mother Janee Ethel     Atrial fibrillation Mother Janee Ethel     Cataracts Mother Janee Ethel     Glaucoma Mother Janee Ethel     Arthritis Mother Janee Ethel     Kidney disease Mother Janee Ethel     Miscarriages / Stillbirths Mother Janee Ethel     Vision loss Mother Janee Ethel     Allergies Father Milton Ethel     Hypertension Father Milton Ethel     Dementia Father Milton Ethel     Hyperlipidemia Father Milton Ethel     Liver cancer Father Milton Ethel     Cancer Father Milton Ethel         Liver    Stroke Father Milton Ethel     Rheum arthritis Maternal Grandmother          wheelchair bound    Cataracts Maternal Grandfather      Rheum arthritis Paternal Grandmother      Allergies Child      Hypertension Maternal Aunt Gely Lopez     Coronary artery disease Maternal Uncle          with stent    Asbestos Maternal Uncle      Leukemia Maternal Uncle      Breast cancer Neg Hx      Colon cancer Neg Hx      Ovarian cancer Neg Hx       SOCIAL HISTORY:  Social History[1]    MEDICATIONS: Current Medications[2]  ALLERGIES:   Review of patient's allergies indicates:   Allergen Reactions    Sulfa (sulfonamide antibiotics) Hives     Also causes joint stiffness.       Review of Systems   Constitution: Negative. Negative for chills, fever and night sweats.   HENT: Negative for congestion and headaches.    Eyes: Negative for blurred vision, left vision loss and right vision loss.   Cardiovascular: Negative for chest pain and syncope.   Respiratory: Negative for cough and shortness of breath.    Endocrine: Negative for polydipsia, polyphagia and polyuria.   Hematologic/Lymphatic: Negative for bleeding problem. Does not bruise/bleed easily.   Skin: Negative for dry skin, itching and rash.   Musculoskeletal: Negative for falls and muscle weakness.   Gastrointestinal: Negative for abdominal pain and bowel incontinence.   Genitourinary: Negative for bladder incontinence and nocturia.   Neurological: Negative for disturbances in coordination, loss of balance and seizures.   Psychiatric/Behavioral: Negative for depression. The patient does not have insomnia.    Allergic/Immunologic: Negative for hives and persistent infections.     PHYSICAL EXAM:  GEN: A&Ox3, WD WN NAD  HEENT: WNL  CHEST: CTAB, no W/R/R  HEART: RRR, no M/R/G   ABD: Soft, NT ND, BS x4 QUADS  MS: Refer to previous note for detailed MS exam  NEURO: CN II-XII intact       The surgical consent was then reviewed with the patient, who agreed with all the contents of the consent form and it was signed. she was instructed to wait for a phone call from the anesthesia department prior to surgery to discuss past medical history, medications, and clearance. Also, informed she may be required to get additional testing per the anesthesia department prior to having surgery.     PHYSICAL THERAPY:  She was also instructed regarding physical therapy and will begin POD # 1-3. She was given a copy of the original prescription to  schedule. Another copy of this prescription was also faxed to [facility].    POST OP CARE:instructions were reviewed including care of the wound and dressing after surgery and when she can shower.     PAIN MANAGEMENT: Kristofer Gentile was also given a pain management regime, which includes the TENS unit given to her by  Symone Ghosh  along with the education required for its use. She was also instructed regarding the Polar ice unit that will be in place after surgery and her postoperative pain medications.     PAIN MEDICATION:  Roxicodone (Oxycodone) 5 mg po q 4-6 hours prn pain   Phenergan 25 mg one p.o. q.4-6 hours prn nausea and vomiting.  Celebrex 200 mg BID with meals  Robaxin 500mg q 8 hours prn pain  Aspirin 81mg daily x 6 weeks for DVT prophylaxis starting on the evening after surgery.    Post op meds to be delivered bedside prior to discharge. Deliver to family if patient is in surgery at 5pm.   Patient denies history of seizures.     Patient will also use bilateral TEDs on lower extremities, SCDs during surgery, and early ambulation post-op. If the patient was previously taking 81mg baby aspirin, they were told to not take it will using the above stated aspirin and to restart the 81mg aspirin after completion of the aspirin dose.       Patient was also told to buy over the counter Prilosec medication and take it once daily for GI protection as long as they are taking NSAIDs or Aspirin.    DVT prophylaxis was discussed with the patient today including risk factors for developing DVTs and history of DVTs. The patient was asked if any specific recommendations were given from the doctor/s that did pre-operative surgical clearance.      Patient was asked if they were taking or using OCP pills or devices. If they answered yes, then they were instructed to stop using OCPs at this pre-operative appointment until 2 months post-op to help prevent DVT development. They understand that there are other forms of  birth control that do not involve hormones. They expressed understanding that ignoring/not following this instruction could result in a DVT which could turn into a deadly pulmonary embolism.      The patient was told that narcotic pain medications may make them drowsy and instructions were given to not sign legal documents, drive or operate heavy machinery, cars, or equipment while under the influence of narcotic medications.     My supervising physician Cullen Caro MD was also present during the appointment.  He discussed with the patient all the potential complications, risks, and benefits of their upcoming surgery.    As there were no other questions to be asked, she was given my business card along with Cullen Caro's, MD business card if she has any questions or concerns prior to surgery or in the postop period.          [1]   Social History  Socioeconomic History    Marital status:     Number of children: 2   Occupational History     Employer: [employer]   Tobacco Use    Smoking status: Never     Passive exposure: Past    Smokeless tobacco: Never   Substance and Sexual Activity    Alcohol use: Not Currently     Alcohol/week: 0.8 standard drinks of alcohol     Comment: Rarely    Drug use: No    Sexual activity: Yes     Partners: Male     Birth control/protection: I.U.D.     Social Drivers of Health     Financial Resource Strain: Low Risk  (5/28/2024)    Overall Financial Resource Strain (CARDIA)     Difficulty of Paying Living Expenses: Not hard at all   Food Insecurity: No Food Insecurity (5/28/2024)    Hunger Vital Sign     Worried About Running Out of Food in the Last Year: Never true     Ran Out of Food in the Last Year: Never true   Transportation Needs: No Transportation Needs (9/28/2023)    PRAPARE - Transportation     Lack of Transportation (Medical): No     Lack of Transportation (Non-Medical): No   Physical Activity: Insufficiently Active (5/28/2024)    Exercise Vital Sign     Days  of Exercise per Week: 3 days     Minutes of Exercise per Session: 30 min   Stress: No Stress Concern Present (5/28/2024)    [employer] [place] [employer] - Occupational Stress Questionnaire     Feeling of Stress : Only a little   Housing Stability: High Risk (5/28/2024)    Housing Stability Vital Sign     Unable to Pay for Housing in the Last Year: Yes   [2]   Current Outpatient Medications:     CALCIUM ORAL, Take by mouth., Disp: , Rfl:     cholecalciferol, vitamin D3, (VITAMIN D3) 125 mcg (5,000 unit) Tab, Take 2,000 Units by mouth once daily., Disp: , Rfl:     COLLAGEN MISC, by Misc.(Non-Drug; Combo Route) route., Disp: , Rfl:     diclofenac (VOLTAREN) 50 MG EC tablet, Take 1 tablet (50 mg total) by mouth 2 (two) times daily as needed (pain). Take with food, Disp: 40 tablet, Rfl: 0    docosahexaenoic acid/epa (FISH OIL ORAL), Take by mouth., Disp: , Rfl:     FLONASE ALLERGY RELIEF 50 mcg/actuation nasal spray, SPRAY TWICE IN EACH NOSTRIL ONCE DAILY, Disp: 16 g, Rfl: 5    hydrocortisone 2.5 % cream, Apply topically 2 (two) times daily. Mix with ketoconazole cream and apply twice a day for 1-2 weeks when flaring. Only use when flaring., Disp: 28 g, Rfl: 1    ketoconazole (NIZORAL) 2 % cream, Apply topically 2 (two) times daily. Apply twice a day with hydrocortisone when flaring for up to two weeks at a time., Disp: 30 g, Rfl: 1    L.acid/L.casei/B.bif/B.deanna/FOS (PROBIOTIC BLEND ORAL), Take by mouth., Disp: , Rfl:     nitrofurantoin, macrocrystal-monohydrate, (MACROBID) 100 MG capsule, Take 1 capsule (100 mg total) by mouth 2 (two) times daily., Disp: 10 capsule, Rfl: 0    thymol/chlorophyllin (CHLOROPHYLL ORAL), Take by mouth., Disp: , Rfl:     valACYclovir (VALTREX) 500 MG tablet, TAKE 1 TABLET(500 MG) BY MOUTH EVERY DAY, Disp: 30 tablet, Rfl: 11    Current Facility-Administered Medications:     levonorgestreL (Mirena) 52 mg IUD 1 Intra Uterine Device, 1 Intra Uterine Device, Intrauterine, , , 1 Intra  Uterine Device at 12/27/24 [id number]

## 2025-03-19 ENCOUNTER — PATIENT MESSAGE (OUTPATIENT)
Dept: SPORTS MEDICINE | Facility: CLINIC | Age: 45
End: 2025-03-19
Payer: COMMERCIAL

## 2025-03-20 ENCOUNTER — PATIENT MESSAGE (OUTPATIENT)
Dept: SPORTS MEDICINE | Facility: CLINIC | Age: 45
End: 2025-03-20
Payer: COMMERCIAL

## 2025-03-20 ENCOUNTER — ANESTHESIA EVENT (OUTPATIENT)
Dept: SURGERY | Facility: HOSPITAL | Age: 45
End: 2025-03-20
Payer: COMMERCIAL

## 2025-03-21 ENCOUNTER — ANESTHESIA (OUTPATIENT)
Dept: SURGERY | Facility: HOSPITAL | Age: 45
End: 2025-03-21
Payer: COMMERCIAL

## 2025-03-21 ENCOUNTER — HOSPITAL ENCOUNTER (OUTPATIENT)
Facility: HOSPITAL | Age: 45
Discharge: HOME | End: 2025-03-21
Attending: STUDENT IN AN ORGANIZED HEALTH CARE EDUCATION/TRAINING PROGRAM | Admitting: STUDENT IN AN ORGANIZED HEALTH CARE EDUCATION/TRAINING PROGRAM
Payer: COMMERCIAL

## 2025-03-21 VITALS
HEIGHT: 64 IN | OXYGEN SATURATION: 99 % | WEIGHT: 215 LBS | TEMPERATURE: 98 F | SYSTOLIC BLOOD PRESSURE: 142 MMHG | BODY MASS INDEX: 36.7 KG/M2 | RESPIRATION RATE: 12 BRPM | DIASTOLIC BLOOD PRESSURE: 76 MMHG | HEART RATE: 85 BPM

## 2025-03-21 DIAGNOSIS — M94.262 CHONDROMALACIA, KNEE, LEFT: ICD-10-CM

## 2025-03-21 DIAGNOSIS — S83.232A COMPLEX TEAR OF MEDIAL MENISCUS OF LEFT KNEE AS CURRENT INJURY, INITIAL ENCOUNTER: ICD-10-CM

## 2025-03-21 LAB
B-HCG UR QL: NEGATIVE
CTP QC/QA: YES

## 2025-03-21 PROCEDURE — 36000710: Performed by: STUDENT IN AN ORGANIZED HEALTH CARE EDUCATION/TRAINING PROGRAM

## 2025-03-21 PROCEDURE — 37000008 HC ANESTHESIA 1ST 15 MINUTES: Performed by: STUDENT IN AN ORGANIZED HEALTH CARE EDUCATION/TRAINING PROGRAM

## 2025-03-21 PROCEDURE — 29881 ARTHRS KNE SRG MNISECTMY M/L: CPT | Mod: AS,59,LT, | Performed by: ORTHOPAEDIC SURGERY

## 2025-03-21 PROCEDURE — 63600175 PHARM REV CODE 636 W HCPCS: Performed by: PHYSICIAN ASSISTANT

## 2025-03-21 PROCEDURE — 71000039 HC RECOVERY, EACH ADD'L HOUR: Performed by: STUDENT IN AN ORGANIZED HEALTH CARE EDUCATION/TRAINING PROGRAM

## 2025-03-21 PROCEDURE — 71000033 HC RECOVERY, INTIAL HOUR: Performed by: STUDENT IN AN ORGANIZED HEALTH CARE EDUCATION/TRAINING PROGRAM

## 2025-03-21 PROCEDURE — 63600175 PHARM REV CODE 636 W HCPCS: Performed by: STUDENT IN AN ORGANIZED HEALTH CARE EDUCATION/TRAINING PROGRAM

## 2025-03-21 PROCEDURE — 29881 ARTHRS KNE SRG MNISECTMY M/L: CPT | Mod: 59,51,LT, | Performed by: STUDENT IN AN ORGANIZED HEALTH CARE EDUCATION/TRAINING PROGRAM

## 2025-03-21 PROCEDURE — 37000009 HC ANESTHESIA EA ADD 15 MINS: Performed by: STUDENT IN AN ORGANIZED HEALTH CARE EDUCATION/TRAINING PROGRAM

## 2025-03-21 PROCEDURE — 81025 URINE PREGNANCY TEST: CPT | Performed by: ORTHOPAEDIC SURGERY

## 2025-03-21 PROCEDURE — 36000711: Performed by: STUDENT IN AN ORGANIZED HEALTH CARE EDUCATION/TRAINING PROGRAM

## 2025-03-21 PROCEDURE — 29882 ARTHRS KNE SRG MNISC RPR M/L: CPT | Mod: LT,,, | Performed by: STUDENT IN AN ORGANIZED HEALTH CARE EDUCATION/TRAINING PROGRAM

## 2025-03-21 PROCEDURE — 25000003 PHARM REV CODE 250: Performed by: ORTHOPAEDIC SURGERY

## 2025-03-21 PROCEDURE — 63600175 PHARM REV CODE 636 W HCPCS: Performed by: ANESTHESIOLOGY

## 2025-03-21 PROCEDURE — 20610 DRAIN/INJ JOINT/BURSA W/O US: CPT | Mod: 59,51,RT, | Performed by: STUDENT IN AN ORGANIZED HEALTH CARE EDUCATION/TRAINING PROGRAM

## 2025-03-21 PROCEDURE — 25000003 PHARM REV CODE 250: Performed by: ANESTHESIOLOGY

## 2025-03-21 PROCEDURE — 25000003 PHARM REV CODE 250: Performed by: PHYSICIAN ASSISTANT

## 2025-03-21 PROCEDURE — 63600175 PHARM REV CODE 636 W HCPCS: Performed by: ORTHOPAEDIC SURGERY

## 2025-03-21 PROCEDURE — 71000015 HC POSTOP RECOV 1ST HR: Performed by: STUDENT IN AN ORGANIZED HEALTH CARE EDUCATION/TRAINING PROGRAM

## 2025-03-21 PROCEDURE — 27201423 OPTIME MED/SURG SUP & DEVICES STERILE SUPPLY: Performed by: STUDENT IN AN ORGANIZED HEALTH CARE EDUCATION/TRAINING PROGRAM

## 2025-03-21 PROCEDURE — 63600175 PHARM REV CODE 636 W HCPCS: Mod: JZ,TB | Performed by: ANESTHESIOLOGY

## 2025-03-21 PROCEDURE — 27200651 HC AIRWAY, LMA: Performed by: ANESTHESIOLOGY

## 2025-03-21 PROCEDURE — 99900035 HC TECH TIME PER 15 MIN (STAT)

## 2025-03-21 PROCEDURE — 64448 NJX AA&/STRD FEM NRV NFS IMG: CPT | Performed by: ANESTHESIOLOGY

## 2025-03-21 PROCEDURE — 94761 N-INVAS EAR/PLS OXIMETRY MLT: CPT

## 2025-03-21 PROCEDURE — 25000003 PHARM REV CODE 250: Performed by: NURSE ANESTHETIST, CERTIFIED REGISTERED

## 2025-03-21 PROCEDURE — 63600175 PHARM REV CODE 636 W HCPCS: Performed by: NURSE ANESTHETIST, CERTIFIED REGISTERED

## 2025-03-21 DEVICE — IMPLANT SYSTEM, SUTURELOC
Type: IMPLANTABLE DEVICE | Site: KNEE | Status: FUNCTIONAL
Brand: ARTHREX®

## 2025-03-21 RX ORDER — ROPIVACAINE HYDROCHLORIDE 2 MG/ML
INJECTION, SOLUTION EPIDURAL; INFILTRATION; PERINEURAL CONTINUOUS
Status: DISCONTINUED | OUTPATIENT
Start: 2025-03-21 | End: 2025-03-21 | Stop reason: HOSPADM

## 2025-03-21 RX ORDER — OXYCODONE HYDROCHLORIDE 5 MG/1
5 TABLET ORAL EVERY 4 HOURS PRN
Status: DISCONTINUED | OUTPATIENT
Start: 2025-03-21 | End: 2025-03-21 | Stop reason: HOSPADM

## 2025-03-21 RX ORDER — LIDOCAINE HYDROCHLORIDE 20 MG/ML
INJECTION INTRAVENOUS
Status: DISCONTINUED | OUTPATIENT
Start: 2025-03-21 | End: 2025-03-21

## 2025-03-21 RX ORDER — METHOCARBAMOL 500 MG/1
1000 TABLET, FILM COATED ORAL ONCE AS NEEDED
Status: COMPLETED | OUTPATIENT
Start: 2025-03-21 | End: 2025-03-21

## 2025-03-21 RX ORDER — TRANEXAMIC ACID 100 MG/ML
INJECTION, SOLUTION INTRAVENOUS
Status: DISCONTINUED | OUTPATIENT
Start: 2025-03-21 | End: 2025-03-21

## 2025-03-21 RX ORDER — ROPIVACAINE HYDROCHLORIDE 5 MG/ML
INJECTION, SOLUTION EPIDURAL; INFILTRATION; PERINEURAL
Status: COMPLETED | OUTPATIENT
Start: 2025-03-21 | End: 2025-03-21

## 2025-03-21 RX ORDER — SODIUM CHLORIDE 9 MG/ML
INJECTION, SOLUTION INTRAVENOUS CONTINUOUS
Status: DISCONTINUED | OUTPATIENT
Start: 2025-03-21 | End: 2025-03-21 | Stop reason: HOSPADM

## 2025-03-21 RX ORDER — FENTANYL CITRATE 50 UG/ML
100 INJECTION, SOLUTION INTRAMUSCULAR; INTRAVENOUS
Status: DISCONTINUED | OUTPATIENT
Start: 2025-03-21 | End: 2025-03-21 | Stop reason: HOSPADM

## 2025-03-21 RX ORDER — ONDANSETRON HYDROCHLORIDE 2 MG/ML
INJECTION, SOLUTION INTRAVENOUS
Status: DISCONTINUED | OUTPATIENT
Start: 2025-03-21 | End: 2025-03-21

## 2025-03-21 RX ORDER — KETAMINE HCL IN 0.9 % NACL 50 MG/5 ML
SYRINGE (ML) INTRAVENOUS
Status: DISCONTINUED | OUTPATIENT
Start: 2025-03-21 | End: 2025-03-21

## 2025-03-21 RX ORDER — PROPOFOL 10 MG/ML
VIAL (ML) INTRAVENOUS
Status: DISCONTINUED | OUTPATIENT
Start: 2025-03-21 | End: 2025-03-21

## 2025-03-21 RX ORDER — EPINEPHRINE 1 MG/ML
INJECTION INTRAMUSCULAR; INTRAVENOUS; SUBCUTANEOUS
Status: DISCONTINUED | OUTPATIENT
Start: 2025-03-21 | End: 2025-03-21 | Stop reason: HOSPADM

## 2025-03-21 RX ORDER — ONDANSETRON HYDROCHLORIDE 2 MG/ML
4 INJECTION, SOLUTION INTRAVENOUS ONCE AS NEEDED
Status: DISCONTINUED | OUTPATIENT
Start: 2025-03-21 | End: 2025-03-21 | Stop reason: HOSPADM

## 2025-03-21 RX ORDER — FAMOTIDINE 10 MG/ML
INJECTION, SOLUTION INTRAVENOUS
Status: DISCONTINUED | OUTPATIENT
Start: 2025-03-21 | End: 2025-03-21

## 2025-03-21 RX ORDER — SODIUM CHLORIDE 0.9 % (FLUSH) 0.9 %
3 SYRINGE (ML) INJECTION
Status: DISCONTINUED | OUTPATIENT
Start: 2025-03-21 | End: 2025-03-21 | Stop reason: HOSPADM

## 2025-03-21 RX ORDER — CELECOXIB 200 MG/1
400 CAPSULE ORAL
Status: COMPLETED | OUTPATIENT
Start: 2025-03-21 | End: 2025-03-21

## 2025-03-21 RX ORDER — HYDROMORPHONE HYDROCHLORIDE 1 MG/ML
0.2 INJECTION, SOLUTION INTRAMUSCULAR; INTRAVENOUS; SUBCUTANEOUS EVERY 5 MIN PRN
Status: DISCONTINUED | OUTPATIENT
Start: 2025-03-21 | End: 2025-03-21 | Stop reason: HOSPADM

## 2025-03-21 RX ORDER — PHENYLEPHRINE HYDROCHLORIDE 10 MG/ML
INJECTION INTRAVENOUS
Status: DISCONTINUED | OUTPATIENT
Start: 2025-03-21 | End: 2025-03-21

## 2025-03-21 RX ORDER — CEFAZOLIN 2 G/1
2 INJECTION, POWDER, FOR SOLUTION INTRAMUSCULAR; INTRAVENOUS
Status: DISCONTINUED | OUTPATIENT
Start: 2025-03-21 | End: 2025-03-21 | Stop reason: HOSPADM

## 2025-03-21 RX ORDER — MIDAZOLAM HYDROCHLORIDE 1 MG/ML
1 INJECTION, SOLUTION INTRAMUSCULAR; INTRAVENOUS
Status: DISCONTINUED | OUTPATIENT
Start: 2025-03-21 | End: 2025-03-21 | Stop reason: HOSPADM

## 2025-03-21 RX ORDER — ACETAMINOPHEN 500 MG
1000 TABLET ORAL
Status: COMPLETED | OUTPATIENT
Start: 2025-03-21 | End: 2025-03-21

## 2025-03-21 RX ORDER — DEXAMETHASONE SODIUM PHOSPHATE 4 MG/ML
INJECTION, SOLUTION INTRA-ARTICULAR; INTRALESIONAL; INTRAMUSCULAR; INTRAVENOUS; SOFT TISSUE
Status: DISCONTINUED | OUTPATIENT
Start: 2025-03-21 | End: 2025-03-21

## 2025-03-21 RX ADMIN — ROPIVACAINE HYDROCHLORIDE 7.5 ML: 5 INJECTION EPIDURAL; INFILTRATION; PERINEURAL at 10:03

## 2025-03-21 RX ADMIN — ACETAMINOPHEN 1000 MG: 500 TABLET ORAL at 08:03

## 2025-03-21 RX ADMIN — CELECOXIB 400 MG: 200 CAPSULE ORAL at 08:03

## 2025-03-21 RX ADMIN — METHOCARBAMOL 1000 MG: 500 TABLET ORAL at 12:03

## 2025-03-21 RX ADMIN — HYDROMORPHONE HYDROCHLORIDE 0.2 MG: 1 INJECTION, SOLUTION INTRAMUSCULAR; INTRAVENOUS; SUBCUTANEOUS at 12:03

## 2025-03-21 RX ADMIN — SODIUM CHLORIDE: 9 INJECTION, SOLUTION INTRAVENOUS at 08:03

## 2025-03-21 RX ADMIN — PROPOFOL 300 MG: 10 INJECTION, EMULSION INTRAVENOUS at 10:03

## 2025-03-21 RX ADMIN — LIDOCAINE HYDROCHLORIDE 100 MG: 20 INJECTION INTRAVENOUS at 10:03

## 2025-03-21 RX ADMIN — Medication 20 MG: at 10:03

## 2025-03-21 RX ADMIN — HYDROMORPHONE HYDROCHLORIDE 0.2 MG: 1 INJECTION, SOLUTION INTRAMUSCULAR; INTRAVENOUS; SUBCUTANEOUS at 01:03

## 2025-03-21 RX ADMIN — FENTANYL CITRATE 100 MCG: 50 INJECTION, SOLUTION INTRAMUSCULAR; INTRAVENOUS at 10:03

## 2025-03-21 RX ADMIN — Medication: at 12:03

## 2025-03-21 RX ADMIN — FENTANYL CITRATE 50 MCG: 50 INJECTION, SOLUTION INTRAMUSCULAR; INTRAVENOUS at 10:03

## 2025-03-21 RX ADMIN — PHENYLEPHRINE HYDROCHLORIDE 100 MCG: 10 INJECTION INTRAVENOUS at 10:03

## 2025-03-21 RX ADMIN — SODIUM CHLORIDE, SODIUM GLUCONATE, SODIUM ACETATE, POTASSIUM CHLORIDE, MAGNESIUM CHLORIDE, SODIUM PHOSPHATE, DIBASIC, AND POTASSIUM PHOSPHATE: .53; .5; .37; .037; .03; .012; .00082 INJECTION, SOLUTION INTRAVENOUS at 10:03

## 2025-03-21 RX ADMIN — ONDANSETRON 4 MG: 2 INJECTION INTRAMUSCULAR; INTRAVENOUS at 11:03

## 2025-03-21 RX ADMIN — OXYCODONE 5 MG: 5 TABLET ORAL at 12:03

## 2025-03-21 RX ADMIN — TRANEXAMIC ACID 1000 MG: 100 INJECTION INTRAVENOUS at 10:03

## 2025-03-21 RX ADMIN — MIDAZOLAM 2 MG: 1 INJECTION INTRAMUSCULAR; INTRAVENOUS at 10:03

## 2025-03-21 RX ADMIN — FAMOTIDINE 20 MG: 10 INJECTION, SOLUTION INTRAVENOUS at 10:03

## 2025-03-21 RX ADMIN — DEXAMETHASONE SODIUM PHOSPHATE 8 MG: 4 INJECTION, SOLUTION INTRAMUSCULAR; INTRAVENOUS at 10:03

## 2025-03-21 RX ADMIN — CEFAZOLIN 2 G: 2 INJECTION, POWDER, FOR SOLUTION INTRAMUSCULAR; INTRAVENOUS at 10:03

## 2025-03-21 NOTE — ANESTHESIA PROCEDURE NOTES
Intubation    Date/Time: 3/21/2025 10:27 AM    Performed by: Bianka Chisholm CRNA  Authorized by: Joseph Forbes MD    Intubation:     Induction:  Intravenous    Intubated:  Postinduction    Mask Ventilation:  Not attempted    Attempts:  1    Attempted By:  CRNA    Difficult Airway Encountered?: No      Complications:  None    Airway Device:  Supraglottic airway/LMA    Airway Device Size:  4.0    Style/Cuff Inflation:  Cuffed    Inflation Amount (mL):  0    Secured at:  The lips    Placement Verified By:  Capnometry    Complicating Factors:  None    Findings Post-Intubation:  BS equal bilateral and atraumatic/condition of teeth unchanged

## 2025-03-21 NOTE — BRIEF OP NOTE
[place] - Surgery ([facility])  Brief Operative Note    Surgery Date: 3/21/2025     Surgeons and Role:     * Cullen Caro MD - Primary    Assisting Surgeon: None    Pre-op Diagnosis:  Complex tear of medial meniscus of left knee as current injury, initial encounter [S83.232A]    Post-op Diagnosis:  Post-Op Diagnosis Codes:     * Complex tear of medial meniscus of left knee as current injury, initial encounter [S83.232A]    Procedure(s) (LRB):  ARTHROSCOPY,KNEE,WITH MENISCUS REPAIR- POLAR CARE (Left)  ARTHROSCOPY, KNEE, WITH MEDIAL OR LATERAL MENISCECTOMY (Left)  SYNOVECTOMY, MAJOR, KNEE, ARTHROSCOPIC (Left)  ARTHROSCOPY, KNEE, WITH CHONDROPLASTY (Left)  BIOPSY, MASS, LOWER EXTREMITY (Left)    Anesthesia: Regional    Operative Findings: tear of medial meniscus noted    Estimated Blood Loss: * No values recorded between 3/21/2025 10:16 AM and 3/21/2025 11:19 AM *         Specimens:   Specimen (24h ago, onward)      None            * No specimens in log *        Discharge Note    OUTCOME: Patient tolerated treatment/procedure well without complication and is now ready for discharge.    DISPOSITION: Home or Self Care    FINAL DIAGNOSIS:  <principal problem not specified>    FOLLOWUP: In clinic    DISCHARGE INSTRUCTIONS:  No discharge procedures on file.

## 2025-03-21 NOTE — PROGRESS NOTES
Patient unable to void. Informed Dr. Forbes, verbally ordered a straight cath. Order completed. 200 ml of urine out. Pregnancy test negative.

## 2025-03-21 NOTE — ANESTHESIA PROCEDURE NOTES
Adductor Canal Continuous Nerve Catheter    Patient location during procedure: pre-op   Block not for primary anesthetic.  Reason for block: at surgeon's request and post-op pain management   Post-op Pain Location: L knee pain   Start time: 3/21/2025 10:08 AM  Timeout: 3/21/2025 10:07 AM   End time: 3/21/2025 10:10 AM    Staffing  Authorizing Provider: Joseph Forbes MD  Performing Provider: Joseph Forbes MD    Staffing  Performed by: Joseph Forbes MD  Authorized by: Joseph Forbes MD    Preanesthetic Checklist  Completed: patient identified, IV checked, site marked, risks and benefits discussed, surgical consent, monitors and equipment checked, pre-op evaluation and timeout performed  Peripheral Block  Patient position: supine  Prep: ChloraPrep and site prepped and draped  Patient monitoring: heart rate, cardiac monitor, continuous pulse ox, continuous capnometry and frequent blood pressure checks  Block type: adductor canal  Laterality: left  Injection technique: continuous  Needle  Needle type: Tuohy   Needle gauge: 17 G  Needle length: 3.5 in  Needle localization: anatomical landmarks and ultrasound guidance  Catheter type: spring wound  Catheter size: 19 G  Test dose: lidocaine 1.5% with Epi 1-to-200,000 and negative   -ultrasound image captured on disc.  Assessment  Injection assessment: negative aspiration, negative parasthesia and local visualized surrounding nerve  Paresthesia pain: none  Heart rate change: no  Slow fractionated injection: yes  Pain Tolerance: comfortable throughout block and no complaints  Medications:    Medications: ropivacaine (NAROPIN) injection 0.5% - Perineural   7.5 mL - 3/21/2025 10:10:00 AM    Additional Notes  VSS.  [facility] RN monitoring vitals throughout procedure.  Patient tolerated procedure well.

## 2025-03-21 NOTE — TRANSFER OF CARE
"Anesthesia Transfer of Care Note    Patient: Kristofer Gentile    Procedure(s) Performed: Procedure(s) (LRB):  ARTHROSCOPY,KNEE,WITH MENISCUS REPAIR- POLAR CARE (Left)  ARTHROSCOPY, KNEE, WITH MEDIAL OR LATERAL MENISCECTOMY (Left)  SYNOVECTOMY, MAJOR, KNEE, ARTHROSCOPIC (Left)  ARTHROSCOPY, KNEE, WITH CHONDROPLASTY (Left)  BIOPSY, MASS, LOWER EXTREMITY (Left)    Patient location: PACU    Anesthesia Type: general and regional    Transport from OR: Transported from OR on 6-10 L/min O2 by face mask with adequate spontaneous ventilation    Post pain: adequate analgesia    Post assessment: no apparent anesthetic complications and tolerated procedure well    Post vital signs: stable    Level of consciousness: responds to stimulation and sedated    Nausea/Vomiting: no nausea/vomiting    Complications: none    Transfer of care protocol was followed      Last vitals: Visit Vitals  /74 (BP Location: Right arm, Patient Position: Lying)   Pulse 69   Temp 36.6 °C (97.9 °F) (Tympanic)   Resp (!) 22   Ht 5' 4" (1.626 m)   Wt 97.5 kg (215 lb)   LMP 03/15/2024 (Exact Date)   SpO2 100%   Breastfeeding No   BMI 36.90 kg/m²     "

## 2025-03-21 NOTE — ANESTHESIA PREPROCEDURE EVALUATION
03/21/2025  Pre-operative evaluation for Procedure(s) (LRB):  ARTHROSCOPY,KNEE,WITH MENISCUS REPAIR- [facility] (Left)    Kristofer Gentile is a 44 y.o. female     Problem List[1]    Review of patient's allergies indicates:   Allergen Reactions    Sulfa (sulfonamide antibiotics) Hives     Also causes joint stiffness.       Medications Ordered Prior to Encounter[2]    Past Surgical History:   Procedure Laterality Date    BREAST BIOPSY Left 12/14/2018    Core bx, benign    BREAST SURGERY  March 2019    Had a growth removed from my milk duct    EXCISIONAL BIOPSY Left 03/01/2019    Procedure: EXCISIONAL BIOPSY- w/major duct excision KENALOG INJECTION AT TIME OF SURGERY;  Surgeon: Kelby Mcmahan MD;  Location: [facility] OR [address] [place] [address];  Service: General;  Laterality: Left;    INTRAUTERINE DEVICE INSERTION  2009    again 2014    PANNICULECTOMY N/A 04/23/2024    Procedure: PANNICULECTOMY;  Surgeon: Christiano Richard MD;  Location: [facility] OR [address] [place] [address];  Service: Plastics;  Laterality: N/A;     EKG:  Normal sinus rhythm   Normal ECG   No previous ECGs available   Confirmed by Joseph Hubbard ([id number]) on 3/5/2025 12:11:33 PM     2D Echo:  No results found for this or any previous visit.      Pre-op Assessment    I have reviewed the Patient Summary Reports.     I have reviewed the Nursing Notes. I have reviewed the NPO Status.   I have reviewed the Medications.     Review of Systems  Anesthesia Hx:             Denies Family Hx of Anesthesia complications.    Denies Personal Hx of Anesthesia complications.                    Cardiovascular:  Exercise tolerance: good        Denies Dysrhythmias.   Denies Angina.                                    Pulmonary:    Denies COPD.  Denies Asthma.                    Renal/:   Denies Chronic Renal Disease.                Hepatic/GI:      Denies GERD.                 Musculoskeletal:  Arthritis               Neurological:    Denies CVA.    Denies Seizures.                                Endocrine:  Denies Diabetes.               Physical Exam  General: Well nourished, Cooperative, Alert and Oriented    Airway:  Mallampati: II   Mouth Opening: Normal  TM Distance: Normal  Tongue: Normal  Neck ROM: Normal ROM    Dental:  Intact        Anesthesia Plan  Type of Anesthesia, risks & benefits discussed:    Anesthesia Type: Gen ETT, Regional  Intra-op Monitoring Plan: Standard ASA Monitors  Post Op Pain Control Plan: multimodal analgesia and IV/PO Opioids PRN  Induction:  IV  Airway Plan: Direct, Post-Induction  Informed Consent: Informed consent signed with the Patient and all parties understand the risks and agree with anesthesia plan.  All questions answered.   ASA Score: 2  Day of Surgery Review of History & Physical: H&P Update referred to the surgeon/provider.    Ready For Surgery From Anesthesia Perspective.     .           [1]   Patient Active Problem List  Diagnosis    Dyslipidemia (high LDL; low HDL)    Herpes simplex labialis    Obesity (BMI 30-39.9)    Metabolic syndrome    [place] cardiac risk <10% in next 10 years    Vitamin D insufficiency    Anti-TPO antibodies present    Intraductal papilloma of left breast    Severe obesity (BMI 35.0-39.9) with comorbidity    Alopecia    Intertrigo    Primary focal hyperhidrosis of axilla    Lateral knee pain, right    Limitation of joint motion of knee, right    Skin lesion of chest wall    Keloid    Pruritic rash    Allergic reaction    Fungal toenail infection    Complex tear of medial meniscus of left knee as current injury    Bilateral primary osteoarthritis of knee    Complex tear of medial meniscus of right knee as current injury    Dysuria    Frequency of micturition   [2]   No current facility-administered medications on file prior to encounter.     Current Outpatient Medications on File Prior to Encounter   Medication  Sig Dispense Refill    CALCIUM ORAL Take by mouth.      cholecalciferol, vitamin D3, (VITAMIN D3) 125 mcg (5,000 unit) Tab Take 2,000 Units by mouth once daily.      COLLAGEN MISC by Misc.(Non-Drug; Combo Route) route.      docosahexaenoic acid/epa (FISH OIL ORAL) Take by mouth.      FLONASE ALLERGY RELIEF 50 mcg/actuation nasal spray SPRAY TWICE IN EACH NOSTRIL ONCE DAILY 16 g 5    valACYclovir (VALTREX) 500 MG tablet TAKE 1 TABLET(500 MG) BY MOUTH EVERY DAY (Patient not taking: Reported on 3/18/2025) 30 tablet 11

## 2025-03-21 NOTE — PLAN OF CARE
VSS. Pt able to tolerate oral liquids. Pt reports tolerable pain level for D/C. Polar care and dressing intact. Thigh TEDs intact. Significant other received home meds per bedside delivery. Polar care power adapter placed with pts personal belongings. Crutches at bedside for home use. No distress noted. Pt states she is ready for D/C. D/C instructions reviewed with pt and S/O, verbalized understanding.
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
## 2025-03-21 NOTE — PLAN OF CARE
Pre op complete. Waiting on anesthesia consent, site arlene, update H&P.  Boyfriend at bedside. Pt resting comfortably with all questions addressed at this time and call light in reach. Needs crutches. Belongings placed in locker.

## 2025-03-24 ENCOUNTER — PATIENT MESSAGE (OUTPATIENT)
Dept: SPORTS MEDICINE | Facility: CLINIC | Age: 45
End: 2025-03-24
Payer: COMMERCIAL

## 2025-03-24 NOTE — OPERATIVE NOTE ADDENDUM
Certification of Assistant at Surgery       Surgery Date: 3/21/2025     Participating Surgeons:  Surgeons and Role:     * Cullen Caro MD - Primary    Procedures:  Procedure(s) (LRB):  ARTHROSCOPY,KNEE,WITH MENISCUS REPAIR- POLAR CARE (Left)  ARTHROSCOPY, KNEE, WITH MEDIAL OR LATERAL MENISCECTOMY (Left)  SYNOVECTOMY, MAJOR, KNEE, ARTHROSCOPIC (Left)  ARTHROSCOPY, KNEE, WITH CHONDROPLASTY (Left)  BIOPSY, MASS, LOWER EXTREMITY (Left)  INJECTION, JOINT, SHOULDER, HIP, OR KNEE (Right)    Assistant Surgeon's Certification of Necessity:  I understand that section 1842 (b) (6) (d) of the Social Security Act generally prohibits Medicare Part B reasonable charge payment for the services of assistants at surgery in teaching hospitals when qualified residents are available to furnish such services. I certify that the services for which payment is claimed were medically necessary, and that no qualified resident was available to perform the services. I further understand that these services are subject to post-payment review by the Medicare carrier.      Christiano Scott PA-C    03/24/2025  8:15 AM

## 2025-03-24 NOTE — ANESTHESIA POSTPROCEDURE EVALUATION
Anesthesia Post Evaluation    Patient: Kristofer Gentiel    Procedure(s) Performed: Procedure(s) (LRB):  ARTHROSCOPY,KNEE,WITH MENISCUS REPAIR- POLAR CARE (Left)  ARTHROSCOPY, KNEE, WITH MEDIAL OR LATERAL MENISCECTOMY (Left)  SYNOVECTOMY, MAJOR, KNEE, ARTHROSCOPIC (Left)  ARTHROSCOPY, KNEE, WITH CHONDROPLASTY (Left)  BIOPSY, MASS, LOWER EXTREMITY (Left)  INJECTION, JOINT, SHOULDER, HIP, OR KNEE (Right)    Final Anesthesia Type: general      Patient location during evaluation: PACU  Patient participation: Yes- Able to Participate  Level of consciousness: awake and alert and oriented  Post-procedure vital signs: reviewed and stable  Pain management: adequate  Airway patency: patent    PONV status at discharge: No PONV  Anesthetic complications: no      Cardiovascular status: blood pressure returned to baseline and hemodynamically stable  Respiratory status: unassisted, room air and spontaneous ventilation  Hydration status: euvolemic  Follow-up not needed.              Vitals Value Taken Time   /76 03/21/25 14:01   Temp 36.7 °C (98.1 °F) 03/21/25 14:00   Pulse 92 03/21/25 14:04   Resp 14 03/21/25 14:04   SpO2 100 % 03/21/25 14:04   Vitals shown include unfiled device data.      Event Time   Out of Recovery 13:30:00         Pain/Matthew Score: No data recorded

## 2025-03-31 ENCOUNTER — TELEPHONE (OUTPATIENT)
Dept: SPORTS MEDICINE | Facility: CLINIC | Age: 45
End: 2025-03-31
Payer: COMMERCIAL

## 2025-03-31 NOTE — TELEPHONE ENCOUNTER
LVM with patient in attempt to discuss her knee pain after fall following surgery. Asked patient to call to discuss.

## 2025-03-31 NOTE — TELEPHONE ENCOUNTER
Spoke with patient and recommended to come in earlier then Thursday for evaluation after suffering a fall to operative knee. She understood.

## 2025-03-31 NOTE — TELEPHONE ENCOUNTER
LVM with patient in second attempt to call patient to discuss symptoms. Phone going straight to .

## 2025-04-01 ENCOUNTER — OFFICE VISIT (OUTPATIENT)
Dept: SPORTS MEDICINE | Facility: CLINIC | Age: 45
End: 2025-04-01
Payer: COMMERCIAL

## 2025-04-01 VITALS
HEART RATE: 72 BPM | BODY MASS INDEX: 36.9 KG/M2 | HEIGHT: 64 IN | SYSTOLIC BLOOD PRESSURE: 113 MMHG | DIASTOLIC BLOOD PRESSURE: 71 MMHG

## 2025-04-01 DIAGNOSIS — Z98.890 S/P MEDIAL MENISCAL REPAIR: Primary | ICD-10-CM

## 2025-04-01 PROCEDURE — 99999 PR PBB SHADOW E&M-EST. PATIENT-LVL IV: CPT | Mod: PBBFAC,,, | Performed by: ORTHOPAEDIC SURGERY

## 2025-04-01 NOTE — PROGRESS NOTES
Subjective:     Chief Complaint: Kristofer Gentile is a 44 y.o. female who had concerns including Pain of the Left Knee.    HPI    Patient is here s/p below for 2 week post-op appointment.  She states a few days after surgery she suffered a fall where her calf came into contact with a metal bar.  She has not believe she would any weight on the operative knee.  She reports 0/10 pain and is taking oxycodone every day for pain. She denies any nausea, vomiting, fever, chills, shortness of breath, or calf pain. She is attending formal physical therapy at [facility]. T-scope knee brace in place.     DATE OF PROCEDURE: 03/21/2025     SURGEON: Cullen Caro MD     PROCEDURE PERFORMED:   Left knee arthroscopic medial meniscal root repair   Left knee arthroscopic synovectomy   Left knee arthroscopic chondroplasty of the lateral femoral condyle   Left knee arthroscopic cartilage biopsy for staged KATE procedure   Left knee arthroscopic partial lateral meniscectomy  Right knee viscosupplementation injection    Review of Systems   Constitutional: Negative.   HENT: Negative.     Eyes: Negative.    Cardiovascular: Negative.    Respiratory: Negative.     Endocrine: Negative.    Hematologic/Lymphatic: Negative.    Skin: Negative.    Musculoskeletal:  Positive for falls, joint pain, joint swelling, muscle weakness and stiffness. Negative for arthritis.   Neurological: Negative.    Psychiatric/Behavioral: Negative.     Allergic/Immunologic: Negative.        Pain Related Questions  Over the past 3 days, what was your average pain during activity? (I.e. running, jogging, walking, climbing stairs, getting dressed, ect.): 7  Over the past 3 days, what was your highest pain level?: 7  Over the past 3 days, what was your lowest pain level? : 0    Other  Was the patient's HEIGHT measured or patient reported?: Patient Reported  Was the patient's WEIGHT measured or patient reported?: Measured    Objective:     General: Kristofer davidson  well-developed, well-nourished, appears stated age, in no acute distress, alert and oriented to time, place and person.     General    Constitutional: She is oriented to person, place, and time. She appears well-developed and well-nourished. No distress.   Cardiovascular:  Intact distal pulses.            Neurological: She is alert and oriented to person, place, and time.   Psychiatric: She has a normal mood and affect. Her behavior is normal. Thought content normal.             Left Knee Exam     Inspection   Erythema: absent  Scars: present  Swelling: absent  Effusion: absent  Deformity: absent  Bruising: absent    Range of Motion   Extension:  0   Flexion:  80     Other   Sensation: normal    Comments:  Incision sites healing well, without signs of erythema, infection, or wound dehiscence      Vascular Exam       Left Pulses  Dorsalis Pedis:      2+  Posterior Tibial:      2+        Edema  Left Lower Leg: absent          Assessment:     Encounter Diagnosis   Name Primary?    S/P medial meniscal repair Yes        Plan:     1. Patient instructed to continue to utilize hinged T-scope knee brace until we see her again for 6 week post-op appt. Must be locked in extension during ambulation, but can bend the knee from 0-90 degrees at other times.  This Friday, May progress to 25-50% partial weight bearing over the next 2 weeks.     2. Suture tags removed Steri-Strips applied. Patient may now shower without covering incision site. Instructed not to submerge incision site in water for another 2 weeks    3. Continue daily ASA and Celebrex b.i.d.    4. Continue PT per meniscus repair protocol.     5. RTC to see Cullen Caro MD in 4 weeks for 6 week post-op evaluation      All of the patient's questions were answered. Patient was advised to call the clinic or contact me through the patient portal for any questions or concerns.           Patient questionnaires may have been collected.

## 2025-04-04 ENCOUNTER — TELEPHONE (OUTPATIENT)
Dept: SPORTS MEDICINE | Facility: CLINIC | Age: 45
End: 2025-04-04
Payer: COMMERCIAL

## 2025-04-04 NOTE — TELEPHONE ENCOUNTER
I spoke w/ Cullen from [facility] and he knew she could be 25% weight bearing but he wanted to know if it was just with exercises or 25% with walking as well. I told him just with the exercises until the doctor states otherwise most likely at the 6wk PO appt.       ----- Message from Med Assistant Resendez sent at 4/4/2025  3:27 PM CDT -----  Regarding: Physical therapy  Contact: cullen   Type:  Needs Medical AdviceWho Called:  cullen calling from [facility] needs clarification on patient weight bearing  status  Would the patient rather a call back or a response via [facility]?  Call back Best Call Back Number: cullen  Additional Information:

## 2025-04-17 NOTE — PROGRESS NOTES
"  Chief Complaint: IUD String Check     HPI:      Kristofer Gentile 44 y.o.   presents for follow up after IUD placement approximately 2 months ago. Today she reports vaginal odor and discharge for past 1-2 weeks and thinks likely BV as had in the past. Has been sexually active with her partner without problems.  Patient's last menstrual period was 2024 (exact date). Having regular cycles so far with a little less bleeding. No current or recent sexual partners.    ROS:     GENERAL: Denies unintentional weight gain or weight loss. Feeling well overall.   GYN: Denies change in discharge or vaginal bleeding.     Physical Exam:      PHYSICAL EXAM:  Visit Vitals    /78    Ht 5' 5" (1.651 m)    Wt 62.4 kg (137 lb 7.3 oz)    LMP 2016 (Exact Date)    BMI 22.87 kg/m2     Body mass index is 22.87 kg/(m^2).     APPEARANCE: Well nourished, well developed, in no acute distress.  ABDOMEN: Soft.  No tenderness or masses.    PELVIC: Normal external genitalia without lesions.  Normal hair distribution.  Adequate perineal body, normal urethral meatus.  Vagina moist and well rugated without lesions or discharge.  Cervix pink, without lesions or tenderness. +White thin moderate amount of discharge in vault with fishy odor. IUD strings visible at the cervical os. No significant cystocele or rectocele.  Bimanual exam shows uterus to be normal size, regular, mobile and nontender.  Adnexa without masses or tenderness.    EXTREMITIES: No edema.     Assessment/Plan:     Surveillance of intrauterine contraception  Doing well so far.     Vaginal discharge  -     Vaginosis Screen by DNA Probe; Future; Expected date: 2025    Bacterial vaginitis  -     metroNIDAZOLE (FLAGYL) 500 MG tablet; Take 1 tablet (500 mg total) by mouth every 12 (twelve) hours. for 5 days  Dispense: 10 tablet; Refill: 0    Counseling:     Reviewed the length of IUD efficacy, in this case 8 years.   Reviewed barrier contraception to decrease " risk of STDs.       Chel Wade MD

## 2025-04-28 ENCOUNTER — TELEPHONE (OUTPATIENT)
Dept: SPORTS MEDICINE | Facility: CLINIC | Age: 45
End: 2025-04-28
Payer: COMMERCIAL

## 2025-04-28 ENCOUNTER — PATIENT MESSAGE (OUTPATIENT)
Dept: SPORTS MEDICINE | Facility: CLINIC | Age: 45
End: 2025-04-28
Payer: COMMERCIAL

## 2025-04-28 NOTE — TELEPHONE ENCOUNTER
Called and left voicemail for patient in regards to Dr. Caro being in surgery on 5/1/25 and appointment needs to be rescheduled.

## 2025-04-28 NOTE — TELEPHONE ENCOUNTER
----- Message from Sherin sent at 4/28/2025  3:39 PM CDT -----  Regarding: appt  Contact: [phone]  .Type:  Patient Returning CallWho Called:pt Who Left Message for Patient:Neelima Ruiz MADoes the patient know what this is regarding?:r/s PO appt Would the patient rather a call back or a response via [facility]? Call but ok to message on portal if no answer Best Call Back Number:[phone] Information: n/a

## 2025-05-06 ENCOUNTER — OFFICE VISIT (OUTPATIENT)
Dept: SPORTS MEDICINE | Facility: CLINIC | Age: 45
End: 2025-05-06
Payer: COMMERCIAL

## 2025-05-06 VITALS — SYSTOLIC BLOOD PRESSURE: 115 MMHG | DIASTOLIC BLOOD PRESSURE: 78 MMHG | HEIGHT: 64 IN | BODY MASS INDEX: 36.9 KG/M2

## 2025-05-06 DIAGNOSIS — S83.232A COMPLEX TEAR OF MEDIAL MENISCUS OF LEFT KNEE AS CURRENT INJURY, INITIAL ENCOUNTER: ICD-10-CM

## 2025-05-06 DIAGNOSIS — M94.262 CHONDROMALACIA, KNEE, LEFT: ICD-10-CM

## 2025-05-06 DIAGNOSIS — S83.232D COMPLEX TEAR OF MEDIAL MENISCUS OF LEFT KNEE AS CURRENT INJURY, SUBSEQUENT ENCOUNTER: Primary | ICD-10-CM

## 2025-05-06 PROCEDURE — 99999 PR PBB SHADOW E&M-EST. PATIENT-LVL III: CPT | Mod: PBBFAC,,, | Performed by: STUDENT IN AN ORGANIZED HEALTH CARE EDUCATION/TRAINING PROGRAM

## 2025-05-06 PROCEDURE — 1159F MED LIST DOCD IN RCRD: CPT | Mod: CPTII,S$GLB,, | Performed by: STUDENT IN AN ORGANIZED HEALTH CARE EDUCATION/TRAINING PROGRAM

## 2025-05-06 PROCEDURE — 3074F SYST BP LT 130 MM HG: CPT | Mod: CPTII,S$GLB,, | Performed by: STUDENT IN AN ORGANIZED HEALTH CARE EDUCATION/TRAINING PROGRAM

## 2025-05-06 PROCEDURE — 3078F DIAST BP <80 MM HG: CPT | Mod: CPTII,S$GLB,, | Performed by: STUDENT IN AN ORGANIZED HEALTH CARE EDUCATION/TRAINING PROGRAM

## 2025-05-06 PROCEDURE — 1160F RVW MEDS BY RX/DR IN RCRD: CPT | Mod: CPTII,S$GLB,, | Performed by: STUDENT IN AN ORGANIZED HEALTH CARE EDUCATION/TRAINING PROGRAM

## 2025-05-06 PROCEDURE — 99024 POSTOP FOLLOW-UP VISIT: CPT | Mod: S$GLB,,, | Performed by: STUDENT IN AN ORGANIZED HEALTH CARE EDUCATION/TRAINING PROGRAM

## 2025-05-07 ENCOUNTER — PATIENT MESSAGE (OUTPATIENT)
Dept: SPORTS MEDICINE | Facility: CLINIC | Age: 45
End: 2025-05-07
Payer: COMMERCIAL

## 2025-05-07 DIAGNOSIS — Z98.890 S/P MEDIAL MENISCAL REPAIR: Primary | ICD-10-CM

## 2025-05-07 RX ORDER — METHOCARBAMOL 500 MG/1
500 TABLET, FILM COATED ORAL 3 TIMES DAILY PRN
Qty: 30 TABLET | Refills: 0 | Status: SHIPPED | OUTPATIENT
Start: 2025-05-07

## 2025-05-07 RX ORDER — TRAMADOL HYDROCHLORIDE 50 MG/1
50 TABLET ORAL EVERY 6 HOURS
Qty: 14 TABLET | Refills: 0 | Status: SHIPPED | OUTPATIENT
Start: 2025-05-07

## 2025-05-07 RX ORDER — OXYCODONE HYDROCHLORIDE 5 MG/1
5 TABLET ORAL EVERY 6 HOURS PRN
Qty: 28 TABLET | Refills: 0 | OUTPATIENT
Start: 2025-05-07

## 2025-05-08 NOTE — PROGRESS NOTES
Subjective:     Chief Complaint: Kristofer Gentile is a 44 y.o. female who had concerns including Post-op Evaluation of the Left Knee.    HPI  History of Present Illness    CHIEF COMPLAINT:  - Client presents for follow-up s/p left knee surgery.    HPI:  Client presents for follow-up after left knee surgery. She reports tripping on crutches on Sunday, landing on her left knee, but states it felt solid and she did not experience pain. The next day, she had soreness, for which she attended PT, applied ice, and took pain medication. She woke up feeling better the following morning. She mentions having a setback in week one, which she believes has delayed her swelling reduction by a week.    Left knee pain is present on both the inside and outside, due to weakness, and she feels it in a specific area. Morning stiffness occurs, causing her to walk on her toes until she applies ice and touches the knee. She also has numbness in her inner calf, attributed to the nerve block from surgery.    She estimates she is putting about 50/50 weight on her leg, though she acknowledges it might be more. She walked around her house over the weekend, starting in her bedroom and progressing to the kitchen, noting that it felt strong.    She initially expressed hesitation but agreed to an aspiration procedure. Post-aspiration, she reported immediate improvement, stating that it feels much better.    She also mentions ongoing issues with her right knee, describing pain in the side. She reports positive effects from a previous gel shot in the right knee.    Client denies any medical conditions.    PREVIOUS TREATMENTS:  - Client has been icing the left knee, which provided some benefit in reducing swelling and pain  - PT was attended recently  - Client has been using pain medications  - Client has been walking around the house to test the knee strength    WORK STATUS:  - Client works in [email or web address]  - Parking garage is far from the  building, requiring a long walk  - Elevator is sometimes out of service, necessitating climbing two flights of stairs  - Client has requested an additional week or two off work due to mobility challenges  - I agreed to write a note allowing the patient to return to work in 1-2 weeks, with the exact date to be determined by the patient         DATE OF PROCEDURE: 03/21/2025     SURGEON: Cullen Caro MD     PROCEDURE PERFORMED:   Left knee arthroscopic medial meniscal root repair   Left knee arthroscopic synovectomy   Left knee arthroscopic chondroplasty of the lateral femoral condyle   Left knee arthroscopic cartilage biopsy for staged KATE procedure   Left knee arthroscopic partial lateral meniscectomy  Right knee viscosupplementation injection    Review of Systems   Constitutional: Negative.   HENT: Negative.     Eyes: Negative.    Cardiovascular: Negative.    Respiratory: Negative.     Endocrine: Negative.    Hematologic/Lymphatic: Negative.    Skin: Negative.    Musculoskeletal:  Positive for joint pain, muscle weakness and stiffness. Negative for arthritis, falls and joint swelling.   Neurological: Negative.    Psychiatric/Behavioral: Negative.     Allergic/Immunologic: Negative.                  Objective:     General: Kristofer is well-developed, well-nourished, appears stated age, in no acute distress, alert and oriented to time, place and person.     General    Nursing note and vitals reviewed.  Constitutional: She is oriented to person, place, and time. She appears well-developed and well-nourished. No distress.   HENT:   Head: Normocephalic and atraumatic.   Nose: Nose normal.   Eyes: EOM are normal.   Cardiovascular:  Intact distal pulses.            Pulmonary/Chest: Effort normal. No respiratory distress.   Neurological: She is alert and oriented to person, place, and time.   Psychiatric: She has a normal mood and affect. Her behavior is normal. Judgment and thought content normal.             Left  Knee Exam     Inspection   Erythema: absent  Scars: present  Swelling: absent  Effusion: absent  Deformity: absent  Bruising: absent    Range of Motion   Extension:  0   Flexion:  80     Other   Sensation: normal    Comments:  Incision sites healing well, without signs of erythema, infection, or wound dehiscence      Vascular Exam       Left Pulses  Dorsalis Pedis:      2+  Posterior Tibial:      2+        Edema  Left Lower Leg: absent          Assessment:   Kristofer Gentile is a 44 y.o. female status post above and doing well  Encounter Diagnoses   Name Primary?    Chondromalacia, knee, left     Complex tear of medial meniscus of left knee as current injury, subsequent encounter Yes        Plan:     Assessment & Plan    PROCEDURES:  - Performed aspiration of left knee, removing ~25 cc of fluid.  - Potential future cartilage repair using previously harvested cartilage sample if patient experiences continued pain on the outside of the knee.  - Cartilage repair would involve growing the sample into a membrane and implanting it arthroscopically to fill the cartilage defect.    FOLLOW UP:  - Follow up in 6 weeks.  - Work note provided for additional 1-2 weeks off, as requested by patient.    PATIENT INSTRUCTIONS:  - Begin weight-bearing as tolerated on left lower extremity.  - Unlock knee brace to allow bending while walking.  - Discontinue sleeping in knee brace.  - Wean off crutches over the next 2 weeks.  - Wean out of brace after discontinuing crutches.  - Resume using stairs as tolerated.           All of the patient's questions were answered. Patient was advised to call the clinic or contact me through the patient portal for any questions or concerns.     This note was generated with the assistance of ambient listening technology. Verbal consent was obtained by the patient and accompanying visitor(s) for the recording of patient appointment to facilitate this note. I attest to having reviewed and edited the  generated note for accuracy, though some syntax or spelling errors may persist. Please contact the author of this note for any clarification.      Patient questionnaires may have been collected.

## 2025-05-23 ENCOUNTER — OFFICE VISIT (OUTPATIENT)
Dept: DERMATOLOGY | Facility: CLINIC | Age: 45
End: 2025-05-23
Payer: COMMERCIAL

## 2025-05-23 DIAGNOSIS — R21 RASH AND NONSPECIFIC SKIN ERUPTION: ICD-10-CM

## 2025-05-23 DIAGNOSIS — L91.0 KELOID: Primary | ICD-10-CM

## 2025-05-23 PROCEDURE — 99999 PR PBB SHADOW E&M-EST. PATIENT-LVL IV: CPT | Mod: PBBFAC,,, | Performed by: DERMATOLOGY

## 2025-05-23 NOTE — PROGRESS NOTES
Subjective:      Patient ID:  Kristofer Gentile is a 44 y.o. female who presents for   Chief Complaint   Patient presents with    Keloid     Chest, raised      Follow up keloid, see previous notes Dr Regna.  The lesion has recurred post injection, had others removed per Dr Richard and would like the remaining lesion removed also.    Keloid - Initial  Affected locations: chest  Signs / symptoms: asymptomatic      Review of Systems   Constitutional:  Negative for fever, chills, weight loss, weight gain, fatigue, night sweats and malaise.   Skin:  Positive for itching. Negative for daily sunscreen use, activity-related sunscreen use and wears hat.   Hematologic/Lymphatic: Does not bruise/bleed easily.       Objective:   Physical Exam   Constitutional: She appears well-developed and well-nourished.   Neurological: She is alert and oriented to person, place, and time.   Psychiatric: She has a normal mood and affect.   Skin:   Areas Examined (abnormalities noted in diagram):   Chest / Axilla Inspection Performed            Diagram Legend     Erythematous scaling macule/papule c/w actinic keratosis       Vascular papule c/w angioma      Pigmented verrucoid papule/plaque c/w seborrheic keratosis      Yellow umbilicated papule c/w sebaceous hyperplasia      Irregularly shaped tan macule c/w lentigo     1-2 mm smooth white papules consistent with Milia      Movable subcutaneous cyst with punctum c/w epidermal inclusion cyst      Subcutaneous movable cyst c/w pilar cyst      Firm pink to brown papule c/w dermatofibroma      Pedunculated fleshy papule(s) c/w skin tag(s)      Evenly pigmented macule c/w junctional nevus     Mildly variegated pigmented, slightly irregular-bordered macule c/w mildly atypical nevus      Flesh colored to evenly pigmented papule c/w intradermal nevus       Pink pearly papule/plaque c/w basal cell carcinoma      Erythematous hyperkeratotic cursted plaque c/w SCC      Surgical scar with no sign of  skin cancer recurrence      Open and closed comedones      Inflammatory papules and pustules      Verrucoid papule consistent consistent with wart     Erythematous eczematous patches and plaques     Dystrophic onycholytic nail with subungual debris c/w onychomycosis     Umbilicated papule    Erythematous-base heme-crusted tan verrucoid plaque consistent with inflamed seborrheic keratosis     Erythematous Silvery Scaling Plaque c/w Psoriasis     See annotation      Assessment / Plan:        Keloid  Referred to Dr Richard for removal     Rash and nonspecific skin eruption  -     Ambulatory referral/consult to Dermatology             Follow up if symptoms worsen or fail to improve.

## 2025-05-29 ENCOUNTER — TELEPHONE (OUTPATIENT)
Dept: PLASTIC SURGERY | Facility: CLINIC | Age: 45
End: 2025-05-29
Payer: COMMERCIAL

## 2025-05-30 ENCOUNTER — TELEPHONE (OUTPATIENT)
Dept: PLASTIC SURGERY | Facility: CLINIC | Age: 45
End: 2025-05-30
Payer: COMMERCIAL

## 2025-06-17 ENCOUNTER — OFFICE VISIT (OUTPATIENT)
Dept: SPORTS MEDICINE | Facility: CLINIC | Age: 45
End: 2025-06-17
Payer: COMMERCIAL

## 2025-06-17 VITALS
HEIGHT: 64 IN | SYSTOLIC BLOOD PRESSURE: 114 MMHG | DIASTOLIC BLOOD PRESSURE: 76 MMHG | HEART RATE: 80 BPM | WEIGHT: 215.75 LBS | BODY MASS INDEX: 36.83 KG/M2

## 2025-06-17 DIAGNOSIS — S83.232D COMPLEX TEAR OF MEDIAL MENISCUS OF LEFT KNEE AS CURRENT INJURY, SUBSEQUENT ENCOUNTER: ICD-10-CM

## 2025-06-17 DIAGNOSIS — M94.262 CHONDROMALACIA, KNEE, LEFT: Primary | ICD-10-CM

## 2025-06-17 PROCEDURE — 99999 PR PBB SHADOW E&M-EST. PATIENT-LVL IV: CPT | Mod: PBBFAC,,, | Performed by: STUDENT IN AN ORGANIZED HEALTH CARE EDUCATION/TRAINING PROGRAM

## 2025-06-17 PROCEDURE — 1159F MED LIST DOCD IN RCRD: CPT | Mod: CPTII,S$GLB,, | Performed by: STUDENT IN AN ORGANIZED HEALTH CARE EDUCATION/TRAINING PROGRAM

## 2025-06-17 PROCEDURE — 3074F SYST BP LT 130 MM HG: CPT | Mod: CPTII,S$GLB,, | Performed by: STUDENT IN AN ORGANIZED HEALTH CARE EDUCATION/TRAINING PROGRAM

## 2025-06-17 PROCEDURE — 3078F DIAST BP <80 MM HG: CPT | Mod: CPTII,S$GLB,, | Performed by: STUDENT IN AN ORGANIZED HEALTH CARE EDUCATION/TRAINING PROGRAM

## 2025-06-17 PROCEDURE — 99024 POSTOP FOLLOW-UP VISIT: CPT | Mod: S$GLB,,, | Performed by: STUDENT IN AN ORGANIZED HEALTH CARE EDUCATION/TRAINING PROGRAM

## 2025-06-17 PROCEDURE — 1160F RVW MEDS BY RX/DR IN RCRD: CPT | Mod: CPTII,S$GLB,, | Performed by: STUDENT IN AN ORGANIZED HEALTH CARE EDUCATION/TRAINING PROGRAM

## 2025-06-17 NOTE — PROGRESS NOTES
Subjective:     Chief Complaint: Kristofer Gentile is a 44 y.o. female who had concerns including Post-op Evaluation of the Left Knee.    HPI  History of Present Illness    CHIEF COMPLAINT:  - Right knee pain and function concerns    HPI:  Client presents for a 3-month post-op follow-up after right knee meniscus repair. She reports right knee pain, particularly when walking for extended periods or standing for prolonged times. Pain worsens with activity, causing her to limp after cooking a meal or visiting multiple stores at the mall.    She attends PT, where she does 5 minutes on the treadmill and 5 minutes on the bike to help with her steps. She notes ongoing issues with the outside part of the knee, where there was an unexpected cartilage defect. This area feels unstable and becomes more painful with increased use. She reports some residual fluid in the knee, though it has decreased significantly since her last visit.    She is concerned about her ability to return to regular workout routines and walk long distances. She has been following an intermittent fasting diet to control her weight during recovery.    She denies any new injuries or falls since the surgery.    PREVIOUS TREATMENTS:  - Meniscus repair: Performed approximately 3 months ago  - PT: Ongoing, including 5 minutes on treadmill and 5 minutes on bike  - Fluid aspiration from the knee: Performed previously    WORK STATUS:  - Works from home  - Experiences difficulty working for long periods due to knee pain  - Expresses need to increase work-from-home duration         DATE OF PROCEDURE: 03/21/2025     SURGEON: Cullen Caro MD     PROCEDURE PERFORMED:   Left knee arthroscopic medial meniscal root repair   Left knee arthroscopic synovectomy   Left knee arthroscopic chondroplasty of the lateral femoral condyle   Left knee arthroscopic cartilage biopsy for staged KATE procedure   Left knee arthroscopic partial lateral meniscectomy  Right knee  viscosupplementation injection    Review of Systems   Constitutional: Negative.   HENT: Negative.     Eyes: Negative.    Cardiovascular: Negative.    Respiratory: Negative.     Endocrine: Negative.    Hematologic/Lymphatic: Negative.    Skin: Negative.    Musculoskeletal:  Positive for joint pain, muscle weakness and stiffness. Negative for arthritis, falls and joint swelling.   Neurological: Negative.    Psychiatric/Behavioral: Negative.     Allergic/Immunologic: Negative.        Pain Related Questions  Over the past 3 days, what was your average pain during activity? (I.e. running, jogging, walking, climbing stairs, getting dressed, ect.): 6  Over the past 3 days, what was your highest pain level?: 6  Over the past 3 days, what was your lowest pain level? : 2    Other  How many nights a week are you awakened by your affected body part?: 7  Was the patient's HEIGHT measured or patient reported?: Patient Reported  Was the patient's WEIGHT measured or patient reported?: Measured    Objective:     General: Kristofer is well-developed, well-nourished, appears stated age, in no acute distress, alert and oriented to time, place and person.     General    Nursing note and vitals reviewed.  Constitutional: She is oriented to person, place, and time. She appears well-developed and well-nourished. No distress.   HENT:   Head: Normocephalic and atraumatic.   Nose: Nose normal. Mouth/Throat: No oropharyngeal exudate.   Eyes: EOM are normal. Right eye exhibits no discharge. Left eye exhibits no discharge.   Cardiovascular:  Intact distal pulses.            Pulmonary/Chest: Effort normal and breath sounds normal. No respiratory distress.   Neurological: She is alert and oriented to person, place, and time. She has normal reflexes. No cranial nerve deficit. Coordination normal.   Psychiatric: She has a normal mood and affect. Her behavior is normal. Judgment and thought content normal.     General Musculoskeletal Exam   Gait:  normal       Right Knee Exam     Inspection   Erythema: absent  Scars: absent  Swelling: absent  Effusion: absent  Deformity: absent  Bruising: absent    Tenderness   The patient is experiencing no tenderness.     Range of Motion   Extension:  0   Flexion:  140     Tests   Meniscus   Naeem:  Medial - negative Lateral - negative  Ligament Examination   Lachman: normal (-1 to 2mm)   PCL-Posterior Drawer: normal (0 to 2mm)     MCL - Valgus: normal (0 to 2mm)  LCL - Varus: normal  Pivot Shift: normal (Equal)  Reverse Pivot Shift: normal (Equal)  Dial Test at 30 degrees: normal (< 5 degrees)  Dial Test at 90 degrees: normal (< 5 degrees)  Posterior Sag Test: negative  Posterolateral Corner: stable  Patella   Patellar apprehension: negative  Passive Patellar Tilt: neutral  Patellar Tracking: normal  Patellar Glide (quadrants): Lateral - 1   Medial - 2  Q-Angle at 90 degrees: normal  Patellar Grind: negative  J-Sign: none    Other   Meniscal Cyst: absent  Popliteal (Baker's) Cyst: absent  Sensation: normal    Left Knee Exam     Inspection   Erythema: absent  Scars: present  Swelling: present  Effusion: absent  Deformity: absent  Bruising: absent    Tenderness   The patient tender to palpation of the lateral joint line.    Range of Motion   Extension:  0 normal   Flexion:  120     Tests   Meniscus   Naeem:  Medial - negative Lateral - negative  Stability   Lachman: normal (-1 to 2mm)   PCL-Posterior Drawer: normal (0 to 2mm)  MCL - Valgus: normal (0 to 2mm)  LCL - Varus: normal (0 to 2mm)  Pivot Shift: normal (Equal)  Reverse Pivot Shift: normal (Equal)  Dial Test at 30 degrees: normal (< 5 degrees)  Dial Test at 90 degrees: normal (< 5 degrees)  Posterior Sag Test: negative  Posterolateral Corner: stable  Patella   Patellar apprehension: negative  Passive Patellar Tilt: neutral  Patellar Tracking: normal  Patellar Glide (Quadrants): Lateral - 1 Medial - 2  Q-Angle at 90 degrees: normal  Patellar Grind:  negative  J-Sign: J sign absent    Other   Meniscal Cyst: absent  Popliteal (Baker's) Cyst: absent  Sensation: normal    Comments:  Incision sites healing well, without signs of erythema, infection, or wound dehiscence      Right Hip Exam     Tests   Margarito: negative  Left Hip Exam     Tests   Margarito: negative          Muscle Strength   Left Lower Extremity   Hip Abduction: 5/5   Quadriceps:  4/5   Hamstrin/5     Reflexes     Left Side  Achilles:  2+  Quadriceps:  2+    Right Side   Achilles:  2+  Quadriceps:  2+    Vascular Exam     Right Pulses  Dorsalis Pedis:      2+  Posterior Tibial:      2+        Left Pulses  Dorsalis Pedis:      2+  Posterior Tibial:      2+        Edema  Left Lower Leg: absent          Assessment:   Kristofer Gentile is a 44 y.o. female status post above and doing well  Encounter Diagnoses   Name Primary?    Chondromalacia, knee, left Yes    Complex tear of medial meniscus of left knee as current injury, subsequent encounter           Plan:     Assessment & Plan    PROCEDURES:  - Discussed potential future cartilage repair.  - Could be done arthroscopically.  - Recovery similar to recent meniscus repair.  - Weight-bearing allowed almost right away, with full weight-bearing at about 2 weeks post-op.    FOLLOW UP:  - Follow up in 2 months to reassess knee condition and discuss potential return to personal training.    PATIENT INSTRUCTIONS:  - Continue with home PT exercises.  - Use stationary bike or outdoor cycling for knee motion and strength.  - Started Pilates for exercise.  - Avoid treadmill use.  - Gradually increase activity as tolerated.         All of the patient's questions were answered. Patient was advised to call the clinic or contact me through the patient portal for any questions or concerns.     This note was generated with the assistance of ambient listening technology. Verbal consent was obtained by the patient and accompanying visitor(s) for the recording of patient  appointment to facilitate this note. I attest to having reviewed and edited the generated note for accuracy, though some syntax or spelling errors may persist. Please contact the author of this note for any clarification.      Patient questionnaires may have been collected.

## 2025-07-02 ENCOUNTER — OFFICE VISIT (OUTPATIENT)
Dept: PLASTIC SURGERY | Facility: CLINIC | Age: 45
End: 2025-07-02
Payer: COMMERCIAL

## 2025-07-02 VITALS
HEART RATE: 67 BPM | BODY MASS INDEX: 37.63 KG/M2 | DIASTOLIC BLOOD PRESSURE: 61 MMHG | SYSTOLIC BLOOD PRESSURE: 111 MMHG | WEIGHT: 219.25 LBS

## 2025-07-02 DIAGNOSIS — L91.0 KELOID: ICD-10-CM

## 2025-07-02 PROCEDURE — 1159F MED LIST DOCD IN RCRD: CPT | Mod: CPTII,S$GLB,, | Performed by: SURGERY

## 2025-07-02 PROCEDURE — 99999 PR PBB SHADOW E&M-EST. PATIENT-LVL IV: CPT | Mod: PBBFAC,,, | Performed by: SURGERY

## 2025-07-02 PROCEDURE — 3074F SYST BP LT 130 MM HG: CPT | Mod: CPTII,S$GLB,, | Performed by: SURGERY

## 2025-07-02 PROCEDURE — 3078F DIAST BP <80 MM HG: CPT | Mod: CPTII,S$GLB,, | Performed by: SURGERY

## 2025-07-02 PROCEDURE — 99213 OFFICE O/P EST LOW 20 MIN: CPT | Mod: S$GLB,,, | Performed by: SURGERY

## 2025-07-02 PROCEDURE — 3008F BODY MASS INDEX DOCD: CPT | Mod: CPTII,S$GLB,, | Performed by: SURGERY

## 2025-07-02 RX ORDER — IBUPROFEN 100 MG/5ML
1000 SUSPENSION, ORAL (FINAL DOSE FORM) ORAL DAILY
COMMUNITY

## 2025-07-02 NOTE — PROGRESS NOTES
Plastic Surgery Consult Note    Kristofer Gentile is a 44 y.o. year old female who presents to the Plastic Surgery Clinic on 07/02/2025 for consultation regarding chest keloid. Pt reports she has had steroid injections in this area without any relief. Denies fever, chills, nausea, vomiting, or other systemic signs of infection.    ROS: Negative unless otherwise stated above in HPI    PHYSICAL EXAM:  Vitals:    07/02/25 1126   BP: 111/61   Pulse: 67     WD WN NAD  VSS  Normal resp effort  Chest keloid roughly 1 cm.     ASSESSMENT/PLAN:   44 y.o. female with chest keloid  - Patient was seen and evaluated by myself and Dr. Christiano Richard    - Plan for keloid excision in minor procedure room w/ radiation to follow.   - Risks, benefits and alternatives to surgery were discussed.   - Office staff to coordinate surgery date once insurance authorization obtained    All questions were answered. The patient was advised to call the clinic with any questions or concerns prior to their next visit.     Katalina Llanes  Plastic and Reconstructive Surgery   [phone]     Procedure: Chest keloid excision MINOR PROCEDURE ROOM  CPT codes: 27614  Orders/Clearance: N/A

## 2025-07-07 RX ORDER — VALACYCLOVIR HYDROCHLORIDE 500 MG/1
500 TABLET, FILM COATED ORAL 2 TIMES DAILY
Qty: 60 TABLET | Refills: 11 | Status: SHIPPED | OUTPATIENT
Start: 2025-07-07 | End: 2026-07-07

## 2025-07-31 ENCOUNTER — OFFICE VISIT (OUTPATIENT)
Dept: OPTOMETRY | Facility: CLINIC | Age: 45
End: 2025-07-31
Payer: COMMERCIAL

## 2025-07-31 ENCOUNTER — LAB VISIT (OUTPATIENT)
Dept: LAB | Facility: HOSPITAL | Age: 45
End: 2025-07-31
Attending: INTERNAL MEDICINE
Payer: COMMERCIAL

## 2025-07-31 ENCOUNTER — OFFICE VISIT (OUTPATIENT)
Dept: INTERNAL MEDICINE | Facility: CLINIC | Age: 45
End: 2025-07-31
Payer: COMMERCIAL

## 2025-07-31 VITALS
WEIGHT: 218.69 LBS | BODY MASS INDEX: 37.34 KG/M2 | HEIGHT: 64 IN | HEART RATE: 67 BPM | DIASTOLIC BLOOD PRESSURE: 70 MMHG | OXYGEN SATURATION: 98 % | SYSTOLIC BLOOD PRESSURE: 110 MMHG | TEMPERATURE: 98 F

## 2025-07-31 DIAGNOSIS — R30.0 DYSURIA: ICD-10-CM

## 2025-07-31 DIAGNOSIS — Z00.00 ANNUAL PHYSICAL EXAM: ICD-10-CM

## 2025-07-31 DIAGNOSIS — Z12.11 SCREEN FOR COLON CANCER: ICD-10-CM

## 2025-07-31 DIAGNOSIS — E78.2 MIXED HYPERLIPIDEMIA: ICD-10-CM

## 2025-07-31 DIAGNOSIS — R76.8 ANTI-TPO ANTIBODIES PRESENT: ICD-10-CM

## 2025-07-31 DIAGNOSIS — Z00.00 ANNUAL PHYSICAL EXAM: Primary | ICD-10-CM

## 2025-07-31 DIAGNOSIS — E66.9 OBESITY (BMI 30-39.9): ICD-10-CM

## 2025-07-31 DIAGNOSIS — E04.9 PALPABLE THYROID: ICD-10-CM

## 2025-07-31 DIAGNOSIS — R30.0 DYSURIA: Primary | ICD-10-CM

## 2025-07-31 DIAGNOSIS — D64.9 ANEMIA, UNSPECIFIED TYPE: ICD-10-CM

## 2025-07-31 DIAGNOSIS — H04.123 DRY EYE SYNDROME OF BOTH EYES: Primary | ICD-10-CM

## 2025-07-31 DIAGNOSIS — H52.4 BILATERAL PRESBYOPIA: ICD-10-CM

## 2025-07-31 DIAGNOSIS — E55.9 VITAMIN D INSUFFICIENCY: ICD-10-CM

## 2025-07-31 LAB
ABSOLUTE EOSINOPHIL (OHS): 0.07 K/UL
ABSOLUTE MONOCYTE (OHS): 0.42 K/UL (ref 0.3–1)
ABSOLUTE NEUTROPHIL COUNT (OHS): 1.69 K/UL (ref 1.8–7.7)
ALBUMIN SERPL BCP-MCNC: 3.6 G/DL (ref 3.5–5.2)
ALP SERPL-CCNC: 58 UNIT/L (ref 40–150)
ALT SERPL W/O P-5'-P-CCNC: 17 UNIT/L (ref 0–55)
ANION GAP (OHS): 7 MMOL/L (ref 8–16)
AST SERPL-CCNC: 27 UNIT/L (ref 0–50)
BASOPHILS # BLD AUTO: 0.03 K/UL
BASOPHILS NFR BLD AUTO: 0.8 %
BILIRUB SERPL-MCNC: 0.4 MG/DL (ref 0.1–1)
BILIRUB SERPL-MCNC: NEGATIVE MG/DL
BILIRUB UR QL STRIP.AUTO: NEGATIVE
BLOOD URINE, POC: NEGATIVE
BUN SERPL-MCNC: 11 MG/DL (ref 6–20)
CALCIUM SERPL-MCNC: 8.7 MG/DL (ref 8.7–10.5)
CHLORIDE SERPL-SCNC: 107 MMOL/L (ref 95–110)
CHOLEST SERPL-MCNC: 211 MG/DL (ref 120–199)
CHOLEST/HDLC SERPL: 5.1 {RATIO} (ref 2–5)
CLARITY UR: CLEAR
CLARITY, POC UA: CLEAR
CO2 SERPL-SCNC: 23 MMOL/L (ref 23–29)
COLOR UR AUTO: YELLOW
COLOR, POC UA: YELLOW
CREAT SERPL-MCNC: 0.8 MG/DL (ref 0.5–1.4)
EAG (OHS): 91 MG/DL (ref 68–131)
ERYTHROCYTE [DISTWIDTH] IN BLOOD BY AUTOMATED COUNT: 13 % (ref 11.5–14.5)
GFR SERPLBLD CREATININE-BSD FMLA CKD-EPI: >60 ML/MIN/1.73/M2
GLUCOSE SERPL-MCNC: 87 MG/DL (ref 70–110)
GLUCOSE UR QL STRIP: NEGATIVE
GLUCOSE UR QL STRIP: NORMAL
HBA1C MFR BLD: 4.8 % (ref 4–5.6)
HCT VFR BLD AUTO: 35.7 % (ref 37–48.5)
HDLC SERPL-MCNC: 41 MG/DL (ref 40–75)
HDLC SERPL: 19.4 % (ref 20–50)
HGB BLD-MCNC: 11.8 GM/DL (ref 12–16)
HGB UR QL STRIP: NEGATIVE
IMM GRANULOCYTES # BLD AUTO: 0.02 K/UL (ref 0–0.04)
IMM GRANULOCYTES NFR BLD AUTO: 0.5 % (ref 0–0.5)
KETONES UR QL STRIP: NEGATIVE
KETONES UR QL STRIP: NEGATIVE
LDLC SERPL CALC-MCNC: 161.2 MG/DL (ref 63–159)
LEUKOCYTE ESTERASE UR QL STRIP: NEGATIVE
LEUKOCYTE ESTERASE URINE, POC: NEGATIVE
LYMPHOCYTES # BLD AUTO: 1.7 K/UL (ref 1–4.8)
MCH RBC QN AUTO: 30.1 PG (ref 27–31)
MCHC RBC AUTO-ENTMCNC: 33.1 G/DL (ref 32–36)
MCV RBC AUTO: 91 FL (ref 82–98)
NITRITE UR QL STRIP: NEGATIVE
NITRITE, POC UA: NEGATIVE
NONHDLC SERPL-MCNC: 170 MG/DL
NUCLEATED RBC (/100WBC) (OHS): 0 /100 WBC
PH UR STRIP: 6 [PH]
PH, POC UA: 5
PLATELET # BLD AUTO: 263 K/UL (ref 150–450)
PMV BLD AUTO: 10.7 FL (ref 9.2–12.9)
POTASSIUM SERPL-SCNC: 4.4 MMOL/L (ref 3.5–5.1)
PROT SERPL-MCNC: 7.3 GM/DL (ref 6–8.4)
PROT UR QL STRIP: NEGATIVE
PROTEIN, POC: NEGATIVE
RBC # BLD AUTO: 3.92 M/UL (ref 4–5.4)
RELATIVE EOSINOPHIL (OHS): 1.8 %
RELATIVE LYMPHOCYTE (OHS): 43.3 % (ref 18–48)
RELATIVE MONOCYTE (OHS): 10.7 % (ref 4–15)
RELATIVE NEUTROPHIL (OHS): 42.9 % (ref 38–73)
SODIUM SERPL-SCNC: 137 MMOL/L (ref 136–145)
SP GR UR STRIP: 1.01
SPECIFIC GRAVITY, POC UA: 1010
TRIGL SERPL-MCNC: 44 MG/DL (ref 30–150)
TSH SERPL-ACNC: 2.17 UIU/ML (ref 0.4–4)
UROBILINOGEN UR STRIP-ACNC: NEGATIVE EU/DL
UROBILINOGEN, POC UA: NORMAL
WBC # BLD AUTO: 3.93 K/UL (ref 3.9–12.7)

## 2025-07-31 PROCEDURE — 81003 URINALYSIS AUTO W/O SCOPE: CPT | Performed by: INTERNAL MEDICINE

## 2025-07-31 PROCEDURE — 83036 HEMOGLOBIN GLYCOSYLATED A1C: CPT

## 2025-07-31 PROCEDURE — 99999 PR PBB SHADOW E&M-EST. PATIENT-LVL V: CPT | Mod: PBBFAC,,, | Performed by: INTERNAL MEDICINE

## 2025-07-31 PROCEDURE — 3078F DIAST BP <80 MM HG: CPT | Mod: CPTII,S$GLB,, | Performed by: INTERNAL MEDICINE

## 2025-07-31 PROCEDURE — 99396 PREV VISIT EST AGE 40-64: CPT | Mod: S$GLB,,, | Performed by: INTERNAL MEDICINE

## 2025-07-31 PROCEDURE — 3008F BODY MASS INDEX DOCD: CPT | Mod: CPTII,S$GLB,, | Performed by: INTERNAL MEDICINE

## 2025-07-31 PROCEDURE — 1160F RVW MEDS BY RX/DR IN RCRD: CPT | Mod: CPTII,S$GLB,, | Performed by: OPTOMETRIST

## 2025-07-31 PROCEDURE — 87086 URINE CULTURE/COLONY COUNT: CPT | Performed by: INTERNAL MEDICINE

## 2025-07-31 PROCEDURE — 80053 COMPREHEN METABOLIC PANEL: CPT

## 2025-07-31 PROCEDURE — 85025 COMPLETE CBC W/AUTO DIFF WBC: CPT

## 2025-07-31 PROCEDURE — 81002 URINALYSIS NONAUTO W/O SCOPE: CPT | Mod: S$GLB,,, | Performed by: INTERNAL MEDICINE

## 2025-07-31 PROCEDURE — 3074F SYST BP LT 130 MM HG: CPT | Mod: CPTII,S$GLB,, | Performed by: INTERNAL MEDICINE

## 2025-07-31 PROCEDURE — 1160F RVW MEDS BY RX/DR IN RCRD: CPT | Mod: CPTII,S$GLB,, | Performed by: INTERNAL MEDICINE

## 2025-07-31 PROCEDURE — 92004 COMPRE OPH EXAM NEW PT 1/>: CPT | Mod: S$GLB,,, | Performed by: OPTOMETRIST

## 2025-07-31 PROCEDURE — 99999 PR PBB SHADOW E&M-EST. PATIENT-LVL IV: CPT | Mod: PBBFAC,,, | Performed by: OPTOMETRIST

## 2025-07-31 PROCEDURE — 1159F MED LIST DOCD IN RCRD: CPT | Mod: CPTII,S$GLB,, | Performed by: OPTOMETRIST

## 2025-07-31 PROCEDURE — 84443 ASSAY THYROID STIM HORMONE: CPT

## 2025-07-31 PROCEDURE — 36415 COLL VENOUS BLD VENIPUNCTURE: CPT | Mod: PO

## 2025-07-31 PROCEDURE — 3044F HG A1C LEVEL LT 7.0%: CPT | Mod: CPTII,S$GLB,, | Performed by: INTERNAL MEDICINE

## 2025-07-31 PROCEDURE — 1159F MED LIST DOCD IN RCRD: CPT | Mod: CPTII,S$GLB,, | Performed by: INTERNAL MEDICINE

## 2025-07-31 PROCEDURE — 80061 LIPID PANEL: CPT

## 2025-07-31 NOTE — PROGRESS NOTES
Subjective:     PCP: Melonie Villanueva MD    Kristofer Gentile is a 45 y.o. female who presents for an annual exam.    Medical History:   Past Medical History:   Diagnosis Date    Allergy     Anti-TPO antibodies present 11/8/2018    Carpal tunnel syndrome     Dyslipidemia (high LDL; low HDL) 6/25/2013    Fever blister     Herpes simplex labialis 6/25/2013    Intraductal papilloma of left breast 3/1/2019    IUD (intrauterine device) in place 2009    Metabolic syndrome 7/22/2013    Thyroid disease     treated with synthroid during pregnancy    Vitamin D deficiency 11/5/2018    Vitamin D insufficiency 11/5/2018       Family History: family history includes Allergies in her child and father; Angioedema in her mother; Arthritis in her mother; Asbestos in her maternal uncle; Atrial fibrillation in her mother; Cancer in her father; Cataracts in her maternal grandfather and mother; Coronary artery disease in her maternal uncle and mother; Dementia in her father; Diabetes in her mother; Glaucoma in her mother; Hyperlipidemia in her father and mother; Hypertension in her father, maternal aunt, and mother; Kidney disease in her mother; Leukemia in her maternal uncle; Liver cancer in her father; Liver disease in her mother; Miscarriages / Stillbirths in her mother; Rheum arthritis in her maternal grandmother, mother, and paternal grandmother; Sarcoidosis in her mother; Stroke in her father and mother; Thyroid disease in her mother; Vision loss in her mother.    Surgical History:   Past Surgical History:   Procedure Laterality Date    ARTHROSCOPIC CHONDROPLASTY OF KNEE JOINT Left 3/21/2025    Procedure: ARTHROSCOPY, KNEE, WITH CHONDROPLASTY;  Surgeon: Cullen Caro MD;  Location: [facility] OR;  Service: Orthopedics;  Laterality: Left;    ARTHROSCOPY, KNEE, WITH MEDIAL OR LATERAL MENISCECTOMY Left 3/21/2025    Procedure: ARTHROSCOPY, KNEE, WITH MEDIAL OR LATERAL MENISCECTOMY;  Surgeon: Cullen Caro MD;  Location: [facility]  OR;  Service: Orthopedics;  Laterality: Left;    ARTHROSCOPY,KNEE,WITH MENISCUS REPAIR Left 3/21/2025    Procedure: ARTHROSCOPY,KNEE,WITH MENISCUS REPAIR- [facility];  Surgeon: Cullen Caro MD;  Location: [facility] OR;  Service: Orthopedics;  Laterality: Left;  Medial Repair    BIOPSY OF MASS OF LOWER EXTREMITY Left 3/21/2025    Procedure: BIOPSY, MASS, LOWER EXTREMITY;  Surgeon: Cullen Caro MD;  Location: [facility];  Service: Orthopedics;  Laterality: Left;  elian biopsy    BREAST BIOPSY Left 12/14/2018    Core bx, benign    BREAST SURGERY  March 2019    Had a growth removed from my milk duct    EXCISIONAL BIOPSY Left 03/01/2019    Procedure: EXCISIONAL BIOPSY- w/major duct excision KENALOG INJECTION AT TIME OF SURGERY;  Surgeon: Kelby Mcmahan MD;  Location: [address];  Service: General;  Laterality: Left;    INJECTION OF JOINT Right 3/21/2025    Procedure: INJECTION, JOINT, SHOULDER, HIP, OR KNEE;  Surgeon: Cullen Caro MD;  Location: [facility];  Service: Orthopedics;  Laterality: Right;  Viscosupplementation injection    INTRAUTERINE DEVICE INSERTION  2009    again 2014    PANNICULECTOMY N/A 04/23/2024    Procedure: PANNICULECTOMY;  Surgeon: Christiano Richard MD;  Location: [facility] OR [address] [place] [address];  Service: Plastics;  Laterality: N/A;    SYNOVECTOMY, MAJOR, KNEE, ARTHROSCOPIC Left 3/21/2025    Procedure: SYNOVECTOMY, MAJOR, KNEE, ARTHROSCOPIC;  Surgeon: Cullen Caro MD;  Location: [facility];  Service: Orthopedics;  Laterality: Left;        Social History:  reports that she has never smoked. She has been exposed to tobacco smoke. She has never used smokeless tobacco. She reports that she does not currently use alcohol after a past usage of about 0.8 standard drinks of alcohol per week. She reports that she does not use drugs.     Allergies:   Review of patient's allergies indicates:   Allergen Reactions    Dermabond [tissue glues]     Sulfa (sulfonamide antibiotics) Hives     Also causes  joint stiffness.       Medications:   Current Outpatient Medications   Medication Sig    ascorbic acid, vitamin C, (VITAMIN C) 1000 MG tablet Take 1,000 mg by mouth once daily.    cholecalciferol, vitamin D3, (VITAMIN D3) 125 mcg (5,000 unit) Tab Take 2,000 Units by mouth once daily.    COLLAGEN MISC by Misc.(Non-Drug; Combo Route) route.    docosahexaenoic acid/epa (FISH OIL ORAL) Take by mouth.    FLONASE ALLERGY RELIEF 50 mcg/actuation nasal spray SPRAY TWICE IN EACH NOSTRIL ONCE DAILY (Patient taking differently: as needed.)    hydrocortisone 2.5 % cream Apply topically 2 (two) times daily. Mix with ketoconazole cream and apply twice a day for 1-2 weeks when flaring. Only use when flaring.    ketoconazole (NIZORAL) 2 % cream Apply topically 2 (two) times daily. Apply twice a day with hydrocortisone when flaring for up to two weeks at a time.    L.acid/L.casei/B.bif/B.deanna/FOS (PROBIOTIC BLEND ORAL) Take by mouth.    oxyCODONE (ROXICODONE) 5 MG immediate release tablet Take 1 tablet (5 mg total) by mouth every 6 (six) hours as needed for Pain.    promethazine (PHENERGAN) 25 MG tablet Take 1 tablet (25 mg total) by mouth every 6 (six) hours as needed for Nausea.    thymol/chlorophyllin (CHLOROPHYLL ORAL) Take by mouth.    traMADoL (ULTRAM) 50 mg tablet Take 1 tablet (50 mg total) by mouth every 6 (six) hours.    valACYclovir (VALTREX) 500 MG tablet Take 1 tablet (500 mg total) by mouth 2 (two) times daily.    aspirin (ECOTRIN) 81 MG EC tablet Take 1 tablet (81 mg total) by mouth once daily.    CALCIUM ORAL Take by mouth. (Patient not taking: Reported on 7/31/2025)    celecoxib (CELEBREX) 200 MG capsule Take 1 capsule (200 mg total) by mouth 2 (two) times daily with meals. (Patient not taking: Reported on 7/31/2025)    nitrofurantoin, macrocrystal-monohydrate, (MACROBID) 100 MG capsule Take 1 capsule (100 mg total) by mouth 2 (two) times daily. (Patient not taking: Reported on 7/31/2025)     Current  Facility-Administered Medications   Medication    levonorgestreL (Mirena) 52 mg IUD 1 Intra Uterine Device       Health Maintenance:   Health Maintenance Topics with due status: Not Due       Topic Last Completion Date    TETANUS VACCINE 10/31/2018    Influenza Vaccine 01/04/2021    Cervical Cancer Screening 10/03/2022    Hemoglobin A1c (Diabetic Prevention Screening) 03/10/2023    Mammogram 11/26/2024    Lipid Panel 03/05/2025    RSV Vaccine (Age 60+ and Pregnant patients) Not Due     Lab Results   Component Value Date    HEPCAB Negative 08/20/2020     Eye Exam:   harder to see small writing  Dental Exam: not within the last year  OB/GYN: No data on file.   Pap smear: 10/10/2022  Mammogram:    Colonoscopy:     FitKit:    Cologuard:   HIV:   Hepatitis C  Ab:     Vaccinations:  Immunization History   Administered Date(s) Administered    COVID-19, MRNA, LN-S, PF (Pfizer) (Purple Cap) 04/06/2021, 04/27/2021, 04/22/2022    DTP 1980, 1980, 01/13/1981, 06/01/1982    Influenza - Quadrivalent - PF *Preferred* (6 months and older) 10/31/2018, 11/25/2019, 01/04/2021    MMR 10/19/1981    OPV 1980, 1980, 01/13/1981, 06/01/1982    Td - PF (ADULT) 10/31/2018    Tdap 03/28/2008     Influenza:   Tetanus:   Shingrix:   Pneumovax:   Prevnar-13:   Covid vaccine:    Body mass index is 37.54 kg/m².  Wt Readings from Last 3 Encounters:   07/31/25 99.2 kg (218 lb 11.1 oz)   07/02/25 99.5 kg (219 lb 4 oz)   06/17/25 97.8 kg (215 lb 11.5 oz)   Mg glycinate qpm    X1 week  Review of Systems   Genitourinary:  Positive for dysuria and urgency.     DISCOLORATION AFTER RADIATION       Objective:     Physical Exam         Assessment:        1. Dysuria    2. Vitamin D insufficiency    3. Obesity (BMI 30-39.9)    4. Annual physical exam    5. Palpable thyroid           Plan:     1. Dysuria  - Urinalysis  - Urine Culture High Risk  - POCT URINE DIPSTICK WITHOUT MICROSCOPE    2. Vitamin D insufficiency    3. Obesity (BMI  30-39.9)    4. Annual physical exam  - Hemoglobin A1C; Future  - CBC Auto Differential; Future  - Comprehensive Metabolic Panel; Future  - Lipid Panel; Future  - TSH; Future  - Ambulatory referral/consult to Optometry; Future    5. Palpable thyroid  - US Thyroid; Future      RTC in weeks/months for follow-up or sooner if needed    __________________________    Melonie Villanueva MD, PharmD  [facility]- Internal Medicine  American Board of Obesity Medicine diplomate  Office [phone]         adenopathy.      Left upper body: No supraclavicular adenopathy.   Skin:     General: Skin is warm and dry.      Coloration: Skin is not cyanotic.      Findings: No lesion or rash.      Nails: There is no clubbing.   Neurological:      General: No focal deficit present.      Mental Status: She is alert and oriented to person, place, and time.      Sensory: Sensation is intact.      Coordination: Coordination is intact.      Gait: Gait is intact.      Deep Tendon Reflexes: Reflexes are normal and symmetric.   Psychiatric:         Attention and Perception: Attention normal.         Mood and Affect: Mood normal.         Behavior: Behavior is cooperative.              Assessment:        1. Annual physical exam    2. Mixed hyperlipidemia    3. Anemia, unspecified type    4. Dysuria    5. Palpable thyroid    6. Anti-TPO antibodies present    7. Obesity (BMI 30-39.9)    8. Screen for colon cancer           Plan:     1. Annual physical exam  - Hemoglobin A1C; Future  - CBC Auto Differential; Future  - Comprehensive Metabolic Panel; Future  - Lipid Panel; Future  - TSH; Future  - Ambulatory referral/consult to Optometry; Future    2. Mixed hyperlipidemia  - elevated last year, will check  - Lipid Panel; Future    3. Anemia, unspecified type  - CBC Auto Differential; Future    4. Dysuria  - will treat for UTI if culture is positive  - Urinalysis  - Urine Culture High Risk  - POCT URINE DIPSTICK WITHOUT MICROSCOPE    5. Palpable thyroid  - slight enlargement, check US  - US Thyroid; Future    6. Anti-TPO antibodies present  - TSH; Future    7. Obesity (BMI 30-39.9)  - maintain adequate amount of physical activity    8. Screen for colon cancer  - patient is considering screening options    RTC in 3 months for follow-up or sooner if needed    __________________________    Mleonie Villanueva MD, PharmD  [facility]- [facility] Medicine  American Board of Obesity Medicine diplomate  Office [phone]

## 2025-08-02 LAB — BACTERIA UR CULT: ABNORMAL

## 2025-08-05 ENCOUNTER — OFFICE VISIT (OUTPATIENT)
Dept: RADIATION ONCOLOGY | Facility: CLINIC | Age: 45
End: 2025-08-05
Payer: COMMERCIAL

## 2025-08-05 VITALS
HEART RATE: 62 BPM | SYSTOLIC BLOOD PRESSURE: 118 MMHG | WEIGHT: 220.44 LBS | BODY MASS INDEX: 37.84 KG/M2 | DIASTOLIC BLOOD PRESSURE: 62 MMHG

## 2025-08-05 DIAGNOSIS — L91.0 KELOID: ICD-10-CM

## 2025-08-05 PROCEDURE — 1159F MED LIST DOCD IN RCRD: CPT | Mod: CPTII,S$GLB,, | Performed by: RADIOLOGY

## 2025-08-05 PROCEDURE — 99999 PR PBB SHADOW E&M-EST. PATIENT-LVL IV: CPT | Mod: PBBFAC,,, | Performed by: RADIOLOGY

## 2025-08-05 PROCEDURE — 3044F HG A1C LEVEL LT 7.0%: CPT | Mod: CPTII,S$GLB,, | Performed by: RADIOLOGY

## 2025-08-05 PROCEDURE — 99212 OFFICE O/P EST SF 10 MIN: CPT | Mod: S$GLB,,, | Performed by: RADIOLOGY

## 2025-08-05 PROCEDURE — 1160F RVW MEDS BY RX/DR IN RCRD: CPT | Mod: CPTII,S$GLB,, | Performed by: RADIOLOGY

## 2025-08-05 PROCEDURE — 3074F SYST BP LT 130 MM HG: CPT | Mod: CPTII,S$GLB,, | Performed by: RADIOLOGY

## 2025-08-05 PROCEDURE — 3008F BODY MASS INDEX DOCD: CPT | Mod: CPTII,S$GLB,, | Performed by: RADIOLOGY

## 2025-08-05 PROCEDURE — 3078F DIAST BP <80 MM HG: CPT | Mod: CPTII,S$GLB,, | Performed by: RADIOLOGY

## 2025-08-05 NOTE — PROGRESS NOTES
[facility] - Radiation Oncology     Patient ID: Kristofer Gentile is a 45 y.o. female.    Chief Complaint: Keloid (Discuss xrt-lt breast)      This patient has a history of keloid skin disorder.  She is status post scar revision followed by postoperative radiotherapy in 2024 for prevention of keloids on the abdomen. The patient denies recurrence of the keloids but is somewhat discouraged by the hyperpigmentation of her skin in the irradiated areas.  She has another keloid in the medial Lt. breast and is considering revision.  She presents for discussion of post treatment options.        Review of Systems   Constitutional:  Negative for activity change, appetite change, chills, fatigue and fever.       Physical Exam  Constitutional:       General: She is not in acute distress.     Appearance: Normal appearance.   Chest:          Comments: medial Lt. breast keloid   Neurological:      Mental Status: She is alert.            Assessment and Plan    Keloid skin disorder - Discussed the procedures, risks and benefits of postoperative radiotherapy.  Explained unfortunately, we can't prevent the hyperpigmentation following treatment.  Discussed possible scar revision with no immediate radiotherapy.  Explained we could also give radiotherapy if a keloid begins to develop.  She understands radiotherapy at that time would note cause the keloid to regress but would stop any further development.  She plans to move ahead with removal of the keloid but delay radiotherapy only until necessary.

## 2025-08-11 ENCOUNTER — HOSPITAL ENCOUNTER (OUTPATIENT)
Dept: RADIOLOGY | Facility: HOSPITAL | Age: 45
Discharge: HOME | End: 2025-08-11
Attending: INTERNAL MEDICINE
Payer: COMMERCIAL

## 2025-08-11 DIAGNOSIS — E04.9 PALPABLE THYROID: ICD-10-CM

## 2025-08-11 PROCEDURE — 76536 US EXAM OF HEAD AND NECK: CPT | Mod: TC

## 2025-08-11 PROCEDURE — 76536 US EXAM OF HEAD AND NECK: CPT | Mod: 26,,, | Performed by: INTERNAL MEDICINE

## 2025-08-18 ENCOUNTER — OFFICE VISIT (OUTPATIENT)
Dept: SPORTS MEDICINE | Facility: CLINIC | Age: 45
End: 2025-08-18
Payer: COMMERCIAL

## 2025-08-18 VITALS
BODY MASS INDEX: 35.12 KG/M2 | SYSTOLIC BLOOD PRESSURE: 113 MMHG | DIASTOLIC BLOOD PRESSURE: 76 MMHG | HEIGHT: 64 IN | HEART RATE: 65 BPM | WEIGHT: 205.69 LBS

## 2025-08-18 DIAGNOSIS — M24.662 ARTHROFIBROSIS OF KNEE JOINT, LEFT: Primary | ICD-10-CM

## 2025-08-18 DIAGNOSIS — M94.262 CHONDROMALACIA, KNEE, LEFT: ICD-10-CM

## 2025-08-18 DIAGNOSIS — S83.232D COMPLEX TEAR OF MEDIAL MENISCUS OF LEFT KNEE AS CURRENT INJURY, SUBSEQUENT ENCOUNTER: ICD-10-CM

## 2025-08-18 PROCEDURE — 3074F SYST BP LT 130 MM HG: CPT | Mod: CPTII,S$GLB,, | Performed by: STUDENT IN AN ORGANIZED HEALTH CARE EDUCATION/TRAINING PROGRAM

## 2025-08-18 PROCEDURE — 3008F BODY MASS INDEX DOCD: CPT | Mod: CPTII,S$GLB,, | Performed by: STUDENT IN AN ORGANIZED HEALTH CARE EDUCATION/TRAINING PROGRAM

## 2025-08-18 PROCEDURE — 99213 OFFICE O/P EST LOW 20 MIN: CPT | Mod: 25,S$GLB,, | Performed by: STUDENT IN AN ORGANIZED HEALTH CARE EDUCATION/TRAINING PROGRAM

## 2025-08-18 PROCEDURE — 1159F MED LIST DOCD IN RCRD: CPT | Mod: CPTII,S$GLB,, | Performed by: STUDENT IN AN ORGANIZED HEALTH CARE EDUCATION/TRAINING PROGRAM

## 2025-08-18 PROCEDURE — 3078F DIAST BP <80 MM HG: CPT | Mod: CPTII,S$GLB,, | Performed by: STUDENT IN AN ORGANIZED HEALTH CARE EDUCATION/TRAINING PROGRAM

## 2025-08-18 PROCEDURE — 20610 DRAIN/INJ JOINT/BURSA W/O US: CPT | Mod: LT,S$GLB,, | Performed by: STUDENT IN AN ORGANIZED HEALTH CARE EDUCATION/TRAINING PROGRAM

## 2025-08-18 PROCEDURE — 3044F HG A1C LEVEL LT 7.0%: CPT | Mod: CPTII,S$GLB,, | Performed by: STUDENT IN AN ORGANIZED HEALTH CARE EDUCATION/TRAINING PROGRAM

## 2025-08-18 PROCEDURE — 1160F RVW MEDS BY RX/DR IN RCRD: CPT | Mod: CPTII,S$GLB,, | Performed by: STUDENT IN AN ORGANIZED HEALTH CARE EDUCATION/TRAINING PROGRAM

## 2025-08-18 PROCEDURE — 99999 PR PBB SHADOW E&M-EST. PATIENT-LVL IV: CPT | Mod: PBBFAC,,, | Performed by: STUDENT IN AN ORGANIZED HEALTH CARE EDUCATION/TRAINING PROGRAM

## (undated) DEVICE — DRESSING AQUACEL AG SILVER 4X4

## (undated) DEVICE — Device

## (undated) DEVICE — CONTAINER SPECIMEN STRL 4OZ

## (undated) DEVICE — MARKER FN REG DUAL UTIL RULER

## (undated) DEVICE — SUT 2-0 VICRYL / CT-1

## (undated) DEVICE — KIT BIOPSY CARTLIAGE

## (undated) DEVICE — TRAY SKIN SCRUB WET PREMIUM

## (undated) DEVICE — SUT SUTURETAPE TIGERLINK 1.3MM

## (undated) DEVICE — SOL 0.9% NACL IRRI.IN STERIL

## (undated) DEVICE — MAT SURGICAL ECOSUCTIONER

## (undated) DEVICE — ELECTRODE BLADE INSULATED 1 IN

## (undated) DEVICE — BNDG COFLEX FOAM LF2 ST 6X5YD

## (undated) DEVICE — SUT MONOCRYL 3-0 PS-2 UND

## (undated) DEVICE — DRAPE T EXTRM SURG 121X128X90

## (undated) DEVICE — SEE MEDLINE ITEM 146417

## (undated) DEVICE — TUBING SUC UNIV W/CONN 12FT

## (undated) DEVICE — SUT MCRYL PLUS 4-0 PS2 27IN

## (undated) DEVICE — DRAPE STERI INSTRUMENT 1018

## (undated) DEVICE — GOWN SURGICAL X-LARGE

## (undated) DEVICE — REMOVER STAPLE SKIN STERILE

## (undated) DEVICE — TRAY CATH 1-LYR URIMTR 16FR

## (undated) DEVICE — PAD ELECTRODE STER 1.5X3

## (undated) DEVICE — GAUZE SPONGE 4X4 12PLY

## (undated) DEVICE — DRAPE HALF SURGICAL 40X58IN

## (undated) DEVICE — GLOVE BIOGEL SKINSENSE PI 7.0

## (undated) DEVICE — SUT 2/0 30IN SILK BLK BRAI

## (undated) DEVICE — ELECTRODE REM PLYHSV RETURN 9

## (undated) DEVICE — TIP YANKAUERS BULB NO VENT

## (undated) DEVICE — GAUZE FLUFF XXLG 36X36 2 PLY

## (undated) DEVICE — SPONGE LAP 18X18 PREWASHED

## (undated) DEVICE — SUT SILK 2-0 PS 18IN BLACK

## (undated) DEVICE — SUT VICRYL 3-0 27 SH

## (undated) DEVICE — BOVIE SUCTION

## (undated) DEVICE — PROBE ARTHSCP EDGE ENERGY 50

## (undated) DEVICE — UNDERGLOVES BIOGEL PI SZ 7 LF

## (undated) DEVICE — NDL 18GA X1 1/2 REG BEVEL

## (undated) DEVICE — PACK UNIVERSAL SPLIT II

## (undated) DEVICE — GLOVE PROTEXIS LIGHT BROWN 8.5

## (undated) DEVICE — SYR 30CC LUER LOCK

## (undated) DEVICE — SPONGE GAUZE 16PLY 4X4

## (undated) DEVICE — COVER PROBE NL STRL 3.6X96IN

## (undated) DEVICE — APPLIER CLIP LIAGCLIP 9.375IN

## (undated) DEVICE — SOL NACL IRR 3000ML

## (undated) DEVICE — UNDERGLOVES BIOGEL PI SIZE 8

## (undated) DEVICE — SOL NORMAL USPCA 0.9%

## (undated) DEVICE — DRAPE TOP 53X102IN

## (undated) DEVICE — TOWEL OR XRAY BLUE 17X26IN

## (undated) DEVICE — BLADE SURG #15 CARBON STEEL

## (undated) DEVICE — PAD ABDOMINAL STERILE 8X10IN

## (undated) DEVICE — GOWN ECLIPSE REINF LV4 XLNG XL

## (undated) DEVICE — MANIFOLD 4 PORT

## (undated) DEVICE — BLADE SURG CARBON STEEL #10

## (undated) DEVICE — DRAPE STERI U-SHAPED 47X51IN

## (undated) DEVICE — BLADE SHAVER LANZA 4.2X13CM

## (undated) DEVICE — MARKER SKIN RULER STERILE

## (undated) DEVICE — TRAY MINOR GEN SURG OMC

## (undated) DEVICE — SUT VICRYL PLUS 3-0 SH 18IN

## (undated) DEVICE — TRAY MINOR GEN SURG

## (undated) DEVICE — GOWN AERO CHROME W/ TOWEL XL

## (undated) DEVICE — NDL HYPO STD REG BVL 18GX1.5IN

## (undated) DEVICE — UNDERGLOVES BIOGEL PI SIZE 8.5

## (undated) DEVICE — ADHESIVE DERMABOND ADVANCED

## (undated) DEVICE — DRESSING TEGADERM CHG 3.5X4.5

## (undated) DEVICE — DRAIN CHANNEL ROUND 15FR

## (undated) DEVICE — GOWN ECLIPSE REINF L4 XLNG XXL

## (undated) DEVICE — EVACUATOR WOUND BULB 100CC

## (undated) DEVICE — STAPLER SKIN ROTATING HEAD

## (undated) DEVICE — GLOVE BIOGEL SKINSENSE PI 7.5